# Patient Record
Sex: MALE | Race: WHITE | NOT HISPANIC OR LATINO | Employment: OTHER | URBAN - METROPOLITAN AREA
[De-identification: names, ages, dates, MRNs, and addresses within clinical notes are randomized per-mention and may not be internally consistent; named-entity substitution may affect disease eponyms.]

---

## 2017-11-27 ENCOUNTER — ALLSCRIPTS OFFICE VISIT (OUTPATIENT)
Dept: OTHER | Facility: OTHER | Age: 62
End: 2017-11-27

## 2017-11-29 NOTE — PROGRESS NOTES
Assessment    1  Encounter to establish care (V65 8) (Z76 89)   2  Encounter for preventive health examination (V70 0) (Z00 00)   3  BMI 45 0-49 9, adult (V85 42) (Z68 42)   4  Right groin pain (789 09) (R10 31)   5  Screening for colorectal cancer (V76 51) (Z12 11,Z12 12)   6  Fatigue (780 79) (R53 83)   7  Screening for prostate cancer (V76 44) (Z12 5)   8  Screening for lipid disorders (V77 91) (Z13 220)   9  Screening for other and unspecified cardiovascular conditions (V81 2) (Z13 6)   10  Borderline high blood pressure (796 2) (R03 0)    Plan  BMI 45 0-49 9, adult    · 1 Samantha Ratliff MD, Jose Alfredo Morales (Internal Medicine) Co-Management  *  Status: Active  Requestedfor: 61RBL6181  Care Summary provided  : Yes  BMI 45 0-49 9, adult, Fatigue    · (1) CBC/PLT/DIFF; Status:Active; Requested for:27Nov2017;    · (1) COMPREHENSIVE METABOLIC PANEL; Status:Active; Requested for:27Nov2017;    · (1) TSH WITH FT4 REFLEX; Status:Active; Requested for:27Nov2017; Health Maintenance    · Begin a limited exercise program ; Status:Complete;   Done: 15HNE8241   · Decreasing the stress in your life may help your condition improve ; Status:Complete;  Done: 04JSK7320   · Drink plenty of fluids ; Status:Complete;   Done: 68PBL2782   · Regular aerobic exercise can help reduce stress ; Status:Complete;   Done: 54YLU5562   · There are many exercise options for seniors ; Status:Complete;   Done: 88ZIM1415   · We recommend a colonoscopy ; Status:Complete;   Done: 86ZHQ0204   · We recommend that you follow the Mediterranean diet ; Status:Complete;   Done:27Nov2017  Right groin pain    · * XR HIPS BILATERAL 2 VW W PELVIS IF PERFORMED; Status:Active; Requestedfor:27Nov2017;    · * XR SPINE LUMBAR MINIMUM 4 VIEWS NON INJURY; Status:Active;  Requestedfor:27Nov2017;    · *1 - [facility] Co-[facility]  *  Status: Active  Requested for:27Nov2017  Care Summary provided  : Yes  Screening for colorectal cancer    · 1 Lisa Nichols MD, Lolly West (Gastroenterology) Co-Management  *  Status: Active Requested for: 14XAO6401  Care Summary provided  : Yes   · 1 - Srinivasan Crawley MD, Frannie Gaviria  (Gastroenterology) Co-Management  *  Status: Active  Requested for:27Nov2017  Care Summary provided  : Yes  Screening for lipid disorders, Screening for other and unspecified cardiovascularconditions    · (1) LIPID PANEL FASTING W DIRECT LDL REFLEX; Status:Active; Requestedfor:27Nov2017;   Screening for prostate cancer    · (1) PSA (SCREEN) (Dx V76 44 Screen for Prostate Cancer); Status:Active; Requestedfor:27Nov2017;     Discussion/Summary  Discussion Summary:   49-year-old male here to establish care  Have strongly urged, advised on the importance of lifestyle modifications  Will refer to Dr Karin Blankenship for further management as well   groin pain: Unclear etiology  Will obtain x-rays of low back as well as hips  Will refer to Orthopedics for further evaluation  Exam WNL today  BP: Patient is asymptomatic  Will monitor closely  Have discussed the importance of lifestyle modification, working on weight loss, continue routine exercise regimen, limiting sodium intake  Patient admits to not eating as healthy  I would like patient to return in 2 weeks for BP check  No prior colonoscopy  Referral to GI provided  Will check routine blood work  flu vaccine  maintenance care guided provided  in 2 weeks for BP check or sooner if needed  Counseling Documentation With Imm: The patient was counseled regarding diagnostic results,-- instructions for management,-- risk factor reductions,-- prognosis,-- patient and family education,-- impressions,-- risks and benefits of treatment options,-- importance of compliance with treatment  Medication SE Review and Pt Understands Tx: Possible side effects of new medications were reviewed with the patient/guardian today  The treatment plan was reviewed with the patient/guardian   The patient/guardian understands and agrees with the treatment plan      Chief Complaint  Chief Complaint Free Text Note Form: Pt is here to re-establish care  Pt states he has discomfort right side of groin area times 3 months  All meds/allergies reviewed w/ pt  History of Present Illness  HPI: 67-year-old male here to establish care  states he has a h/o elevated bp, treated for it in his 18sa h/o gout in the pastdailystates he has had a 3 month h/o intermittent right groin painwith prolonged sittingright hip painlow back painany injuries  any pain at present  Review of Systems  Complete-Male:  Constitutional: recent weight gain  Eyes: No complaints of eye pain, no red eyes, no discharge from eyes, no itchy eyes  ENT: no complaints of earache, no hearing loss, no nosebleeds, no nasal discharge, no sore throat, no hoarseness  Cardiovascular: No complaints of slow heart rate, no fast heart rate, no chest pain, no palpitations, no leg claudication, no lower extremity,-- no chest pain,-- no palpitations-- and-- no extremity edema  Respiratory: No complaints of shortness of breath, no wheezing, no cough, no SOB on exertion, no orthopnea or PND-- and-- no shortness of breath  Gastrointestinal: No complaints of abdominal pain, no constipation, no nausea or vomiting, no diarrhea or bloody stools-- and-- no abdominal pain  Genitourinary: no dysuria-- and-- no incontinence  Musculoskeletal: myalgias-- and-- groin pain, but-- as noted in HPI  Neurological: no headache,-- no numbness,-- no tingling-- and-- no limb weakness  Psychiatric: no depression  Active Problems  1  Abnormal EKG (794 31) (R94 31)   2  Chest pain (786 50) (R07 9)   3  Dysuria (788 1) (R30 0)   4  Obesity (278 00) (E66 9)    Past Medical History  1  History of sciatica (V12 49) (Z86 69)  Active Problems And Past Medical History Reviewed: The active problems and past medical history were reviewed and updated today  Surgical History  1  History of Hand Surgery   2   History of Oral Surgery Tooth Extraction 3  History of Tonsillectomy  Surgical History Reviewed: The surgical history was reviewed and updated today  Family History  Mother    1  Family history of Breast Cancer (V16 3)  Father    2  Family history of Heart Disease (V17 49)  Family History Reviewed: The family history was reviewed and updated today  Social History   · Alcohol Use (History)   · Coffee   · Never a smoker   · Occupation: Retired   · Denied: History of Smoker, Current Status Unknown  Social History Reviewed: The social history was reviewed and updated today  Current Meds   1  No Reported Medications Recorded    Allergies  1  [address]  Vital Signs    Recorded: 23SYX6024 12:01PM Recorded: 58ZEP2921 11:16AM   Temperature  98 7 F   Heart Rate  84   Respiration  16   Systolic 937 115   Diastolic 78 80   Height  5 ft 9 in   Weight  337 lb 2 oz   BMI Calculated  49 79   BSA Calculated  2 58       Physical Exam   Constitutional  General appearance: No acute distress, well appearing and well nourished  Eyes  Conjunctiva and lids: No swelling, erythema, or discharge  Pupils and irises: Equal, round and reactive to light  Ears, Nose, Mouth, and Throat  External inspection of ears and nose: Normal    Otoscopic examination: Tympanic membrance translucent with normal light reflex  Canals patent without erythema  Oropharynx: Normal with no erythema, edema, exudate or lesions  Pulmonary  Respiratory effort: No increased work of breathing or signs of respiratory distress  Auscultation of lungs: Clear to auscultation, equal breath sounds bilaterally, no wheezes, no rales, no rhonci  Cardiovascular  Auscultation of heart: Normal rate and rhythm, normal S1 and S2, without murmurs  Examination of extremities for edema and/or varicosities: Normal    Carotid pulses: Normal    Abdomen  Abdomen: Non-tender, no masses  -- obese  Liver and spleen: No hepatomegaly or splenomegaly     Lymphatic  Palpation of lymph nodes in neck: No lymphadenopathy  Musculoskeletal  Inspection/palpation of joints, bones, and muscles: Normal  -- full rom of right hip  Neurologic  Cranial nerves: Cranial nerves 2-12 intact     Psychiatric  Mood and affect: Normal          Future Appointments    Date/Time Provider Specialty Site   12/11/2017 11:00 AM Catrina Hartman MD Family Medicine [address] [place] [address]       Signatures   Electronically signed by : Chris Gee MD; Nov 28 2017  8:07PM EST                       (Author)

## 2017-11-30 ENCOUNTER — APPOINTMENT (OUTPATIENT)
Dept: LAB | Facility: CLINIC | Age: 62
End: 2017-11-30
Payer: COMMERCIAL

## 2017-11-30 ENCOUNTER — TRANSCRIBE ORDERS (OUTPATIENT)
Dept: LAB | Facility: CLINIC | Age: 62
End: 2017-11-30

## 2017-11-30 DIAGNOSIS — Z13.6 SCREENING FOR ISCHEMIC HEART DISEASE: ICD-10-CM

## 2017-11-30 DIAGNOSIS — R53.83 FATIGUE, UNSPECIFIED TYPE: ICD-10-CM

## 2017-11-30 DIAGNOSIS — Z12.5 SPECIAL SCREENING FOR MALIGNANT NEOPLASM OF PROSTATE: ICD-10-CM

## 2017-11-30 DIAGNOSIS — Z13.220 SCREENING FOR LIPOID DISORDERS: ICD-10-CM

## 2017-11-30 LAB
ALBUMIN SERPL BCP-MCNC: 3.6 G/DL (ref 3.5–5)
ALP SERPL-CCNC: 68 U/L (ref 46–116)
ALT SERPL W P-5'-P-CCNC: 43 U/L (ref 12–78)
ANION GAP SERPL CALCULATED.3IONS-SCNC: 6 MMOL/L (ref 4–13)
AST SERPL W P-5'-P-CCNC: 29 U/L (ref 5–45)
BASOPHILS # BLD AUTO: 0.03 THOUSANDS/ΜL (ref 0–0.1)
BASOPHILS NFR BLD AUTO: 1 % (ref 0–1)
BILIRUB SERPL-MCNC: 1.15 MG/DL (ref 0.2–1)
BUN SERPL-MCNC: 11 MG/DL (ref 5–25)
CALCIUM SERPL-MCNC: 8.6 MG/DL (ref 8.3–10.1)
CHLORIDE SERPL-SCNC: 107 MMOL/L (ref 100–108)
CHOLEST SERPL-MCNC: 147 MG/DL (ref 50–200)
CO2 SERPL-SCNC: 29 MMOL/L (ref 21–32)
CREAT SERPL-MCNC: 0.75 MG/DL (ref 0.6–1.3)
EOSINOPHIL # BLD AUTO: 0.31 THOUSAND/ΜL (ref 0–0.61)
EOSINOPHIL NFR BLD AUTO: 5 % (ref 0–6)
ERYTHROCYTE [DISTWIDTH] IN BLOOD BY AUTOMATED COUNT: 14 % (ref 11.6–15.1)
GFR SERPL CREATININE-BSD FRML MDRD: 98 ML/MIN/1.73SQ M
GLUCOSE P FAST SERPL-MCNC: 106 MG/DL (ref 65–99)
HCT VFR BLD AUTO: 45.8 % (ref 36.5–49.3)
HDLC SERPL-MCNC: 38 MG/DL (ref 40–60)
HGB BLD-MCNC: 16.1 G/DL (ref 12–17)
LDLC SERPL CALC-MCNC: 88 MG/DL (ref 0–100)
LYMPHOCYTES # BLD AUTO: 1.7 THOUSANDS/ΜL (ref 0.6–4.47)
LYMPHOCYTES NFR BLD AUTO: 27 % (ref 14–44)
MCH RBC QN AUTO: 31 PG (ref 26.8–34.3)
MCHC RBC AUTO-ENTMCNC: 35.2 G/DL (ref 31.4–37.4)
MCV RBC AUTO: 88 FL (ref 82–98)
MONOCYTES # BLD AUTO: 0.66 THOUSAND/ΜL (ref 0.17–1.22)
MONOCYTES NFR BLD AUTO: 11 % (ref 4–12)
NEUTROPHILS # BLD AUTO: 3.58 THOUSANDS/ΜL (ref 1.85–7.62)
NEUTS SEG NFR BLD AUTO: 56 % (ref 43–75)
NRBC BLD AUTO-RTO: 0 /100 WBCS
PLATELET # BLD AUTO: 226 THOUSANDS/UL (ref 149–390)
PMV BLD AUTO: 9.8 FL (ref 8.9–12.7)
POTASSIUM SERPL-SCNC: 3.7 MMOL/L (ref 3.5–5.3)
PROT SERPL-MCNC: 7 G/DL (ref 6.4–8.2)
PSA SERPL-MCNC: 3.4 NG/ML (ref 0–4)
RBC # BLD AUTO: 5.19 MILLION/UL (ref 3.88–5.62)
SODIUM SERPL-SCNC: 142 MMOL/L (ref 136–145)
TRIGL SERPL-MCNC: 106 MG/DL
TSH SERPL DL<=0.05 MIU/L-ACNC: 2.32 UIU/ML (ref 0.36–3.74)
WBC # BLD AUTO: 6.29 THOUSAND/UL (ref 4.31–10.16)

## 2017-11-30 PROCEDURE — 80061 LIPID PANEL: CPT

## 2017-11-30 PROCEDURE — 36415 COLL VENOUS BLD VENIPUNCTURE: CPT

## 2017-11-30 PROCEDURE — 80053 COMPREHEN METABOLIC PANEL: CPT

## 2017-11-30 PROCEDURE — 84443 ASSAY THYROID STIM HORMONE: CPT

## 2017-11-30 PROCEDURE — G0103 PSA SCREENING: HCPCS

## 2017-11-30 PROCEDURE — 85025 COMPLETE CBC W/AUTO DIFF WBC: CPT

## 2017-12-01 ENCOUNTER — HOSPITAL ENCOUNTER (OUTPATIENT)
Dept: RADIOLOGY | Facility: HOSPITAL | Age: 62
Discharge: HOME | End: 2017-12-01
Payer: COMMERCIAL

## 2017-12-01 ENCOUNTER — TRANSCRIBE ORDERS (OUTPATIENT)
Dept: LAB | Facility: CLINIC | Age: 62
End: 2017-12-01

## 2017-12-01 ENCOUNTER — TRANSCRIBE ORDERS (OUTPATIENT)
Dept: ADMINISTRATIVE | Facility: HOSPITAL | Age: 62
End: 2017-12-01

## 2017-12-01 ENCOUNTER — GENERIC CONVERSION - ENCOUNTER (OUTPATIENT)
Dept: OTHER | Facility: OTHER | Age: 62
End: 2017-12-01

## 2017-12-01 ENCOUNTER — APPOINTMENT (OUTPATIENT)
Dept: LAB | Facility: CLINIC | Age: 62
End: 2017-12-01
Payer: COMMERCIAL

## 2017-12-01 ENCOUNTER — GENERIC CONVERSION - ENCOUNTER (OUTPATIENT)
Dept: FAMILY MEDICINE CLINIC | Facility: CLINIC | Age: 62
End: 2017-12-01

## 2017-12-01 DIAGNOSIS — R10.31 ABDOMINAL PAIN, RLQ: ICD-10-CM

## 2017-12-01 DIAGNOSIS — R73.01 IMPAIRED FASTING GLUCOSE: Primary | ICD-10-CM

## 2017-12-01 DIAGNOSIS — R73.01 IMPAIRED FASTING GLUCOSE: ICD-10-CM

## 2017-12-01 DIAGNOSIS — R10.31 ABDOMINAL PAIN, RLQ: Primary | ICD-10-CM

## 2017-12-01 LAB
EST. AVERAGE GLUCOSE BLD GHB EST-MCNC: 120 MG/DL
HBA1C MFR BLD: 5.8 % (ref 4.2–6.3)

## 2017-12-01 PROCEDURE — 73521 X-RAY EXAM HIPS BI 2 VIEWS: CPT

## 2017-12-01 PROCEDURE — 36415 COLL VENOUS BLD VENIPUNCTURE: CPT

## 2017-12-01 PROCEDURE — 72110 X-RAY EXAM L-2 SPINE 4/>VWS: CPT

## 2017-12-01 PROCEDURE — 83036 HEMOGLOBIN GLYCOSYLATED A1C: CPT

## 2017-12-03 ENCOUNTER — GENERIC CONVERSION - ENCOUNTER (OUTPATIENT)
Dept: OTHER | Facility: OTHER | Age: 62
End: 2017-12-03

## 2017-12-11 ENCOUNTER — GENERIC CONVERSION - ENCOUNTER (OUTPATIENT)
Dept: OTHER | Facility: OTHER | Age: 62
End: 2017-12-11

## 2017-12-12 ENCOUNTER — ALLSCRIPTS OFFICE VISIT (OUTPATIENT)
Dept: OTHER | Facility: OTHER | Age: 62
End: 2017-12-12

## 2017-12-12 ENCOUNTER — GENERIC CONVERSION - ENCOUNTER (OUTPATIENT)
Dept: OTHER | Facility: OTHER | Age: 62
End: 2017-12-12

## 2017-12-12 LAB
BILIRUB UR QL STRIP: NORMAL
CLARITY UR: NORMAL
COLOR UR: YELLOW
GLUCOSE (HISTORICAL): NORMAL
HGB UR QL STRIP.AUTO: NORMAL
KETONES UR STRIP-MCNC: NORMAL MG/DL
LEUKOCYTE ESTERASE UR QL STRIP: NORMAL
NITRITE UR QL STRIP: NORMAL
PH UR STRIP.AUTO: 8 [PH]
PROT UR STRIP-MCNC: NORMAL MG/DL
SP GR UR STRIP.AUTO: 1.01
UROBILINOGEN UR QL STRIP.AUTO: 0.2

## 2018-01-14 VITALS
WEIGHT: 315 LBS | BODY MASS INDEX: 46.65 KG/M2 | HEIGHT: 69 IN | RESPIRATION RATE: 16 BRPM | DIASTOLIC BLOOD PRESSURE: 78 MMHG | TEMPERATURE: 98.7 F | SYSTOLIC BLOOD PRESSURE: 140 MMHG | HEART RATE: 84 BPM

## 2018-01-23 NOTE — PROGRESS NOTES
Chief Complaint  Pt is here for nurse visit for urine check      Active Problems   1  Abnormal EKG (794 31) (R94 31)  2  BMI 45 0-49 9, adult (V85 42) (Z68 42)  3  Borderline high blood pressure (796 2) (R03 0)  4  Chest pain (786 50) (R07 9)  5  Dysuria (788 1) (R30 0)  6  Encounter to establish care (V65 8) (Z76 89)  7  Fatigue (780 79) (R53 83)  8  Impaired fasting glucose (790 21) (R73 01)  9  Obesity (278 00) (E66 9)  10  Right groin pain (789 09) (R10 31)  11  Screening for colorectal cancer (V76 51) (Z12 11,Z12 12)  12  Screening for lipid disorders (V77 91) (Z13 220)  13  Screening for other and unspecified cardiovascular conditions (V81 2) (Z13 6)  14  Screening for prostate cancer (V76 44) (Z12 5)  15  Testicular swelling, right (608 86) (N50 89)    Current Meds  1  No Reported Medications Recorded    Allergies   1   [facility] Appointments    Date/Time Provider Specialty Site   06/12/2018 11:30 AM Praveen Le MD Family Medicine [facility] [address]   Electronically signed by : Iliana Diane MD; Dec 12 2017  5:27PM EST                       (Author)

## 2018-01-23 NOTE — RESULT NOTES
Verified Results  * XR HIPS BILATERAL 2 VW W PELVIS IF PERFORMED [id number] 11:21AM Dariela Garrido     Test Name Result Flag Reference   XR HIPS BILATERAL WITH AP PELVIS (Report)     BILATERAL HIPS AND PELVIS     INDICATION: Right-sided groin pain for 2 to 3 months  COMPARISON: None     VIEWS: AP pelvis and coned down views of each hip     IMAGES: 6      FINDINGS:   No acute pelvic fracture or pathologic bone lesions  Bilateral pelvic phleboliths  LEFT HIP:   Mild degenerative changes without joint space narrowing  Bony alignment is maintained  Soft tissues are unremarkable  RIGHT HIP:   Mild degenerative changes without joint space narrowing  Bony alignment is maintained  Soft tissues are unremarkable  IMPRESSION:     No acute osseous abnormality  Mild osteoarthritis of the hips bilaterally  Workstation performed: [id number]     Signed by:   Renu Maier DO   12/6/17     * XR SPINE LUMBAR MINIMUM 4 VIEWS NON INJURY [id number] 11:21AM Dariela Garrido     Test Name Result Flag Reference   XR SPINE LUMBAR MINIMUM 4 VIEWS (Report)     LUMBAR SPINE     INDICATION: Lower back stiffness for years  COMPARISON: None     VIEWS: AP, lateral, bilateral oblique and coned down projections     IMAGES: 5     FINDINGS:     No spondylolisthesis  There is slight exaggeration of the lumbar lordosis  There is suggestion of faint sclerosis along the adjacent margins of the L3-4 spinous processes which 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  There is no radiographic evidence of acute fracture or destructive osseous lesion  Bilateral facet arthropathy L5-S1  The intervertebral disc spaces are preserved  Small marginal endplate spurs are seen in the upper lumbar spine with bridging lower thoracic paravertebral ossifications  These findings could be secondary to diffuse idiopathic skeletal hyperostosis (DISH)  The pedicles appear intact  No pars defects       Visualized soft tissues appear unremarkable  IMPRESSION:     No acute findings  Degenerative changes as above with suggestive evidence of DISH         Workstation performed: [id number]     Signed by:   Jing Goodman DO   12/6/17

## 2018-01-23 NOTE — RESULT NOTES
Verified Results  Urine Dip Non-Automated- POC [id number] 02:35PM Dariela Garrido     Test Name Result Flag Reference   Color Yellow     Clarity Cloudy     Leukocytes -     Nitrite -     Blood -     Bilirubin -     Urobilinogen 0 2     Protein -     Ph 8 0     Specific Gravity 1 010     Ketone -     Glucose -

## 2018-01-23 NOTE — RESULT NOTES
Verified Results  (1) HEMOGLOBIN A1C [id number] 10:36AM Dariela Garrido     Test Name Result Flag Reference   HEMOGLOBIN A1C 5 8 %  4 2-6 3   EST  AVG   GLUCOSE 120 mg/dl

## 2018-01-23 NOTE — RESULT NOTES
Verified Results  (1) CBC/PLT/DIFF [id number] 08:48AM Dariela Garrido     Test Name Result Flag Reference   WBC COUNT 6 29 Thousand/uL  4 31-10 16   RBC COUNT 5 19 Million/uL  3 88-5 62   HEMOGLOBIN 16 1 g/dL  12 0-17 0   HEMATOCRIT 45 8 %  36 5-49 3   MCV 88 fL  82-98   MCH 31 0 pg  26 8-34 3   MCHC 35 2 g/dL  31 4-37 4   RDW 14 0 %  11 6-15 1   MPV 9 8 fL  8 9-12 7   PLATELET COUNT 453 Thousands/uL  149-390   nRBC AUTOMATED 0 /100 WBCs     NEUTROPHILS RELATIVE PERCENT 56 %  43-75   LYMPHOCYTES RELATIVE PERCENT 27 %  14-44   MONOCYTES RELATIVE PERCENT 11 %  4-12   EOSINOPHILS RELATIVE PERCENT 5 %  0-6   BASOPHILS RELATIVE PERCENT 1 %  0-1   NEUTROPHILS ABSOLUTE COUNT 3 58 Thousands/? ??L  1 85-7 62   LYMPHOCYTES ABSOLUTE COUNT 1 70 Thousands/? ??L  0 60-4 47   MONOCYTES ABSOLUTE COUNT 0 66 Thousand/? ??L  0 17-1 22   EOSINOPHILS ABSOLUTE COUNT 0 31 Thousand/? ??L  0 00-0 61   BASOPHILS ABSOLUTE COUNT 0 03 Thousands/? ??L  0 00-0 10   This is a patient instruction: This test is non-fasting  Please drink two glasses of water morning of bloodwork  (1) COMPREHENSIVE METABOLIC PANEL [id number] 20:79XE Dariela Garrido     Test Name Result Flag Reference   SODIUM 142 mmol/L  136-145   POTASSIUM 3 7 mmol/L  3 5-5 3   CHLORIDE 107 mmol/L  100-108   CARBON DIOXIDE 29 mmol/L  21-32   ANION GAP (CALC) 6 mmol/L  4-13   BLOOD UREA NITROGEN 11 mg/dL  5-25   CREATININE 0 75 mg/dL  0 60-1 30   Standardized to IDMS reference method   CALCIUM 8 6 mg/dL  8 3-10 1   BILI, TOTAL 1 15 mg/dL H 0 20-1 00   ALK PHOSPHATAS 68 U/L     ALT (SGPT) 43 U/L  12-78   Specimen collection should occur prior to Sulfasalazine and/or Sulfapyridine administration due to the potential for falsely depressed results  AST(SGOT) 29 U/L  5-45   Specimen collection should occur prior to Sulfasalazine administration due to the potential for falsely depressed results     ALBUMIN 3 6 g/dL  3 5-5 0   TOTAL PROTEIN 7 0 g/dL  6 4-8 2   eGFR 98 ml/min/1 73sq m This is a patient instruction: Patient fasting for 8 hours or longer recommended  National Kidney Disease Education Program recommendations are as follows:  GFR calculation is accurate only with a steady state creatinine  Chronic Kidney disease less than 60 ml/min/1 73 sq  meters  Kidney failure less than 15 ml/min/1 73 sq  meters  GLUCOSE FASTING 106 mg/dL H 65-99   Specimen collection should occur prior to Sulfasalazine administration due to the potential for falsely depressed results  Specimen collection should occur prior to Sulfapyridine administration due to the potential for falsely elevated results  (1) TSH WITH FT4 REFLEX [id number] 08:48AM Dariela Garrido     Test Name Result Flag Reference   TSH 2 320 uIU/mL  0 358-3 740   Patients undergoing fluorescein dye angiography may retain small amounts of fluorescein in the body for 48-72 hours post procedure  Samples containing fluorescein can produce falsely depressed TSH values  If the patient had this procedure,a specimen should be resubmitted post fluorescein clearance  (1) LIPID PANEL FASTING W DIRECT LDL REFLEX [id number] 08:48AM Dariela Garrido     Test Name Result Flag Reference   CHOLESTEROL 147 mg/dL     LDL CHOLESTEROL CALCULATED 88 mg/dL  0-100   This is a patient instruction: This test requires patient fasting for 10-12 hours or longer  Drinking of black coffee or black tea is acceptable  Triglyceride:        Normal <150 mg/dl   Borderline High 150-199 mg/dl   High 200-499 mg/dl   Very High >499 mg/dl      Cholesterol:       Desirable <200 mg/dl    Borderline High 200-239 mg/dl    High >239 mg/dl      HDL Cholesterol:       High>59 mg/dL    Low <41 mg/dL      HDL Cholesterol:       High>59 mg/dL    Low <41 mg/dL      This screening LDL is a calculated result  It does not have the accuracy of the Direct Measured LDL in the monitoring of patients with hyperlipidemia and/or statin therapy     Direct Measure LDL (DVS577) must be ordered separately in these patients  TRIGLYCERIDES 106 mg/dL  <=150   Specimen collection should occur prior to N-Acetylcysteine or Metamizole administration due to the potential for falsely depressed results  HDL,DIRECT 38 mg/dL L 40-60   Specimen collection should occur prior to Metamizole administration due to the potential for falsley depressed results  (1) PSA (SCREEN) (Dx V76 44 Screen for Prostate Cancer) [id number] 08:48AM Dariela Garrido     Test Name Result Flag Reference   PROSTATE SPECIFIC ANTIGEN 3 4 ng/mL  0 0-4 0   American Urological Association Guidelines define biochemical recurrence of prostate cancer as a detectable or rising PSA value post-radical prostatectomy that is greater than or equal to 0 2 ng/mL with a second confirmatory level of greater than or equal to 0 2 ng/mL  This is a patient instruction: This test is non-fasting  Please drink two glasses of water morning of bloodwork

## 2018-01-24 VITALS
RESPIRATION RATE: 16 BRPM | WEIGHT: 315 LBS | TEMPERATURE: 97.5 F | HEART RATE: 76 BPM | DIASTOLIC BLOOD PRESSURE: 72 MMHG | SYSTOLIC BLOOD PRESSURE: 126 MMHG | BODY MASS INDEX: 46.65 KG/M2 | HEIGHT: 69 IN

## 2018-03-13 ENCOUNTER — OFFICE VISIT (OUTPATIENT)
Dept: GASTROENTEROLOGY | Facility: AMBULARY SURGERY CENTER | Age: 63
End: 2018-03-13
Payer: COMMERCIAL

## 2018-03-13 ENCOUNTER — APPOINTMENT (OUTPATIENT)
Dept: LAB | Facility: AMBULARY SURGERY CENTER | Age: 63
End: 2018-03-13
Attending: INTERNAL MEDICINE
Payer: COMMERCIAL

## 2018-03-13 VITALS
SYSTOLIC BLOOD PRESSURE: 150 MMHG | HEIGHT: 69 IN | WEIGHT: 300 LBS | HEART RATE: 85 BPM | DIASTOLIC BLOOD PRESSURE: 80 MMHG | BODY MASS INDEX: 44.43 KG/M2 | TEMPERATURE: 98.2 F | RESPIRATION RATE: 14 BRPM

## 2018-03-13 DIAGNOSIS — R19.7 DIARRHEA, UNSPECIFIED TYPE: ICD-10-CM

## 2018-03-13 DIAGNOSIS — Z12.11 COLON CANCER SCREENING: Primary | ICD-10-CM

## 2018-03-13 PROCEDURE — 87177 OVA AND PARASITES SMEARS: CPT

## 2018-03-13 PROCEDURE — 99244 OFF/OP CNSLTJ NEW/EST MOD 40: CPT | Performed by: INTERNAL MEDICINE

## 2018-03-13 PROCEDURE — 87493 C DIFF AMPLIFIED PROBE: CPT

## 2018-03-13 PROCEDURE — 87209 SMEAR COMPLEX STAIN: CPT

## 2018-03-13 PROCEDURE — 87505 NFCT AGENT DETECTION GI: CPT

## 2018-03-13 RX ORDER — DICYCLOMINE HYDROCHLORIDE 10 MG/1
10 CAPSULE ORAL 3 TIMES DAILY PRN
Qty: 90 CAPSULE | Refills: 0 | Status: SHIPPED | OUTPATIENT
Start: 2018-03-13 | End: 2018-06-12

## 2018-03-13 NOTE — LETTER
March 13, 2018     Mauro Rocha, [address]    Patient: Ron Begum   YOB: 1955   Date of Visit: 3/13/2018       Dear Dr Sam Red:    Thank you for referring Rosemary Michelle to me for evaluation  Below are my notes for this consultation  If you have questions, please do not hesitate to call me  I look forward to following your patient along with you           Sincerely,        Radha Espinoza MD        CC: No Recipients

## 2018-03-13 NOTE — PROGRESS NOTES
Consultation - [facility]  Oswaldo Banda 57 y o  male MRN: [id number]  Unit/Bed#:  Encounter: [id number]        Consults    ASSESSMENT/PLAN:   1  Change in bowel habits with diarrhea over the past 3 months-states that he has been having 3-4 loose bowel movements on a daily basis for the past 3 months  Has been attempting to lose weight and has therefore made dietary changes and has lost 37 pounds  No new medications  Other possibility includes infectious causes versus microscopic colitis versus overflow incontinence due to colonic lesion-no prior colonoscopy  TSH, CBC and CMP were normal in November 2017   -We'll check stool studies including ova and parasites, cultures and C  Difficile   -Obtain colonoscopy to assess for colonic lesions/polyps and to assess for microscopic colitis   -Discussed avoiding dairy products  Adding probiotic and a diet and as needed antispasmodics meanwhile           ______________________________________________________________________    Reason for Consult / Principal Problem: [unfilled]    HPI: Oswaldo Banda is a 57y o  year old male medical history presents for evaluation of change in bowel habits and colon cancer screening  Patient states that he has never had a colonoscopy, denies family history of GI malignancy  He denies hematochezia but does endorse change in bowel habits over the past 3 months  Patient states that his bowel movements have become more loose, is having 2-4 loose bowel movements on a daily basis  He states that he has lost 37 pounds however attributes this to dietary changes and exercise  He denies change in medications, in fact does not take any medications at this time  He denies recent travel or antibiotic use  No sick contacts  Lab work is reviewed, CBC shows normal hemoglobin, TSH is normal and liver function tests is also normal     Review of Systems:   The remainder of the review of systems was negative except for the pertinent positives noted in HPI  Historical Information   History reviewed  No pertinent past medical history  Past Surgical History:   Procedure Laterality Date    TONSILECTOMY AND ADNOIDECTOMY       Social History   History   Alcohol Use    Yes     History   Drug Use No     History   Smoking Status    Never Smoker   Smokeless Tobacco    Never Used     History reviewed  No pertinent family history  Meds/Allergies       (Not in a hospital admission)  No current facility-administered medications for this visit  Allergies   Allergen Reactions    Cefadroxil      Action Taken: fever;        Objective     Blood pressure 150/80, pulse 85, temperature 98 2 °F (36 8 °C), temperature source Tympanic, resp  rate 14, height 5' 9" (1 753 m), weight 136 kg (300 lb)  [unfilled]    PHYSICAL EXAM     GEN: well nourished, well developed, no acute distress  HEENT: anicteric, MMM, no cervical or supraclavicular lymphadenopathy  CV: RRR, no m/r/g  CHEST: CTA b/l, no WRR  ABD: +BS, soft, NT/ND, no hepatosplenomegaly  EXT: no c/c/e  SKIN: no rashes,  NEURO: aaox3    Lab Results:   No visits with results within 1 Day(s) from this visit     Latest known visit with results is:   [employer] Office Visit on 12/12/2017   Component Date Value    Color, UA 12/12/2017 Yellow     Clarity, UA 12/12/2017 Cloudy     Leukocytes, UA 12/12/2017 -     Nitrite, UA 12/12/2017 -     Blood, UA 12/12/2017 -     Bilirubin, UA 12/12/2017 -     Urobilinogen, UA 12/12/2017 0 2     Protein, UA 12/12/2017 -     pH, UA 12/12/2017 8 0     Specific Arp, UA 12/12/2017 1 010     Ketones, UA 12/12/2017 -     Glucose 12/12/2017 -      Imaging Studies: I have personally reviewed pertinent films in PACS

## 2018-03-14 LAB
C DIFF TOX GENS STL QL NAA+PROBE: NORMAL
CAMPYLOBACTER DNA SPEC NAA+PROBE: NORMAL
SALMONELLA DNA SPEC QL NAA+PROBE: NORMAL
SHIGA TOXIN STX GENE SPEC NAA+PROBE: NORMAL
SHIGELLA DNA SPEC QL NAA+PROBE: NORMAL

## 2018-03-16 LAB — O+P STL CONC: NORMAL

## 2018-03-21 ENCOUNTER — ANESTHESIA EVENT (OUTPATIENT)
Dept: GASTROENTEROLOGY | Facility: HOSPITAL | Age: 63
End: 2018-03-21
Payer: COMMERCIAL

## 2018-03-22 ENCOUNTER — ANESTHESIA (OUTPATIENT)
Dept: GASTROENTEROLOGY | Facility: HOSPITAL | Age: 63
End: 2018-03-22
Payer: COMMERCIAL

## 2018-03-22 ENCOUNTER — HOSPITAL ENCOUNTER (OUTPATIENT)
Facility: HOSPITAL | Age: 63
Setting detail: OUTPATIENT SURGERY
Discharge: HOME | End: 2018-03-22
Attending: INTERNAL MEDICINE | Admitting: INTERNAL MEDICINE
Payer: COMMERCIAL

## 2018-03-22 VITALS
HEIGHT: 69 IN | SYSTOLIC BLOOD PRESSURE: 145 MMHG | TEMPERATURE: 97.5 F | OXYGEN SATURATION: 98 % | DIASTOLIC BLOOD PRESSURE: 77 MMHG | BODY MASS INDEX: 43.69 KG/M2 | WEIGHT: 295 LBS | HEART RATE: 85 BPM | RESPIRATION RATE: 18 BRPM

## 2018-03-22 DIAGNOSIS — R19.7 DIARRHEA, UNSPECIFIED TYPE: ICD-10-CM

## 2018-03-22 DIAGNOSIS — Z12.11 COLON CANCER SCREENING: ICD-10-CM

## 2018-03-22 PROCEDURE — 88305 TISSUE EXAM BY PATHOLOGIST: CPT | Performed by: PATHOLOGY

## 2018-03-22 PROCEDURE — 45380 COLONOSCOPY AND BIOPSY: CPT | Performed by: INTERNAL MEDICINE

## 2018-03-22 PROCEDURE — 45385 COLONOSCOPY W/LESION REMOVAL: CPT | Performed by: INTERNAL MEDICINE

## 2018-03-22 RX ORDER — LIDOCAINE HYDROCHLORIDE 10 MG/ML
INJECTION, SOLUTION INFILTRATION; PERINEURAL AS NEEDED
Status: DISCONTINUED | OUTPATIENT
Start: 2018-03-22 | End: 2018-03-22 | Stop reason: SURG

## 2018-03-22 RX ORDER — PROPOFOL 10 MG/ML
INJECTION, EMULSION INTRAVENOUS AS NEEDED
Status: DISCONTINUED | OUTPATIENT
Start: 2018-03-22 | End: 2018-03-22 | Stop reason: SURG

## 2018-03-22 RX ORDER — SODIUM CHLORIDE, SODIUM LACTATE, POTASSIUM CHLORIDE, CALCIUM CHLORIDE 600; 310; 30; 20 MG/100ML; MG/100ML; MG/100ML; MG/100ML
125 INJECTION, SOLUTION INTRAVENOUS CONTINUOUS
Status: DISCONTINUED | OUTPATIENT
Start: 2018-03-22 | End: 2018-03-22 | Stop reason: HOSPADM

## 2018-03-22 RX ORDER — SODIUM CHLORIDE, SODIUM LACTATE, POTASSIUM CHLORIDE, CALCIUM CHLORIDE 600; 310; 30; 20 MG/100ML; MG/100ML; MG/100ML; MG/100ML
INJECTION, SOLUTION INTRAVENOUS CONTINUOUS PRN
Status: DISCONTINUED | OUTPATIENT
Start: 2018-03-22 | End: 2018-03-22 | Stop reason: SURG

## 2018-03-22 RX ADMIN — PROPOFOL 20 MG: 10 INJECTION, EMULSION INTRAVENOUS at 08:33

## 2018-03-22 RX ADMIN — PROPOFOL 20 MG: 10 INJECTION, EMULSION INTRAVENOUS at 08:52

## 2018-03-22 RX ADMIN — PROPOFOL 20 MG: 10 INJECTION, EMULSION INTRAVENOUS at 08:44

## 2018-03-22 RX ADMIN — PROPOFOL 30 MG: 10 INJECTION, EMULSION INTRAVENOUS at 08:16

## 2018-03-22 RX ADMIN — PROPOFOL 20 MG: 10 INJECTION, EMULSION INTRAVENOUS at 08:28

## 2018-03-22 RX ADMIN — SODIUM CHLORIDE, SODIUM LACTATE, POTASSIUM CHLORIDE, AND CALCIUM CHLORIDE: .6; .31; .03; .02 INJECTION, SOLUTION INTRAVENOUS at 08:10

## 2018-03-22 RX ADMIN — PROPOFOL 20 MG: 10 INJECTION, EMULSION INTRAVENOUS at 08:50

## 2018-03-22 RX ADMIN — PROPOFOL 20 MG: 10 INJECTION, EMULSION INTRAVENOUS at 08:31

## 2018-03-22 RX ADMIN — PROPOFOL 20 MG: 10 INJECTION, EMULSION INTRAVENOUS at 08:25

## 2018-03-22 RX ADMIN — PROPOFOL 20 MG: 10 INJECTION, EMULSION INTRAVENOUS at 08:39

## 2018-03-22 RX ADMIN — PROPOFOL 20 MG: 10 INJECTION, EMULSION INTRAVENOUS at 08:42

## 2018-03-22 RX ADMIN — PROPOFOL 20 MG: 10 INJECTION, EMULSION INTRAVENOUS at 08:46

## 2018-03-22 RX ADMIN — PROPOFOL 20 MG: 10 INJECTION, EMULSION INTRAVENOUS at 08:18

## 2018-03-22 RX ADMIN — PROPOFOL 20 MG: 10 INJECTION, EMULSION INTRAVENOUS at 08:37

## 2018-03-22 RX ADMIN — PROPOFOL 120 MG: 10 INJECTION, EMULSION INTRAVENOUS at 08:15

## 2018-03-22 RX ADMIN — PROPOFOL 20 MG: 10 INJECTION, EMULSION INTRAVENOUS at 08:20

## 2018-03-22 RX ADMIN — PROPOFOL 30 MG: 10 INJECTION, EMULSION INTRAVENOUS at 08:35

## 2018-03-22 RX ADMIN — PROPOFOL 20 MG: 10 INJECTION, EMULSION INTRAVENOUS at 08:40

## 2018-03-22 RX ADMIN — LIDOCAINE HYDROCHLORIDE 50 MG: 10 INJECTION, SOLUTION INFILTRATION; PERINEURAL at 08:14

## 2018-03-22 RX ADMIN — PROPOFOL 20 MG: 10 INJECTION, EMULSION INTRAVENOUS at 08:29

## 2018-03-22 RX ADMIN — PROPOFOL 20 MG: 10 INJECTION, EMULSION INTRAVENOUS at 08:23

## 2018-03-22 RX ADMIN — PROPOFOL 20 MG: 10 INJECTION, EMULSION INTRAVENOUS at 08:22

## 2018-03-22 RX ADMIN — PROPOFOL 20 MG: 10 INJECTION, EMULSION INTRAVENOUS at 08:48

## 2018-03-22 RX ADMIN — PROPOFOL 20 MG: 10 INJECTION, EMULSION INTRAVENOUS at 08:27

## 2018-03-22 NOTE — ANESTHESIA PREPROCEDURE EVALUATION
Review of Systems/Medical History      No history of anesthetic complications     Cardiovascular  Negative cardio ROS    Pulmonary  Negative pulmonary ROS        GI/Hepatic  Negative GI/hepatic ROS          Negative  ROS        Endo/Other  Negative endo/other ROS      GYN  Negative gynecology ROS          Hematology   Musculoskeletal    Arthritis     Neurology   Psychology           Physical Exam    Airway       Dental       Cardiovascular  Comment: Negative ROS,     Pulmonary      Other Findings        Anesthesia Plan  ASA Score- 3     Anesthesia Type- IV sedation with anesthesia with ASA Monitors  Additional Monitors:   Airway Plan:     Comment: IV sedation,  standard ASA monitors  Risks and benefits discussed with patient; patient consented and agrees to proceed  I saw and evaluated the patient  If seen with CRNA, we have discussed the anesthetic plan and I am in agreement that the plan is appropriate for the patient        Plan Factors-    Induction- intravenous  Postoperative Plan-     Informed Consent- Anesthetic plan and risks discussed with patient

## 2018-03-22 NOTE — DISCHARGE INSTRUCTIONS
Diverticulosis   WHAT YOU NEED TO KNOW:   Diverticulosis is a condition that causes small pockets called diverticula to form in your intestine  These pockets make it difficult for bowel movements to pass through your digestive system  DISCHARGE INSTRUCTIONS:   Seek care immediately if:   · You have severe pain on the left side of your lower abdomen  · Your bowel movements are bright or dark red  Contact your healthcare provider if:   · You have a fever and chills  · You feel dizzy or lightheaded  · You have nausea, or you are vomiting  · You have a change in your bowel movements  · You have questions or concerns about your condition or care  Medicines:   · Medicines  to soften your bowel movements may be given  You may also need medicines to treat symptoms such as bloating and pain  · Take your medicine as directed  Contact your healthcare provider if you think your medicine is not helping or if you have side effects  Tell him or her if you are allergic to any medicine  Keep a list of the medicines, vitamins, and herbs you take  Include the amounts, and when and why you take them  Bring the list or the pill bottles to follow-up visits  Carry your medicine list with you in case of an emergency  Self-care: The goal of treatment is to manage any symptoms you have and prevent other problems such as diverticulitis  Diverticulitis is swelling or infection of the diverticula  Your healthcare provider may recommend any of the following:  · Eat a variety of high-fiber foods  High-fiber foods help you have regular bowel movements  High-fiber foods include cooked beans, fruits, vegetables, and some cereals  Most adults need 25 to 35 grams of fiber each day  Your healthcare provider may recommend that you have more  Ask your healthcare provider how much fiber you need  Increase fiber slowly  You may have abdominal discomfort, bloating, and gas if you add fiber to your diet too quickly   You may need to take a fiber supplement if you are not getting enough fiber from food  · Drink liquids as directed  You may need to drink 2 to 3 liters (8 to 12 cups) of liquids every day  Ask your healthcare provider how much liquid to drink each day and which liquids are best for you  · Apply heat  on your abdomen for 20 to 30 minutes every 2 hours for as many days as directed  Heat helps decrease pain and muscle spasms  Help prevent diverticulitis or other symptoms: The following may help decrease your risk for diverticulitis or symptoms, such as bleeding  Talk to your provider about these or other things you can do to prevent problems that may occur with diverticulosis  · Exercise regularly  Ask your healthcare provider about the best exercise plan for you  Exercise can help you have regular bowel movements  Get 30 minutes of exercise on most days of the week  · Maintain a healthy weight  Ask your healthcare provider how much you should weigh  Ask him or her to help you create a weight loss plan if you are overweight  · Do not smoke  Nicotine and other chemicals in cigarettes increase your risk for diverticulitis  Ask your healthcare provider for information if you currently smoke and need help to quit  E-cigarettes or smokeless tobacco still contain nicotine  Talk to your healthcare provider before you use these products  · Ask your healthcare provider if it is safe to take NSAIDs  NSAIDs may increase your risk of diverticulitis  Follow up with your healthcare provider as directed:  Write down your questions so you remember to ask them during your visits  © 2017 [employer] Information is for End User's use only and may not be sold, redistributed or otherwise used for commercial purposes  All illustrations and images included in CareNotes® are the copyrighted property of [employer] , [employer]  or Jason Roldan  The above information is an  only   It is not intended as medical advice for individual conditions or treatments  Talk to your doctor, nurse or pharmacist before following any medical regimen to see if it is safe and effective for you

## 2018-03-22 NOTE — ANESTHESIA POSTPROCEDURE EVALUATION
Post-Op Assessment Note      CV Status:  Stable    Mental Status:  Alert and awake    Hydration Status:  Euvolemic    PONV Controlled:  Controlled    Airway Patency:  Patent    Post Op Vitals Reviewed: Yes          Comments: vss, report to rn          BP      Temp      Pulse     Resp      SpO2

## 2018-03-22 NOTE — H&P
History and Physical -  [facility]  Madyson Padron 57 y o  male MRN: [id number]    HPI: Madyson Padron is a 57y o  year old male who presents with change in bowel habits  Review of Systems    Historical Information   Past Medical History:   Diagnosis Date    Arthritis      Past Surgical History:   Procedure Laterality Date    TONSILECTOMY AND ADNOIDECTOMY       Social History   History   Alcohol Use    Yes     Comment: occasional     History   Drug Use No     History   Smoking Status    Never Smoker   Smokeless Tobacco    Never Used     History reviewed  No pertinent family history  Meds/Allergies     Prescriptions Prior to Admission   Medication    dicyclomine (BENTYL) 10 mg capsule    Na Sulfate-K Sulfate-Mg Sulf 17 5-3 13-1 6 GM/180ML SOLN       Allergies   Allergen Reactions    Cefadroxil      Action Taken: fever;        Objective     Pulse 96, temperature 97 5 °F (36 4 °C), temperature source Temporal, resp  rate 20, height 5' 9" (1 753 m), weight 134 kg (295 lb), SpO2 95 %  PHYSICAL EXAM    Gen: NAD  CV: RRR  CHEST: Clear  ABD: soft, NT/ND  EXT: no edema  Neuro: AAO      ASSESSMENT/PLAN:  This is a 57y o  year old male here for change in bowel habits    PLAN:   Procedure: colonoscopy

## 2018-03-22 NOTE — OP NOTE
Colonoscopy Procedure Note    Procedure: Colonoscopy    Sedation: Monitored anesthesia care, check anesthesia records      ASA Class: 3    INDICATIONS: Diarrhea, change in bowel habits  POST-OP DIAGNOSIS: See the impression below    Procedure Details     Prior colonoscopy: No prior colonoscopy  Informed consent was obtained for the procedure, including sedation  Risks of perforation, hemorrhage, adverse drug reaction and aspiration were discussed  The patient was placed in the left lateral decubitus position  Based on the pre-procedure assessment, including review of the patient's medical history, medications, allergies, and review of systems, he had been deemed to be an appropriate candidate for conscious sedation; he was therefore sedated with the medications listed below  The patient was monitored continuously with telemetry, pulse oximetry, blood pressure monitoring, and direct observations  A rectal examination was performed  The variable-stiffness pediatric colonoscope was inserted into the rectum and advanced under direct vision to the terminal ileum  The quality of the colonic preparation was good  A careful inspection was made as the colonoscope was withdrawn, including a retroflexed view of the rectum; findings and interventions are described below  Findings:  1   1 5 cm sessile polyp noted in the cecum, removed using hot snare polypectomy in its entirety, 2 hemoclips were placed to prevent post polypectomy bleeding  2   1 cm pedunculated polyp noted in the sigmoid removed using hot snare polypectomy  A 2nd polyp measuring 3-4 mm, removed using cold snare polypectomy  3   Moderate sigmoid/descending colon diverticulosis  4   Retroflexion was performed and revealed small internal hemorrhoids  Complications:  None; patient tolerated the procedure well  Impression:    1  Three polyps  2   Moderate left-sided diverticulosis  3   Small internal hemorrhoids    4  Otherwise normal appearing colonic mucosa and terminal ileum also appeared normal, random biopsies were taken to assess for microscopic colitis  Recommendations:    1  Repeat colonoscopy in 3 years if the polyps removed were adenomatous  2   Low FODMAP diet  3   Follow-up biopsy results in 2-3 weeks

## 2018-05-16 ENCOUNTER — OFFICE VISIT (OUTPATIENT)
Dept: GASTROENTEROLOGY | Facility: AMBULARY SURGERY CENTER | Age: 63
End: 2018-05-16
Payer: COMMERCIAL

## 2018-05-16 VITALS
HEIGHT: 69 IN | DIASTOLIC BLOOD PRESSURE: 76 MMHG | SYSTOLIC BLOOD PRESSURE: 134 MMHG | HEART RATE: 82 BPM | TEMPERATURE: 98.6 F | WEIGHT: 267 LBS | BODY MASS INDEX: 39.55 KG/M2

## 2018-05-16 DIAGNOSIS — Z86.010 HISTORY OF COLONIC POLYPS: ICD-10-CM

## 2018-05-16 DIAGNOSIS — R19.7 DIARRHEA, UNSPECIFIED TYPE: Primary | ICD-10-CM

## 2018-05-16 PROBLEM — Z86.0100 HISTORY OF COLONIC POLYPS: Status: ACTIVE | Noted: 2018-05-16

## 2018-05-16 PROCEDURE — 99213 OFFICE O/P EST LOW 20 MIN: CPT | Performed by: PHYSICIAN ASSISTANT

## 2018-05-16 NOTE — PROGRESS NOTES
[facility] - Outpatient Follow-up Note  Moises Castillo 57 y o  male MRN: [id number]  Encounter: [id number]          ASSESSMENT AND PLAN:      Change in bowel habits  -stools have improved  -Colonoscopy unremarkable for causes of change in stools  -Recommend high fiber diet and to avoid dairy  -Recall colonoscopy in 2 years  ______________________________________________________________________    SUBJECTIVE:  Mr Sandra Douglass presents for follow-up  He had seen Dr Rebel Bryant with complaints of increase of stool frequency and looseness of stool  He had a colonoscopy 3/22/18 which had 3 polyps, 2 tubular adenomatous and internal hemorrhoids  He states that he has improved since his prior visit and is now back at his baseline in regards to bms, formed and about twice daily  He denies additional GI complaints  He continues to lose weight as he is dieting  REVIEW OF SYSTEMS IS OTHERWISE NEGATIVE  Historical Information   Past Medical History:   Diagnosis Date    Arthritis      Past Surgical History:   Procedure Laterality Date    HAND SURGERY      ME COLONOSCOPY FLX DX W/COLLJ SPEC WHEN PFRMD N/A 3/22/2018    Procedure: COLONOSCOPY;  Surgeon: Edson Mclain MD;  [facility];   Service: Gastroenterology    TONSILECTOMY AND ADNOIDECTOMY      TOOTH EXTRACTION       Social History   History   Alcohol Use    Yes     Comment: occasional     History   Drug Use No     History   Smoking Status    Never Smoker   Smokeless Tobacco    Never Used     Family History   Problem Relation Age of Onset   [facility] Breast cancer Mother     Heart disease Father      MI in his 52's       Meds/Allergies       Current Outpatient Prescriptions:     dicyclomine (BENTYL) 10 mg capsule    Na Sulfate-K Sulfate-Mg Sulf 17 5-3 13-1 6 GM/180ML SOLN    Allergies   Allergen Reactions    Cefadroxil      Action Taken: fever;            Objective     Blood pressure 134/76, pulse 82, temperature 98 6 °F (37 °C), height 5' 9" (1 753 m), weight 121 kg (267 lb)  Body mass index is 39 43 kg/m²  PHYSICAL EXAM:      General Appearance:   Alert, cooperative, no distress   HEENT:   Normocephalic, atraumatic, anicteric      Neck:  Supple, symmetrical, trachea midline   Lungs:   Clear to auscultation bilaterally; no rales, rhonchi or wheezing; respirations unlabored    Heart[de-identified]   Regular rate and rhythm; no murmur, rub, or gallop  Abdomen:   Soft, non-tender, non-distended; normal bowel sounds; no masses, no organomegaly    Genitalia:   Deferred    Rectal:   Deferred    Extremities:  No cyanosis, clubbing or edema    Pulses:  2+ and symmetric    Skin:  No jaundice, rashes, or lesions    Lymph nodes:  No palpable cervical lymphadenopathy        Lab Results:   No visits with results within 1 Day(s) from this visit  Latest known visit with results is:   Admission on 03/22/2018, Discharged on 03/22/2018   Component Date Value    Case Report 03/22/2018                      Value:Surgical Pathology Report                         Case: [id number]                                   Authorizing Provider:  Aaliyah Martinez MD       Collected:           03/22/2018 0828              Ordering Location:     [facility]        Received:            03/22/2018 [address] [place] [address] Endoscopy                                                           Pathologist:           Rosalba Kuhn MD                                                              Specimens:   A) - Large Intestine, Cecum, Hot Snare cecal polyp                                                  B) - Colon, Random cold Bx colon                                                                    C) - Large Intestine, Sigmoid Colon, Hot Snare sigmoid polyp x2                            Final Diagnosis 03/22/2018                      Value: This result contains rich text formatting which cannot be displayed here      Note 03/22/2018 Value:This result contains rich text formatting which cannot be displayed here   Additional Information 03/22/2018                      Value: This result contains rich text formatting which cannot be displayed here  Christy Driver 03/22/2018                      Value: This result contains rich text formatting which cannot be displayed here  Radiology Results:   No results found

## 2018-05-16 NOTE — LETTER
May 16, 2018     Braxton Cartagena, [address]    Patient: Mckenzie Freeman   YOB: 1955   Date of Visit: 5/16/2018       Dear Dr Natalie Lerma:    Thank you for referring [facility] to me for evaluation  Below are my notes for this consultation  If you have questions, please do not hesitate to call me  I look forward to following your patient along with you           Sincerely,        Darrius Willis PA-C        CC: No Recipients

## 2018-06-12 ENCOUNTER — OFFICE VISIT (OUTPATIENT)
Dept: FAMILY MEDICINE CLINIC | Facility: CLINIC | Age: 63
End: 2018-06-12
Payer: COMMERCIAL

## 2018-06-12 VITALS
BODY MASS INDEX: 37.98 KG/M2 | SYSTOLIC BLOOD PRESSURE: 126 MMHG | TEMPERATURE: 96.8 F | DIASTOLIC BLOOD PRESSURE: 84 MMHG | RESPIRATION RATE: 18 BRPM | WEIGHT: 256.4 LBS | HEART RATE: 64 BPM | HEIGHT: 69 IN

## 2018-06-12 DIAGNOSIS — Z12.5 SCREENING FOR PROSTATE CANCER: ICD-10-CM

## 2018-06-12 DIAGNOSIS — Z00.00 ROUTINE HEALTH MAINTENANCE: ICD-10-CM

## 2018-06-12 DIAGNOSIS — Z13.220 SCREENING FOR LIPID DISORDERS: ICD-10-CM

## 2018-06-12 DIAGNOSIS — R73.01 IMPAIRED FASTING GLUCOSE: Primary | ICD-10-CM

## 2018-06-12 DIAGNOSIS — R53.83 FATIGUE, UNSPECIFIED TYPE: ICD-10-CM

## 2018-06-12 DIAGNOSIS — L98.9 SKIN LESION: ICD-10-CM

## 2018-06-12 PROCEDURE — 1036F TOBACCO NON-USER: CPT | Performed by: FAMILY MEDICINE

## 2018-06-12 PROCEDURE — 99214 OFFICE O/P EST MOD 30 MIN: CPT | Performed by: FAMILY MEDICINE

## 2018-06-12 NOTE — PROGRESS NOTES
FAMILY PRACTICE OFFICE VISIT       NAME: Francisca Couch  AGE: 57 y o  SEX: male       : 1955        MRN: [id number]    DATE: 2018  TIME: 4:20 PM    Assessment and Plan     Problem List Items Addressed This Visit     Impaired fasting glucose - Primary      Will monitor A1c  Continue with lifestyle modifications including limiting carbohydrates in the diet  Have also encouraged to start routine exercise regimen as well  Relevant Orders    Hemoglobin A1C    BMI 37 0-37 9, adult       Patient has lost over 40 lb since his last office visit  He continues to monitor what he eats  Have encouraged to continue lifestyle modifications as well as routine exercise regimen  Will continue to monitor  Routine health maintenance       Patient is up-to-date with colonoscopy done by Dr Gaby Dowd,  Was noted to have polyps and recommended to repeat colonoscopy in 2 years  Continue to increase fiber as well as water in the diet  Other Visit Diagnoses     Fatigue, unspecified type        Relevant Orders    CBC and differential    Comprehensive metabolic panel    TSH, 3rd generation with T4 reflex    Skin lesion        Relevant Orders    Ambulatory referral to Dermatology    Screening for lipid disorders        Relevant Orders    Lipid Panel with Direct LDL reflex    Screening for prostate cancer        Relevant Orders    PSA, total and free        I have spent 25 minutes with Patient  today in which greater than 50% of this time was spent in counseling/coordination of care regarding Diagnostic results, Prognosis, Risks and benefits of tx options, Intructions for management, Patient and family education, Importance of tx compliance, Risk factor reductions and Impressions  There are no Patient Instructions on file for this visit  Chief Complaint     Chief Complaint   Patient presents with    Follow-up     Patient is here for a followup      Dental Pain     Patient c/o tooth pain x's 3+ days  History of Present Illness     HPI    75-year-old male here for routine follow-up  Patient states he is doing well overall  He did have his colonoscopy and was recommended to have a repeat colonoscopy in 2 years as he was noted to have polyps  In addition, patient has been working on watching what he eats and has lost over 40 lb  Patient states he is also more active  Review of Systems   Review of Systems   Constitutional: Negative for unexpected weight change  HENT: Negative  Eyes: Negative for visual disturbance  Respiratory: Negative for shortness of breath  Cardiovascular: Negative  Gastrointestinal: Negative for abdominal pain and constipation  Genitourinary: Negative for dysuria  Psychiatric/Behavioral: Negative for dysphoric mood and sleep disturbance  Active Problem List     Patient Active Problem List   Diagnosis    Colon cancer screening    History of colonic polyps    Impaired fasting glucose    BMI 37 0-37 9, adult    Routine health maintenance       Past Medical History:  Past Medical History:   Diagnosis Date    Arthritis        Past Surgical History:  Past Surgical History:   Procedure Laterality Date    HAND SURGERY      SC COLONOSCOPY FLX DX W/COLLJ SPEC WHEN PFRMD N/A 3/22/2018    Procedure: COLONOSCOPY;  Surgeon: Lawson Betancourt MD;  [facility];   Service: Gastroenterology    TONSILECTOMY AND ADNOIDECTOMY      TOOTH EXTRACTION         Family History:  Family History   Problem Relation Age of Onset   [employer] Breast cancer Mother     Heart disease Father      MI in his 52's       Social History:  Social History     Social History    Marital status: Single     Spouse name: N/A    Number of children: N/A    Years of education: N/A     Occupational History    retired      Social History Main Topics    Smoking status: Never Smoker    Smokeless tobacco: Never Used    Alcohol use Yes      Comment: occasional    Drug use: No    Sexual activity: Not Currently     Other Topics Concern    Not on file     Social History Narrative    Coffee: 12 oz         I have reviewed the patient's medical history in detail; there are no changes to the history as noted in the electronic medical record  Objective     Vitals:    06/12/18 1140   BP: 126/84   Pulse: 64   Resp: 18   Temp: (!) 96 8 °F (36 °C)     Wt Readings from Last 3 Encounters:   06/12/18 116 kg (256 lb 6 4 oz)   05/16/18 121 kg (267 lb)   03/22/18 134 kg (295 lb)       Physical Exam   Constitutional: He is oriented to person, place, and time  He appears well-developed and well-nourished  HENT:   Head: Normocephalic and atraumatic  Mouth/Throat: Oropharynx is clear and moist     TMs intact and clear   Eyes: Conjunctivae and EOM are normal  Pupils are equal, round, and reactive to light  Neck: Normal range of motion  Neck supple  No thyromegaly present  Cardiovascular: Normal rate, regular rhythm and normal heart sounds  No carotid bruits auscultated   Pulmonary/Chest: Effort normal and breath sounds normal    Abdominal: Soft  Bowel sounds are normal  He exhibits no distension  There is no tenderness  Musculoskeletal: Normal range of motion  He exhibits no edema  Lymphadenopathy:     He has no cervical adenopathy  Neurological: He is alert and oriented to person, place, and time  Skin: No rash noted  Psychiatric: He has a normal mood and affect  Nursing note and vitals reviewed        Pertinent Laboratory/Diagnostic Studies:  Lab Results   Component Value Date    GLUCOSE 104 10/29/2014    BUN 11 11/30/2017    CREATININE 0 75 11/30/2017    CALCIUM 8 6 11/30/2017     11/30/2017    K 3 7 11/30/2017    CO2 29 11/30/2017     11/30/2017     Lab Results   Component Value Date    ALT 43 11/30/2017    AST 29 11/30/2017    ALKPHOS 68 11/30/2017    BILITOT 1 15 (H) 11/30/2017       Lab Results   Component Value Date    WBC 6 29 11/30/2017    HGB 16 1 11/30/2017 HCT 45 8 11/30/2017    MCV 88 11/30/2017     11/30/2017       No results found for: TSH    Lab Results   Component Value Date    CHOL 147 11/30/2017     Lab Results   Component Value Date    TRIG 106 11/30/2017     Lab Results   Component Value Date    HDL 38 (L) 11/30/2017     Lab Results   Component Value Date    LDLCALC 88 11/30/2017     Lab Results   Component Value Date    HGBA1C 5 8 12/01/2017       Results for orders placed or performed during the hospital encounter of 03/22/18   Tissue Exam   Result Value Ref Range    Case Report       Surgical Pathology Report                         Case: [id number]                                   Authorizing Provider:  Laura Arana MD       Collected:           03/22/2018 0828              Ordering Location:     [facility]        Received:            03/22/2018 [address] [place] [address] Endoscopy                                                           Pathologist:           Lily Fish MD                                                              Specimens:   A) - Large Intestine, Cecum, Hot Snare cecal polyp                                                  B) - Colon, Random cold Bx colon                                                                    C) - Large Intestine, Sigmoid Colon, Hot Snare sigmoid polyp x2                            Final Diagnosis       A  Large Intestine, Cecum, Hot Snare cecal polyp:  -Tubulovillous adenoma with focal mucin extrusion      -No evidence of high grade dysplasia or malignancy     B  Colon, Random cold Bx colon:  Benign colonic mucosa with mild edema   -No evidence of lymphocytic or collagenous colitis   -No evidence of active colitis   -No evidence of dysplasia or malignancy seen  C  Large Intestine, Sigmoid Colon, Hot Snare sigmoid polyp x2:  -Two hyperplastic polyps  -No evidence of adenomatous change or malignancy          Note       Interpretation performed at [facility], [address]      Additional Information       All controls performed with the immunohistochemical stains reported above reacted appropriately  These tests were developed and their performance characteristics determined by [facility] or [facility]  They may not be cleared or approved by the [place]  Food and Drug Administration  The FDA has determined that such clearance or approval is not necessary  These tests are used for clinical purposes  They should not be regarded as investigational or for research  This laboratory has been approved by [facility], designated as a high-complexity laboratory and is qualified to perform these tests  Gross Description       A  The specimen is received in formalin, labeled with the patient's name and hospital number, and is designated " Cecal polyp  The specimen consists of 1 tan red polypoid tissue fragment measuring 1 0 cm in greatest dimension  The apparent margin of resection is inked blue  Trisected and entirely submitted in 1 cassette  B  The specimen is received in formalin, labeled with the patient's name and hospital number, and is designated "random colon biopsy  The specimen consists of 3 tan soft tissue fragments measuring 0 2, 0 3, and 0 4 cm in greatest dimension  Entirely submitted  One cassette  C  The specimen is received in formalin, labeled with the patient's name and hospital number, and is designated "sigmoid polyp x2  The specimen consists of 3 tan soft tissue fragments measuring 0 1, 0 4, and 0 5 cm in greatest dimension  Entirely submitted  One cassette  Note: The estimated total formalin fixation time based upon information provided by the submitting clinician and the standard processing schedule is under 72 hours      AKemmerer         Orders Placed This Encounter   Procedures    CBC and differential    Comprehensive metabolic panel    Lipid Panel with Direct LDL reflex    TSH, 3rd generation with T4 reflex    Hemoglobin A1C    PSA, total and free    Ambulatory referral to Dermatology       ALLERGIES:  Allergies   Allergen Reactions    Cefadroxil      Action Taken: fever;        Current Medications     No current outpatient prescriptions on file  No current facility-administered medications for this visit  Health Maintenance     Health Maintenance   Topic Date Due    HIV SCREENING  1955    Hepatitis C Screening  1955    DTaP,Tdap,and Td Vaccines (1 - Tdap) 12/23/1974    INFLUENZA VACCINE  09/01/2018    Depression Screening PHQ-9  06/12/2019    CRC Screening: Colonoscopy  03/22/2020       There is no immunization history on file for this patient      Crissy Ruth MD

## 2018-06-13 PROBLEM — R73.01 IMPAIRED FASTING GLUCOSE: Status: ACTIVE | Noted: 2018-06-13

## 2018-06-13 PROBLEM — Z00.00 ROUTINE HEALTH MAINTENANCE: Status: ACTIVE | Noted: 2018-06-13

## 2018-06-13 NOTE — ASSESSMENT & PLAN NOTE
Patient is up-to-date with colonoscopy done by Dr Em Yo,  Was noted to have polyps and recommended to repeat colonoscopy in 2 years  Continue to increase fiber as well as water in the diet

## 2018-06-13 NOTE — ASSESSMENT & PLAN NOTE
Patient has lost over 40 lb since his last office visit  He continues to monitor what he eats  Have encouraged to continue lifestyle modifications as well as routine exercise regimen  Will continue to monitor

## 2018-06-13 NOTE — ASSESSMENT & PLAN NOTE
Will monitor A1c  Continue with lifestyle modifications including limiting carbohydrates in the diet  Have also encouraged to start routine exercise regimen as well

## 2018-12-03 ENCOUNTER — TRANSCRIBE ORDERS (OUTPATIENT)
Dept: LAB | Facility: CLINIC | Age: 63
End: 2018-12-03

## 2018-12-03 ENCOUNTER — APPOINTMENT (OUTPATIENT)
Dept: LAB | Facility: CLINIC | Age: 63
End: 2018-12-03
Payer: COMMERCIAL

## 2018-12-03 DIAGNOSIS — Z13.220 SCREENING FOR LIPID DISORDERS: ICD-10-CM

## 2018-12-03 DIAGNOSIS — R53.83 FATIGUE, UNSPECIFIED TYPE: ICD-10-CM

## 2018-12-03 DIAGNOSIS — Z12.5 SCREENING FOR PROSTATE CANCER: ICD-10-CM

## 2018-12-03 DIAGNOSIS — R73.01 IMPAIRED FASTING GLUCOSE: ICD-10-CM

## 2018-12-03 LAB
ALBUMIN SERPL BCP-MCNC: 3.7 G/DL (ref 3.5–5)
ALP SERPL-CCNC: 71 U/L (ref 46–116)
ALT SERPL W P-5'-P-CCNC: 18 U/L (ref 12–78)
ANION GAP SERPL CALCULATED.3IONS-SCNC: 5 MMOL/L (ref 4–13)
AST SERPL W P-5'-P-CCNC: 15 U/L (ref 5–45)
BASOPHILS # BLD AUTO: 0.04 THOUSANDS/ΜL (ref 0–0.1)
BASOPHILS NFR BLD AUTO: 1 % (ref 0–1)
BILIRUB SERPL-MCNC: 2.06 MG/DL (ref 0.2–1)
BUN SERPL-MCNC: 12 MG/DL (ref 5–25)
CALCIUM SERPL-MCNC: 8.5 MG/DL (ref 8.3–10.1)
CHLORIDE SERPL-SCNC: 107 MMOL/L (ref 100–108)
CHOLEST SERPL-MCNC: 173 MG/DL (ref 50–200)
CO2 SERPL-SCNC: 29 MMOL/L (ref 21–32)
CREAT SERPL-MCNC: 0.65 MG/DL (ref 0.6–1.3)
EOSINOPHIL # BLD AUTO: 0.21 THOUSAND/ΜL (ref 0–0.61)
EOSINOPHIL NFR BLD AUTO: 4 % (ref 0–6)
ERYTHROCYTE [DISTWIDTH] IN BLOOD BY AUTOMATED COUNT: 13.3 % (ref 11.6–15.1)
EST. AVERAGE GLUCOSE BLD GHB EST-MCNC: 91 MG/DL
GFR SERPL CREATININE-BSD FRML MDRD: 103 ML/MIN/1.73SQ M
GLUCOSE P FAST SERPL-MCNC: 77 MG/DL (ref 65–99)
HBA1C MFR BLD: 4.8 % (ref 4.2–6.3)
HCT VFR BLD AUTO: 46.1 % (ref 36.5–49.3)
HDLC SERPL-MCNC: 44 MG/DL (ref 40–60)
HGB BLD-MCNC: 15.4 G/DL (ref 12–17)
IMM GRANULOCYTES # BLD AUTO: 0.01 THOUSAND/UL (ref 0–0.2)
IMM GRANULOCYTES NFR BLD AUTO: 0 % (ref 0–2)
LDLC SERPL CALC-MCNC: 114 MG/DL (ref 0–100)
LYMPHOCYTES # BLD AUTO: 1.36 THOUSANDS/ΜL (ref 0.6–4.47)
LYMPHOCYTES NFR BLD AUTO: 27 % (ref 14–44)
MCH RBC QN AUTO: 31.4 PG (ref 26.8–34.3)
MCHC RBC AUTO-ENTMCNC: 33.4 G/DL (ref 31.4–37.4)
MCV RBC AUTO: 94 FL (ref 82–98)
MONOCYTES # BLD AUTO: 0.48 THOUSAND/ΜL (ref 0.17–1.22)
MONOCYTES NFR BLD AUTO: 9 % (ref 4–12)
NEUTROPHILS # BLD AUTO: 3.02 THOUSANDS/ΜL (ref 1.85–7.62)
NEUTS SEG NFR BLD AUTO: 59 % (ref 43–75)
NRBC BLD AUTO-RTO: 0 /100 WBCS
PLATELET # BLD AUTO: 223 THOUSANDS/UL (ref 149–390)
PMV BLD AUTO: 9.2 FL (ref 8.9–12.7)
POTASSIUM SERPL-SCNC: 4.1 MMOL/L (ref 3.5–5.3)
PROT SERPL-MCNC: 7 G/DL (ref 6.4–8.2)
RBC # BLD AUTO: 4.91 MILLION/UL (ref 3.88–5.62)
SODIUM SERPL-SCNC: 141 MMOL/L (ref 136–145)
TRIGL SERPL-MCNC: 74 MG/DL
TSH SERPL DL<=0.05 MIU/L-ACNC: 1.18 UIU/ML (ref 0.36–3.74)
WBC # BLD AUTO: 5.12 THOUSAND/UL (ref 4.31–10.16)

## 2018-12-03 PROCEDURE — 85025 COMPLETE CBC W/AUTO DIFF WBC: CPT

## 2018-12-03 PROCEDURE — 36415 COLL VENOUS BLD VENIPUNCTURE: CPT

## 2018-12-03 PROCEDURE — 84153 ASSAY OF PSA TOTAL: CPT

## 2018-12-03 PROCEDURE — 84443 ASSAY THYROID STIM HORMONE: CPT

## 2018-12-03 PROCEDURE — 84154 ASSAY OF PSA FREE: CPT

## 2018-12-03 PROCEDURE — 80053 COMPREHEN METABOLIC PANEL: CPT

## 2018-12-03 PROCEDURE — 83036 HEMOGLOBIN GLYCOSYLATED A1C: CPT

## 2018-12-03 PROCEDURE — 80061 LIPID PANEL: CPT

## 2018-12-04 LAB
PSA FREE MFR SERPL: 35 %
PSA FREE SERPL-MCNC: 0.28 NG/ML
PSA SERPL-MCNC: 0.8 NG/ML (ref 0–4)

## 2018-12-10 ENCOUNTER — OFFICE VISIT (OUTPATIENT)
Dept: FAMILY MEDICINE CLINIC | Facility: CLINIC | Age: 63
End: 2018-12-10
Payer: COMMERCIAL

## 2018-12-10 ENCOUNTER — OFFICE VISIT (OUTPATIENT)
Dept: LAB | Facility: CLINIC | Age: 63
End: 2018-12-10
Payer: COMMERCIAL

## 2018-12-10 VITALS
TEMPERATURE: 96.4 F | WEIGHT: 213 LBS | HEIGHT: 69 IN | SYSTOLIC BLOOD PRESSURE: 120 MMHG | DIASTOLIC BLOOD PRESSURE: 60 MMHG | RESPIRATION RATE: 16 BRPM | BODY MASS INDEX: 31.55 KG/M2 | HEART RATE: 60 BPM

## 2018-12-10 DIAGNOSIS — I45.9 SKIPPED BEATS: ICD-10-CM

## 2018-12-10 DIAGNOSIS — R17 TOTAL BILIRUBIN, ELEVATED: Primary | ICD-10-CM

## 2018-12-10 DIAGNOSIS — Z00.00 ROUTINE HEALTH MAINTENANCE: ICD-10-CM

## 2018-12-10 DIAGNOSIS — Z86.010 HISTORY OF COLONIC POLYPS: ICD-10-CM

## 2018-12-10 PROCEDURE — 99214 OFFICE O/P EST MOD 30 MIN: CPT | Performed by: FAMILY MEDICINE

## 2018-12-10 PROCEDURE — 93005 ELECTROCARDIOGRAM TRACING: CPT

## 2018-12-10 PROCEDURE — 3008F BODY MASS INDEX DOCD: CPT | Performed by: FAMILY MEDICINE

## 2018-12-10 PROCEDURE — 1036F TOBACCO NON-USER: CPT | Performed by: FAMILY MEDICINE

## 2018-12-10 NOTE — PROGRESS NOTES
FAMILY PRACTICE OFFICE VISIT       NAME: Jignesh Escalante  AGE: 57 y o  SEX: male       : 1955        MRN: [id number]    DATE: 2018  TIME: 10:06 PM    Assessment and Plan     Problem List Items Addressed This Visit     History of colonic polyps     Up-to-date with colonoscopy  Patient will have repeat colonoscopy in 2020 as he was found to 2 polyps  Recommend increasing fiber and water in the diet  BMI 31 0-31 9,adult     Patient has lost significant amount of weight  He has been working on lifestyle modifications and doing very well  Patient was previously BMI of 49 approximately 1 year ago  Continue with lifestyle modifications  Continue with well-balanced diet  Will continue to monitor  Routine health maintenance    Total bilirubin, elevated - Primary     Patient was noted to have elevated total bilirubin on recent blood work  I will go ahead and recheck this  LFTs are normal   If persistently elevated, will refer to GI and obtain ultrasound  Patient is agreeable with this  Relevant Orders    Bilirubin, total    Skipped beats     Unable to obtain EKG in the office today due to motion artifact  Will have patient obtain EKG from hospital   Script provided  Will also order Holter monitor  Exam today WNL  Denies chest pain, shortness of breath, palpitations  If symptoms do worsen or occur more in frequency, I would like patient to let me know  Relevant Orders    ECG 12 lead    Holter monitor - 24 hour            There are no Patient Instructions on file for this visit  Chief Complaint     Chief Complaint   Patient presents with    Follow-up     Patient is here for a follow up visit  History of Present Illness     HPI   78-year-old male here for routine follow-up and discuss recent blood work results  Patient states he is doing well overall  He has lost further weight    Has been cutting down on his portion, walks 3 miles in the morning and 1-1/2 to 2 miles in the afternoon  Had lifeline screening which was normal  Feels an extra heart beat once in while  This has been occurring for 20 yrs   had holter, stress test 5 yrs ago    denies cp, sob, palpitations      Review of Systems   Review of Systems   Constitutional: Negative for unexpected weight change  HENT: Negative  Eyes: Negative for visual disturbance  Respiratory: Negative for shortness of breath  Cardiovascular: Negative  Gastrointestinal: Negative for abdominal pain and constipation  Genitourinary: Negative for dysuria  Psychiatric/Behavioral: Negative for dysphoric mood and sleep disturbance  Active Problem List     Patient Active Problem List   Diagnosis    Colon cancer screening    History of colonic polyps    Impaired fasting glucose    BMI 31 0-31 9,adult    Routine health maintenance    Total bilirubin, elevated    Skipped beats       Past Medical History:  Past Medical History:   Diagnosis Date    Arthritis        Past Surgical History:  Past Surgical History:   Procedure Laterality Date    HAND SURGERY      MS COLONOSCOPY FLX DX W/COLLJ SPEC WHEN PFRMD N/A 3/22/2018    Procedure: COLONOSCOPY;  Surgeon: Bijal Oliveira MD;  [facility];   Service: Gastroenterology    TONSILECTOMY AND ADNOIDECTOMY      TOOTH EXTRACTION         Family History:  Family History   Problem Relation Age of Onset   [place] Breast cancer Mother     Heart disease Father         MI in his 52's       Social History:  Social History     Social History    Marital status: Single     Spouse name: N/A    Number of children: N/A    Years of education: N/A     Occupational History    retired      Social History Main Topics    Smoking status: Never Smoker    Smokeless tobacco: Never Used    Alcohol use Yes      Comment: occasional    Drug use: No    Sexual activity: Not Currently     Other Topics Concern    Not on file     Social History Narrative    Coffee: 12 oz         I have reviewed the patient's medical history in detail; there are no changes to the history as noted in the electronic medical record  Objective     Vitals:    12/10/18 1126   BP: 120/60   Pulse: 60   Resp: 16   Temp: (!) 96 4 °F (35 8 °C)     Wt Readings from Last 3 Encounters:   12/10/18 96 6 kg (213 lb)   06/12/18 116 kg (256 lb 6 4 oz)   05/16/18 121 kg (267 lb)             Physical Exam   Constitutional: He is oriented to person, place, and time  He appears well-developed and well-nourished  HENT:   Head: Normocephalic and atraumatic  Mouth/Throat: Oropharynx is clear and moist    TMs intact and clear   Eyes: Pupils are equal, round, and reactive to light  Conjunctivae and EOM are normal    Neck: Normal range of motion  Neck supple  No thyromegaly present  Cardiovascular: Normal rate, regular rhythm and normal heart sounds  Pulmonary/Chest: Effort normal and breath sounds normal    Abdominal: Soft  Bowel sounds are normal  He exhibits no distension  There is no tenderness  Musculoskeletal: Normal range of motion  He exhibits no edema  Lymphadenopathy:     He has no cervical adenopathy  Neurological: He is alert and oriented to person, place, and time  Psychiatric: He has a normal mood and affect  Nursing note and vitals reviewed        Pertinent Laboratory/Diagnostic Studies:  Lab Results   Component Value Date    GLUCOSE 104 10/29/2014    BUN 12 12/03/2018    CREATININE 0 65 12/03/2018    CALCIUM 8 5 12/03/2018     10/29/2014    K 4 1 12/03/2018    CO2 29 12/03/2018     12/03/2018     Lab Results   Component Value Date    ALT 18 12/03/2018    AST 15 12/03/2018    ALKPHOS 71 12/03/2018    BILITOT 1 52 (H) 10/29/2014       Lab Results   Component Value Date    WBC 5 12 12/03/2018    HGB 15 4 12/03/2018    HCT 46 1 12/03/2018    MCV 94 12/03/2018     12/03/2018       No results found for: TSH    No results found for: CHOL  Lab Results   Component Value Date    TRIG 74 12/03/2018     Lab Results   Component Value Date    HDL 44 12/03/2018     Lab Results   Component Value Date    LDLCALC 114 (H) 12/03/2018     Lab Results   Component Value Date    HGBA1C 4 8 12/03/2018       Results for orders placed or performed in visit on 12/03/18   CBC and differential   Result Value Ref Range    WBC 5 12 4 31 - 10 16 Thousand/uL    RBC 4 91 3 88 - 5 62 Million/uL    Hemoglobin 15 4 12 0 - 17 0 g/dL    Hematocrit 46 1 36 5 - 49 3 %    MCV 94 82 - 98 fL    MCH 31 4 26 8 - 34 3 pg    MCHC 33 4 31 4 - 37 4 g/dL    RDW 13 3 11 6 - 15 1 %    MPV 9 2 8 9 - 12 7 fL    Platelets 724 692 - 136 Thousands/uL    nRBC 0 /100 WBCs    Neutrophils Relative 59 43 - 75 %    Immat GRANS % 0 0 - 2 %    Lymphocytes Relative 27 14 - 44 %    Monocytes Relative 9 4 - 12 %    Eosinophils Relative 4 0 - 6 %    Basophils Relative 1 0 - 1 %    Neutrophils Absolute 3 02 1 85 - 7 62 Thousands/µL    Immature Grans Absolute 0 01 0 00 - 0 20 Thousand/uL    Lymphocytes Absolute 1 36 0 60 - 4 47 Thousands/µL    Monocytes Absolute 0 48 0 17 - 1 22 Thousand/µL    Eosinophils Absolute 0 21 0 00 - 0 61 Thousand/µL    Basophils Absolute 0 04 0 00 - 0 10 Thousands/µL   Comprehensive metabolic panel   Result Value Ref Range    Sodium 141 136 - 145 mmol/L    Potassium 4 1 3 5 - 5 3 mmol/L    Chloride 107 100 - 108 mmol/L    CO2 29 21 - 32 mmol/L    ANION GAP 5 4 - 13 mmol/L    BUN 12 5 - 25 mg/dL    Creatinine 0 65 0 60 - 1 30 mg/dL    Glucose, Fasting 77 65 - 99 mg/dL    Calcium 8 5 8 3 - 10 1 mg/dL    AST 15 5 - 45 U/L    ALT 18 12 - 78 U/L    Alkaline Phosphatase 71 46 - 116 U/L    Total Protein 7 0 6 4 - 8 2 g/dL    Albumin 3 7 3 5 - 5 0 g/dL    Total Bilirubin 2 06 (H) 0 20 - 1 00 mg/dL    eGFR 103 ml/min/1 73sq m   Lipid Panel with Direct LDL reflex   Result Value Ref Range    Cholesterol 173 50 - 200 mg/dL    Triglycerides 74 <=150 mg/dL    HDL, Direct 44 40 - 60 mg/dL    LDL Calculated 114 (H) 0 - 100 mg/dL   TSH, 3rd generation with T4 reflex   Result Value Ref Range    TSH 3RD GENERATON 1 180 0 358 - 3 740 uIU/mL   Hemoglobin A1C   Result Value Ref Range    Hemoglobin A1C 4 8 4 2 - 6 3 %    EAG 91 mg/dl   PSA, total and free   Result Value Ref Range    Prostate Specific Antigen Total 0 8 0 0 - 4 0 ng/mL    PSA, Free 0 28 N/A ng/mL    PSA, Free Pct 35 0 %       Orders Placed This Encounter   Procedures    Bilirubin, total    Holter monitor - 24 hour    ECG 12 lead       ALLERGIES:  Allergies   Allergen Reactions    Cefadroxil      Action Taken: fever;        Current Medications     No current outpatient prescriptions on file  No current facility-administered medications for this visit  [facility]     Health Maintenance   Topic Date Due    Hepatitis C Screening  1955    DTaP,Tdap,and Td Vaccines (1 - Tdap) 12/23/1974    INFLUENZA VACCINE  07/01/2018    Depression Screening PHQ  06/12/2019    CRC Screening: Colonoscopy  03/22/2020       There is no immunization history on file for this patient      Alveta Hammans, MD

## 2018-12-11 PROBLEM — R17 TOTAL BILIRUBIN, ELEVATED: Status: ACTIVE | Noted: 2018-12-11

## 2018-12-11 PROBLEM — I45.9 SKIPPED BEATS: Status: ACTIVE | Noted: 2018-12-11

## 2018-12-11 LAB
ATRIAL RATE: 72 BPM
P AXIS: 4 DEGREES
PR INTERVAL: 166 MS
QRS AXIS: 72 DEGREES
QRSD INTERVAL: 90 MS
QT INTERVAL: 402 MS
QTC INTERVAL: 440 MS
T WAVE AXIS: 25 DEGREES
VENTRICULAR RATE: 72 BPM

## 2018-12-11 PROCEDURE — 93010 ELECTROCARDIOGRAM REPORT: CPT | Performed by: INTERNAL MEDICINE

## 2018-12-12 NOTE — ASSESSMENT & PLAN NOTE
Patient has lost significant amount of weight  He has been working on lifestyle modifications and doing very well  Patient was previously BMI of 49 approximately 1 year ago  Continue with lifestyle modifications  Continue with well-balanced diet  Will continue to monitor

## 2018-12-12 NOTE — ASSESSMENT & PLAN NOTE
Unable to obtain EKG in the office today due to motion artifact  Will have patient obtain EKG from hospital   Script provided  Will also order Holter monitor  Exam today WNL  Denies chest pain, shortness of breath, palpitations  If symptoms do worsen or occur more in frequency, I would like patient to let me know

## 2018-12-12 NOTE — ASSESSMENT & PLAN NOTE
Up-to-date with colonoscopy  Patient will have repeat colonoscopy in 2020 as he was found to 2 polyps  Recommend increasing fiber and water in the diet

## 2018-12-12 NOTE — ASSESSMENT & PLAN NOTE
Patient was noted to have elevated total bilirubin on recent blood work  I will go ahead and recheck this  LFTs are normal   If persistently elevated, will refer to GI and obtain ultrasound  Patient is agreeable with this

## 2018-12-18 ENCOUNTER — HOSPITAL ENCOUNTER (OUTPATIENT)
Dept: NON INVASIVE DIAGNOSTICS | Facility: CLINIC | Age: 63
Discharge: HOME | End: 2018-12-18
Payer: COMMERCIAL

## 2018-12-18 DIAGNOSIS — I45.9 SKIPPED BEATS: ICD-10-CM

## 2018-12-18 PROCEDURE — 93226 XTRNL ECG REC<48 HR SCAN A/R: CPT

## 2018-12-18 PROCEDURE — 93225 XTRNL ECG REC<48 HRS REC: CPT

## 2018-12-19 ENCOUNTER — TRANSCRIBE ORDERS (OUTPATIENT)
Dept: LAB | Facility: CLINIC | Age: 63
End: 2018-12-19

## 2018-12-19 ENCOUNTER — LAB (OUTPATIENT)
Dept: LAB | Facility: CLINIC | Age: 63
End: 2018-12-19
Payer: COMMERCIAL

## 2018-12-19 DIAGNOSIS — R17 TOTAL BILIRUBIN, ELEVATED: ICD-10-CM

## 2018-12-19 LAB — BILIRUB SERPL-MCNC: 1.2 MG/DL (ref 0.2–1)

## 2018-12-19 PROCEDURE — 82247 BILIRUBIN TOTAL: CPT

## 2018-12-19 PROCEDURE — 36415 COLL VENOUS BLD VENIPUNCTURE: CPT

## 2018-12-20 NOTE — PROGRESS NOTES
Can you please let patient know that his total bilirubin has improved and is now 1 2 decreased from 2 0  We can continue to monitor it    Thank you

## 2018-12-21 PROCEDURE — 93227 XTRNL ECG REC<48 HR R&I: CPT | Performed by: INTERNAL MEDICINE

## 2018-12-24 ENCOUNTER — TELEPHONE (OUTPATIENT)
Dept: FAMILY MEDICINE CLINIC | Facility: CLINIC | Age: 63
End: 2018-12-24

## 2018-12-24 NOTE — TELEPHONE ENCOUNTER
----- Message from Oziel Fontenot MD sent at 12/24/2018 12:30 PM EST -----  Can you please let patient know that his Holter monitor was overall normal   Because he has been experiencing skipped beats occasionally, I would like him to be further evaluated by Cardiology  Can you please provide patient with [facility] Cardiology number    Thank you

## 2018-12-24 NOTE — PROGRESS NOTES
Can you please let patient know that his Holter monitor was overall normal   Because he has been experiencing skipped beats occasionally, I would like him to be further evaluated by Cardiology  Can you please provide patient with [facility] Cardiology number    Thank you

## 2018-12-24 NOTE — TELEPHONE ENCOUNTER
----- Message from Venus Perales MD sent at 12/24/2018 12:30 PM EST -----  Can you please let patient know that his Holter monitor was overall normal   Because he has been experiencing skipped beats occasionally, I would like him to be further evaluated by Cardiology  Can you please provide patient with [facility] Cardiology number    Thank you

## 2019-01-17 ENCOUNTER — OFFICE VISIT (OUTPATIENT)
Dept: CARDIOLOGY CLINIC | Facility: CLINIC | Age: 64
End: 2019-01-17
Payer: COMMERCIAL

## 2019-01-17 VITALS
BODY MASS INDEX: 31.83 KG/M2 | SYSTOLIC BLOOD PRESSURE: 126 MMHG | HEART RATE: 68 BPM | DIASTOLIC BLOOD PRESSURE: 68 MMHG | HEIGHT: 69 IN | WEIGHT: 214.9 LBS

## 2019-01-17 DIAGNOSIS — I47.2 NSVT (NONSUSTAINED VENTRICULAR TACHYCARDIA) (HCC): ICD-10-CM

## 2019-01-17 DIAGNOSIS — R00.8 OTHER ABNORMALITIES OF HEART BEAT: Primary | ICD-10-CM

## 2019-01-17 PROBLEM — I47.29 NSVT (NONSUSTAINED VENTRICULAR TACHYCARDIA): Status: ACTIVE | Noted: 2019-01-17

## 2019-01-17 PROCEDURE — 99204 OFFICE O/P NEW MOD 45 MIN: CPT | Performed by: INTERNAL MEDICINE

## 2019-01-17 PROCEDURE — 93000 ELECTROCARDIOGRAM COMPLETE: CPT | Performed by: INTERNAL MEDICINE

## 2019-01-17 NOTE — LETTER
January 17, 2019     [place], [address]    Patient: Ford Reaves   YOB: 1955   Date of Visit: 1/17/2019       Dear Dr René Gamble:    Thank you for referring Williamstiarra Balbuena to me for evaluation  Below are my notes for this consultation  If you have questions, please do not hesitate to call me  I look forward to following your patient along with you           Sincerely,        Rebeca Garnett MD        CC: No Recipients

## 2019-01-17 NOTE — PATIENT INSTRUCTIONS
Premature Ventricular Contractions   WHAT YOU NEED TO KNOW:   What are premature ventricular contractions? Premature ventricular contractions (PVCs) are an interruption in your heart rhythm  They are caused by an early signal for your heart to pump  Your risk of PVCs increases when you drink alcohol or caffeine, or if you are fatigued or stressed  It is very important for you to follow up with your healthcare provider so the cause of your PVC can be diagnosed and treated  What are symptoms of PVCs? · A skipped heartbeat     · A fast heartbeat    · Chest pain     · Lightheadedness, dizziness, or faintness    · Tiredness with exercise or activity  How are PVCs diagnosed? Your healthcare provider will ask if you have a family history of heart problems  You may also need one or more of the following:  · An EKG  records your heart rhythm and how fast your heart beats  It is used to check for PVCs  · A Holter monitor  is a device that you wear for a period of time  It records how fast your heart beats, and if it beats in a regular pattern  You may need to wear it up to 72 hours  This will show how frequent your PVCs are during your normal daily activities  · An echocardiogram  (echo) is a type of ultrasound that shows the movement and blood vessels of your heart on a monitor  How are PVCs treated? Your treatment will depend on the cause of your PVC  You may need any of the following:  · Heart medicine  may be given to make your heart beat at a regular rate and rhythm  · Cardiac ablation  is a procedure that is used to treat an abnormal heart rhythm  Ask your healthcare provider for more information  When should I contact my healthcare provider? · You still have symptoms after treatment, or your symptoms worsen  · You have questions or concerns about your condition or care  When should I seek immediate care or call 911?    · You have any of the following signs of a heart attack:      ¨ Squeezing, pressure, or pain in your chest that lasts longer than 5 minutes or returns    ¨ Discomfort or pain in your back, neck, jaw, stomach, or arm     ¨ Trouble breathing    ¨ Nausea or vomiting    ¨ Lightheadedness or a sudden cold sweat, especially with chest pain or trouble breathing    CARE AGREEMENT:   You have the right to help plan your care  Learn about your health condition and how it may be treated  Discuss treatment options with your caregivers to decide what care you want to receive  You always have the right to refuse treatment  The above information is an  only  It is not intended as medical advice for individual conditions or treatments  Talk to your doctor, nurse or pharmacist before following any medical regimen to see if it is safe and effective for you  © 2017 [employer] Information is for End User's use only and may not be sold, redistributed or otherwise used for commercial purposes  All illustrations and images included in CareNotes® are the copyrighted property of [employer]  or Jason Roldan

## 2019-01-17 NOTE — PROGRESS NOTES
Cardiology Consultation     Juan Miguel Plaza  [id number]  1955  HEART & VASCULAR [facility] [address]    1  Other abnormalities of heart beat  POCT ECG   2  NSVT (nonsustained ventricular tachycardia) [facility])       Chief Complaint   Patient presents with    PT Initial Evaluation     patient at the office refer by PCP due to patient report to have an extra heart beat  patient deny any cardiac complaint  HPI: Patient is here for evaluation and management of extra beats seen on EKG by PCP's office  Patient feels well, without complaints  No reported chest pain, shortness of breath, palpitations, lightheadedness, syncope, LE edema, orthopnea, PND, or significant weight changes  Patient remains active without any increased fatigue out of the ordinary        Patient Active Problem List   Diagnosis    Colon cancer screening    History of colonic polyps    Impaired fasting glucose    BMI 31 0-31 9,adult    Routine health maintenance    Total bilirubin, elevated    Skipped beats    NSVT (nonsustained ventricular tachycardia) (HCC)     Past Medical History:   Diagnosis Date    Arthritis      Social History     Social History    Marital status: Single     Spouse name: N/A    Number of children: N/A    Years of education: N/A     Occupational History    retired      Social History Main Topics    Smoking status: Never Smoker    Smokeless tobacco: Never Used    Alcohol use Yes      Comment: occasional    Drug use: No    Sexual activity: Not Currently     Other Topics Concern    Not on file     Social History Narrative    Coffee: 12 oz          Family History   Problem Relation Age of Onset    Breast cancer Mother     Heart disease Father         MI in his 52's     Past Surgical History:   Procedure Laterality Date    HAND SURGERY      SD COLONOSCOPY FLX DX W/COLLJ SPEC WHEN PFRMD N/A 3/22/2018 Procedure: COLONOSCOPY;  Surgeon: Cassidy Douglass MD;  [facility]; Service: Gastroenterology    TONSILECTOMY AND ADNOIDECTOMY      TOOTH EXTRACTION       No current outpatient prescriptions on file    Allergies   Allergen Reactions    Cefadroxil      Action Taken: fever;      Vitals:    01/17/19 1103   BP: 126/68   BP Location: Left arm   Patient Position: Sitting   Cuff Size: Standard   Pulse: 68   Weight: 97 5 kg (214 lb 14 4 oz)   Height: 5' 9" (1 753 m)       Labs:  Lab on 12/19/2018   Component Date Value    Total Bilirubin 12/19/2018 1 20*   Office Visit on 12/10/2018   Component Date Value    Ventricular Rate 12/10/2018 72     Atrial Rate 12/10/2018 72     IN Interval 12/10/2018 166     QRSD Interval 12/10/2018 90     QT Interval 12/10/2018 402     QTC Interval 12/10/2018 440     P Axis 12/10/2018 4     QRS Axis 12/10/2018 72     T Wave [place] 12/10/2018 25    Appointment on 12/03/2018   Component Date Value    WBC 12/03/2018 5 12     RBC 12/03/2018 4 91     Hemoglobin 12/03/2018 15 4     Hematocrit 12/03/2018 46 1     MCV 12/03/2018 94     MCH 12/03/2018 31 4     MCHC 12/03/2018 33 4     RDW 12/03/2018 13 3     MPV 12/03/2018 9 2     Platelets 57/05/0383 223     nRBC 12/03/2018 0     Neutrophils Relative 12/03/2018 59     Immat GRANS % 12/03/2018 0     Lymphocytes Relative 12/03/2018 27     Monocytes Relative 12/03/2018 9     Eosinophils Relative 12/03/2018 4     Basophils Relative 12/03/2018 1     Neutrophils Absolute 12/03/2018 3 02     Immature Grans Absolute 12/03/2018 0 01     Lymphocytes Absolute 12/03/2018 1 36     Monocytes Absolute 12/03/2018 0 48     Eosinophils Absolute 12/03/2018 0 21     Basophils Absolute 12/03/2018 0 04     Sodium 12/03/2018 141     Potassium 12/03/2018 4 1     Chloride 12/03/2018 107     CO2 12/03/2018 29     ANION GAP 12/03/2018 5     BUN 12/03/2018 12     Creatinine 12/03/2018 0 65     Glucose, Fasting 12/03/2018 77  Calcium 12/03/2018 8 5     AST 12/03/2018 15     ALT 12/03/2018 18     Alkaline Phosphatase 12/03/2018 71     Total Protein 12/03/2018 7 0     Albumin 12/03/2018 3 7     Total Bilirubin 12/03/2018 2 06*    eGFR 12/03/2018 103     Cholesterol 12/03/2018 173     Triglycerides 12/03/2018 74     HDL, Direct 12/03/2018 44     LDL Calculated 12/03/2018 114*    TSH 3RD GENERATON 12/03/2018 1 180     Hemoglobin A1C 12/03/2018 4 8     EAG 12/03/2018 91     Prostate Specific Antige* 12/03/2018 0 8     PSA, Free 12/03/2018 0 28     PSA, Free Pct 12/03/2018 35 0      Lab Results   Component Value Date    TRIG 74 12/03/2018    HDL 44 12/03/2018     Imaging: No results found  Review of Systems:  Review of Systems   Constitutional: Negative for activity change, appetite change, fatigue and fever  HENT: Negative for nosebleeds and sore throat  Eyes: Negative for photophobia and visual disturbance  Respiratory: Negative for cough, chest tightness, shortness of breath and wheezing  Cardiovascular: Negative for chest pain, palpitations and leg swelling  Gastrointestinal: Negative for abdominal pain, diarrhea, nausea and vomiting  Endocrine: Negative for polyuria  Genitourinary: Negative for dysuria, frequency and hematuria  Musculoskeletal: Positive for arthralgias  Negative for back pain and gait problem  Skin: Negative for pallor and rash  Neurological: Negative for dizziness, syncope, speech difficulty and light-headedness  Hematological: Does not bruise/bleed easily  Psychiatric/Behavioral: Negative for agitation, behavioral problems and confusion  Physical Exam:  Physical Exam   Constitutional: He is oriented to person, place, and time  He appears well-developed and well-nourished  HENT:   Head: Normocephalic and atraumatic  Nose: Nose normal    Eyes: Pupils are equal, round, and reactive to light  EOM are normal  No scleral icterus  Neck: Normal range of motion   Neck supple  No JVD present  Cardiovascular: Normal rate, regular rhythm and normal heart sounds  Exam reveals no gallop and no friction rub  No murmur heard  Pulmonary/Chest: Effort normal and breath sounds normal  No respiratory distress  He has no wheezes  He has no rales  Abdominal: Soft  Bowel sounds are normal  He exhibits no distension  There is no tenderness  Musculoskeletal: Normal range of motion  He exhibits no edema or deformity  Neurological: He is alert and oriented to person, place, and time  No cranial nerve deficit  Skin: Skin is warm and dry  No rash noted  He is not diaphoretic  Psychiatric: He has a normal mood and affect  His behavior is normal    Vitals reviewed  Blood pressure 126/68, pulse 68, height 5' 9" (1 753 m), weight 97 5 kg (214 lb 14 4 oz)  EKG:  Normal sinus rhythm  Normal ECG    Discussion/Summary:  Extrasystoles/NSVT: Holter monitor revealed a 4 beat run of NSVT without any other significant abnormalities  This was asymptomatic, and he remains asymptomatic  Will check an echo to evaluate for any structural heart disease as well as a stress test to rule out any ischemic disease or increase in NSVT with exercise  His father  of a heart attack

## 2019-02-14 ENCOUNTER — HOSPITAL ENCOUNTER (OUTPATIENT)
Dept: NON INVASIVE DIAGNOSTICS | Facility: CLINIC | Age: 64
Discharge: HOME | End: 2019-02-14
Payer: COMMERCIAL

## 2019-02-14 DIAGNOSIS — I47.2 NSVT (NONSUSTAINED VENTRICULAR TACHYCARDIA) (HCC): ICD-10-CM

## 2019-02-14 LAB
CHEST PAIN STATEMENT: NORMAL
MAX DIASTOLIC BP: 70 MMHG
MAX HEART RATE: 162 BPM
MAX PREDICTED HEART RATE: 155 BPM
MAX. SYSTOLIC BP: 166 MMHG
PROTOCOL NAME: NORMAL
REASON FOR TERMINATION: NORMAL
TARGET HR FORMULA: NORMAL
TEST INDICATION: NORMAL
TIME IN EXERCISE PHASE: NORMAL

## 2019-02-14 PROCEDURE — 93306 TTE W/DOPPLER COMPLETE: CPT | Performed by: INTERNAL MEDICINE

## 2019-02-14 PROCEDURE — 93016 CV STRESS TEST SUPVJ ONLY: CPT | Performed by: INTERNAL MEDICINE

## 2019-02-14 PROCEDURE — 93017 CV STRESS TEST TRACING ONLY: CPT

## 2019-02-14 PROCEDURE — 93018 CV STRESS TEST I&R ONLY: CPT | Performed by: INTERNAL MEDICINE

## 2019-02-14 PROCEDURE — 93306 TTE W/DOPPLER COMPLETE: CPT

## 2019-02-21 ENCOUNTER — OFFICE VISIT (OUTPATIENT)
Dept: CARDIOLOGY CLINIC | Facility: CLINIC | Age: 64
End: 2019-02-21
Payer: COMMERCIAL

## 2019-02-21 VITALS
HEIGHT: 69 IN | WEIGHT: 214 LBS | BODY MASS INDEX: 31.7 KG/M2 | DIASTOLIC BLOOD PRESSURE: 68 MMHG | SYSTOLIC BLOOD PRESSURE: 128 MMHG | HEART RATE: 73 BPM

## 2019-02-21 DIAGNOSIS — I45.9 SKIPPED BEATS: ICD-10-CM

## 2019-02-21 DIAGNOSIS — I47.2 NSVT (NONSUSTAINED VENTRICULAR TACHYCARDIA) (HCC): Primary | ICD-10-CM

## 2019-02-21 PROCEDURE — 99214 OFFICE O/P EST MOD 30 MIN: CPT | Performed by: INTERNAL MEDICINE

## 2019-02-21 NOTE — PROGRESS NOTES
Cardiology Consultation     Maddi Rick  [id number]  1955  [facility]ASCULAR [facility] [address]    1  NSVT (nonsustained ventricular tachycardia) ([address] )     2  Skipped beats       Chief Complaint   Patient presents with    Follow-up      week follow up     HPI: Patient is here for evaluation and management of extra beats seen on EKG by PCP's office  Patient feels well, without complaints  No reported chest pain, shortness of breath, palpitations, lightheadedness, syncope, LE edema, orthopnea, PND, or significant weight changes  Patient remains active without any increased fatigue out of the ordinary  No significant changes since last visit       Patient Active Problem List   Diagnosis    Colon cancer screening    History of colonic polyps    Impaired fasting glucose    BMI 31 0-31 9,adult    Routine health maintenance    Total bilirubin, elevated    Skipped beats    NSVT (nonsustained ventricular tachycardia) (HCC)     Past Medical History:   Diagnosis Date    Arthritis      Social History     Socioeconomic History    Marital status: Single     Spouse name: Not on file    Number of children: Not on file    Years of education: Not on file    Highest education level: Not on file   Occupational History    Occupation: retired   Social Needs    Financial resource strain: Not on file    Food insecurity:     Worry: Not on file     Inability: Not on file   [employer] needs:     Medical: Not on file     Non-medical: Not on file   Tobacco Use    Smoking status: Never Smoker    Smokeless tobacco: Never Used   Substance and Sexual Activity    Alcohol use: Yes     Comment: occasional    Drug use: No    Sexual activity: Not Currently   Lifestyle    Physical activity:     Days per week: Not on file     Minutes per session: Not on file    Stress: Not on file   Relationships    Social connections:     Talks on phone: Not on file     Gets together: Not on file     Attends Catholic service: Not on file     Active member of club or organization: Not on file     Attends meetings of clubs or organizations: Not on file     Relationship status: Not on file    Intimate partner violence:     Fear of current or ex partner: Not on file     Emotionally abused: Not on file     Physically abused: Not on file     Forced sexual activity: Not on file   Other Topics Concern    Not on file   Social History Narrative    Coffee: 12 oz      Family History   Problem Relation Age of Onset    Breast cancer Mother     Heart disease Father         MI in his 52's     Past Surgical History:   Procedure Laterality Date    HAND SURGERY      MT COLONOSCOPY FLX DX W/COLLJ SPEC WHEN PFRMD N/A 3/22/2018    Procedure: COLONOSCOPY;  Surgeon: Juan Francisco Yusuf MD;  [facility]; Service: Gastroenterology    TONSILECTOMY AND ADNOIDECTOMY      TOOTH EXTRACTION       No current outpatient medications on file  Allergies   Allergen Reactions    Cefadroxil      Action Taken: fever;      Vitals:    02/21/19 1312   BP: 128/68   BP Location: Right arm   Patient Position: Sitting   Cuff Size: Large   Pulse: 73   Weight: 97 1 kg (214 lb)   Height: 5' 9" (1 753 m)       Labs:  Hospital Outpatient Visit on 02/14/2019   Component Date Value    Protocol Name 02/14/2019 BOYD     Time In Exercise Phase 02/14/2019 00:09:13     MAX   SYSTOLIC BP 39/55/0292 922     Max Diastolic Bp 28/89/1403 70     Max Heart Rate 02/14/2019 162     Max Predicted Heart Rate 02/14/2019 155     Reason for Termination 02/14/2019 leg fatigue, fatigue, mild dyspnea     Test Indication 02/14/2019 Abnormal ECG/ holter     Target Hr Formular 02/14/2019 (220 - Age)*100%     Chest Pain Statement 02/14/2019 none    Lab on 12/19/2018   Component Date Value    Total Bilirubin 12/19/2018 1 20*   Office Visit on 12/10/2018   Component Date Value    Ventricular Rate 12/10/2018 72     Atrial Rate 12/10/2018 72     MA Interval 12/10/2018 166     QRSD Interval 12/10/2018 90     QT Interval 12/10/2018 402     QTC Interval 12/10/2018 440     P Axis 12/10/2018 4     QRS Axis 12/10/2018 72     T Wave [place] 12/10/2018 25    Appointment on 12/03/2018   Component Date Value    WBC 12/03/2018 5 12     RBC 12/03/2018 4 91     Hemoglobin 12/03/2018 15 4     Hematocrit 12/03/2018 46 1     MCV 12/03/2018 94     MCH 12/03/2018 31 4     MCHC 12/03/2018 33 4     RDW 12/03/2018 13 3     MPV 12/03/2018 9 2     Platelets 77/41/0364 223     nRBC 12/03/2018 0     Neutrophils Relative 12/03/2018 59     Immat GRANS % 12/03/2018 0     Lymphocytes Relative 12/03/2018 27     Monocytes Relative 12/03/2018 9     Eosinophils Relative 12/03/2018 4     Basophils Relative 12/03/2018 1     Neutrophils Absolute 12/03/2018 3 02     Immature Grans Absolute 12/03/2018 0 01     Lymphocytes Absolute 12/03/2018 1 36     Monocytes Absolute 12/03/2018 0 48     Eosinophils Absolute 12/03/2018 0 21     Basophils Absolute 12/03/2018 0 04     Sodium 12/03/2018 141     Potassium 12/03/2018 4 1     Chloride 12/03/2018 107     CO2 12/03/2018 29     ANION GAP 12/03/2018 5     BUN 12/03/2018 12     Creatinine 12/03/2018 0 65     Glucose, Fasting 12/03/2018 77     Calcium 12/03/2018 8 5     AST 12/03/2018 15     ALT 12/03/2018 18     Alkaline Phosphatase 12/03/2018 71     Total Protein 12/03/2018 7 0     Albumin 12/03/2018 3 7     Total Bilirubin 12/03/2018 2 06*    eGFR 12/03/2018 103     Cholesterol 12/03/2018 173     Triglycerides 12/03/2018 74     HDL, Direct 12/03/2018 44     LDL Calculated 12/03/2018 114*    TSH 3RD GENERATON 12/03/2018 1 180     Hemoglobin A1C 12/03/2018 4 8     EAG 12/03/2018 91     Prostate Specific Antige* 12/03/2018 0 8     PSA, Free 12/03/2018 0 28     PSA, Free Pct 12/03/2018 35 0      Lab Results   Component Value Date    TRIG 74 12/03/2018    HDL 44 12/03/2018     Imaging: No results found  Review of Systems:  Review of Systems   Constitutional: Negative for activity change, appetite change, fatigue and fever  HENT: Negative for nosebleeds and sore throat  Eyes: Negative for photophobia and visual disturbance  Respiratory: Negative for cough, chest tightness, shortness of breath and wheezing  Cardiovascular: Negative for chest pain, palpitations and leg swelling  Gastrointestinal: Negative for abdominal pain, diarrhea, nausea and vomiting  Endocrine: Negative for polyuria  Genitourinary: Negative for dysuria, frequency and hematuria  Musculoskeletal: Positive for arthralgias  Negative for back pain and gait problem  Skin: Negative for pallor and rash  Neurological: Negative for dizziness, syncope, speech difficulty and light-headedness  Hematological: Does not bruise/bleed easily  Psychiatric/Behavioral: Negative for agitation, behavioral problems and confusion  Physical Exam:  Physical Exam   Constitutional: He is oriented to person, place, and time  He appears well-developed and well-nourished  HENT:   Head: Normocephalic and atraumatic  Nose: Nose normal    Eyes: Pupils are equal, round, and reactive to light  EOM are normal  No scleral icterus  Neck: Normal range of motion  Neck supple  No JVD present  Cardiovascular: Normal rate, regular rhythm and normal heart sounds  Exam reveals no gallop and no friction rub  No murmur heard  Pulmonary/Chest: Effort normal and breath sounds normal  No respiratory distress  He has no wheezes  He has no rales  Abdominal: Soft  Bowel sounds are normal  He exhibits no distension  There is no tenderness  Musculoskeletal: Normal range of motion  He exhibits no edema or deformity  Neurological: He is alert and oriented to person, place, and time  No cranial nerve deficit  Skin: Skin is warm and dry  No rash noted  He is not diaphoretic  Psychiatric: He has a normal mood and affect  His behavior is normal    Vitals reviewed  Blood pressure 128/68, pulse 73, height 5' 9" (1 753 m), weight 97 1 kg (214 lb)  EKG:  Normal sinus rhythm  Normal ECG    Discussion/Summary:  Extrasystoles/NSVT: Holter monitor revealed a 4 beat run of NSVT without any other significant abnormalities  This was asymptomatic, and he remains asymptomatic  His father  of a heart attack  We checked an echo to evaluate for any structural heart disease, which was normal - as well as a stress test that ruled out any ischemic disease or increase in NSVT with exercise

## 2019-04-29 ENCOUNTER — OFFICE VISIT (OUTPATIENT)
Dept: FAMILY MEDICINE CLINIC | Facility: CLINIC | Age: 64
End: 2019-04-29
Payer: COMMERCIAL

## 2019-04-29 VITALS
TEMPERATURE: 98 F | WEIGHT: 214.4 LBS | BODY MASS INDEX: 31.76 KG/M2 | SYSTOLIC BLOOD PRESSURE: 128 MMHG | RESPIRATION RATE: 16 BRPM | HEIGHT: 69 IN | DIASTOLIC BLOOD PRESSURE: 78 MMHG | HEART RATE: 64 BPM

## 2019-04-29 DIAGNOSIS — Z00.00 ROUTINE HEALTH MAINTENANCE: ICD-10-CM

## 2019-04-29 DIAGNOSIS — Z13.220 ENCOUNTER FOR LIPID SCREENING FOR CARDIOVASCULAR DISEASE: ICD-10-CM

## 2019-04-29 DIAGNOSIS — R53.83 FATIGUE, UNSPECIFIED TYPE: ICD-10-CM

## 2019-04-29 DIAGNOSIS — Z13.6 ENCOUNTER FOR LIPID SCREENING FOR CARDIOVASCULAR DISEASE: ICD-10-CM

## 2019-04-29 DIAGNOSIS — Z12.5 SCREENING FOR PROSTATE CANCER: ICD-10-CM

## 2019-04-29 DIAGNOSIS — Z28.21 REFUSED INFLUENZA VACCINE: ICD-10-CM

## 2019-04-29 DIAGNOSIS — Z11.59 NEED FOR HEPATITIS C SCREENING TEST: ICD-10-CM

## 2019-04-29 PROCEDURE — 3008F BODY MASS INDEX DOCD: CPT | Performed by: FAMILY MEDICINE

## 2019-04-29 PROCEDURE — G0438 PPPS, INITIAL VISIT: HCPCS | Performed by: FAMILY MEDICINE

## 2019-04-29 PROCEDURE — 99213 OFFICE O/P EST LOW 20 MIN: CPT | Performed by: FAMILY MEDICINE

## 2019-04-29 PROCEDURE — 1125F AMNT PAIN NOTED PAIN PRSNT: CPT | Performed by: FAMILY MEDICINE

## 2019-04-29 PROCEDURE — 1170F FXNL STATUS ASSESSED: CPT | Performed by: FAMILY MEDICINE

## 2019-04-29 PROCEDURE — 1160F RVW MEDS BY RX/DR IN RCRD: CPT | Performed by: FAMILY MEDICINE

## 2019-11-26 ENCOUNTER — LAB (OUTPATIENT)
Dept: LAB | Facility: CLINIC | Age: 64
End: 2019-11-26
Payer: COMMERCIAL

## 2019-11-26 DIAGNOSIS — R53.83 FATIGUE, UNSPECIFIED TYPE: ICD-10-CM

## 2019-11-26 DIAGNOSIS — Z11.59 NEED FOR HEPATITIS C SCREENING TEST: ICD-10-CM

## 2019-11-26 DIAGNOSIS — Z13.6 ENCOUNTER FOR LIPID SCREENING FOR CARDIOVASCULAR DISEASE: ICD-10-CM

## 2019-11-26 DIAGNOSIS — Z13.220 ENCOUNTER FOR LIPID SCREENING FOR CARDIOVASCULAR DISEASE: ICD-10-CM

## 2019-11-26 DIAGNOSIS — Z12.5 SCREENING FOR PROSTATE CANCER: ICD-10-CM

## 2019-11-26 LAB
ALBUMIN SERPL BCP-MCNC: 3.9 G/DL (ref 3.5–5)
ALP SERPL-CCNC: 53 U/L (ref 46–116)
ALT SERPL W P-5'-P-CCNC: 20 U/L (ref 12–78)
ANION GAP SERPL CALCULATED.3IONS-SCNC: 3 MMOL/L (ref 4–13)
AST SERPL W P-5'-P-CCNC: 16 U/L (ref 5–45)
BASOPHILS # BLD AUTO: 0.04 THOUSANDS/ΜL (ref 0–0.1)
BASOPHILS NFR BLD AUTO: 1 % (ref 0–1)
BILIRUB SERPL-MCNC: 3.14 MG/DL (ref 0.2–1)
BUN SERPL-MCNC: 13 MG/DL (ref 5–25)
CALCIUM SERPL-MCNC: 9 MG/DL (ref 8.3–10.1)
CHLORIDE SERPL-SCNC: 108 MMOL/L (ref 100–108)
CHOLEST SERPL-MCNC: 170 MG/DL (ref 50–200)
CO2 SERPL-SCNC: 30 MMOL/L (ref 21–32)
CREAT SERPL-MCNC: 0.74 MG/DL (ref 0.6–1.3)
EOSINOPHIL # BLD AUTO: 0.22 THOUSAND/ΜL (ref 0–0.61)
EOSINOPHIL NFR BLD AUTO: 5 % (ref 0–6)
ERYTHROCYTE [DISTWIDTH] IN BLOOD BY AUTOMATED COUNT: 13.1 % (ref 11.6–15.1)
GFR SERPL CREATININE-BSD FRML MDRD: 97 ML/MIN/1.73SQ M
GLUCOSE P FAST SERPL-MCNC: 89 MG/DL (ref 65–99)
HCT VFR BLD AUTO: 44.6 % (ref 36.5–49.3)
HCV AB SER QL: NORMAL
HDLC SERPL-MCNC: 51 MG/DL
HGB BLD-MCNC: 14.8 G/DL (ref 12–17)
IMM GRANULOCYTES # BLD AUTO: 0.01 THOUSAND/UL (ref 0–0.2)
IMM GRANULOCYTES NFR BLD AUTO: 0 % (ref 0–2)
LDLC SERPL CALC-MCNC: 101 MG/DL (ref 0–100)
LYMPHOCYTES # BLD AUTO: 1.25 THOUSANDS/ΜL (ref 0.6–4.47)
LYMPHOCYTES NFR BLD AUTO: 28 % (ref 14–44)
MCH RBC QN AUTO: 31 PG (ref 26.8–34.3)
MCHC RBC AUTO-ENTMCNC: 33.2 G/DL (ref 31.4–37.4)
MCV RBC AUTO: 93 FL (ref 82–98)
MONOCYTES # BLD AUTO: 0.41 THOUSAND/ΜL (ref 0.17–1.22)
MONOCYTES NFR BLD AUTO: 9 % (ref 4–12)
NEUTROPHILS # BLD AUTO: 2.49 THOUSANDS/ΜL (ref 1.85–7.62)
NEUTS SEG NFR BLD AUTO: 57 % (ref 43–75)
NRBC BLD AUTO-RTO: 0 /100 WBCS
PLATELET # BLD AUTO: 166 THOUSANDS/UL (ref 149–390)
PMV BLD AUTO: 9.6 FL (ref 8.9–12.7)
POTASSIUM SERPL-SCNC: 3.7 MMOL/L (ref 3.5–5.3)
PROT SERPL-MCNC: 7.1 G/DL (ref 6.4–8.2)
RBC # BLD AUTO: 4.78 MILLION/UL (ref 3.88–5.62)
SODIUM SERPL-SCNC: 141 MMOL/L (ref 136–145)
TRIGL SERPL-MCNC: 89 MG/DL
TSH SERPL DL<=0.05 MIU/L-ACNC: 1.89 UIU/ML (ref 0.36–3.74)
WBC # BLD AUTO: 4.42 THOUSAND/UL (ref 4.31–10.16)

## 2019-11-26 PROCEDURE — 84153 ASSAY OF PSA TOTAL: CPT

## 2019-11-26 PROCEDURE — 84154 ASSAY OF PSA FREE: CPT

## 2019-11-26 PROCEDURE — 84443 ASSAY THYROID STIM HORMONE: CPT

## 2019-11-26 PROCEDURE — 80061 LIPID PANEL: CPT

## 2019-11-26 PROCEDURE — 85025 COMPLETE CBC W/AUTO DIFF WBC: CPT

## 2019-11-26 PROCEDURE — 36415 COLL VENOUS BLD VENIPUNCTURE: CPT

## 2019-11-26 PROCEDURE — 86803 HEPATITIS C AB TEST: CPT

## 2019-11-26 PROCEDURE — 80053 COMPREHEN METABOLIC PANEL: CPT

## 2019-11-27 ENCOUNTER — TELEPHONE (OUTPATIENT)
Dept: FAMILY MEDICINE CLINIC | Facility: CLINIC | Age: 64
End: 2019-11-27

## 2019-11-27 DIAGNOSIS — R17 TOTAL BILIRUBIN, ELEVATED: Primary | ICD-10-CM

## 2019-11-27 LAB
PSA FREE MFR SERPL: 48.3 %
PSA FREE SERPL-MCNC: 0.29 NG/ML
PSA SERPL-MCNC: 0.6 NG/ML (ref 0–4)

## 2019-11-27 NOTE — TELEPHONE ENCOUNTER
----- Message from Madisyn Fish MD sent at 11/27/2019  9:16 AM EST -----  Can you please let patient know that all of his blood work is stable except that his total bilirubin, breakdown of his hemoglobin by his liver has increased and is doubled  His liver enzymes however are normal   Because of this, I would like to repeat this again and have him to an ultrasound of his liver  I will order this  If he should have any abdominal pain, nausea or anything else that is concerning prior to his upcoming appointment, I would like him to let us know or go to the ER    Thank you

## 2019-11-27 NOTE — RESULT ENCOUNTER NOTE
Can you please let patient know that all of his blood work is stable except that his total bilirubin, breakdown of his hemoglobin by his liver has increased and is doubled  His liver enzymes however are normal   Because of this, I would like to repeat this again and have him to an ultrasound of his liver  I will order this  If he should have any abdominal pain, nausea or anything else that is concerning prior to his upcoming appointment, I would like him to let us know or go to the ER    Thank you

## 2019-12-02 ENCOUNTER — TELEPHONE (OUTPATIENT)
Dept: FAMILY MEDICINE CLINIC | Facility: CLINIC | Age: 64
End: 2019-12-02

## 2019-12-02 ENCOUNTER — OFFICE VISIT (OUTPATIENT)
Dept: FAMILY MEDICINE CLINIC | Facility: CLINIC | Age: 64
End: 2019-12-02
Payer: COMMERCIAL

## 2019-12-02 ENCOUNTER — LAB (OUTPATIENT)
Dept: LAB | Facility: CLINIC | Age: 64
End: 2019-12-02
Payer: COMMERCIAL

## 2019-12-02 DIAGNOSIS — R17 TOTAL BILIRUBIN, ELEVATED: ICD-10-CM

## 2019-12-02 DIAGNOSIS — Z00.00 ROUTINE HEALTH MAINTENANCE: ICD-10-CM

## 2019-12-02 LAB — BILIRUB SERPL-MCNC: 1.72 MG/DL (ref 0.2–1)

## 2019-12-02 PROCEDURE — 36415 COLL VENOUS BLD VENIPUNCTURE: CPT

## 2019-12-02 PROCEDURE — 82247 BILIRUBIN TOTAL: CPT

## 2019-12-02 PROCEDURE — 99214 OFFICE O/P EST MOD 30 MIN: CPT | Performed by: FAMILY MEDICINE

## 2019-12-02 NOTE — TELEPHONE ENCOUNTER
----- Message from Liana Liao MD sent at 12/2/2019  2:08 PM EST -----  Please let patient know that his total bilirubin is now 1 72, decreased from 3 14  It has improved tremendously from last week however it is still little higher  Because of this, I would like him to see the gastroenterologist   I also would like him to keep his appointment for abdominal ultrasound    Thank you

## 2019-12-02 NOTE — PROGRESS NOTES
FAMILY PRACTICE OFFICE VISIT       NAME: Sera Faith  AGE: 70 y o  SEX: male       : 1955        MRN: [id number]    DATE: 12/3/2019  TIME: 10:11 PM    Assessment and Plan     Problem List Items Addressed This Visit        Other    BMI 34 0-34 9,adult - Primary    Routine health maintenance    Elevated bilirubin        BMI 34:  Patient continues to watch what he eats, remains active  Continue with lifestyle modifications to work on weight loss  Will continue to monitor  Elevated total bilirubin:  Unclear etiology  This has been elevated in the past however this time increased to over 3  I will go ahead and recheck this again today  Patient is scheduled for abdominal ultrasound for Wednesday  I feel he would benefit from seeing Gastroenterology as well  Liver enzymes are within normal range  Exam WNL today  Routine health maintenance:  Up-to-date colonoscopy, due in 2020  Declines vaccines today  Up-to-date with PSA  There are no Patient Instructions on file for this visit  I have spent 25 minutes with Patient  today in which greater than 50% of this time was spent in counseling/coordination of care regarding Diagnostic results, Prognosis, Risks and benefits of tx options, Intructions for management, Patient and family education, Importance of tx compliance, Risk factor reductions and Impressions  Chief Complaint     Chief Complaint   Patient presents with    Follow-up       History of Present Illness     HPI   49-year-old male here for routine follow-up and discuss recent blood work results  Patient states he is concerned about his elevated bilirubin level  He is scheduled for upcoming ultrasound on Wednesday  He is agreeable on getting his bilirubin checked today  He denies any complaints  He continues to remain active   Walks 3 miles in the morning daily then walks again for an 1 hr  Does football and baseball coaching  Review of Systems   Review of Systems Constitutional: Negative for activity change, appetite change and unexpected weight change  Respiratory: Negative for shortness of breath  Cardiovascular: Negative  Gastrointestinal: Negative for abdominal pain, constipation, diarrhea, nausea and vomiting  Genitourinary: Negative for dysuria  Neurological: Negative for dizziness and light-headedness  Psychiatric/Behavioral: Negative for dysphoric mood  Active Problem List     Patient Active Problem List   Diagnosis    Colon cancer screening    History of adenomatous polyp of colon    Impaired fasting glucose    BMI 34 0-34 9,adult    Routine health maintenance    Elevated bilirubin    Skipped beats    NSVT (nonsustained ventricular tachycardia) (HCC)       Past Medical History:  Past Medical History:   Diagnosis Date    Arthritis        Past Surgical History:  Past Surgical History:   Procedure Laterality Date    HAND SURGERY      [place] COLONOSCOPY FLX DX W/COLLJ SPEC WHEN PFRMD N/A 3/22/2018    Procedure: COLONOSCOPY;  Surgeon: Hina Romano MD;  [facility];   Service: Gastroenterology    TONSILECTOMY AND ADNOIDECTOMY      TOOTH EXTRACTION         Family History:  Family History   Problem Relation Age of Onset   [facility] Breast cancer Mother     Heart disease Father         MI in his 52's       Social History:  Social History     Socioeconomic History    Marital status: Single     Spouse name: Not on file    Number of children: Not on file    Years of education: Not on file    Highest education level: Not on file   Occupational History    Occupation: retired   Social Needs    Financial resource strain: Not on file    Food insecurity:     Worry: Not on file     Inability: Not on file   [employer] needs:     Medical: Not on file     Non-medical: Not on file   Tobacco Use    Smoking status: Never Smoker    Smokeless tobacco: Never Used   Substance and Sexual Activity    Alcohol use: Yes     Comment: occasional    Drug use: No    Sexual activity: Not Currently   Lifestyle    Physical activity:     Days per week: Not on file     Minutes per session: Not on file    Stress: Not on file   Relationships    Social connections:     Talks on phone: Not on file     Gets together: Not on file     Attends Hindu service: Not on file     Active member of club or organization: Not on file     Attends meetings of clubs or organizations: Not on file     Relationship status: Not on file    Intimate partner violence:     Fear of current or ex partner: Not on file     Emotionally abused: Not on file     Physically abused: Not on file     Forced sexual activity: Not on file   Other Topics Concern    Not on file   Social History Narrative    Coffee: 12 oz     I have reviewed the patient's medical history in detail; there are no changes to the history as noted in the electronic medical record  Objective     Vitals:    12/02/19 1153   BP: 130/74   Pulse:    Resp:    Temp:    SpO2:      Wt Readings from Last 3 Encounters:   12/03/19 106 kg (234 lb)   12/02/19 105 kg (232 lb)   04/29/19 97 3 kg (214 lb 6 4 oz)     Vitals:    12/02/19 1129 12/02/19 1153   BP: 130/86 130/74   BP Location: Left arm    Patient Position: Sitting    Cuff Size: Adult    Pulse: 62    Resp: 18    Temp: 98 6 °F (37 °C)    TempSrc: Tympanic    SpO2: 98%    Weight: 105 kg (232 lb)    Height: 5' 9" (1 753 m)      Vitals:    12/02/19 1129 12/02/19 1153 12/04/19 1901   BP: 130/86 130/74    BP Location: Left arm     Patient Position: Sitting     Cuff Size: Adult     Pulse: 62     Resp: 18  16   Temp: 98 6 °F (37 °C)     TempSrc: Tympanic     SpO2: 98%     Weight: 105 kg (232 lb)     Height: 5' 9" (1 753 m)           Physical Exam   Constitutional: He appears well-developed and well-nourished  HENT:   Head: Normocephalic and atraumatic  Mouth/Throat: Oropharynx is clear and moist    TMs intact and clear   Eyes: Pupils are equal, round, and reactive to light  Conjunctivae and EOM are normal    Neck: Normal range of motion  Neck supple  No thyromegaly present  Cardiovascular: Normal rate, regular rhythm and normal heart sounds  Pulmonary/Chest: Effort normal and breath sounds normal    Abdominal: Soft  Bowel sounds are normal  He exhibits no distension  There is no tenderness  Musculoskeletal: Normal range of motion  He exhibits no edema  Lymphadenopathy:     He has no cervical adenopathy  Neurological: He is alert  Psychiatric: He has a normal mood and affect  Nursing note and vitals reviewed        Pertinent Laboratory/Diagnostic Studies:  Lab Results   Component Value Date    GLUCOSE 104 10/29/2014    BUN 13 11/26/2019    CREATININE 0 74 11/26/2019    CALCIUM 9 0 11/26/2019     10/29/2014    K 3 7 11/26/2019    CO2 30 11/26/2019     11/26/2019     Lab Results   Component Value Date    ALT 20 11/26/2019    AST 16 11/26/2019    ALKPHOS 53 11/26/2019    BILITOT 1 52 (H) 10/29/2014       Lab Results   Component Value Date    WBC 4 42 11/26/2019    HGB 14 8 11/26/2019    HCT 44 6 11/26/2019    MCV 93 11/26/2019     11/26/2019       No results found for: TSH    No results found for: CHOL  Lab Results   Component Value Date    TRIG 89 11/26/2019     Lab Results   Component Value Date    HDL 51 11/26/2019     Lab Results   Component Value Date    LDLCALC 101 (H) 11/26/2019     Lab Results   Component Value Date    HGBA1C 4 8 12/03/2018       Results for orders placed or performed in visit on 11/26/19   CBC and differential   Result Value Ref Range    WBC 4 42 4 31 - 10 16 Thousand/uL    RBC 4 78 3 88 - 5 62 Million/uL    Hemoglobin 14 8 12 0 - 17 0 g/dL    Hematocrit 44 6 36 5 - 49 3 %    MCV 93 82 - 98 fL    MCH 31 0 26 8 - 34 3 pg    MCHC 33 2 31 4 - 37 4 g/dL    RDW 13 1 11 6 - 15 1 %    MPV 9 6 8 9 - 12 7 fL    Platelets 584 502 - 716 Thousands/uL    nRBC 0 /100 WBCs    Neutrophils Relative 57 43 - 75 %    Immat GRANS % 0 0 - 2 % Lymphocytes Relative 28 14 - 44 %    Monocytes Relative 9 4 - 12 %    Eosinophils Relative 5 0 - 6 %    Basophils Relative 1 0 - 1 %    Neutrophils Absolute 2 49 1 85 - 7 62 Thousands/µL    Immature Grans Absolute 0 01 0 00 - 0 20 Thousand/uL    Lymphocytes Absolute 1 25 0 60 - 4 47 Thousands/µL    Monocytes Absolute 0 41 0 17 - 1 22 Thousand/µL    Eosinophils Absolute 0 22 0 00 - 0 61 Thousand/µL    Basophils Absolute 0 04 0 00 - 0 10 Thousands/µL   Comprehensive metabolic panel   Result Value Ref Range    Sodium 141 136 - 145 mmol/L    Potassium 3 7 3 5 - 5 3 mmol/L    Chloride 108 100 - 108 mmol/L    CO2 30 21 - 32 mmol/L    ANION GAP 3 (L) 4 - 13 mmol/L    BUN 13 5 - 25 mg/dL    Creatinine 0 74 0 60 - 1 30 mg/dL    Glucose, Fasting 89 65 - 99 mg/dL    Calcium 9 0 8 3 - 10 1 mg/dL    AST 16 5 - 45 U/L    ALT 20 12 - 78 U/L    Alkaline Phosphatase 53 46 - 116 U/L    Total Protein 7 1 6 4 - 8 2 g/dL    Albumin 3 9 3 5 - 5 0 g/dL    Total Bilirubin 3 14 (H) 0 20 - 1 00 mg/dL    eGFR 97 ml/min/1 73sq m   Lipid Panel with Direct LDL reflex   Result Value Ref Range    Cholesterol 170 50 - 200 mg/dL    Triglycerides 89 <=150 mg/dL    HDL, Direct 51 >=40 mg/dL    LDL Calculated 101 (H) 0 - 100 mg/dL   TSH, 3rd generation with Free T4 reflex   Result Value Ref Range    TSH 3RD GENERATON 1 890 0 358 - 3 740 uIU/mL   PSA, total and free   Result Value Ref Range    Prostate Specific Antigen Total 0 6 0 0 - 4 0 ng/mL    PSA, Free 0 29 N/A ng/mL    PSA, Free Pct 48 3 %   Hepatitis C antibody   Result Value Ref Range    Hepatitis C Ab Non-reactive Non-reactive       No orders of the defined types were placed in this encounter  ALLERGIES:  Allergies   Allergen Reactions    Cefadroxil      Action Taken: fever;        Current Medications     No current outpatient medications on file  No current facility-administered medications for this visit            Health Maintenance     Health Maintenance   Topic Date Due    HIV Screening  12/23/1968    BMI: Followup Plan  12/23/1971    Pneumococcal Vaccine: 65+ Years (1 of 2 - PCV13) 12/23/2018    DTaP,Tdap,and Td Vaccines (1 - Tdap) 04/25/2020 (Originally 12/23/1964)    Influenza Vaccine  04/29/2020 (Originally 7/1/2019)    CRC Screening: Colonoscopy  03/22/2020    Fall Risk  04/29/2020    Depression Screening PHQ  04/29/2020    Medicare Annual Wellness Visit (AWV)  04/29/2020    BMI: Adult  12/03/2020    Hepatitis C Screening  Completed    Pneumococcal Vaccine: Pediatrics (0 to 5 Years) and At-Risk Patients (6 to 59 Years)  Aged Out    HIB Vaccine  Aged Out    Hepatitis B Vaccine  Aged Out    IPV Vaccine  Aged Out    Hepatitis A Vaccine  Aged Out    Meningococcal ACWY Vaccine  Aged Out    HPV Vaccine  Aged Out     There is no immunization history for the selected administration types on file for this patient      Luis Angel Cervantes MD

## 2019-12-02 NOTE — RESULT ENCOUNTER NOTE
Please let patient know that his total bilirubin is now 1 72, decreased from 3 14  It has improved tremendously from last week however it is still little higher  Because of this, I would like him to see the gastroenterologist   I also would like him to keep his appointment for abdominal ultrasound    Thank you

## 2019-12-03 ENCOUNTER — OFFICE VISIT (OUTPATIENT)
Dept: GASTROENTEROLOGY | Facility: AMBULARY SURGERY CENTER | Age: 64
End: 2019-12-03
Payer: COMMERCIAL

## 2019-12-03 VITALS
SYSTOLIC BLOOD PRESSURE: 130 MMHG | HEIGHT: 69 IN | HEART RATE: 64 BPM | BODY MASS INDEX: 34.66 KG/M2 | DIASTOLIC BLOOD PRESSURE: 88 MMHG | RESPIRATION RATE: 18 BRPM | WEIGHT: 234 LBS | TEMPERATURE: 98 F

## 2019-12-03 DIAGNOSIS — Z86.010 HISTORY OF ADENOMATOUS POLYP OF COLON: ICD-10-CM

## 2019-12-03 DIAGNOSIS — R17 ELEVATED BILIRUBIN: Primary | ICD-10-CM

## 2019-12-03 PROBLEM — Z86.0101 HISTORY OF ADENOMATOUS POLYP OF COLON: Status: ACTIVE | Noted: 2018-05-16

## 2019-12-03 PROCEDURE — 99214 OFFICE O/P EST MOD 30 MIN: CPT | Performed by: PHYSICIAN ASSISTANT

## 2019-12-03 NOTE — PROGRESS NOTES
Follow-up Note -  [facility]  Rula Isaac 1955 70 y o  male         Reason:  Follow up; elevated bilirubin    HPI:  59-year-old male with history of arthritis who presents for follow-up; he had had colonoscopy with our service in March 2018, showing 3 colon polyps, 2 of which showed tubular adenoma and one of which showed tubulovillous adenoma, and he was recommended for follow-up colonoscopy in 2 years after that point  At this time, he says he was referred from his PCP because his bilirubin was elevated  This had apparently fluctuated some in the last couple of years, about a year ago he had been noted with bilirubin of 2, but in November 26 was found be 3 14, and was checked again yesterday and found to be 1 72  I do not see that his direct bilirubin was ever differentiated from his indirect bilirubin  His other liver enzymes also appeared to have been normal   He denies any known history of liver disease, denies any history of regular alcohol use, I see that he had hepatitis C antibody recently checked which was negative  He has ultrasound of the abdomen currently ordered and pending  REVIEW OF SYSTEMS:      CONSTITUTIONAL: Denies any fever, chills, or rigors  Good appetite, and no recent weight loss  HEENT: No earache or tinnitus  Denies hearing loss or visual disturbances  CARDIOVASCULAR: No chest pain or palpitations  RESPIRATORY: Denies any cough, hemoptysis, shortness of breath or dyspnea on exertion  GASTROINTESTINAL: As noted in the History of Present Illness  GENITOURINARY: No problems with urination  Denies any hematuria or dysuria  NEUROLOGIC: No dizziness or vertigo, denies headaches  MUSCULOSKELETAL: Denies any muscle or joint pain  SKIN: Denies skin rashes or itching  ENDOCRINE: Denies excessive thirst  Denies intolerance to heat or cold  PSYCHOSOCIAL: Denies depression or anxiety  Denies any recent memory loss       Past Medical History:   Diagnosis Date    Arthritis       Past Surgical History:   Procedure Laterality Date    HAND SURGERY      [place] COLONOSCOPY FLX DX W/COLLJ SPEC WHEN PFRMD N/A 3/22/2018    Procedure: COLONOSCOPY;  Surgeon: Ashwini Krishnamurthy MD;  [facility]; Service: Gastroenterology    TONSILECTOMY AND ADNOIDECTOMY      TOOTH EXTRACTION       Social History     Socioeconomic History    Marital status: Single     Spouse name: Not on file    Number of children: Not on file    Years of education: Not on file    Highest education level: Not on file   Occupational History    Occupation: retired   Social Needs    Financial resource strain: Not on file    Food insecurity:     Worry: Not on file     Inability: Not on file   [employer] needs:     Medical: Not on file     Non-medical: Not on file   Tobacco Use    Smoking status: Never Smoker    Smokeless tobacco: Never Used   Substance and Sexual Activity    Alcohol use: Yes     Comment: occasional    Drug use: No    Sexual activity: Not Currently   Lifestyle    Physical activity:     Days per week: Not on file     Minutes per session: Not on file    Stress: Not on file   Relationships    Social connections:     Talks on phone: Not on file     Gets together: Not on file     Attends [employer] service: Not on file     Active member of club or organization: Not on file     Attends meetings of clubs or organizations: Not on file     Relationship status: Not on file    Intimate partner violence:     Fear of current or ex partner: Not on file     Emotionally abused: Not on file     Physically abused: Not on file     Forced sexual activity: Not on file   Other Topics Concern    Not on file   Social History Narrative    Coffee: 12 oz     Family History   Problem Relation Age of Onset    Breast cancer Mother     Heart disease Father         MI in his 52's     Cefadroxil  No current outpatient medications on file       No current facility-administered medications for this visit  Blood pressure 130/88, pulse 64, temperature 98 °F (36 7 °C), temperature source Tympanic, resp  rate 18, height 5' 9" (1 753 m), weight 106 kg (234 lb)  PHYSICAL EXAM:      General Appearance:   Alert, cooperative, no distress, appears stated age    HEENT:   Normocephalic, atraumatic, anicteric      Neck:  Supple, symmetrical, trachea midline, no adenopathy;    thyroid: no enlargement/tenderness/nodules; no carotid  bruit or JVD    Lungs:   Clear to auscultation bilaterally; no rales, rhonchi or wheezing; respirations unlabored    Heart[de-identified]   S1 and S2 normal; regular rate and rhythm; no murmur, rub, or gallop  Abdomen:   Soft, non-tender, non-distended; normal bowel sounds; no masses, no organomegaly    Extremities: No edema, erythema, wounds, rashes   Rectal:   Deferred                      Lab Results   Component Value Date    WBC 4 42 11/26/2019    HGB 14 8 11/26/2019    HCT 44 6 11/26/2019    MCV 93 11/26/2019     11/26/2019     Lab Results   Component Value Date    GLUCOSE 104 10/29/2014    CALCIUM 9 0 11/26/2019     10/29/2014    K 3 7 11/26/2019    CO2 30 11/26/2019     11/26/2019    BUN 13 11/26/2019    CREATININE 0 74 11/26/2019     Lab Results   Component Value Date    ALT 20 11/26/2019    AST 16 11/26/2019    ALKPHOS 53 11/26/2019    BILITOT 1 52 (H) 10/29/2014     Lab Results   Component Value Date    INR 1 08 10/29/2014    PROTIME 13 8 10/29/2014       No results found      ASSESSMENT & PLAN:    Elevated bilirubin  Suspect to be benign process such as Clarisse Means syndrome, his bilirubin has fluctuated without a true persistent increase over the last couple of years, and his other liver enzymes have remained normal   Could possibly indicate fatty liver disease or other process though, his hep C antibody was negative    - We will agree with checking right upper quadrant ultrasound, follow-up on this report    - In one month we'll check liver enzymes including both direct and indirect bilirubin, check PT/INR also    History of adenomatous polyp of colon  Patient had 3 adenomatous polyps including one tubulovillous adenoma during colonoscopy in March 2018, for which he was recommended for repeat colonoscopy in 2 years      - Plan for colonoscopy this coming spring, he says he is planned for April which I think is appropriate

## 2019-12-03 NOTE — ASSESSMENT & PLAN NOTE
Suspect to be benign process such as Latoya Boas syndrome, his bilirubin has fluctuated without a true persistent increase over the last couple of years, and his other liver enzymes have remained normal   Could possibly indicate fatty liver disease or other process though, his hep C antibody was negative    - We will agree with checking right upper quadrant ultrasound, follow-up on this report    - In one month we'll check liver enzymes including both direct and indirect bilirubin, check PT/INR also

## 2019-12-03 NOTE — ASSESSMENT & PLAN NOTE
Patient had 3 adenomatous polyps including one tubulovillous adenoma during colonoscopy in March 2018, for which he was recommended for repeat colonoscopy in 2 years      - Plan for colonoscopy this coming spring, he says he is planned for April which I think is appropriate

## 2019-12-04 ENCOUNTER — HOSPITAL ENCOUNTER (OUTPATIENT)
Dept: ULTRASOUND IMAGING | Facility: HOSPITAL | Age: 64
Discharge: HOME | End: 2019-12-04
Payer: COMMERCIAL

## 2019-12-04 VITALS
WEIGHT: 232 LBS | HEIGHT: 69 IN | RESPIRATION RATE: 16 BRPM | DIASTOLIC BLOOD PRESSURE: 74 MMHG | TEMPERATURE: 98.6 F | SYSTOLIC BLOOD PRESSURE: 130 MMHG | HEART RATE: 62 BPM | OXYGEN SATURATION: 98 % | BODY MASS INDEX: 34.36 KG/M2

## 2019-12-04 DIAGNOSIS — R17 TOTAL BILIRUBIN, ELEVATED: ICD-10-CM

## 2019-12-04 PROCEDURE — 76705 ECHO EXAM OF ABDOMEN: CPT

## 2019-12-06 ENCOUNTER — TELEPHONE (OUTPATIENT)
Dept: FAMILY MEDICINE CLINIC | Facility: CLINIC | Age: 64
End: 2019-12-06

## 2019-12-06 NOTE — RESULT ENCOUNTER NOTE
Can you please let patient know that his ultrasound of his abdomen shows a gallstone as well as possible fatty liver  I would like patient to review the ultrasound with the gastroenterologist as well  I would like him to continue to work on weight loss, avoiding saturated fats, incorporating heart healthy fats in the diet

## 2019-12-06 NOTE — TELEPHONE ENCOUNTER
----- Message from Mirela Costello MD sent at 12/6/2019  3:02 PM EST -----  Can you please let patient know that his ultrasound of his abdomen shows a gallstone as well as possible fatty liver  I would like patient to review the ultrasound with the gastroenterologist as well  I would like him to continue to work on weight loss, avoiding saturated fats, incorporating heart healthy fats in the diet

## 2020-01-03 ENCOUNTER — APPOINTMENT (OUTPATIENT)
Dept: LAB | Facility: CLINIC | Age: 65
End: 2020-01-03
Payer: COMMERCIAL

## 2020-01-03 ENCOUNTER — TELEPHONE (OUTPATIENT)
Dept: GASTROENTEROLOGY | Facility: CLINIC | Age: 65
End: 2020-01-03

## 2020-01-03 DIAGNOSIS — R17 ELEVATED BILIRUBIN: ICD-10-CM

## 2020-01-03 LAB
ALBUMIN SERPL BCP-MCNC: 3.9 G/DL (ref 3.5–5)
ALP SERPL-CCNC: 56 U/L (ref 46–116)
ALT SERPL W P-5'-P-CCNC: 24 U/L (ref 12–78)
AST SERPL W P-5'-P-CCNC: 21 U/L (ref 5–45)
BILIRUB DIRECT SERPL-MCNC: 0.48 MG/DL (ref 0–0.2)
BILIRUB SERPL-MCNC: 3.01 MG/DL (ref 0.2–1)
INR PPP: 1.12 (ref 0.84–1.19)
PROT SERPL-MCNC: 7.5 G/DL (ref 6.4–8.2)
PROTHROMBIN TIME: 14 SECONDS (ref 11.6–14.5)

## 2020-01-03 PROCEDURE — 36415 COLL VENOUS BLD VENIPUNCTURE: CPT

## 2020-01-03 PROCEDURE — 80076 HEPATIC FUNCTION PANEL: CPT

## 2020-01-03 PROCEDURE — 85610 PROTHROMBIN TIME: CPT

## 2020-01-03 NOTE — TELEPHONE ENCOUNTER
Patient was last seen 12/03/19 he's coming in for a US follow up, his apt is set for 01/06/20 do you want patient to be seen or follow up in a few months

## 2020-01-03 NOTE — TELEPHONE ENCOUNTER
If he is asymptomatic it is reasonable for him to follow up in the next few months unless he wants to follow up Monday to discuss the results  His U/S shows fatty liver  He should work on losing weight through diet and exercise  It also appears as Chica Marroquin had talked about in the past office visit that he has gilberts which means his bilirubin will likely be chronically mildly elevated  No further workup is indicated for that   Thanks

## 2020-01-06 ENCOUNTER — TELEPHONE (OUTPATIENT)
Dept: GASTROENTEROLOGY | Facility: CLINIC | Age: 65
End: 2020-01-06

## 2020-01-06 ENCOUNTER — OFFICE VISIT (OUTPATIENT)
Dept: GASTROENTEROLOGY | Facility: AMBULARY SURGERY CENTER | Age: 65
End: 2020-01-06
Payer: COMMERCIAL

## 2020-01-06 VITALS
RESPIRATION RATE: 18 BRPM | DIASTOLIC BLOOD PRESSURE: 65 MMHG | WEIGHT: 233 LBS | BODY MASS INDEX: 34.51 KG/M2 | SYSTOLIC BLOOD PRESSURE: 121 MMHG | HEIGHT: 69 IN | HEART RATE: 65 BPM | TEMPERATURE: 98 F

## 2020-01-06 DIAGNOSIS — R17 ELEVATED BILIRUBIN: Primary | ICD-10-CM

## 2020-01-06 DIAGNOSIS — Z86.010 HISTORY OF COLON POLYPS: Primary | ICD-10-CM

## 2020-01-06 DIAGNOSIS — K76.0 FATTY LIVER: ICD-10-CM

## 2020-01-06 DIAGNOSIS — E80.4 GILBERT'S SYNDROME: ICD-10-CM

## 2020-01-06 DIAGNOSIS — Z86.010 HISTORY OF ADENOMATOUS POLYP OF COLON: ICD-10-CM

## 2020-01-06 PROCEDURE — 99213 OFFICE O/P EST LOW 20 MIN: CPT | Performed by: PHYSICIAN ASSISTANT

## 2020-01-06 PROCEDURE — 1160F RVW MEDS BY RX/DR IN RCRD: CPT | Performed by: PHYSICIAN ASSISTANT

## 2020-01-06 NOTE — TELEPHONE ENCOUNTER
lmom asking patient to contact back that he doesn't need to come into the office if he's feeling okay

## 2020-01-06 NOTE — PROGRESS NOTES
[facility] - Outpatient Follow-up Note  Sonal Tamez 75 y o  male MRN: [id number]  Encounter: [id number]    ASSESSMENT AND PLAN:      Elevated bilirubin  -predominately indirect, which is consistent with conjugation disorder such a gilberts  -discussed that he chronically will have mild bilirubin elevation    Fatty liver  -patient has been focusing on diet and exercise and has lost over 100lbs  -he will continue this    History of colonic polyps  -will schedule repeat colonoscopy for April of this year  ______________________________________________________________________    SUBJECTIVE:  Pleasant 74 yo male presents for follow up of elevated bilirubin, labs reveal a predominance of indirect bilirubin  His ultrasound showed gallstones and fatty liver  He has no symptoms presently  He denies irregular bowel movements  He reports he has focused on losing weight over the last couple of years  He lost nearly 100 pounds and walks several miles a few times week  He had a colonoscopy with adenomatous polyps in 2018 and was instructed to have a repeat colonoscopy in 2 years    REVIEW OF SYSTEMS IS OTHERWISE NEGATIVE  Historical Information   Past Medical History:   Diagnosis Date    Arthritis      Past Surgical History:   Procedure Laterality Date    HAND SURGERY      [place] COLONOSCOPY FLX DX W/COLLJ SPEC WHEN PFRMD N/A 3/22/2018    Procedure: COLONOSCOPY;  Surgeon: Stefani Ardon MD;  [facility];   Service: Gastroenterology    TONSILECTOMY AND ADNOIDECTOMY      TOOTH EXTRACTION       Social History   Social History     Substance and Sexual Activity   Alcohol Use Yes    Comment: occasional     Social History     Substance and Sexual Activity   Drug Use No     Social History     Tobacco Use   Smoking Status Never Smoker   Smokeless Tobacco Never Used     Family History   Problem Relation Age of Onset    Breast cancer Mother     Heart disease Father         MI in his 52's Meds/Allergies       Current Outpatient Medications:     Na Sulfate-K Sulfate-Mg Sulf 17 5-3 13-1 6 GM/177ML SOLN    Allergies   Allergen Reactions    Cefadroxil      Action Taken: fever;            Objective     Blood pressure 121/65, pulse 65, temperature 98 °F (36 7 °C), temperature source Tympanic, resp  rate 18, height 5' 9" (1 753 m), weight 106 kg (233 lb)  Body mass index is 34 41 kg/m²  PHYSICAL EXAM:      General Appearance:   Alert, cooperative, no distress   HEENT:   Normocephalic, atraumatic, anicteric      Neck:  Supple, symmetrical, trachea midline   Lungs:   Clear to auscultation bilaterally; no rales, rhonchi or wheezing; respirations unlabored    Heart[de-identified]   Regular rate and rhythm; no murmur, rub, or gallop  Abdomen:   Soft, non-tender, non-distended; normal bowel sounds; no masses, no organomegaly                              Lab Results:   No visits with results within 1 Day(s) from this visit  Latest known visit with results is:   Appointment on 01/03/2020   Component Date Value    Total Bilirubin 01/03/2020 3 01*    Bilirubin, Direct 01/03/2020 0 48*    Alkaline Phosphatase 01/03/2020 56     AST 01/03/2020 21     ALT 01/03/2020 24     Total Protein 01/03/2020 7 5     Albumin 01/03/2020 3 9     Protime 01/03/2020 14 0     INR 01/03/2020 1 12      Radiology Results:   No results found

## 2020-01-08 ENCOUNTER — TELEPHONE (OUTPATIENT)
Dept: GASTROENTEROLOGY | Facility: CLINIC | Age: 65
End: 2020-01-08

## 2020-01-08 NOTE — LETTER
January 8, 2020     [facility] [address] [address]      Dear Mr Raven Pierre: Our office has attempted to call you in regards to your results, however, we have been unable to get a hold of you  Please give our office a call so we can review your results and answer any questions you may have in regards to your recent Lab Work                Sincerely,        [facility]

## 2020-01-08 NOTE — TELEPHONE ENCOUNTER
----- Message from Laurent Dai PA-C sent at 1/6/2020  1:11 PM EST -----  Looks like this patient has an office visit scheduled with you today  Bilirubin elevation appears to be mostly indirect, although the direct bilirubin is slightly elevated  Most likely related to fatty liver disease; can consider checking LFTs again in a few months and pursuing further workup on an as needed basis, recommending exercise/weight loss/vitamin-E in the meantime

## 2020-03-24 ENCOUNTER — TELEPHONE (OUTPATIENT)
Dept: GASTROENTEROLOGY | Facility: AMBULARY SURGERY CENTER | Age: 65
End: 2020-03-24

## 2020-03-24 NOTE — TELEPHONE ENCOUNTER
----- Message from Stephanie Jiang MD sent at 3/23/2020 12:36 PM EDT -----  History of adenomatous polyps-last colonoscopy 2018, will reschedule in 3 months

## 2020-06-11 DIAGNOSIS — Z86.010 HISTORY OF COLON POLYPS: Primary | ICD-10-CM

## 2020-06-18 ENCOUNTER — TELEPHONE (OUTPATIENT)
Dept: GASTROENTEROLOGY | Facility: AMBULARY SURGERY CENTER | Age: 65
End: 2020-06-18

## 2020-06-18 DIAGNOSIS — Z86.010 HISTORY OF COLON POLYPS: Primary | ICD-10-CM

## 2020-06-24 DIAGNOSIS — Z11.59 SCREENING FOR VIRAL DISEASE: ICD-10-CM

## 2020-06-24 PROCEDURE — U0003 INFECTIOUS AGENT DETECTION BY NUCLEIC ACID (DNA OR RNA); SEVERE ACUTE RESPIRATORY SYNDROME CORONAVIRUS 2 (SARS-COV-2) (CORONAVIRUS DISEASE [COVID-19]), AMPLIFIED PROBE TECHNIQUE, MAKING USE OF HIGH THROUGHPUT TECHNOLOGIES AS DESCRIBED BY CMS-2020-01-R: HCPCS

## 2020-06-25 LAB — SARS-COV-2 RNA SPEC QL NAA+PROBE: NOT DETECTED

## 2020-06-29 ENCOUNTER — ANESTHESIA (OUTPATIENT)
Dept: GASTROENTEROLOGY | Facility: AMBULARY SURGERY CENTER | Age: 65
End: 2020-06-29

## 2020-06-29 ENCOUNTER — HOSPITAL ENCOUNTER (OUTPATIENT)
Dept: GASTROENTEROLOGY | Facility: AMBULARY SURGERY CENTER | Age: 65
Setting detail: OUTPATIENT SURGERY
Discharge: HOME | End: 2020-06-29
Attending: INTERNAL MEDICINE | Admitting: INTERNAL MEDICINE
Payer: COMMERCIAL

## 2020-06-29 ENCOUNTER — ANESTHESIA EVENT (OUTPATIENT)
Dept: GASTROENTEROLOGY | Facility: AMBULARY SURGERY CENTER | Age: 65
End: 2020-06-29

## 2020-06-29 VITALS
HEART RATE: 61 BPM | RESPIRATION RATE: 21 BRPM | OXYGEN SATURATION: 95 % | HEIGHT: 69 IN | DIASTOLIC BLOOD PRESSURE: 73 MMHG | TEMPERATURE: 97.3 F | BODY MASS INDEX: 34.8 KG/M2 | WEIGHT: 235 LBS | SYSTOLIC BLOOD PRESSURE: 129 MMHG

## 2020-06-29 DIAGNOSIS — Z86.010 HISTORY OF COLON POLYPS: ICD-10-CM

## 2020-06-29 DIAGNOSIS — R19.7 DIARRHEA, UNSPECIFIED TYPE: ICD-10-CM

## 2020-06-29 DIAGNOSIS — R17 ELEVATED BILIRUBIN: ICD-10-CM

## 2020-06-29 DIAGNOSIS — Z86.010 HISTORY OF ADENOMATOUS POLYP OF COLON: ICD-10-CM

## 2020-06-29 PROCEDURE — 45385 COLONOSCOPY W/LESION REMOVAL: CPT | Performed by: INTERNAL MEDICINE

## 2020-06-29 PROCEDURE — 45380 COLONOSCOPY AND BIOPSY: CPT | Performed by: INTERNAL MEDICINE

## 2020-06-29 PROCEDURE — 88305 TISSUE EXAM BY PATHOLOGIST: CPT | Performed by: PATHOLOGY

## 2020-06-29 RX ORDER — SODIUM CHLORIDE, SODIUM LACTATE, POTASSIUM CHLORIDE, CALCIUM CHLORIDE 600; 310; 30; 20 MG/100ML; MG/100ML; MG/100ML; MG/100ML
100 INJECTION, SOLUTION INTRAVENOUS CONTINUOUS
Status: DISCONTINUED | OUTPATIENT
Start: 2020-06-29 | End: 2020-07-03 | Stop reason: HOSPADM

## 2020-06-29 RX ORDER — LIDOCAINE HYDROCHLORIDE 10 MG/ML
INJECTION, SOLUTION EPIDURAL; INFILTRATION; INTRACAUDAL; PERINEURAL AS NEEDED
Status: DISCONTINUED | OUTPATIENT
Start: 2020-06-29 | End: 2020-06-29 | Stop reason: SURG

## 2020-06-29 RX ORDER — PROPOFOL 10 MG/ML
INJECTION, EMULSION INTRAVENOUS AS NEEDED
Status: DISCONTINUED | OUTPATIENT
Start: 2020-06-29 | End: 2020-06-29 | Stop reason: SURG

## 2020-06-29 RX ADMIN — PROPOFOL 50 MG: 10 INJECTION, EMULSION INTRAVENOUS at 10:29

## 2020-06-29 RX ADMIN — PROPOFOL 50 MG: 10 INJECTION, EMULSION INTRAVENOUS at 10:45

## 2020-06-29 RX ADMIN — PROPOFOL 50 MG: 10 INJECTION, EMULSION INTRAVENOUS at 10:32

## 2020-06-29 RX ADMIN — LIDOCAINE HYDROCHLORIDE 50 MG: 10 INJECTION, SOLUTION EPIDURAL; INFILTRATION; INTRACAUDAL; PERINEURAL at 10:18

## 2020-06-29 RX ADMIN — PROPOFOL 50 MG: 10 INJECTION, EMULSION INTRAVENOUS at 10:26

## 2020-06-29 RX ADMIN — PROPOFOL 100 MG: 10 INJECTION, EMULSION INTRAVENOUS at 10:18

## 2020-06-29 RX ADMIN — PROPOFOL 50 MG: 10 INJECTION, EMULSION INTRAVENOUS at 10:34

## 2020-06-29 RX ADMIN — PROPOFOL 50 MG: 10 INJECTION, EMULSION INTRAVENOUS at 10:42

## 2020-06-29 RX ADMIN — PROPOFOL 50 MG: 10 INJECTION, EMULSION INTRAVENOUS at 10:50

## 2020-06-29 RX ADMIN — PROPOFOL 50 MG: 10 INJECTION, EMULSION INTRAVENOUS at 10:38

## 2020-06-29 RX ADMIN — PROPOFOL 50 MG: 10 INJECTION, EMULSION INTRAVENOUS at 10:22

## 2020-06-29 RX ADMIN — SODIUM CHLORIDE, SODIUM LACTATE, POTASSIUM CHLORIDE, AND CALCIUM CHLORIDE: .6; .31; .03; .02 INJECTION, SOLUTION INTRAVENOUS at 10:04

## 2020-06-29 NOTE — H&P
History and Physical -  [facility]  Ron Begum 75 y o  male MRN: [id number]    HPI: Ron Begum is a 75y o  year old male who presents for evaluation of colon polyps, has history of tubulovillous adenoma on a colonoscopy 3 years ago  Review of Systems    Historical Information   Past Medical History:   Diagnosis Date    Arthritis      Past Surgical History:   Procedure Laterality Date    HAND SURGERY      [place] COLONOSCOPY FLX DX W/COLLJ SPEC WHEN PFRMD N/A 3/22/2018    Procedure: COLONOSCOPY;  Surgeon: Radha Espinoza MD;  [facility]; Service: Gastroenterology    TONSILECTOMY AND ADNOIDECTOMY      TOOTH EXTRACTION       Social History   Social History     Substance and Sexual Activity   Alcohol Use Yes    Comment: occasional     Social History     Substance and Sexual Activity   Drug Use No     Social History     Tobacco Use   Smoking Status Never Smoker   Smokeless Tobacco Never Used     Family History   Problem Relation Age of Onset    Breast cancer Mother     Heart disease Father         MI in his 52's       Meds/Allergies       (Not in a hospital admission)    Allergies   Allergen Reactions    Cefadroxil      Action Taken: fever;        Objective     /60   Pulse 72   Temp 98 5 °F (36 9 °C) (Temporal)   Resp 16   Ht 5' 9" (1 753 m)   Wt 107 kg (235 lb)   SpO2 100%   BMI 34 70 kg/m²       PHYSICAL EXAM    Gen: NAD  CV: RRR  CHEST: Clear  ABD: soft, NT/ND  EXT: no edema  Neuro: AAO      ASSESSMENT/PLAN:  This is a 75y o  year old male here for evaluation of colon cancer screening, has history of colon polyps  Last colonoscopy was 3 years ago, was noted to have a tubulovillous adenoma  PLAN:   Procedure:  Colonoscopy

## 2020-07-07 ENCOUNTER — TELEPHONE (OUTPATIENT)
Dept: GASTROENTEROLOGY | Facility: MEDICAL CENTER | Age: 65
End: 2020-07-07

## 2020-07-07 NOTE — TELEPHONE ENCOUNTER
----- Message from Allan Gallegos MD sent at 7/2/2020 10:44 AM EDT -----  Please inform the patient that 2 of the polyps removed were precancerous, 1 was tubular adenoma, 2nd was sessile serrated adenoma, the remainder were benign  Would recommend repeat colonoscopy in 3 years with a 2 day prep  He will need to avoi fibrous materials 5 days prior to the procedure

## 2020-07-10 ENCOUNTER — APPOINTMENT (OUTPATIENT)
Dept: LAB | Facility: CLINIC | Age: 65
End: 2020-07-10
Payer: COMMERCIAL

## 2020-07-10 DIAGNOSIS — R17 ELEVATED BILIRUBIN: ICD-10-CM

## 2020-07-10 LAB
ALBUMIN SERPL BCP-MCNC: 3.7 G/DL (ref 3.5–5)
ALP SERPL-CCNC: 70 U/L (ref 46–116)
ALT SERPL W P-5'-P-CCNC: 24 U/L (ref 12–78)
AST SERPL W P-5'-P-CCNC: 16 U/L (ref 5–45)
BILIRUB DIRECT SERPL-MCNC: 0.33 MG/DL (ref 0–0.2)
BILIRUB SERPL-MCNC: 1.4 MG/DL (ref 0.2–1)
FERRITIN SERPL-MCNC: 263 NG/ML (ref 8–388)
HBV CORE AB SER QL: NORMAL
HBV CORE IGM SER QL: NORMAL
HBV SURFACE AG SER QL: NORMAL
HCV AB SER QL: NORMAL
IRON SATN MFR SERPL: 38 %
IRON SERPL-MCNC: 101 UG/DL (ref 65–175)
PROT SERPL-MCNC: 7 G/DL (ref 6.4–8.2)
TIBC SERPL-MCNC: 264 UG/DL (ref 250–450)

## 2020-07-10 PROCEDURE — 86705 HEP B CORE ANTIBODY IGM: CPT

## 2020-07-10 PROCEDURE — 86038 ANTINUCLEAR ANTIBODIES: CPT

## 2020-07-10 PROCEDURE — 86255 FLUORESCENT ANTIBODY SCREEN: CPT

## 2020-07-10 PROCEDURE — 83516 IMMUNOASSAY NONANTIBODY: CPT

## 2020-07-10 PROCEDURE — 87340 HEPATITIS B SURFACE AG IA: CPT

## 2020-07-10 PROCEDURE — 83550 IRON BINDING TEST: CPT

## 2020-07-10 PROCEDURE — 86235 NUCLEAR ANTIGEN ANTIBODY: CPT

## 2020-07-10 PROCEDURE — 82728 ASSAY OF FERRITIN: CPT

## 2020-07-10 PROCEDURE — 82784 ASSAY IGA/IGD/IGG/IGM EACH: CPT

## 2020-07-10 PROCEDURE — 86256 FLUORESCENT ANTIBODY TITER: CPT

## 2020-07-10 PROCEDURE — 36415 COLL VENOUS BLD VENIPUNCTURE: CPT

## 2020-07-10 PROCEDURE — 86376 MICROSOMAL ANTIBODY EACH: CPT

## 2020-07-10 PROCEDURE — 83540 ASSAY OF IRON: CPT

## 2020-07-10 PROCEDURE — 86704 HEP B CORE ANTIBODY TOTAL: CPT

## 2020-07-10 PROCEDURE — 82390 ASSAY OF CERULOPLASMIN: CPT

## 2020-07-10 PROCEDURE — 86803 HEPATITIS C AB TEST: CPT

## 2020-07-10 PROCEDURE — 82103 ALPHA-1-ANTITRYPSIN TOTAL: CPT

## 2020-07-10 PROCEDURE — 80076 HEPATIC FUNCTION PANEL: CPT

## 2020-07-11 LAB
A1AT SERPL-MCNC: 126 MG/DL (ref 101–187)
ACTIN IGG SERPL-ACNC: 6 UNITS (ref 0–19)
CERULOPLASMIN SERPL-MCNC: 15.4 MG/DL (ref 16–31)
ENDOMYSIUM IGA SER QL: NEGATIVE
GLIADIN PEPTIDE IGA SER-ACNC: 7 UNITS (ref 0–19)
GLIADIN PEPTIDE IGG SER-ACNC: 6 UNITS (ref 0–19)
IGA SERPL-MCNC: 145 MG/DL (ref 61–437)
LKM-1 AB SER-ACNC: <20.1 UNITS (ref 0–20)
MITOCHONDRIA M2 IGG SER-ACNC: <20 UNITS (ref 0–20)
TTG IGA SER-ACNC: <2 U/ML (ref 0–3)
TTG IGG SER-ACNC: 6 U/ML (ref 0–5)

## 2020-07-13 LAB — RYE IGE QN: NEGATIVE

## 2020-07-21 ENCOUNTER — TELEPHONE (OUTPATIENT)
Dept: GASTROENTEROLOGY | Facility: MEDICAL CENTER | Age: 65
End: 2020-07-21

## 2020-07-21 NOTE — TELEPHONE ENCOUNTER
----- Message from Julio César Vera MD sent at 7/13/2020  3:48 PM EDT -----  Please inform the patient that the bilirubin has now improved- most recent bilirubin was 1 4-remainder of the liver functions are completely normal, I do suspect that he has Gilbert's disease despite his ceruloplasmin being slightly low  We will continue to monitor his liver functions, will need to schedule follow-up with me in the next 4-6 weeks

## 2020-08-11 ENCOUNTER — TELEPHONE (OUTPATIENT)
Dept: GASTROENTEROLOGY | Facility: AMBULARY SURGERY CENTER | Age: 65
End: 2020-08-11

## 2020-08-13 ENCOUNTER — OFFICE VISIT (OUTPATIENT)
Dept: GASTROENTEROLOGY | Facility: AMBULARY SURGERY CENTER | Age: 65
End: 2020-08-13
Payer: COMMERCIAL

## 2020-08-13 VITALS
HEIGHT: 69 IN | WEIGHT: 248 LBS | BODY MASS INDEX: 36.73 KG/M2 | SYSTOLIC BLOOD PRESSURE: 118 MMHG | DIASTOLIC BLOOD PRESSURE: 68 MMHG | TEMPERATURE: 97.6 F

## 2020-08-13 DIAGNOSIS — Z86.010 HISTORY OF ADENOMATOUS POLYP OF COLON: ICD-10-CM

## 2020-08-13 DIAGNOSIS — K76.0 FATTY LIVER: Primary | ICD-10-CM

## 2020-08-13 PROCEDURE — 3008F BODY MASS INDEX DOCD: CPT | Performed by: PHYSICIAN ASSISTANT

## 2020-08-13 PROCEDURE — 1036F TOBACCO NON-USER: CPT | Performed by: PHYSICIAN ASSISTANT

## 2020-08-13 PROCEDURE — 99213 OFFICE O/P EST LOW 20 MIN: CPT | Performed by: PHYSICIAN ASSISTANT

## 2020-08-13 PROCEDURE — 1160F RVW MEDS BY RX/DR IN RCRD: CPT | Performed by: PHYSICIAN ASSISTANT

## 2020-08-13 NOTE — PROGRESS NOTES
Follow-up Note -  [facility]  Benjamin Mckeon 1955 75 y o  male         Reason:  Follow-up; elevated liver enzymes, history of colon polyps    HPI:  25-year-old male with history of obesity and arthritis who presents for follow-up; he was seen in our office with our physician assistant Johnny Mckinney in January, for follow-up of apparently elevated bilirubin level, fatty liver noted on imaging, he had also been noted with history of colon polyps which warranted surveillance  His bilirubin was found to be predominantly indirect, and only mildly elevated, as of 7/10 his total bilirubin was only 1 4  He was felt to have most likely Gilbert's syndrome, but he underwent extensive serologic workup including autoimmune serologies, chronic hepatitis panel, alpha-1 antitrypsin that was normal   His ceruloplasmin was very mildly decreased at 15 1  He denies any regular alcohol use  He underwent colonoscopy 6/29 which showed 4 polyps which were removed, 3 of which were adenomatous  Prep was also noted fair with some fibrous material noted throughout the colon, he was recommended for colonoscopy in 3 years with 2 day bowel prep  At this time the patient says he is feeling well, his bowel habits are regular and he denies any rectal bleeding or melena  He denies any unexpected weight changes, he has been losing weight intentionally  He denies any acid reflux or heartburn, or any difficulty with swallowing    REVIEW OF SYSTEMS:      CONSTITUTIONAL: Denies any fever, chills, or rigors  Good appetite, and no recent weight loss  HEENT: No earache or tinnitus  Denies hearing loss or visual disturbances  CARDIOVASCULAR: No chest pain or palpitations  RESPIRATORY: Denies any cough, hemoptysis, shortness of breath or dyspnea on exertion  GASTROINTESTINAL: As noted in the History of Present Illness  GENITOURINARY: No problems with urination  Denies any hematuria or dysuria    NEUROLOGIC: No dizziness or vertigo, denies headaches  MUSCULOSKELETAL: Denies any muscle or joint pain  SKIN: Denies skin rashes or itching  ENDOCRINE: Denies excessive thirst  Denies intolerance to heat or cold  PSYCHOSOCIAL: Denies depression or anxiety  Denies any recent memory loss  Past Medical History:   Diagnosis Date    Arthritis       Past Surgical History:   Procedure Laterality Date    HAND SURGERY      [place] COLONOSCOPY FLX DX W/COLLJ SPEC WHEN PFRMD N/A 3/22/2018    Procedure: COLONOSCOPY;  Surgeon: Lizabeth Davies MD;  [facility];   Service: Gastroenterology    TONSILECTOMY AND ADNOIDECTOMY      TOOTH EXTRACTION       Social History     Socioeconomic History    Marital status: Single     Spouse name: Not on file    Number of children: Not on file    Years of education: Not on file    Highest education level: Not on file   Occupational History    Occupation: retired   Social Needs    Financial resource strain: Not on file    Food insecurity     Worry: Not on file     Inability: Not on file   [employer] needs     Medical: Not on file     Non-medical: Not on file   Tobacco Use    Smoking status: Never Smoker    Smokeless tobacco: Never Used   Substance and Sexual Activity    Alcohol use: Yes     Comment: occasional    Drug use: No    Sexual activity: Not Currently   Lifestyle    Physical activity     Days per week: Not on file     Minutes per session: Not on file    Stress: Not on file   Relationships    Social connections     Talks on phone: Not on file     Gets together: Not on file     Attends [employer] service: Not on file     Active member of club or organization: Not on file     Attends meetings of clubs or organizations: Not on file     Relationship status: Not on file    Intimate partner violence     Fear of current or ex partner: Not on file     Emotionally abused: Not on file     Physically abused: Not on file     Forced sexual activity: Not on file   Other Topics Concern  Not on file   Social History Narrative    Coffee: 12 oz     Family History   Problem Relation Age of Onset    Breast cancer Mother     Heart disease Father         MI in his 52's     Cefadroxil  No current outpatient medications on file  No current facility-administered medications for this visit  Blood pressure 118/68, temperature 97 6 °F (36 4 °C), temperature source Tympanic, height 5' 9" (1 753 m), weight 112 kg (248 lb)  PHYSICAL EXAM:      General Appearance:   Alert, cooperative, no distress, appears stated age    HEENT:   Normocephalic, atraumatic, anicteric      Neck:  Supple, symmetrical, trachea midline, no adenopathy;    thyroid: no enlargement/tenderness/nodules; no carotid  bruit or JVD    Lungs:   Clear to auscultation bilaterally; no rales, rhonchi or wheezing; respirations unlabored    Heart[de-identified]   S1 and S2 normal; regular rate and rhythm; no murmur, rub, or gallop  Abdomen:   Soft, non-tender, non-distended; normal bowel sounds; no masses, no organomegaly    Extremities: No edema, erythema, wounds, rashes   Rectal:   Deferred                      Lab Results   Component Value Date    WBC 4 42 11/26/2019    HGB 14 8 11/26/2019    HCT 44 6 11/26/2019    MCV 93 11/26/2019     11/26/2019     Lab Results   Component Value Date    GLUCOSE 104 10/29/2014    CALCIUM 9 0 11/26/2019     10/29/2014    K 3 7 11/26/2019    CO2 30 11/26/2019     11/26/2019    BUN 13 11/26/2019    CREATININE 0 74 11/26/2019     Lab Results   Component Value Date    ALT 24 07/10/2020    AST 16 07/10/2020    ALKPHOS 70 07/10/2020    BILITOT 1 52 (H) 10/29/2014     Lab Results   Component Value Date    INR 1 12 01/03/2020    INR 1 08 10/29/2014    PROTIME 14 0 01/03/2020    PROTIME 13 8 10/29/2014       No results found      ASSESSMENT & PLAN:    Fatty liver  Appears to be most likely etiology of mildly elevated direct bilirubin, although his hyperbilirubinemia appears predominantly indirect suggesting Gilbert syndrome  AST and ALT appeared normal   He reports no history of diabetes    - suggested patient can take 1000 units vitamin-E once daily, as this can be helpful in preventing progression of fatty liver in nondiabetic patient's    -will check liver enzymes again in 3 months    -if liver enzymes are significantly elevated at that point, can consider further workup, doubt patient will require liver biopsy in this does not currently appear indicated at this time    -advised patient about alcohol avoidance, encouraged regular exercise and continued efforts at weight loss    History of adenomatous polyp of colon  Patient had 2 sessile serrated adenomas and 1 tubular adenoma removed during a colonoscopy 06/29/2020, which was noted with fair prep and fibrous material through the colon  He was recommended for colonoscopy in 3 years    No alarm symptoms at this time    -plan for repeat colonoscopy in 3 years with 2 day prep

## 2020-08-13 NOTE — ASSESSMENT & PLAN NOTE
Patient had 2 sessile serrated adenomas and 1 tubular adenoma removed during a colonoscopy 06/29/2020, which was noted with fair prep and fibrous material through the colon  He was recommended for colonoscopy in 3 years    No alarm symptoms at this time    -plan for repeat colonoscopy in 3 years with 2 day prep

## 2020-08-13 NOTE — ASSESSMENT & PLAN NOTE
Appears to be most likely etiology of mildly elevated direct bilirubin, although his hyperbilirubinemia appears predominantly indirect suggesting Gilbert syndrome    AST and ALT appeared normal   He reports no history of diabetes    - suggested patient can take 1000 units vitamin-E once daily, as this can be helpful in preventing progression of fatty liver in nondiabetic patient's    -will check liver enzymes again in 3 months    -if liver enzymes are significantly elevated at that point, can consider further workup, doubt patient will require liver biopsy in this does not currently appear indicated at this time    -advised patient about alcohol avoidance, encouraged regular exercise and continued efforts at weight loss

## 2020-11-13 ENCOUNTER — TELEPHONE (OUTPATIENT)
Dept: GASTROENTEROLOGY | Facility: CLINIC | Age: 65
End: 2020-11-13

## 2020-11-13 ENCOUNTER — LAB (OUTPATIENT)
Dept: LAB | Facility: CLINIC | Age: 65
End: 2020-11-13
Payer: COMMERCIAL

## 2020-11-13 DIAGNOSIS — K76.0 FATTY LIVER: ICD-10-CM

## 2020-11-13 LAB
ALBUMIN SERPL BCP-MCNC: 4 G/DL (ref 3.5–5)
ALP SERPL-CCNC: 58 U/L (ref 46–116)
ALT SERPL W P-5'-P-CCNC: 24 U/L (ref 12–78)
AST SERPL W P-5'-P-CCNC: 17 U/L (ref 5–45)
BILIRUB DIRECT SERPL-MCNC: 0.38 MG/DL (ref 0–0.2)
BILIRUB SERPL-MCNC: 2.28 MG/DL (ref 0.2–1)
INR PPP: 1.1 (ref 0.84–1.19)
PROT SERPL-MCNC: 7 G/DL (ref 6.4–8.2)
PROTHROMBIN TIME: 14.3 SECONDS (ref 11.6–14.5)

## 2020-11-13 PROCEDURE — 80076 HEPATIC FUNCTION PANEL: CPT

## 2020-11-13 PROCEDURE — 85610 PROTHROMBIN TIME: CPT

## 2020-11-13 PROCEDURE — 36415 COLL VENOUS BLD VENIPUNCTURE: CPT

## 2020-11-23 ENCOUNTER — OFFICE VISIT (OUTPATIENT)
Dept: URGENT CARE | Facility: CLINIC | Age: 65
End: 2020-11-23
Payer: COMMERCIAL

## 2020-11-23 VITALS
HEART RATE: 72 BPM | RESPIRATION RATE: 18 BRPM | BODY MASS INDEX: 35.55 KG/M2 | WEIGHT: 240 LBS | HEIGHT: 69 IN | OXYGEN SATURATION: 99 % | TEMPERATURE: 97.6 F

## 2020-11-23 DIAGNOSIS — Z11.59 SCREENING FOR VIRAL DISEASE: Primary | ICD-10-CM

## 2020-11-23 PROCEDURE — S9088 SERVICES PROVIDED IN URGENT: HCPCS | Performed by: PHYSICIAN ASSISTANT

## 2020-11-23 PROCEDURE — 99213 OFFICE O/P EST LOW 20 MIN: CPT | Performed by: PHYSICIAN ASSISTANT

## 2020-11-23 PROCEDURE — U0003 INFECTIOUS AGENT DETECTION BY NUCLEIC ACID (DNA OR RNA); SEVERE ACUTE RESPIRATORY SYNDROME CORONAVIRUS 2 (SARS-COV-2) (CORONAVIRUS DISEASE [COVID-19]), AMPLIFIED PROBE TECHNIQUE, MAKING USE OF HIGH THROUGHPUT TECHNOLOGIES AS DESCRIBED BY CMS-2020-01-R: HCPCS | Performed by: PHYSICIAN ASSISTANT

## 2020-11-24 ENCOUNTER — TELEPHONE (OUTPATIENT)
Dept: URGENT CARE | Facility: CLINIC | Age: 65
End: 2020-11-24

## 2020-11-24 LAB — SARS-COV-2 RNA SPEC QL NAA+PROBE: NOT DETECTED

## 2020-11-30 ENCOUNTER — TELEPHONE (OUTPATIENT)
Dept: URGENT CARE | Facility: CLINIC | Age: 65
End: 2020-11-30

## 2021-02-07 ENCOUNTER — HOSPITAL ENCOUNTER (EMERGENCY)
Facility: HOSPITAL | Age: 66
Discharge: HOME | End: 2021-02-07
Attending: EMERGENCY MEDICINE | Admitting: EMERGENCY MEDICINE
Payer: COMMERCIAL

## 2021-02-07 ENCOUNTER — APPOINTMENT (EMERGENCY)
Dept: RADIOLOGY | Facility: HOSPITAL | Age: 66
End: 2021-02-07
Payer: COMMERCIAL

## 2021-02-07 VITALS
HEART RATE: 88 BPM | DIASTOLIC BLOOD PRESSURE: 74 MMHG | OXYGEN SATURATION: 98 % | SYSTOLIC BLOOD PRESSURE: 174 MMHG | BODY MASS INDEX: 37.66 KG/M2 | TEMPERATURE: 97.9 F | RESPIRATION RATE: 16 BRPM | WEIGHT: 255 LBS

## 2021-02-07 DIAGNOSIS — S70.00XA CONTUSION OF HIP: ICD-10-CM
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
## 2021-02-07 PROCEDURE — 99283 EMERGENCY DEPT VISIT LOW MDM: CPT

## 2021-02-07 PROCEDURE — 73502 X-RAY EXAM HIP UNI 2-3 VIEWS: CPT

## 2021-02-07 PROCEDURE — 99284 EMERGENCY DEPT VISIT MOD MDM: CPT | Performed by: EMERGENCY MEDICINE

## 2021-02-07 RX ORDER — OXYCODONE HYDROCHLORIDE AND ACETAMINOPHEN 5; 325 MG/1; MG/1
1 TABLET ORAL EVERY 6 HOURS PRN
Qty: 12 TABLET | Refills: 0 | Status: SHIPPED | OUTPATIENT
Start: 2021-02-07 | End: 2021-02-17

## 2021-02-07 RX ORDER — LIDOCAINE 50 MG/G
1 PATCH TOPICAL ONCE
Status: DISCONTINUED | OUTPATIENT
Start: 2021-02-07 | End: 2021-02-07 | Stop reason: HOSPADM

## 2021-02-07 RX ADMIN — LIDOCAINE 5% 1 PATCH: 700 PATCH TOPICAL at 15:35

## 2021-02-07 NOTE — ED PROVIDER NOTES
History  Chief Complaint   Patient presents with    Fall     Pt presents to the ED after walking earlier and slipping on ice, landed on right side of hip  No head strike, no thinners  Took advil PTA     47-year-old gentleman comes in for hip pain  Patient states he was walking this morning when he slipped on some ice falling onto his right hip  Patient states he goes for a daily walk on the grounds of [facility]  He was trying to get his walk-in before this no came today  Patient denies any head strike or loss of consciousness  Denies any other injury  Patient denies any previous injury to the hip  History provided by:  Patient   used: No    Fall  Mechanism of injury: fall    Injury location:  Pelvis  Pelvic injury location:  R hip  Incident location:  [place]  Time since incident:  2 hours  Arrived directly from scene: no    Fall:     Fall occurred:  Walking    Impact surface:  [place]    Entrapped after fall: no    Protective equipment: none    Suspicion of alcohol use: no    Suspicion of drug use: no    Tetanus status:  Up to date  Prior to arrival data:     Bystander interventions:  None    Patient ambulatory at scene: yes      Blood loss:  None    Responsiveness at scene:  Alert    Orientation at scene:  Person, place, situation and time    Loss of consciousness: no      Amnesic to event: no    Associated symptoms: no abdominal pain, no chest pain, no headaches and no seizures        None       Past Medical History:   Diagnosis Date    Arthritis        Past Surgical History:   Procedure Laterality Date    HAND SURGERY      [place] COLONOSCOPY FLX DX W/COLLJ SPEC WHEN PFRMD N/A 3/22/2018    Procedure: COLONOSCOPY;  Surgeon: Dixie Watson MD;  [facility];   Service: Gastroenterology    TONSILECTOMY AND ADNOIDECTOMY      TOOTH EXTRACTION         Family History   Problem Relation Age of Onset    Breast cancer Mother     Heart disease Father         MI in his 52's I have reviewed and agree with the history as documented  E-Cigarette/Vaping     E-Cigarette/Vaping Substances     Social History     Tobacco Use    Smoking status: Never Smoker    Smokeless tobacco: Never Used   Substance Use Topics    Alcohol use: Yes     Comment: occasional    Drug use: No       Review of Systems   Constitutional: Negative for activity change and appetite change  HENT: Negative for congestion and facial swelling  Eyes: Negative for discharge and redness  Respiratory: Negative for cough and wheezing  Cardiovascular: Negative for chest pain and leg swelling  Gastrointestinal: Negative for abdominal distention, abdominal pain and blood in stool  Endocrine: Negative for cold intolerance and polydipsia  Genitourinary: Negative for difficulty urinating and hematuria  Musculoskeletal: Negative for arthralgias and gait problem  Skin: Negative for color change and rash  Allergic/Immunologic: Negative for food allergies and immunocompromised state  Neurological: Negative for dizziness, seizures and headaches  Hematological: Negative for adenopathy  Does not bruise/bleed easily  Psychiatric/Behavioral: Negative for agitation, confusion and decreased concentration  All other systems reviewed and are negative  Physical Exam  Physical Exam  Vitals signs and nursing note reviewed  Constitutional:       Appearance: He is well-developed  HENT:      Head: Normocephalic and atraumatic  Eyes:      Conjunctiva/sclera: Conjunctivae normal    Neck:      Musculoskeletal: Neck supple  Cardiovascular:      Rate and Rhythm: Normal rate and regular rhythm  Heart sounds: No murmur  Pulmonary:      Effort: Pulmonary effort is normal  No respiratory distress  Breath sounds: Normal breath sounds  Abdominal:      Palpations: Abdomen is soft  Tenderness: There is no abdominal tenderness     Musculoskeletal:      Right hip: He exhibits decreased range of motion and tenderness  Skin:     General: Skin is warm and dry  Neurological:      Mental Status: He is alert  Vital Signs  ED Triage Vitals   Temperature Pulse Respirations Blood Pressure SpO2   02/07/21 1459 02/07/21 1459 02/07/21 1459 02/07/21 1500 02/07/21 1459   97 9 °F (36 6 °C) 88 16 (!) 174/74 98 %      Temp Source Heart Rate Source Patient Position - Orthostatic VS BP Location FiO2 (%)   02/07/21 1459 02/07/21 1459 -- 02/07/21 1459 --   Temporal Monitor  Left arm       Pain Score       02/07/21 1459       7           Vitals:    02/07/21 1459 02/07/21 1500   BP:  (!) 174/74   Pulse: 88          Visual Acuity      ED Medications  Medications   lidocaine (LIDODERM) 5 % patch 1 patch (1 patch Topical Medication Applied 2/7/21 2135)       Diagnostic Studies  Results Reviewed     None                 XR hip/pelv 2-3 vws right    (Results Pending)              Procedures  Procedures         ED Course                                           MDM  Number of Diagnoses or Management Options  Contusion of hip: new and requires workup  Fall, initial encounter: new and requires workup     Amount and/or Complexity of Data Reviewed  Tests in the radiology section of CPT®: ordered and reviewed  Independent visualization of images, tracings, or specimens: yes (No fracture or dislocation appreciated)    Patient Progress  Patient progress: stable      Disposition  Final diagnoses:   Fall, initial encounter   Contusion of hip     Time reflects when diagnosis was documented in both MDM as applicable and the Disposition within this note     Time User Action Codes Description Comment    2/7/2021  3:54 PM Vera Ashby [[id number]] Fall, initial encounter     2/7/2021  3:54 PM Vera Ashby [[id number]] Contusion of hip       ED Disposition     ED Disposition Condition Date/Time Comment    Discharge Stable Sun Feb 7, 2021  3:53 PM Anibal Liu discharge to home/self care              Follow-up Information Follow up With Specialties Details Why Jayashree Nguyen MD Family Medicine Schedule an appointment as soon as possible for a visit  As needed [address]  [phone]            Patient's Medications   Discharge Prescriptions    OXYCODONE-ACETAMINOPHEN (PERCOCET) 5-325 MG PER TABLET    Take 1 tablet by mouth every 6 (six) hours as needed for severe pain for up to 10 daysMax Daily Amount: 4 tablets       Start Date: 2/7/2021  End Date: 2/17/2021       Order Dose: 1 tablet       Quantity: 12 tablet    Refills: 0     No discharge procedures on file      PDMP Review     None          ED Provider  Electronically Signed by           Juanita Stewart DO  02/07/21 5952

## 2021-02-13 DIAGNOSIS — Z23 ENCOUNTER FOR IMMUNIZATION: ICD-10-CM

## 2021-02-17 ENCOUNTER — IMMUNIZATIONS (OUTPATIENT)
Dept: FAMILY MEDICINE CLINIC | Facility: HOSPITAL | Age: 66
End: 2021-02-17

## 2021-02-17 DIAGNOSIS — Z23 ENCOUNTER FOR IMMUNIZATION: Primary | ICD-10-CM

## 2021-02-17 PROCEDURE — 91300 SARS-COV-2 / COVID-19 MRNA VACCINE (PFIZER-BIONTECH) 30 MCG: CPT

## 2021-02-17 PROCEDURE — 0001A SARS-COV-2 / COVID-19 MRNA VACCINE (PFIZER-BIONTECH) 30 MCG: CPT

## 2021-03-08 ENCOUNTER — IMMUNIZATIONS (OUTPATIENT)
Dept: FAMILY MEDICINE CLINIC | Facility: HOSPITAL | Age: 66
End: 2021-03-08

## 2021-03-08 DIAGNOSIS — Z23 ENCOUNTER FOR IMMUNIZATION: Primary | ICD-10-CM

## 2021-03-08 PROCEDURE — 91300 SARS-COV-2 / COVID-19 MRNA VACCINE (PFIZER-BIONTECH) 30 MCG: CPT

## 2021-03-08 PROCEDURE — 0002A SARS-COV-2 / COVID-19 MRNA VACCINE (PFIZER-BIONTECH) 30 MCG: CPT

## 2021-03-11 ENCOUNTER — TELEPHONE (OUTPATIENT)
Dept: GASTROENTEROLOGY | Facility: AMBULARY SURGERY CENTER | Age: 66
End: 2021-03-11

## 2021-03-11 DIAGNOSIS — K76.0 FATTY LIVER: Primary | ICD-10-CM

## 2021-03-18 ENCOUNTER — APPOINTMENT (OUTPATIENT)
Dept: LAB | Facility: CLINIC | Age: 66
End: 2021-03-18
Payer: COMMERCIAL

## 2021-03-18 DIAGNOSIS — K76.0 FATTY LIVER: ICD-10-CM

## 2021-03-18 LAB
ALBUMIN SERPL BCP-MCNC: 3.7 G/DL (ref 3.5–5)
ALP SERPL-CCNC: 68 U/L (ref 46–116)
ALT SERPL W P-5'-P-CCNC: 21 U/L (ref 12–78)
AST SERPL W P-5'-P-CCNC: 14 U/L (ref 5–45)
BILIRUB DIRECT SERPL-MCNC: 0.31 MG/DL (ref 0–0.2)
BILIRUB SERPL-MCNC: 1.44 MG/DL (ref 0.2–1)
PROT SERPL-MCNC: 6.9 G/DL (ref 6.4–8.2)

## 2021-03-18 PROCEDURE — 80076 HEPATIC FUNCTION PANEL: CPT

## 2021-03-18 PROCEDURE — 36415 COLL VENOUS BLD VENIPUNCTURE: CPT

## 2021-04-12 ENCOUNTER — TELEPHONE (OUTPATIENT)
Dept: FAMILY MEDICINE CLINIC | Facility: CLINIC | Age: 66
End: 2021-04-12

## 2021-04-12 NOTE — TELEPHONE ENCOUNTER
Called pt to schedule AWV/Check up  Last OV was on 12/02/2019  Spoke w/ pt, he states he will call back next week to schedule apt  If pt does not call by next Monday, we will call pt back to F/U

## 2021-04-13 NOTE — TELEPHONE ENCOUNTER
Spoke w/ pt, he states he has a lot going on right now and he will call back to schedule an apt w/ pcp, sometime next week

## 2021-04-20 ENCOUNTER — VBI (OUTPATIENT)
Dept: ADMINISTRATIVE | Facility: OTHER | Age: 66
End: 2021-04-20

## 2021-04-21 ENCOUNTER — TELEPHONE (OUTPATIENT)
Dept: PHYSICAL THERAPY | Facility: OTHER | Age: 66
End: 2021-04-21

## 2021-04-21 ENCOUNTER — HOSPITAL ENCOUNTER (EMERGENCY)
Facility: HOSPITAL | Age: 66
Discharge: HOME | End: 2021-04-21
Attending: EMERGENCY MEDICINE | Admitting: EMERGENCY MEDICINE
Payer: COMMERCIAL

## 2021-04-21 VITALS
HEART RATE: 80 BPM | RESPIRATION RATE: 16 BRPM | OXYGEN SATURATION: 97 % | DIASTOLIC BLOOD PRESSURE: 73 MMHG | SYSTOLIC BLOOD PRESSURE: 154 MMHG | TEMPERATURE: 97.9 F

## 2021-04-21 DIAGNOSIS — S39.012A STRAIN OF LUMBAR REGION, INITIAL ENCOUNTER: Primary | ICD-10-CM

## 2021-04-21 PROCEDURE — 99284 EMERGENCY DEPT VISIT MOD MDM: CPT | Performed by: PHYSICIAN ASSISTANT

## 2021-04-21 PROCEDURE — 99283 EMERGENCY DEPT VISIT LOW MDM: CPT

## 2021-04-21 PROCEDURE — 96372 THER/PROPH/DIAG INJ SC/IM: CPT

## 2021-04-21 RX ORDER — LIDOCAINE 50 MG/G
1 PATCH TOPICAL ONCE
Status: DISCONTINUED | OUTPATIENT
Start: 2021-04-21 | End: 2021-04-21 | Stop reason: HOSPADM

## 2021-04-21 RX ORDER — IBUPROFEN 600 MG/1
600 TABLET ORAL EVERY 8 HOURS PRN
Qty: 15 TABLET | Refills: 0 | OUTPATIENT
Start: 2021-04-21 | End: 2021-11-06

## 2021-04-21 RX ORDER — DIAZEPAM 5 MG/1
5 TABLET ORAL 2 TIMES DAILY
Qty: 10 TABLET | Refills: 0 | OUTPATIENT
Start: 2021-04-21 | End: 2021-11-06

## 2021-04-21 RX ORDER — KETOROLAC TROMETHAMINE 30 MG/ML
30 INJECTION, SOLUTION INTRAMUSCULAR; INTRAVENOUS ONCE
Status: COMPLETED | OUTPATIENT
Start: 2021-04-21 | End: 2021-04-21

## 2021-04-21 RX ORDER — CAPSAICIN 0.07 G/100G
CREAM TOPICAL 3 TIMES DAILY
Qty: 28.3 G | Refills: 0 | OUTPATIENT
Start: 2021-04-21 | End: 2021-11-06

## 2021-04-21 RX ADMIN — LIDOCAINE 5% 1 PATCH: 700 PATCH TOPICAL at 06:55

## 2021-04-21 RX ADMIN — KETOROLAC TROMETHAMINE 30 MG: 30 INJECTION, SOLUTION INTRAMUSCULAR at 06:55

## 2021-04-21 NOTE — TELEPHONE ENCOUNTER
Voice mail/message left requesting patient to return call to [employer] [facility] including our hours of business and phone number  Kindly asked to LM with Full Name,  and Reminded CB will come from a non- number as the nurses are working remotely/off-site      Referral deferred for f/u attempt per protocol

## 2021-04-21 NOTE — ED PROVIDER NOTES
EMERGENCY MEDICINE NOTE        PATIENT IDENTIFICATION PHYSICIAN/SERVICE INFORMATION   Name: Jignesh Escalante  MRN: [id number]  [place]: 1955  Age/Sex: 77 y o  male  Preferred Language: English  Code Status: No Order  Encounter Date: 4/21/2021  Attending Physician: Ed Briscoe MD  Admitting Physician: No admitting provider for patient encounter  Primary Care Physician: Neetu Arizmendi MD         Primary Care Phone: [phone] [address] [facility]     Chief Complaint   Patient presents with    Hip Pain     Patient states he was carrying 50lb bags on saturday and leaning over when he flet a "pop" in his R hip/lower back  Pain initally went away but has been grtting worse since monday  No trauma or falls  HISTORY OF PRESENT ILLNESS     Jignesh Escalante is a 77 y o  male who presents due to R lower back/Hip pain  Pt reports 4 days ago with lifting injury, carrying 50 lb bags, leaning over to  and feeling abrupt pain in lower back/hip which subsided after minutes, though gradually recurring over days with aching pain, which became less tolerable last night while attempting to sleep  Pt took one oxycodone with minimal relief  Denies bowel/bladder dysfunction, numbness/tingling, weakness, difficulty ambulating, f/c/s, rashes, abdominal pain, and no other complaints at this time  History provided by:  Patient   used: No          PAST MEDICAL AND SURGICAL HISTORY     Past Medical History:   Diagnosis Date    Arthritis        Past Surgical History:   Procedure Laterality Date    HAND SURGERY      [place] COLONOSCOPY FLX DX W/COLLJ SPEC WHEN PFRMD N/A 3/22/2018    Procedure: COLONOSCOPY;  Surgeon: Rosemary Mckee MD;  [facility];   Service: Gastroenterology    TONSILECTOMY AND ADNOIDECTOMY      TOOTH EXTRACTION         Family History   Problem Relation Age of Onset    Breast cancer Mother     Heart disease Father         MI in his 52's       E-Cigarette/Vaping E-Cigarette/Vaping Substances     Social History     Tobacco Use    Smoking status: Never Smoker    Smokeless tobacco: Never Used   Substance Use Topics    Alcohol use: Yes     Comment: occasional    Drug use: No         ALLERGIES     Allergies   Allergen Reactions    Cefadroxil      Action Taken: fever;          HOME MEDICATIONS     None         REVIEW OF SYSTEMS     Review of Systems   Constitutional: Negative for activity change, appetite change, chills, diaphoresis, fatigue and fever  Respiratory: Negative for cough and shortness of breath  Cardiovascular: Negative for chest pain  Gastrointestinal: Negative for abdominal pain  Genitourinary: Negative for decreased urine volume, difficulty urinating, dysuria, flank pain, frequency, genital sores, hematuria and urgency  Musculoskeletal: Positive for arthralgias (R hip), gait problem and myalgias  Negative for neck pain and neck stiffness  Skin: Negative for rash and wound  Neurological: Negative for dizziness, light-headedness and headaches  Hematological: Negative for adenopathy  Psychiatric/Behavioral: The patient is not nervous/anxious  All other systems reviewed and are negative  PHYSICAL EXAMINATION     ED Triage Vitals   Temperature Pulse Respirations Blood Pressure SpO2   04/21/21 0700 04/21/21 0631 04/21/21 0631 04/21/21 0631 04/21/21 0631   97 9 °F (36 6 °C) 80 16 154/73 97 %      Temp Source Heart Rate Source Patient Position - Orthostatic VS BP Location FiO2 (%)   04/21/21 0631 -- 04/21/21 0631 04/21/21 0631 --   Oral  Lying Right arm       Pain Score       --                Wt Readings from Last 3 Encounters:   02/07/21 116 kg (255 lb)   11/23/20 109 kg (240 lb)   08/13/20 112 kg (248 lb)         Physical Exam  Vitals signs and nursing note reviewed  Constitutional:       General: He is not in acute distress  Appearance: He is well-developed  He is obese   He is not ill-appearing, toxic-appearing or diaphoretic  HENT:      Head: Normocephalic and atraumatic  Mouth/Throat:      Mouth: Mucous membranes are moist    Eyes:      Conjunctiva/sclera: Conjunctivae normal       Pupils: Pupils are equal, round, and reactive to light  Neck:      Musculoskeletal: Normal range of motion and neck supple  Cardiovascular:      Rate and Rhythm: Normal rate and regular rhythm  Pulses: Normal pulses  Pulmonary:      Effort: Pulmonary effort is normal  No respiratory distress  Abdominal:      General: Bowel sounds are normal  There is no distension  Palpations: Abdomen is soft  Abdomen is not rigid  There is no mass  Tenderness: There is no abdominal tenderness  There is no right CVA tenderness, left CVA tenderness, guarding or rebound  Negative signs include Waters's sign and McBurney's sign  Hernia: No hernia is present  Comments: Negative Waters's  Negative Appendiceal signs (Psoas, Rovsing's, Obturator)  Negative Peritoneal Signs   Musculoskeletal: Normal range of motion  Cervical back: Normal       Thoracic back: He exhibits pain and spasm  He exhibits normal range of motion (painful ROM), no tenderness, no bony tenderness, no swelling, no edema, no deformity, no laceration and normal pulse  Lumbar back: He exhibits pain and spasm  He exhibits normal range of motion (painful ROM), no tenderness, no bony tenderness, no swelling, no edema, no deformity, no laceration and normal pulse  Right lower leg: No edema  Left lower leg: No edema  Comments: Ambulating, standing on own without difficulty       Special Testing:  Negative Straight Leg Raise  Negative Well Leg Raise  Negative Bowstring's  Negative Bragard  Negative Lasegue's    Nerve Testing:    L4:  5/5 Dorsiflexion   No sensory deficit medial aspect of leg   2+ Patellar reflex    L5: 5/5 Dorsiflexion/Plantarflexion of EHL   No sensory deficit anteriolateral leg, dorsum foot       S1:  5/5 Plantar Flexion   No sensory deficit sole   2+ achilles reflex      Skin:     General: Skin is warm and dry  Capillary Refill: Capillary refill takes less than 2 seconds  Findings: No rash  Neurological:      General: No focal deficit present  Mental Status: He is alert and oriented to person, place, and time  DIAGNOSTIC RESULTS     Laboratory results:    Labs Reviewed - No data to display    All labs reviewed and utilized in the medical decision making process    Radiology results:    No orders to display       All radiology studies independently viewed by me and interpreted by the radiologist       PROCEDURES     Procedures      Invasive Devices     None                 ASSESSMENT AND PLAN     MDM  Number of Diagnoses or Management Options  Strain of lumbar region, initial encounter: new, needed workup     Amount and/or Complexity of Data Reviewed  Review and summarize past medical records: yes    Risk of Complications, Morbidity, and/or Mortality  Presenting problems: low  Diagnostic procedures: low  Management options: low    Patient Progress  Patient progress: stable      Initial ED assessment:  Oswaldo Banda is a 77 y o  male  who presents with Back Pain  Vitals signs reviewed and WNL  Physical examination is remarkable for pain spasm R lower back with painful range of motion    Initial Ddx  includes but is not limited to:   sciatica, herniated disc, arthritis, spinal stenosis, strain, sprain, fracture, cauda equina syndrome, epidural abscess, AAA  Initial ED plan:   Plan will be to treat symptomatically   Final ED summary/disposition: Home care recommendations given with discharge paperwork  Return to ED instructions given if new/worsening sxs  Verbalized understanding      MDM  Reviewed: previous chart, nursing note and vitals          ED COURSE OF CARE AND REASSESSMENT                                          Medications   lidocaine (LIDODERM) 5 % patch 1 patch (1 patch Topical Medication Applied 4/21/21 0655)   ketorolac (TORADOL) injection 30 mg (30 mg Intramuscular Given 4/21/21 0655)         FINAL IMPRESSION     Final diagnoses:   Strain of lumbar region, initial encounter         Katja Bustos 171     Time reflects when diagnosis was documented in both MDM as applicable and the Disposition within this note     Time User Action Codes Description Comment    4/21/2021  6:48 AM Dianne Oviedo Isma [[id number]] Strain of lumbar region, initial encounter       ED Disposition     ED Disposition Condition Date/Time Comment    Discharge Stable Wed Apr 21, 2021  6:47 AM Shar Baker discharge to home/self care              Follow-up Information     Follow up With Specialties Details Why Contact Info Additional Information    Yana Gallegos MD Family Medicine Call in 1 day For follow up [address]       [address] [facility] Orthopedic Surgery Call  For follow up, As needed [address] [facility] [address] [facility]8 [facility] [address] [facility] 100, [address], [address] [facility]    [address] Emergency Department Emergency Medicine Go to  If symptoms worsen [address] [facility] [address] Emergency Department,  [address], [address]  [phone]    Call in 1 day  For follow up    [address] [facility]  [address] [facility] [address] [facility]8 [facility]  Call   For follow up, As needed    [address] Emergency [facility] 8 [address]  [phone]  Go to   If symptoms worsen        DISCHARGE MEDICATIONS     Discharge Medication List as of 4/21/2021  6:50 AM START taking these medications    Details   capsicum (ZOSTRIX) 0 075 % topical cream Apply topically 3 (three) times a day, Starting Wed 4/21/2021, Normal      diazepam (VALIUM) 5 mg tablet Take 1 tablet (5 mg total) by mouth 2 (two) times a day for 5 days, Starting Wed 4/21/2021, Until Mon 4/26/2021, Normal      ibuprofen (MOTRIN) 600 mg tablet Take 1 tablet (600 mg total) by mouth every 8 (eight) hours as needed for mild pain or moderate pain for up to 5 days, Starting Wed 4/21/2021, Until Mon 4/26/2021, Normal                 PDMP Review       Value Time User    PDMP Reviewed  Yes 4/21/2021  6:23 AM MD John Paul Ruiz PA-C Ricard Moder, PA-C  04/21/21 5694

## 2021-04-22 ENCOUNTER — NURSE TRIAGE (OUTPATIENT)
Dept: PHYSICAL THERAPY | Facility: OTHER | Age: 66
End: 2021-04-22

## 2021-04-22 DIAGNOSIS — M54.50 ACUTE RIGHT-SIDED LOW BACK PAIN, UNSPECIFIED WHETHER SCIATICA PRESENT: Primary | ICD-10-CM

## 2021-04-22 NOTE — TELEPHONE ENCOUNTER
Additional Information   Negative: Is this related to a work injury?  Negative: Is this related to an MVA?  Negative: Are you currently recieving homecare services?  Negative: Does the patient have a fever?  Negative: Has the patient had unexplained weight loss?  Negative: Is the patient experiencing blood in sputum?  Negative: Has the patient experienced major trauma? (fall from height, high speed collision, direct blow to spine) and is also experiencing nausea, light-headedness, or loss of consciousness?  Negative: Is the patient experiencing urine retention?  Negative: Is the patient experiencing acute drop foot or paralysis?  Negative: Is this a chronic condition? Background - Initial Assessment  Clinical complaint: right sided lower back pain that radiates to R hip  Date of onset: Pain started on Sunday 4/18/21 after lifting heavy objects  Frequency of pain: intermittent  Quality of pain: "sore", spasm like    Protocols used: SL AMB COMPREHENSIVE SPINE PROGRAM PROTOCOL    Patient seen in ED yesterday=PLEASE SEE NOTES  Triaged for above complaints and NO RF s/s present  Referral entered for [place] site and contact info given to him as well

## 2021-04-23 ENCOUNTER — EVALUATION (OUTPATIENT)
Dept: PHYSICAL THERAPY | Facility: REHABILITATION | Age: 66
End: 2021-04-23
Payer: COMMERCIAL

## 2021-04-23 VITALS — SYSTOLIC BLOOD PRESSURE: 130 MMHG | TEMPERATURE: 98.6 F | HEART RATE: 62 BPM | DIASTOLIC BLOOD PRESSURE: 78 MMHG

## 2021-04-23 DIAGNOSIS — M54.50 ACUTE RIGHT-SIDED LOW BACK PAIN, UNSPECIFIED WHETHER SCIATICA PRESENT: Primary | ICD-10-CM

## 2021-04-23 PROCEDURE — 97162 PT EVAL MOD COMPLEX 30 MIN: CPT | Performed by: PHYSICAL THERAPIST

## 2021-04-23 PROCEDURE — 97140 MANUAL THERAPY 1/> REGIONS: CPT | Performed by: PHYSICAL THERAPIST

## 2021-04-23 PROCEDURE — 97112 NEUROMUSCULAR REEDUCATION: CPT | Performed by: PHYSICAL THERAPIST

## 2021-04-23 NOTE — PROGRESS NOTES
PT Evaluation     Today's date: 2021  Patient name: King Dwain  : 1955   MRN: [id number]  Referring provider: Dilshad Perkins PT  Dx:   Encounter Diagnosis     ICD-10-CM    1  Acute right-sided low back pain, unspecified whether sciatica present  M54 5 Ambulatory referral to PT spine       Start Time: 0750  Stop Time: 0850  Total time in clinic (min): 60 minutes    Assessment  Assessment details: King Dwain is a 77 y o  male presenting to outpatient physical therapy on 21 with referral from [facility] for acute R side LBP that is non radicular in nature  MSIS consistent with R side LS ERS syndrome with underlying hip hypomobility and bending/squatting deficits as a result  Direction preference into extension  Upon evaluation, Leonel Schlatter demonstrates impaired LS AROM, joint mobility and gluteal strength  The listed impairments and functional limitation are effecting Leonel Schlatter ability to function at prior level  They can continue to benefit from physical therapy services at this times in order to address the above discussed impairments and functional limitation in order to allow for a return to premorbid status  HEP: nava BROWN    He reported feeling well post session, mild soreness  He demonstrated improved R hip extension with improved trunk rotation post treatment  Educated on HEP, sitting postures and prolonged sitting       Impairments: abnormal gait, abnormal muscle firing, abnormal muscle tone, abnormal or restricted ROM, abnormal movement, activity intolerance, impaired physical strength and pain with function  Understanding of Dx/Px/POC: good   Prognosis: good    Plan  Patient would benefit from: skilled PT  Planned modality interventions: thermotherapy: hydrocollator packs  Planned therapy interventions: joint mobilization, manual therapy, ADL training, neuromuscular re-education, home exercise program, therapeutic exercise, therapeutic activities, strengthening, patient education and functional ROM exercises  Frequency: 2x week  Duration in weeks: 6  Treatment plan discussed with: patient        Subjective Evaluation    History of Present Illness  Mechanism of injury: Zan Mack is a 77 y o  male presenting to physical therapy on 21 with referral from [facility] for acute LBP  He does report that his pain will come and go in that spot but generally, in the past has resolved with in a few days  He reports that he was carrying about 50# bag and pouring bag of renzo  Felt pain immediately but went away  Then sat for about 30 minutes, pain upon rising but went away  Pain had began 2021  He reports his pain had increased to the point where he went to the ER on 2021  He reports pain with with cough/sneeze  Pain is non radicular  Denies feeling coordination loss in LE or weakness  Denies and bowel and bladder dysfunction  Reports pain with driving  Pain is better with movement or once seated/lying down  Denies night pain unless he shift/roll in the night  Not a recurrent problem   Quality of life: good    Pain  Current pain ratin  At worst pain ratin  Location: R side LS, SIJ  Quality: sharp      Diagnostic Tests  No diagnostic tests performed  Treatments  Previous treatment: injection treatment  Patient Goals  Patient goals for therapy: decreased pain  Patient goal: Return to PLOF, bend/lift pain free    Short Term Goals:   1  Patient will be Independent with hep  2  Patient will improve pain with activity by 50%  3  Patient will perform transition movements from chair, car, squat pfree      Long Term Goals:   1  Patient will improve FOTO to greater then goal  2  Patient will improve pain with activity to 2/10 or less  3  Patient will continue with HEP independence to allow for decreased future reoccurrence of pain and loss in function  4   Patient will report functional activity with baseball pfree (bending, twisting, lifting)  Objective      Palpation: TTP LS paraspinals with tone increases L3-5  Myotomes (L/R): normal LE  Dermatome: (pinprick- L/R):  Normal LE     Reflexes:  (L/R) L3-4: 2+ B/L       S1:     2+ B/L        Clonus= negative    GAIT: decreased R hip extension, decreased trunk rot on R when in terminal stance on R  Squat assess: fair form - pfree      Lumbar  % of normal   Flex  75 - pain in return   Extn  75   SB Left -   SB Right -   ROT Left -   ROT Right -   Repetitive testing: extension= decreased, better    Flexion= P, NW        MMT         AROM          PROM    Hip       L       R        L           R      L     R   Flex  100 100   Extn  Abd  Add  IR      15 15   ER  45 35            G  Max 4 3+       G  Med  Iliop               Neuro Dynamic Testing:  Slump test: L=  neg   R=   neg     Straight leg raise:   L=   neg   R=    neg        Pelvic Motor Control:  LS ext on R with resisted hip extn in prone    Functional Testing:  Seated bend with dowel: pain on return      Hip:   capsular mobility=  R        Segmental mobility:   LS= Hypomobile L4-5 into L side glide                 Precautions: standard      Manuals 4/23            Gapping LS - L3-5 on R G4/5            Hip LAD                                       Neuro Re-Ed 4/23            KEVIN (standing) 4x10            Bridge 4x5-5"            Seated hip hinge             Multif pressout             Dead lift                                       Ther Ex             VG                                                                                                        Ther Activity                                       Gait Training                                       Modalities

## 2021-04-27 ENCOUNTER — OFFICE VISIT (OUTPATIENT)
Dept: PHYSICAL THERAPY | Facility: REHABILITATION | Age: 66
End: 2021-04-27
Payer: COMMERCIAL

## 2021-04-27 DIAGNOSIS — M54.50 ACUTE RIGHT-SIDED LOW BACK PAIN, UNSPECIFIED WHETHER SCIATICA PRESENT: Primary | ICD-10-CM

## 2021-04-27 PROCEDURE — 97112 NEUROMUSCULAR REEDUCATION: CPT | Performed by: PHYSICAL THERAPIST

## 2021-04-27 PROCEDURE — 97140 MANUAL THERAPY 1/> REGIONS: CPT | Performed by: PHYSICAL THERAPIST

## 2021-04-27 NOTE — PROGRESS NOTES
Daily Note     Today's date: 2021  Patient name: Janak Howard  : 1955  MRN: [id number]  Referring provider: Maurene Essex, PT  Dx:   Encounter Diagnosis     ICD-10-CM    1  Acute right-sided low back pain, unspecified whether sciatica present  M54 5        Start Time: 0745  Stop Time: 08  Total time in clinic (min): 45 minutes    Subjective: Patient reports less pain over the last week  Bending and moving with less pain  Still with ERP flexion on R  Reports doing HEP but not as much as prescribed  Objective: See treatment diary below  ERP flexion - reports less post session  (-) SLR    Assessment: Tolerated treatment well  Patient demonstrates nearly full LS AROM pfree post session with only mild pain with forward bend and squat  Will continue with POC, progressing well  Plan: Continue per plan of care        Precautions: standard      Manuals            Gapping LS - L3-5 on R G4/5 G4           Hip LAD - R  G4/5           SIJ LAD - R  G5                        Neuro Re-Ed            KEVIN (standing) 4x10 3x10           Bridge 4x5-5" 5"x8  3 sets  GTB           Seated hip hinge             Multif pressout             Dead lift             Clam  3x8                        Ther Ex             VG  3' L5           R hip flexion mobility on chair  10x                                                                                         Ther Activity                                       Gait Training                                       Modalities

## 2021-04-30 ENCOUNTER — OFFICE VISIT (OUTPATIENT)
Dept: PHYSICAL THERAPY | Facility: REHABILITATION | Age: 66
End: 2021-04-30
Payer: COMMERCIAL

## 2021-04-30 DIAGNOSIS — M54.50 ACUTE RIGHT-SIDED LOW BACK PAIN, UNSPECIFIED WHETHER SCIATICA PRESENT: Primary | ICD-10-CM

## 2021-04-30 PROCEDURE — 97112 NEUROMUSCULAR REEDUCATION: CPT | Performed by: PHYSICAL THERAPIST

## 2021-04-30 PROCEDURE — 97140 MANUAL THERAPY 1/> REGIONS: CPT | Performed by: PHYSICAL THERAPIST

## 2021-04-30 NOTE — PROGRESS NOTES
Daily Note     Today's date: 2021  Patient name: Magy Edwards  : 1955  MRN: [id number]  Referring provider: Mayra Glass, PT  Dx:   Encounter Diagnosis     ICD-10-CM    1  Acute right-sided low back pain, unspecified whether sciatica present  M54 5        Start Time: 1045  Stop Time: 1130  Total time in clinic (min): 45 minutes    Subjective: Patient reports doing well, mild pain at times but much less with activity (bending, picking up baseballs)  Reports 80-85% improvement since injury/starting PT  Objective: See treatment diary below  Hypomobile hip IR - improved with MWM by ~ 10 deg  FOTO improved    Assessment: Tolerated treatment well  Patient performs all bending/lifting pfree as he worked on dead lift with DL support and UL as well to simualte him pikc up baseball as well as to strengthen posteriotr chain  Educated to continue with HEP, especially LS standing ext as he does a lot for forward flexion  Plan: Continue per plan of care        Precautions: standard      Manuals           Gapping LS - L3-5 on R G4/5 G4           Hip LAD - R  G4/5           SIJ LAD - R  G5           assessment   5'          MWM - R hip IR   3x8  2x5 CR at end range          Neuro Re-Ed           KEVIN (standing) 4x10 3x10 3x10          Bridge 4x5-5" 5"x8  3 sets  GTB NP          Seated hip hinge             Multif pressout             Dead lift   15# KB  10x 2 arm support  15# KB  UL support (R) 10x          Clam  3x8           VG- hip neutral focus   DL-ball-2x20  SL-3x10          Ther Ex             VG  3' L5           R hip flexion mobility on chair  10x                                                                                         Ther Activity                                       Gait Training                                       Modalities

## 2021-05-03 ENCOUNTER — OFFICE VISIT (OUTPATIENT)
Dept: PHYSICAL THERAPY | Facility: REHABILITATION | Age: 66
End: 2021-05-03
Payer: COMMERCIAL

## 2021-05-03 DIAGNOSIS — M54.50 ACUTE RIGHT-SIDED LOW BACK PAIN, UNSPECIFIED WHETHER SCIATICA PRESENT: Primary | ICD-10-CM

## 2021-05-03 PROCEDURE — 97140 MANUAL THERAPY 1/> REGIONS: CPT | Performed by: PHYSICAL THERAPIST

## 2021-05-03 PROCEDURE — 97112 NEUROMUSCULAR REEDUCATION: CPT | Performed by: PHYSICAL THERAPIST

## 2021-05-03 NOTE — PROGRESS NOTES
Daily Note     Today's date: 5/3/2021  Patient name: Amber Bishop  : 1955  MRN: [id number]  Referring provider: Jodi Marley PT  Dx:   Encounter Diagnosis     ICD-10-CM    1  Acute right-sided low back pain, unspecified whether sciatica present  M54 5        Start Time: 0900  Stop Time: 0945  Total time in clinic (min): 45 minutes    Subjective: Patient reports doing well overall, was sore after last session in his legs  Objective: See treatment diary below  ERP flexion (R side LS)  ERP R Rot (R side LS)    Assessment: Tolerated treatment well  Patient with reports of pain free bending post session  He has improved hip mobility from last session but still stiff compared to left in regards to ER/IR  ANTT normal, negative with testing  He does shift to his left with forward bending with weight (DL) as well as with hand heel rock for assessing hip/pelvic mobility  Updated HEP to add in [address]  Plan: Continue per plan of care        Precautions: standard      Manuals 4/23 4/27 4/30 5/3         Gapping LS - L3-5 on R G4/5 G4  AB         Hip LAD - R  G4/5           SIJ LAD - R  G5           assessment   5' 5'         MWM - R hip IR   3x8  2x5 CR at end range 3x8-IR, 3x8 ER         Neuro Re-Ed  53         KEVIN (standing) 4x10 3x10 3x10 NP         Bridge 4x5-5" 5"x8  3 sets  GTB NP NP         HHR    15x         Multif pressout    OJB  5"x10         Dead lift   15# KB  10x 2 arm support  15# KB  UL support (R) 10x 15# KB  2x10 double arm-10x UL support         Clam  3x8           VG- hip neutral focus   DL-ball-2x20  SL-3x10 DL-2x20         Ther Ex             VG  3' L5           R hip flexion mobility on chair  10x                                                                                         Ther Activity                                       Gait Training                                       Modalities

## 2021-05-07 ENCOUNTER — OFFICE VISIT (OUTPATIENT)
Dept: PHYSICAL THERAPY | Facility: REHABILITATION | Age: 66
End: 2021-05-07
Payer: COMMERCIAL

## 2021-05-07 DIAGNOSIS — M54.50 ACUTE RIGHT-SIDED LOW BACK PAIN, UNSPECIFIED WHETHER SCIATICA PRESENT: Primary | ICD-10-CM

## 2021-05-07 PROCEDURE — 97140 MANUAL THERAPY 1/> REGIONS: CPT | Performed by: PHYSICAL THERAPIST

## 2021-05-07 PROCEDURE — 97112 NEUROMUSCULAR REEDUCATION: CPT | Performed by: PHYSICAL THERAPIST

## 2021-05-07 NOTE — PROGRESS NOTES
Daily Note     Today's date: 2021  Patient name: Juan Miguel Plaza  : 1955  MRN: [id number]  Referring provider: Lora Moore PT  Dx:   Encounter Diagnosis     ICD-10-CM    1  Acute right-sided low back pain, unspecified whether sciatica present  M54 5        Start Time:   Stop Time: 0915  Total time in clinic (min): 40 minutes    Subjective: Patient reports doing well, 75% better, not feeling much pain with lifting when at baseball field  Objective: See treatment diary below  ERP R side LS with fwd flexion - resolved with repeated movements    Assessment: Tolerated treatment well  Patient initially needing cues for hip drive with dead lift, progressed well  Wendall Manilla in next 2-3 visits  Plan: Continue per plan of care        Precautions: standard      Manuals 4/23 4/27 4/30 5/3 5/7        Gapping LS - L3-5 on R G4/5 G4  AB         Hip LAD - R  G4/5   AB        SIJ LAD - R  G5           assessment   5' 5' 3'        MWM - R hip IR   3x8  2x5 CR at end range 3x8-IR, 3x8 ER 3x8 IR        Neuro Re-Ed 4/23 4/27 4/30 5/3 5        KEVIN (standing) 4x10 3x10 3x10 NP 3x10        Bridge 4x5-5" 5"x8  3 sets  GTB NP NP         HHR    15x         Multif pressout    OJB  5"x10 OJB  5"x15 ea side        Dead lift   15# KB  10x 2 arm support  15# KB  UL support (R) 10x 15# KB  2x10 double arm-10x UL support 25# KB  2x10        Clam  3x8           VG- hip neutral focus   DL-ball-2x20  SL-3x10 DL-2x20         Ther Ex          VG  3' L5   5'        R hip flexion mobility on chair  10x                                                                                         Ther Activity                                       Gait Training                                       Modalities

## 2021-05-11 ENCOUNTER — OFFICE VISIT (OUTPATIENT)
Dept: PHYSICAL THERAPY | Facility: REHABILITATION | Age: 66
End: 2021-05-11
Payer: COMMERCIAL

## 2021-05-11 DIAGNOSIS — M54.50 ACUTE RIGHT-SIDED LOW BACK PAIN, UNSPECIFIED WHETHER SCIATICA PRESENT: Primary | ICD-10-CM

## 2021-05-11 PROCEDURE — 97140 MANUAL THERAPY 1/> REGIONS: CPT | Performed by: PHYSICAL THERAPIST

## 2021-05-11 PROCEDURE — 97112 NEUROMUSCULAR REEDUCATION: CPT | Performed by: PHYSICAL THERAPIST

## 2021-05-11 NOTE — PROGRESS NOTES
Daily Note     Today's date: 2021  Patient name: Shar Baker  : 1955  MRN: [id number]  Referring provider: Frank Vasquez PT  Dx:   Encounter Diagnosis     ICD-10-CM    1  Acute right-sided low back pain, unspecified whether sciatica present  M54 5        Start Time: 0830  Stop Time: 0915  Total time in clinic (min): 45 minutes    Subjective: Patient reports feeling well  States that he feels just about back to normal        Objective: See treatment diary below  LS AROM pain free      Assessment: Tolerated treatment well  Patient initially needing cued for hip extension vs LS ext with lifting  Improving hip mobility into Ir which is likley allowing for decreased LS strain throughout day  Educated on continued HEP  Plan: Continue per plan of care        Precautions: standard      Manuals 4/23 4/27 4/30 5/3 5/7 5/11       Gapping LS - L3-5 on R G4/5 G4  AB         Hip LAD - R  G4/5   AB        SIJ LAD - R  G5           assessment   5' 5' 3' 3'       MWM - R hip IR   3x8  2x5 CR at end range 3x8-IR, 3x8 ER 3x8 IR 3x8 IR  3 reps with CR       Neuro Re-Ed 4/23 4/27 4/30 5/3 5/7 5/11       KEVIN (standing) 4x10 3x10 3x10 NP 3x10        Bridge 4x5-5" 5"x8  3 sets  GTB NP NP         HHR    15x         Multif pressout    OJB  5"x10 OJB  5"x15 ea side OJB  5"x15  Ea side       Flexion in step standing      3x10 -r leg on chair       Dead lift   15# KB  10x 2 arm support  15# KB  UL support (R) 10x 15# KB  2x10 double arm-10x UL support 25# KB  2x10 25# KB 3x10       Clam  3x8           VG- hip neutral focus   DL-ball-2x20  SL-3x10 DL-2x20         Ther Ex         VG  3' L5   5' 3'       R hip flexion mobility on chair  10x                                                                                         Ther Activity                                       Gait Training                                       Modalities

## 2021-05-14 ENCOUNTER — OFFICE VISIT (OUTPATIENT)
Dept: PHYSICAL THERAPY | Facility: REHABILITATION | Age: 66
End: 2021-05-14
Payer: COMMERCIAL

## 2021-05-14 DIAGNOSIS — M54.50 ACUTE RIGHT-SIDED LOW BACK PAIN, UNSPECIFIED WHETHER SCIATICA PRESENT: Primary | ICD-10-CM

## 2021-05-14 PROCEDURE — 97140 MANUAL THERAPY 1/> REGIONS: CPT | Performed by: PHYSICAL THERAPIST

## 2021-05-14 PROCEDURE — 97112 NEUROMUSCULAR REEDUCATION: CPT | Performed by: PHYSICAL THERAPIST

## 2021-05-14 NOTE — PROGRESS NOTES
PT Re-evaluation and Discharge    Today's date: 2021  Patient name: Nora Luna  : 1955   MRN: [id number]  Referring provider: Neeta Galarza, PT  Dx:   Encounter Diagnosis     ICD-10-CM    1  Acute right-sided low back pain, unspecified whether sciatica present  M54 5        Start Time: 0915  Stop Time: 1000  Total time in clinic (min): 45 minutes    Assessment  Assessment details: Patient is a 77y o  year old male who attended physical therapy for 7 treatment sessions regarding acute on chronic R sided LBP  Patient reports 90% improvement at this time which correlates to improved impairments and functionality as he feels the pain the brought him in is gone, will feels his normal R sided stiffness at times  Patient has shown improvement throughout PT by demonstrating decreased pain, increased range of motion, increased strength and improved tolerance to activity  He has returned to his normal activity with ADLs, recreational activity with baseball pfree  Educated on continued HEP for both LS and hip mobility  DC PT    Understanding of Dx/Px/POC: good   Prognosis: good    Plan  Treatment plan discussed with: patient        Subjective Evaluation    History of Present Illness  Mechanism of injury: Subjective:  Janae Baez is a 77 y o  male presenting to physical therapy on 21 with referral from [facility] for acute LBP  He does report that his pain will come and go in that spot but generally, in the past has resolved with in a few days  He reports that he was carrying about 50# bag and pouring bag of renzo  Felt pain immediately but went away  Then sat for about 30 minutes, pain upon rising but went away  Pain had began 2021  He reports his pain had increased to the point where he went to the ER on 2021  He reports pain with with cough/sneeze  Pain is non radicular  Denies feeling coordination loss in LE or weakness   Denies and bowel and bladder dysfunction  Reports pain with driving  Pain is better with movement or once seated/lying down  Denies night pain unless he shift/roll in the night  Not a recurrent problem   Quality of life: good    Pain  Current pain ratin  At worst pain ratin  Location: R side LS, SIJ  Quality: sharp      Diagnostic Tests  No diagnostic tests performed  Treatments  Previous treatment: injection treatment  Patient Goals  Patient goals for therapy: decreased pain  Patient goal: Return to PLOF, bend/lift pain free    Short Term Goals:   1  Patient will be Independent with hep - MET  2  Patient will improve pain with activity by 50% - MET  3  Patient will perform transition movements from chair, car, squat pfree - MET      Long Term Goals:   1  Patient will improve FOTO to greater then goal - MET  2  Patient will improve pain with activity to 2/10 or less - MET  3  Patient will continue with HEP independence to allow for decreased future reoccurrence of pain and loss in function - MET  4  Patient will report functional activity with baseball pfree (bending, twisting, lifting)  - MET        Objective      Palpation: TTP LS paraspinals with tone increases L3-5  Myotomes (L/R): normal LE  Dermatome: (pinprick- L/R):  Normal LE     Reflexes:  (L/R) L3-4: 2+ B/L       S1:     2+ B/L        Clonus= negative    GAIT: decreased R hip extension, decreased trunk rot on R when in terminal stance on R  Squat assess: fair form - pfree      Lumbar  % of normal   Flex  100   Extn  100   SB Left 75   SB Right 75 ERP on R   ROT Left 75   ROT Right 75   n= P, NW        MMT         AROM          PROM    Hip       L       R        L           R      L     R   Flex  100 100   Extn  Abd  Add  IR      15 15   ER  45 35            G  Max 4 4       G  Med  Iliop               Neuro Dynamic Testing:  Slump test: L=  neg   R=   neg     Straight leg raise:   L=   neg   R=    neg         Hip: capsular mobility=  R        Segmental mobility:   LS= Hypomobile L4-5 into L side glide (IMPROVED)                 Precautions: standard      Manuals 4/23 5/14           Assessment  10'           MWM hip IR  2x10                                     Neuro Re-Ed 4/23 5/14           KEVIN (standing) 4x10 2x10           Flexion in step standing w/ R leg on chair  2x10           Standing Ir  15x           Supine hip ER mobility  10x  5"           Education  3'                                     Ther Ex             VG                                                                                                        Ther Activity                                       Gait Training                                       Modalities

## 2021-05-17 ENCOUNTER — APPOINTMENT (OUTPATIENT)
Dept: PHYSICAL THERAPY | Facility: REHABILITATION | Age: 66
End: 2021-05-17
Payer: COMMERCIAL

## 2021-05-19 ENCOUNTER — APPOINTMENT (OUTPATIENT)
Dept: PHYSICAL THERAPY | Facility: REHABILITATION | Age: 66
End: 2021-05-19
Payer: COMMERCIAL

## 2021-07-20 ENCOUNTER — TELEPHONE (OUTPATIENT)
Dept: GASTROENTEROLOGY | Facility: AMBULARY SURGERY CENTER | Age: 66
End: 2021-07-20

## 2021-07-20 DIAGNOSIS — K76.0 FATTY LIVER: Primary | ICD-10-CM

## 2021-07-21 ENCOUNTER — OFFICE VISIT (OUTPATIENT)
Dept: URGENT CARE | Facility: CLINIC | Age: 66
End: 2021-07-21
Payer: COMMERCIAL

## 2021-07-21 VITALS
HEIGHT: 69 IN | SYSTOLIC BLOOD PRESSURE: 169 MMHG | BODY MASS INDEX: 38.51 KG/M2 | HEART RATE: 74 BPM | TEMPERATURE: 97.8 F | DIASTOLIC BLOOD PRESSURE: 76 MMHG | OXYGEN SATURATION: 97 % | WEIGHT: 260 LBS | RESPIRATION RATE: 20 BRPM

## 2021-07-21 DIAGNOSIS — H61.21 IMPACTED CERUMEN OF RIGHT EAR: ICD-10-CM

## 2021-07-21 DIAGNOSIS — T16.1XXA FOREIGN BODY OF RIGHT EAR, INITIAL ENCOUNTER: Primary | ICD-10-CM

## 2021-07-21 PROCEDURE — S9088 SERVICES PROVIDED IN URGENT: HCPCS | Performed by: PHYSICIAN ASSISTANT

## 2021-07-21 PROCEDURE — 69200 CLEAR OUTER EAR CANAL: CPT | Performed by: PHYSICIAN ASSISTANT

## 2021-07-21 PROCEDURE — 99213 OFFICE O/P EST LOW 20 MIN: CPT | Performed by: PHYSICIAN ASSISTANT

## 2021-07-21 RX ORDER — CIPROFLOXACIN AND DEXAMETHASONE 3; 1 MG/ML; MG/ML
4 SUSPENSION/ DROPS AURICULAR (OTIC) 2 TIMES DAILY
Qty: 7.5 ML | Refills: 0 | OUTPATIENT
Start: 2021-07-21 | End: 2021-11-06

## 2021-07-21 NOTE — PROGRESS NOTES
[address] [facility]        NAME: Xuan Linares is a 77 y o  male  : 1955    MRN: [id number]  DATE: 2021  TIME: 10:19 AM    Assessment and Plan   Foreign body of right ear, initial encounter Devonte Gore  1XXA]  1  Foreign body of right ear, initial encounter  Ambulatory Referral to Otolaryngology    ciprofloxacin-dexamethasone (CIPRODEX) otic suspension   2  Impacted cerumen of right ear  Ambulatory Referral to Otolaryngology    carbamide peroxide (DEBROX) 6 5 % otic solution         Patient Instructions       Follow up with PCP in 3-5 days  Proceed to  ER if symptoms worsen  Chief Complaint     Chief Complaint   Patient presents with    Foreign Body in Ear     started last ngiht, pt has tip of hearing aide in right ear  slight discomfot, tried getting it out         History of Present Illness       Patient is a 77year old male [facility] with tip of hearing aid stuck in right ear  Patient reports the dome tip became lodged last evening  Pt reports pressure and worsening hearing  Patient in NAD  Foreign Body in [facility] incident occurred 12 to 24 hours ago  Suspected object: hearing aid dome tip  The incident was reported  The incident was witnessed/reported by the patient  Associated symptoms include hearing loss  Pertinent negatives include no abdominal pain, chest pain, cough, fever, sore throat or vomiting  Review of Systems   Review of Systems   Constitutional: Negative for chills and fever  HENT: Positive for ear pain and hearing loss  Negative for sore throat  Eyes: Negative for pain and visual disturbance  Respiratory: Negative for cough and shortness of breath  Cardiovascular: Negative for chest pain and palpitations  Gastrointestinal: Negative for abdominal pain and vomiting  Genitourinary: Negative for dysuria and hematuria  Musculoskeletal: Negative for arthralgias and back pain  Skin: Negative for color change and rash     Neurological: Negative for seizures and syncope  All other systems reviewed and are negative  Current Medications       Current Outpatient Medications:     capsicum (ZOSTRIX) 0 075 % topical cream, Apply topically 3 (three) times a day (Patient not taking: Reported on 7/21/2021), Disp: 28 3 g, Rfl: 0    carbamide peroxide (DEBROX) 6 5 % otic solution, Administer 5 drops to the right ear 2 (two) times a day, Disp: 15 mL, Rfl: 0    ciprofloxacin-dexamethasone (CIPRODEX) otic suspension, Administer 4 drops to the right ear 2 (two) times a day for 7 days, Disp: 7 5 mL, Rfl: 0    diazepam (VALIUM) 5 mg tablet, Take 1 tablet (5 mg total) by mouth 2 (two) times a day for 5 days, Disp: 10 tablet, Rfl: 0    ibuprofen (MOTRIN) 600 mg tablet, Take 1 tablet (600 mg total) by mouth every 8 (eight) hours as needed for mild pain or moderate pain for up to 5 days, Disp: 15 tablet, Rfl: 0    Current Allergies     Allergies as of 07/21/2021 - Reviewed 07/21/2021   Allergen Reaction Noted    Cefadroxil  11/27/2017            The following portions of the patient's history were reviewed and updated as appropriate: allergies, current medications, past family history, past medical history, past social history, past surgical history and problem list      Past Medical History:   Diagnosis Date    Arthritis        Past Surgical History:   Procedure Laterality Date    HAND SURGERY      [place] COLONOSCOPY FLX DX W/COLLJ SPEC WHEN PFRMD N/A 3/22/2018    Procedure: COLONOSCOPY;  Surgeon: Santa Amin MD;  [facility]; Service: Gastroenterology    TONSILECTOMY AND ADNOIDECTOMY      TOOTH EXTRACTION         Family History   Problem Relation Age of Onset    Breast cancer Mother     Heart disease Father         MI in his 52's         Medications have been verified          Objective   /76   Pulse 74   Temp 97 8 °F (36 6 °C) (Tympanic)   Resp 20   Ht 5' 9" (1 753 m)   Wt 118 kg (260 lb)   SpO2 97%   BMI 38 40 kg/m²   No LMP for male patient  Physical Exam     Physical Exam  Constitutional:       Appearance: Normal appearance  He is normal weight  HENT:      Head: Normocephalic and atraumatic  Right Ear: There is impacted cerumen  A foreign body (Dome hearing aid tip) is present  Nose: Nose normal       Mouth/Throat:      Mouth: Mucous membranes are moist    Eyes:      Extraocular Movements: Extraocular movements intact  Conjunctiva/sclera: Conjunctivae normal       Pupils: Pupils are equal, round, and reactive to light  Cardiovascular:      Rate and Rhythm: Normal rate  Pulmonary:      Effort: Pulmonary effort is normal    Musculoskeletal:         General: Normal range of motion  Cervical back: Normal range of motion and neck supple  Skin:     General: Skin is warm and dry  Neurological:      General: No focal deficit present  Mental Status: He is alert and oriented to person, place, and time  Psychiatric:         Mood and Affect: Mood normal          Behavior: Behavior normal            Universal Protocol:  Consent: Verbal consent obtained  Risks and benefits: risks, benefits and alternatives were discussed  Consent given by: patient  Patient understanding: patient states understanding of the procedure being performed  Patient consent: the patient's understanding of the procedure matches consent given    Foreign body removal    Date/Time: 7/21/2021 10:22 AM  Performed by: Guilherme Mg PA-C  Authorized by: Guilherme Mg PA-C   Body area: ear  Location details: right ear    Sedation:  Patient sedated: no  Patient restrained: no  Patient cooperative: yes  Localization method: visualized  Removal mechanism: alligator forceps  Complexity: simple  1 objects recovered    Objects recovered: Hearing aid dome tip  Post-procedure assessment: foreign body removed  Patient tolerance: patient tolerated the procedure well with no immediate complications

## 2021-07-21 NOTE — PATIENT INSTRUCTIONS
Follow up with your PCP within 2-3 days  Follow up with ENT and your Audiologist at earliest availability  Use medications as prescribed  ED if symptoms worsen  Cerumen Impaction   WHAT YOU NEED TO KNOW:   Cerumen impaction is the blockage of the outer ear canal by tightly packed cerumen (earwax)  It is generally treated with procedures such as flushing or suctioning the ear canal or the use of instruments to remove the impaction  DISCHARGE INSTRUCTIONS:   Medicines:  · Ear drops: These are used to soften the wax in your ear  Wax softening ear drops may be bought without a prescription  Ask your healthcare provider how often you should use this medicine  Read the instructions carefully before you use the ear drops  Do the following when you put in ear drops:     ? Warm the drops by holding the bottle in your hands for a few minutes  Cold ear drops may make you dizzy  ? Lie down with the affected ear toward the ceiling  You may also stand with your head tilted to one side  ? Pull your ear lobe up and back, and place the correct number of drops into the ear  ? Keep your ear facing up for 5 to 10 minutes so the drops coat the outer ear canal      ? Gently clean the outer part of the ear with a cotton swab  Do not  place the cotton swab or anything inside your ear canal  This increases the risk of damaging your eardrum  · Take your medicine as directed  Contact your healthcare provider if you think your medicine is not helping or if you have side effects  Tell him of her if you are allergic to any medicine  Keep a list of the medicines, vitamins, and herbs you take  Include the amounts, and when and why you take them  Bring the list or the pill bottles to follow-up visits  Carry your medicine list with you in case of an emergency  Follow up with your healthcare provider as directed:  Write down your questions so you remember to ask them during your visits     Contact your healthcare provider if:   · You have a fever  · You have trouble hearing or ringing in your ear  · You have questions about your condition or care  Return to the emergency department if:   · You feel dizzy  · You have discharge or blood coming out of your ear  · Your ear pain does not go away or gets worse  © Copyright [employer] 2018 Information is for End User's use only and may not be sold, redistributed or otherwise used for commercial purposes  All illustrations and images included in CareNotes® are the copyrighted property of [employer]  or [facility] Ngozi Mueller   The above information is an  only  It is not intended as medical advice for individual conditions or treatments  Talk to your doctor, nurse or pharmacist before following any medical regimen to see if it is safe and effective for you

## 2021-07-27 ENCOUNTER — APPOINTMENT (OUTPATIENT)
Dept: LAB | Facility: CLINIC | Age: 66
End: 2021-07-27
Payer: COMMERCIAL

## 2021-07-27 DIAGNOSIS — K76.0 FATTY LIVER: ICD-10-CM

## 2021-07-27 LAB
ALBUMIN SERPL BCP-MCNC: 3.7 G/DL (ref 3.5–5)
ALP SERPL-CCNC: 56 U/L (ref 46–116)
ALT SERPL W P-5'-P-CCNC: 24 U/L (ref 12–78)
AST SERPL W P-5'-P-CCNC: 26 U/L (ref 5–45)
BILIRUB DIRECT SERPL-MCNC: 0.4 MG/DL (ref 0–0.2)
BILIRUB SERPL-MCNC: 3.21 MG/DL (ref 0.2–1)
PROT SERPL-MCNC: 7.2 G/DL (ref 6.4–8.2)

## 2021-07-27 PROCEDURE — 80076 HEPATIC FUNCTION PANEL: CPT

## 2021-07-27 PROCEDURE — 36415 COLL VENOUS BLD VENIPUNCTURE: CPT

## 2021-09-03 ENCOUNTER — OFFICE VISIT (OUTPATIENT)
Dept: URGENT CARE | Facility: CLINIC | Age: 66
End: 2021-09-03
Payer: COMMERCIAL

## 2021-09-03 VITALS
RESPIRATION RATE: 20 BRPM | HEIGHT: 69 IN | OXYGEN SATURATION: 98 % | WEIGHT: 260 LBS | BODY MASS INDEX: 38.51 KG/M2 | HEART RATE: 97 BPM | TEMPERATURE: 98.5 F

## 2021-09-03 DIAGNOSIS — R09.81 NOSE CONGESTION: Primary | ICD-10-CM

## 2021-09-03 PROCEDURE — 99213 OFFICE O/P EST LOW 20 MIN: CPT | Performed by: PHYSICIAN ASSISTANT

## 2021-09-03 PROCEDURE — S9088 SERVICES PROVIDED IN URGENT: HCPCS | Performed by: PHYSICIAN ASSISTANT

## 2021-09-03 NOTE — PROGRESS NOTES
[address] [facility]        NAME: Jacqulyne Lennox is a 77 y o  male  : 1955    MRN: [id number]  DATE: September 3, 2021  TIME: 5:07 PM    Assessment and Plan   Nose congestion [R09 81]  1  Nose congestion           Patient Instructions       Recommend over-the-counter antihistamine and Flonase   Monitor for worsening symptoms such as fever  Follow up with PCP in 3-5 days  Proceed to  ER if symptoms worsen  Chief Complaint     Chief Complaint   Patient presents with    Nasal Congestion     x 1 day, fully vaccinated against covid         History of Present Illness        Patient presents with complaint of congestion since this morning  He reports that congestion was worse this morning and has improved this evening  He denies any other symptoms including fever, chills, sweats, cough, sore throat, chest tightness, dyspnea, abdominal pain, nausea, vomiting, and diarrhea  He reports taking Tylenol but denies trying other palliative measures  He states has allergies and experiences congestion intermittently throughout the year but denies taking any allergy medication  He denies any known recent sick contacts  Review of Systems   Review of Systems   Constitutional: Negative for chills and fever  HENT: Positive for congestion  Negative for ear pain and sore throat  Eyes: Negative for pain and visual disturbance  Respiratory: Negative for cough and shortness of breath  Cardiovascular: Negative for chest pain and palpitations  Gastrointestinal: Negative for abdominal pain and vomiting  Genitourinary: Negative for dysuria and hematuria  Musculoskeletal: Negative for arthralgias and back pain  Skin: Negative for color change and rash  Neurological: Negative for seizures and syncope  All other systems reviewed and are negative          Current Medications       Current Outpatient Medications:     capsicum (ZOSTRIX) 0 075 % topical cream, Apply topically 3 (three) times a day (Patient not taking: Reported on 7/21/2021), Disp: 28 3 g, Rfl: 0    carbamide peroxide (DEBROX) 6 5 % otic solution, Administer 5 drops to the right ear 2 (two) times a day (Patient not taking: Reported on 8/16/2021), Disp: 15 mL, Rfl: 0    ciprofloxacin-dexamethasone (CIPRODEX) otic suspension, Administer 4 drops to the right ear 2 (two) times a day for 7 days (Patient not taking: Reported on 9/3/2021), Disp: 7 5 mL, Rfl: 0    diazepam (VALIUM) 5 mg tablet, Take 1 tablet (5 mg total) by mouth 2 (two) times a day for 5 days (Patient not taking: Reported on 9/3/2021), Disp: 10 tablet, Rfl: 0    ibuprofen (MOTRIN) 600 mg tablet, Take 1 tablet (600 mg total) by mouth every 8 (eight) hours as needed for mild pain or moderate pain for up to 5 days (Patient not taking: Reported on 9/3/2021), Disp: 15 tablet, Rfl: 0    Current Allergies     Allergies as of 09/03/2021 - Reviewed 09/03/2021   Allergen Reaction Noted    Cefadroxil  11/27/2017            The following portions of the patient's history were reviewed and updated as appropriate: allergies, current medications, past family history, past medical history, past social history, past surgical history and problem list      Past Medical History:   Diagnosis Date    Arthritis        Past Surgical History:   Procedure Laterality Date    HAND SURGERY      [place] COLONOSCOPY FLX DX W/COLLJ SPEC WHEN PFRMD N/A 3/22/2018    Procedure: COLONOSCOPY;  Surgeon: Bola Burrell MD;  [facility]; Service: Gastroenterology    TONSILECTOMY AND ADNOIDECTOMY      TOOTH EXTRACTION         Family History   Problem Relation Age of Onset    Breast cancer Mother     Heart disease Father         MI in his 52's         Medications have been verified  Objective   Pulse 97   Temp 98 5 °F (36 9 °C) (Tympanic)   Resp 20   Ht 5' 9" (1 753 m)   Wt 118 kg (260 lb)   SpO2 98%   BMI 38 40 kg/m²   No LMP for male patient         Physical Exam     Physical Exam  Vitals and nursing note reviewed  Constitutional:       General: He is not in acute distress  Appearance: Normal appearance  He is well-developed  He is not ill-appearing or diaphoretic  HENT:      Head: Normocephalic and atraumatic  Right Ear: Tympanic membrane, ear canal and external ear normal       Left Ear: Tympanic membrane, ear canal and external ear normal       Nose: Congestion present  Mouth/Throat:      Mouth: Mucous membranes are moist       Pharynx: Oropharynx is clear  No oropharyngeal exudate or posterior oropharyngeal erythema  Eyes:      Conjunctiva/sclera: Conjunctivae normal       Pupils: Pupils are equal, round, and reactive to light  Cardiovascular:      Rate and Rhythm: Normal rate and regular rhythm  Heart sounds: Normal heart sounds  Pulmonary:      Effort: Pulmonary effort is normal  No respiratory distress  Breath sounds: Normal breath sounds  No stridor  No wheezing, rhonchi or rales  Musculoskeletal:      Cervical back: Normal range of motion and neck supple  Lymphadenopathy:      Cervical: No cervical adenopathy  Skin:     General: Skin is warm and dry  Capillary Refill: Capillary refill takes less than 2 seconds  Findings: No rash  Neurological:      Mental Status: He is alert and oriented to person, place, and time  Cranial Nerves: No cranial nerve deficit  Sensory: No sensory deficit  Psychiatric:         Behavior: Behavior normal          Thought Content:  Thought content normal

## 2021-09-05 ENCOUNTER — HOSPITAL ENCOUNTER (EMERGENCY)
Facility: HOSPITAL | Age: 66
Discharge: HOME | End: 2021-09-05
Attending: EMERGENCY MEDICINE | Admitting: EMERGENCY MEDICINE
Payer: COMMERCIAL

## 2021-09-05 VITALS
RESPIRATION RATE: 20 BRPM | HEART RATE: 82 BPM | SYSTOLIC BLOOD PRESSURE: 158 MMHG | TEMPERATURE: 99.3 F | OXYGEN SATURATION: 98 % | DIASTOLIC BLOOD PRESSURE: 71 MMHG

## 2021-09-05 DIAGNOSIS — R09.81 NASAL CONGESTION: Primary | ICD-10-CM

## 2021-09-05 LAB — SARS-COV-2 RNA RESP QL NAA+PROBE: NEGATIVE

## 2021-09-05 PROCEDURE — 99282 EMERGENCY DEPT VISIT SF MDM: CPT | Performed by: EMERGENCY MEDICINE

## 2021-09-05 PROCEDURE — U0003 INFECTIOUS AGENT DETECTION BY NUCLEIC ACID (DNA OR RNA); SEVERE ACUTE RESPIRATORY SYNDROME CORONAVIRUS 2 (SARS-COV-2) (CORONAVIRUS DISEASE [COVID-19]), AMPLIFIED PROBE TECHNIQUE, MAKING USE OF HIGH THROUGHPUT TECHNOLOGIES AS DESCRIBED BY CMS-2020-01-R: HCPCS | Performed by: EMERGENCY MEDICINE

## 2021-09-05 PROCEDURE — U0005 INFEC AGEN DETEC AMPLI PROBE: HCPCS | Performed by: EMERGENCY MEDICINE

## 2021-09-05 PROCEDURE — 99283 EMERGENCY DEPT VISIT LOW MDM: CPT

## 2021-09-05 NOTE — ED PROVIDER NOTES
History  Chief Complaint   Patient presents with    Nasal Congestion     pt presents with nasal congestion since Friday morning, denies fevers,sob     Nasal congestion for 1 week, is actually been improving over the past couple of days still very concerned about COVID, wants COVID testing  No fevers no chills no shortness of breath      History provided by:  Patient  URI  Presenting symptoms: congestion    Presenting symptoms: no cough, no fever and no sore throat    Severity:  Moderate  Onset quality:  Gradual  Duration:  1 week  Timing:  Intermittent  Progression:  Improving  Chronicity:  New  Relieved by:  None tried  Worsened by:  Nothing  Ineffective treatments:  None tried  Associated symptoms: no headaches, no neck pain, no sinus pain, no swollen glands and no wheezing        Prior to Admission Medications   Prescriptions Last Dose Informant Patient Reported?  Taking?   capsicum (ZOSTRIX) 0 075 % topical cream   No No   Sig: Apply topically 3 (three) times a day   Patient not taking: Reported on 7/21/2021   carbamide peroxide (DEBROX) 6 5 % otic solution   No No   Sig: Administer 5 drops to the right ear 2 (two) times a day   Patient not taking: Reported on 8/16/2021   ciprofloxacin-dexamethasone (CIPRODEX) otic suspension   No No   Sig: Administer 4 drops to the right ear 2 (two) times a day for 7 days   Patient not taking: Reported on 9/3/2021   diazepam (VALIUM) 5 mg tablet   No No   Sig: Take 1 tablet (5 mg total) by mouth 2 (two) times a day for 5 days   Patient not taking: Reported on 9/3/2021   ibuprofen (MOTRIN) 600 mg tablet   No No   Sig: Take 1 tablet (600 mg total) by mouth every 8 (eight) hours as needed for mild pain or moderate pain for up to 5 days   Patient not taking: Reported on 9/3/2021      Facility-Administered Medications: None       Past Medical History:   Diagnosis Date    Arthritis        Past Surgical History:   Procedure Laterality Date    HAND SURGERY      [place] COLONOSCOPY FLX DX W/COLLJ SPEC WHEN PFRMD N/A 3/22/2018    Procedure: COLONOSCOPY;  Surgeon: Magalie Lewis MD;  [facility]; Service: Gastroenterology    TONSILECTOMY AND ADNOIDECTOMY      TOOTH EXTRACTION         Family History   Problem Relation Age of Onset    Breast cancer Mother     Heart disease Father         MI in his 52's     I have reviewed and agree with the history as documented  E-Cigarette/Vaping    E-Cigarette Use Never User      E-Cigarette/Vaping Substances     Social History     Tobacco Use    Smoking status: Never Smoker    Smokeless tobacco: Never Used   Vaping Use    Vaping Use: Never used   Substance Use Topics    Alcohol use: Yes     Comment: occasional    Drug use: No       Review of Systems   Constitutional: Negative for activity change, chills, diaphoresis and fever  HENT: Positive for congestion  Negative for sinus pressure, sinus pain and sore throat  Eyes: Negative for pain and visual disturbance  Respiratory: Negative for cough, chest tightness, shortness of breath, wheezing and stridor  Cardiovascular: Negative for chest pain and palpitations  Gastrointestinal: Negative for abdominal distention, abdominal pain, constipation, diarrhea, nausea and vomiting  Genitourinary: Negative for dysuria and frequency  Musculoskeletal: Negative for neck pain and neck stiffness  Skin: Negative for rash  Neurological: Negative for dizziness, speech difficulty, light-headedness, numbness and headaches  Physical Exam  Physical Exam  Vitals reviewed  Constitutional:       General: He is not in acute distress  Appearance: He is well-developed  He is not diaphoretic  HENT:      Head: Normocephalic and atraumatic  Right Ear: External ear normal       Left Ear: External ear normal       Nose: Nose normal    Eyes:      General:         Right eye: No discharge  Left eye: No discharge  Pupils: Pupils are equal, round, and reactive to light     Neck: Trachea: No tracheal deviation  Cardiovascular:      Rate and Rhythm: Normal rate and regular rhythm  Heart sounds: Normal heart sounds  No murmur heard  Pulmonary:      Effort: Pulmonary effort is normal  No respiratory distress  Breath sounds: Normal breath sounds  No stridor  Abdominal:      General: There is no distension  Palpations: Abdomen is soft  Tenderness: There is no abdominal tenderness  There is no guarding or rebound  Musculoskeletal:         General: Normal range of motion  Cervical back: Normal range of motion and neck supple  Skin:     General: Skin is warm and dry  Coloration: Skin is not pale  Findings: No erythema  Neurological:      General: No focal deficit present  Mental Status: He is alert and oriented to person, place, and time  Vital Signs  ED Triage Vitals   Temperature Pulse Respirations Blood Pressure SpO2   09/05/21 1745 09/05/21 1743 09/05/21 1743 09/05/21 1743 09/05/21 1743   99 3 °F (37 4 °C) 82 20 158/71 98 %      Temp Source Heart Rate Source Patient Position - Orthostatic VS BP Location FiO2 (%)   09/05/21 1745 09/05/21 1743 09/05/21 1743 09/05/21 1743 --   Oral Monitor Sitting Right arm       Pain Score       --                  Vitals:    09/05/21 1743   BP: 158/71   Pulse: 82   Patient Position - Orthostatic VS: Sitting         Visual Acuity      ED Medications  Medications - No data to display    Diagnostic Studies  Results Reviewed     Procedure Component Value Units Date/Time    Novel Coronavirus (Covid-19),PCR [facility] - 24 Hour Routine [[id number]]  (Normal) Collected: 09/05/21 1822    Lab Status: Final result Specimen: Nares from Nose Updated: 09/05/21 1932     SARS-CoV-2 Negative    Narrative:       The specimen collection materials, transport medium, and/or testing methodology utilized in the production of these test results have been proven to be reliable in a limited validation with an abbreviated program under the Emergency Utilization Authorization provided by the FDA  Testing reported as "Presumptive positive" will be confirmed with secondary testing to ensure result accuracy  Clinical caution and judgement should be used with the interpretation of these results with consideration of the clinical impression and other laboratory testing  Testing reported as "Positive" or "Negative" has been proven to be accurate according to standard laboratory validation requirements  All testing is performed with control materials showing appropriate reactivity at standard intervals  No orders to display              Procedures  Procedures         ED Course                             SBIRT 22yo+      Most Recent Value   SBIRT (24 yo +)   In order to provide better care to our patients, we are screening all of our patients for alcohol and drug use  Would it be okay to ask you these screening questions? Yes Filed at: 09/05/2021 1818   Initial Alcohol Screen: US AUDIT-C    1  How often do you have a drink containing alcohol?  0 Filed at: 09/05/2021 1818   2  How many drinks containing alcohol do you have on a typical day you are drinking? 0 Filed at: 09/05/2021 1818   3b  FEMALE Any Age, or MALE 65+: How often do you have 4 or more drinks on one occassion? 0 Filed at: 09/05/2021 1818   Audit-C Score  0 Filed at: 09/05/2021 1818   LOLI: How many times in the past year have you    Used an illegal drug or used a prescription medication for non-medical reasons?   Never Filed at: 09/05/2021 1818                    MDM  Number of Diagnoses or Management Options  Nasal congestion: new and requires workup  Diagnosis management comments: Nontoxic, nasal congestion, tested for COVID        Post discharge reviewed results, patient COVID negative       Amount and/or Complexity of Data Reviewed  Clinical lab tests: ordered and reviewed  Review and summarize past medical records: yes        Disposition  Final diagnoses:   Nasal congestion     Time reflects when diagnosis was documented in both MDM as applicable and the Disposition within this note     Time User Action Codes Description Comment    9/5/2021  6:11 PM Alfonzo Sharma Add [R09 81] Nasal congestion       ED Disposition     ED Disposition Condition Date/Time Comment    Discharge Stable Sun Sep 5, 2021  6:11 PM Jessica Rogelio discharge to home/self care  Follow-up Information    None         Discharge Medication List as of 9/5/2021  6:11 PM      CONTINUE these medications which have NOT CHANGED    Details   capsicum (ZOSTRIX) 0 075 % topical cream Apply topically 3 (three) times a day, Starting Wed 4/21/2021, Normal      carbamide peroxide (DEBROX) 6 5 % otic solution Administer 5 drops to the right ear 2 (two) times a day, Starting Wed 7/21/2021, Normal      ciprofloxacin-dexamethasone (CIPRODEX) otic suspension Administer 4 drops to the right ear 2 (two) times a day for 7 days, Starting Wed 7/21/2021, Until Wed 7/28/2021, Normal      diazepam (VALIUM) 5 mg tablet Take 1 tablet (5 mg total) by mouth 2 (two) times a day for 5 days, Starting Wed 4/21/2021, Until Mon 4/26/2021, Normal      ibuprofen (MOTRIN) 600 mg tablet Take 1 tablet (600 mg total) by mouth every 8 (eight) hours as needed for mild pain or moderate pain for up to 5 days, Starting Wed 4/21/2021, Until Mon 4/26/2021, Normal           No discharge procedures on file      PDMP Review       Value Time User    PDMP Reviewed  Yes 4/21/2021  6:23 AM Narciso Garay MD          ED Provider  Electronically Signed by           Joe Prater DO  09/05/21 3952

## 2021-10-15 ENCOUNTER — TELEPHONE (OUTPATIENT)
Dept: GASTROENTEROLOGY | Facility: AMBULARY SURGERY CENTER | Age: 66
End: 2021-10-15

## 2021-10-15 DIAGNOSIS — K76.0 FATTY LIVER: Primary | ICD-10-CM

## 2021-10-30 ENCOUNTER — IMMUNIZATIONS (OUTPATIENT)
Dept: FAMILY MEDICINE CLINIC | Facility: HOSPITAL | Age: 66
End: 2021-10-30

## 2021-10-30 DIAGNOSIS — Z23 ENCOUNTER FOR IMMUNIZATION: Primary | ICD-10-CM

## 2021-10-30 PROCEDURE — 0001A COVID-19 PFIZER VACC 0.3 ML: CPT

## 2021-10-30 PROCEDURE — 91300 COVID-19 PFIZER VACC 0.3 ML: CPT

## 2021-11-02 ENCOUNTER — APPOINTMENT (OUTPATIENT)
Dept: LAB | Facility: CLINIC | Age: 66
End: 2021-11-02
Payer: COMMERCIAL

## 2021-11-02 DIAGNOSIS — K76.0 FATTY LIVER: ICD-10-CM

## 2021-11-02 LAB
ALBUMIN SERPL BCP-MCNC: 3.4 G/DL (ref 3.5–5)
ALP SERPL-CCNC: 65 U/L (ref 46–116)
ALT SERPL W P-5'-P-CCNC: 29 U/L (ref 12–78)
AST SERPL W P-5'-P-CCNC: 31 U/L (ref 5–45)
BILIRUB DIRECT SERPL-MCNC: 0.16 MG/DL (ref 0–0.2)
BILIRUB SERPL-MCNC: 1.06 MG/DL (ref 0.2–1)
PROT SERPL-MCNC: 7.1 G/DL (ref 6.4–8.2)

## 2021-11-02 PROCEDURE — 36415 COLL VENOUS BLD VENIPUNCTURE: CPT

## 2021-11-02 PROCEDURE — 80076 HEPATIC FUNCTION PANEL: CPT

## 2021-11-03 ENCOUNTER — TELEPHONE (OUTPATIENT)
Dept: GASTROENTEROLOGY | Facility: CLINIC | Age: 66
End: 2021-11-03

## 2021-11-05 ENCOUNTER — HOSPITAL ENCOUNTER (OUTPATIENT)
Facility: HOSPITAL | Age: 66
Setting detail: OBSERVATION
Discharge: HOME | End: 2021-11-06
Attending: EMERGENCY MEDICINE | Admitting: HOSPITALIST
Payer: COMMERCIAL

## 2021-11-05 ENCOUNTER — APPOINTMENT (EMERGENCY)
Dept: RADIOLOGY | Facility: HOSPITAL | Age: 66
End: 2021-11-05
Payer: COMMERCIAL

## 2021-11-05 DIAGNOSIS — R07.9 CHEST PAIN, UNSPECIFIED TYPE: Primary | ICD-10-CM

## 2021-11-05 DIAGNOSIS — R94.31 ABNORMAL EKG: ICD-10-CM

## 2021-11-05 LAB
ALBUMIN SERPL BCP-MCNC: 4 G/DL (ref 3.5–5)
ALP SERPL-CCNC: 76 U/L (ref 46–116)
ALT SERPL W P-5'-P-CCNC: 26 U/L (ref 12–78)
ANION GAP SERPL CALCULATED.3IONS-SCNC: 9 MMOL/L (ref 4–13)
AST SERPL W P-5'-P-CCNC: 20 U/L (ref 5–45)
BASOPHILS # BLD AUTO: 0.06 THOUSANDS/ΜL (ref 0–0.1)
BASOPHILS NFR BLD AUTO: 1 % (ref 0–1)
BILIRUB SERPL-MCNC: 0.91 MG/DL (ref 0.2–1)
BUN SERPL-MCNC: 16 MG/DL (ref 5–25)
CALCIUM SERPL-MCNC: 8.4 MG/DL (ref 8.3–10.1)
CHLORIDE SERPL-SCNC: 105 MMOL/L (ref 100–108)
CO2 SERPL-SCNC: 27 MMOL/L (ref 21–32)
CREAT SERPL-MCNC: 0.89 MG/DL (ref 0.6–1.3)
EOSINOPHIL # BLD AUTO: 0.35 THOUSAND/ΜL (ref 0–0.61)
EOSINOPHIL NFR BLD AUTO: 6 % (ref 0–6)
ERYTHROCYTE [DISTWIDTH] IN BLOOD BY AUTOMATED COUNT: 13.3 % (ref 11.6–15.1)
GFR SERPL CREATININE-BSD FRML MDRD: 88 ML/MIN/1.73SQ M
GLUCOSE SERPL-MCNC: 92 MG/DL (ref 65–140)
HCT VFR BLD AUTO: 46.5 % (ref 36.5–49.3)
HGB BLD-MCNC: 15.4 G/DL (ref 12–17)
IMM GRANULOCYTES # BLD AUTO: 0.01 THOUSAND/UL (ref 0–0.2)
IMM GRANULOCYTES NFR BLD AUTO: 0 % (ref 0–2)
LYMPHOCYTES # BLD AUTO: 1.78 THOUSANDS/ΜL (ref 0.6–4.47)
LYMPHOCYTES NFR BLD AUTO: 31 % (ref 14–44)
MCH RBC QN AUTO: 30.4 PG (ref 26.8–34.3)
MCHC RBC AUTO-ENTMCNC: 33.1 G/DL (ref 31.4–37.4)
MCV RBC AUTO: 92 FL (ref 82–98)
MONOCYTES # BLD AUTO: 0.56 THOUSAND/ΜL (ref 0.17–1.22)
MONOCYTES NFR BLD AUTO: 10 % (ref 4–12)
NEUTROPHILS # BLD AUTO: 3.04 THOUSANDS/ΜL (ref 1.85–7.62)
NEUTS SEG NFR BLD AUTO: 52 % (ref 43–75)
NRBC BLD AUTO-RTO: 0 /100 WBCS
PLATELET # BLD AUTO: 185 THOUSANDS/UL (ref 149–390)
PMV BLD AUTO: 9 FL (ref 8.9–12.7)
POTASSIUM SERPL-SCNC: 3.8 MMOL/L (ref 3.5–5.3)
PROT SERPL-MCNC: 7.4 G/DL (ref 6.4–8.2)
RBC # BLD AUTO: 5.07 MILLION/UL (ref 3.88–5.62)
SODIUM SERPL-SCNC: 141 MMOL/L (ref 136–145)
TROPONIN I SERPL-MCNC: <0.02 NG/ML
TROPONIN I SERPL-MCNC: <0.02 NG/ML
WBC # BLD AUTO: 5.8 THOUSAND/UL (ref 4.31–10.16)

## 2021-11-05 PROCEDURE — 71045 X-RAY EXAM CHEST 1 VIEW: CPT

## 2021-11-05 PROCEDURE — 80053 COMPREHEN METABOLIC PANEL: CPT | Performed by: EMERGENCY MEDICINE

## 2021-11-05 PROCEDURE — 83036 HEMOGLOBIN GLYCOSYLATED A1C: CPT | Performed by: NURSE PRACTITIONER

## 2021-11-05 PROCEDURE — 99285 EMERGENCY DEPT VISIT HI MDM: CPT

## 2021-11-05 PROCEDURE — 99285 EMERGENCY DEPT VISIT HI MDM: CPT | Performed by: EMERGENCY MEDICINE

## 2021-11-05 PROCEDURE — 93005 ELECTROCARDIOGRAM TRACING: CPT

## 2021-11-05 PROCEDURE — 36415 COLL VENOUS BLD VENIPUNCTURE: CPT

## 2021-11-05 PROCEDURE — 84484 ASSAY OF TROPONIN QUANT: CPT | Performed by: EMERGENCY MEDICINE

## 2021-11-05 PROCEDURE — 85025 COMPLETE CBC W/AUTO DIFF WBC: CPT | Performed by: EMERGENCY MEDICINE

## 2021-11-05 RX ORDER — NITROGLYCERIN 0.4 MG/1
0.4 TABLET SUBLINGUAL
Status: DISCONTINUED | OUTPATIENT
Start: 2021-11-05 | End: 2021-11-06 | Stop reason: HOSPADM

## 2021-11-05 RX ORDER — ASPIRIN 81 MG/1
324 TABLET, CHEWABLE ORAL ONCE
Status: COMPLETED | OUTPATIENT
Start: 2021-11-05 | End: 2021-11-05

## 2021-11-05 RX ORDER — ASPIRIN 81 MG/1
81 TABLET, CHEWABLE ORAL DAILY
Status: DISCONTINUED | OUTPATIENT
Start: 2021-11-06 | End: 2021-11-06 | Stop reason: HOSPADM

## 2021-11-05 RX ORDER — ACETAMINOPHEN 325 MG/1
650 TABLET ORAL EVERY 6 HOURS PRN
Status: DISCONTINUED | OUTPATIENT
Start: 2021-11-05 | End: 2021-11-06 | Stop reason: HOSPADM

## 2021-11-05 RX ORDER — POTASSIUM CHLORIDE 20 MEQ/1
20 TABLET, EXTENDED RELEASE ORAL ONCE
Status: COMPLETED | OUTPATIENT
Start: 2021-11-06 | End: 2021-11-06

## 2021-11-05 RX ADMIN — ASPIRIN 324 MG: 81 TABLET, CHEWABLE ORAL at 23:17

## 2021-11-06 VITALS
DIASTOLIC BLOOD PRESSURE: 74 MMHG | HEART RATE: 76 BPM | SYSTOLIC BLOOD PRESSURE: 135 MMHG | RESPIRATION RATE: 16 BRPM | OXYGEN SATURATION: 99 % | TEMPERATURE: 99.2 F

## 2021-11-06 LAB
ANION GAP SERPL CALCULATED.3IONS-SCNC: 9 MMOL/L (ref 4–13)
ATRIAL RATE: 69 BPM
BUN SERPL-MCNC: 13 MG/DL (ref 5–25)
CALCIUM SERPL-MCNC: 8.1 MG/DL (ref 8.3–10.1)
CHLORIDE SERPL-SCNC: 105 MMOL/L (ref 100–108)
CHOLEST SERPL-MCNC: 173 MG/DL (ref 50–200)
CO2 SERPL-SCNC: 27 MMOL/L (ref 21–32)
CREAT SERPL-MCNC: 0.86 MG/DL (ref 0.6–1.3)
EST. AVERAGE GLUCOSE BLD GHB EST-MCNC: 100 MG/DL
GFR SERPL CREATININE-BSD FRML MDRD: 90 ML/MIN/1.73SQ M
GLUCOSE SERPL-MCNC: 86 MG/DL (ref 65–140)
HBA1C MFR BLD: 5.1 %
HDLC SERPL-MCNC: 44 MG/DL
LDLC SERPL CALC-MCNC: 115 MG/DL (ref 0–100)
MAGNESIUM SERPL-MCNC: 2.3 MG/DL (ref 1.6–2.6)
NONHDLC SERPL-MCNC: 129 MG/DL
P AXIS: 27 DEGREES
POTASSIUM SERPL-SCNC: 3.5 MMOL/L (ref 3.5–5.3)
PR INTERVAL: 178 MS
QRS AXIS: -7 DEGREES
QRSD INTERVAL: 96 MS
QT INTERVAL: 408 MS
QTC INTERVAL: 437 MS
SODIUM SERPL-SCNC: 141 MMOL/L (ref 136–145)
T WAVE AXIS: -17 DEGREES
TRIGL SERPL-MCNC: 68 MG/DL
TROPONIN I SERPL-MCNC: <0.02 NG/ML
TROPONIN I SERPL-MCNC: <0.02 NG/ML
VENTRICULAR RATE: 69 BPM

## 2021-11-06 PROCEDURE — 99236 HOSP IP/OBS SAME DATE HI 85: CPT | Performed by: HOSPITALIST

## 2021-11-06 PROCEDURE — 93010 ELECTROCARDIOGRAM REPORT: CPT | Performed by: INTERNAL MEDICINE

## 2021-11-06 PROCEDURE — 80061 LIPID PANEL: CPT | Performed by: NURSE PRACTITIONER

## 2021-11-06 PROCEDURE — 84484 ASSAY OF TROPONIN QUANT: CPT | Performed by: NURSE PRACTITIONER

## 2021-11-06 PROCEDURE — 80048 BASIC METABOLIC PNL TOTAL CA: CPT | Performed by: NURSE PRACTITIONER

## 2021-11-06 PROCEDURE — 93005 ELECTROCARDIOGRAM TRACING: CPT

## 2021-11-06 PROCEDURE — 36415 COLL VENOUS BLD VENIPUNCTURE: CPT | Performed by: NURSE PRACTITIONER

## 2021-11-06 PROCEDURE — 83735 ASSAY OF MAGNESIUM: CPT | Performed by: NURSE PRACTITIONER

## 2021-11-06 RX ORDER — NITROGLYCERIN 0.4 MG/1
0.4 TABLET SUBLINGUAL
Qty: 30 TABLET | Refills: 0 | Status: CANCELLED | OUTPATIENT
Start: 2021-11-06

## 2021-11-06 RX ORDER — ASPIRIN 81 MG/1
81 TABLET, CHEWABLE ORAL DAILY
Qty: 30 TABLET | Refills: 0 | Status: CANCELLED | OUTPATIENT
Start: 2021-11-06

## 2021-11-08 ENCOUNTER — TRANSITIONAL CARE MANAGEMENT (OUTPATIENT)
Dept: FAMILY MEDICINE CLINIC | Facility: CLINIC | Age: 66
End: 2021-11-08

## 2021-11-08 LAB
ATRIAL RATE: 66 BPM
ATRIAL RATE: 72 BPM
ATRIAL RATE: 83 BPM
P AXIS: 33 DEGREES
P AXIS: 48 DEGREES
P AXIS: 59 DEGREES
PR INTERVAL: 168 MS
PR INTERVAL: 168 MS
PR INTERVAL: 182 MS
QRS AXIS: -14 DEGREES
QRS AXIS: -18 DEGREES
QRS AXIS: -30 DEGREES
QRSD INTERVAL: 100 MS
QRSD INTERVAL: 102 MS
QRSD INTERVAL: 102 MS
QT INTERVAL: 362 MS
QT INTERVAL: 386 MS
QT INTERVAL: 412 MS
QTC INTERVAL: 414 MS
QTC INTERVAL: 418 MS
QTC INTERVAL: 422 MS
T WAVE AXIS: 23 DEGREES
T WAVE AXIS: 41 DEGREES
T WAVE AXIS: 43 DEGREES
VENTRICULAR RATE: 63 BPM
VENTRICULAR RATE: 69 BPM
VENTRICULAR RATE: 80 BPM

## 2021-11-08 PROCEDURE — 93010 ELECTROCARDIOGRAM REPORT: CPT | Performed by: INTERNAL MEDICINE

## 2021-11-16 ENCOUNTER — OFFICE VISIT (OUTPATIENT)
Dept: FAMILY MEDICINE CLINIC | Facility: CLINIC | Age: 66
End: 2021-11-16
Payer: COMMERCIAL

## 2021-11-16 VITALS
WEIGHT: 277.6 LBS | TEMPERATURE: 97.6 F | BODY MASS INDEX: 41.12 KG/M2 | HEIGHT: 69 IN | HEART RATE: 77 BPM | DIASTOLIC BLOOD PRESSURE: 82 MMHG | OXYGEN SATURATION: 98 % | RESPIRATION RATE: 18 BRPM | SYSTOLIC BLOOD PRESSURE: 130 MMHG

## 2021-11-16 DIAGNOSIS — Z00.00 MEDICARE ANNUAL WELLNESS VISIT, SUBSEQUENT: ICD-10-CM

## 2021-11-16 DIAGNOSIS — E66.01 OBESITY, MORBID (HCC): ICD-10-CM

## 2021-11-16 DIAGNOSIS — R07.89 ATYPICAL CHEST PAIN: Primary | ICD-10-CM

## 2021-11-16 DIAGNOSIS — Z23 NEED FOR PNEUMOCOCCAL VACCINATION: ICD-10-CM

## 2021-11-16 PROBLEM — R17 ELEVATED BILIRUBIN: Status: RESOLVED | Noted: 2018-12-11 | Resolved: 2021-11-16

## 2021-11-16 PROBLEM — I47.2 NSVT (NONSUSTAINED VENTRICULAR TACHYCARDIA) (HCC): Status: RESOLVED | Noted: 2019-01-17 | Resolved: 2021-11-16

## 2021-11-16 PROBLEM — I47.29 NSVT (NONSUSTAINED VENTRICULAR TACHYCARDIA): Status: RESOLVED | Noted: 2019-01-17 | Resolved: 2021-11-16

## 2021-11-16 PROBLEM — R07.9 CHEST PAIN: Status: RESOLVED | Noted: 2021-11-05 | Resolved: 2021-11-16

## 2021-11-16 PROBLEM — R73.01 IMPAIRED FASTING GLUCOSE: Status: RESOLVED | Noted: 2018-06-13 | Resolved: 2021-11-16

## 2021-11-16 PROBLEM — I45.9 SKIPPED BEATS: Status: RESOLVED | Noted: 2018-12-11 | Resolved: 2021-11-16

## 2021-11-16 PROBLEM — Z12.11 COLON CANCER SCREENING: Status: RESOLVED | Noted: 2018-03-13 | Resolved: 2021-11-16

## 2021-11-16 PROCEDURE — 90732 PPSV23 VACC 2 YRS+ SUBQ/IM: CPT

## 2021-11-16 PROCEDURE — 99495 TRANSJ CARE MGMT MOD F2F 14D: CPT | Performed by: FAMILY MEDICINE

## 2021-11-16 PROCEDURE — 1111F DSCHRG MED/CURRENT MED MERGE: CPT | Performed by: FAMILY MEDICINE

## 2021-11-16 PROCEDURE — G0009 ADMIN PNEUMOCOCCAL VACCINE: HCPCS

## 2021-12-16 ENCOUNTER — OFFICE VISIT (OUTPATIENT)
Dept: URGENT CARE | Facility: CLINIC | Age: 66
End: 2021-12-16
Payer: COMMERCIAL

## 2021-12-16 ENCOUNTER — OFFICE VISIT (OUTPATIENT)
Dept: FAMILY MEDICINE CLINIC | Facility: CLINIC | Age: 66
End: 2021-12-16
Payer: COMMERCIAL

## 2021-12-16 VITALS
RESPIRATION RATE: 20 BRPM | BODY MASS INDEX: 41.53 KG/M2 | WEIGHT: 280.4 LBS | DIASTOLIC BLOOD PRESSURE: 80 MMHG | TEMPERATURE: 97.8 F | OXYGEN SATURATION: 98 % | HEART RATE: 91 BPM | SYSTOLIC BLOOD PRESSURE: 154 MMHG | HEIGHT: 69 IN

## 2021-12-16 VITALS
HEIGHT: 69 IN | BODY MASS INDEX: 41.5 KG/M2 | RESPIRATION RATE: 16 BRPM | HEART RATE: 100 BPM | DIASTOLIC BLOOD PRESSURE: 75 MMHG | OXYGEN SATURATION: 97 % | SYSTOLIC BLOOD PRESSURE: 147 MMHG | WEIGHT: 280.2 LBS | TEMPERATURE: 98.6 F

## 2021-12-16 DIAGNOSIS — R03.0 ELEVATED BLOOD PRESSURE READING WITHOUT DIAGNOSIS OF HYPERTENSION: Primary | ICD-10-CM

## 2021-12-16 DIAGNOSIS — Z00.00 MEDICARE ANNUAL WELLNESS VISIT, SUBSEQUENT: Primary | ICD-10-CM

## 2021-12-16 DIAGNOSIS — I10 ESSENTIAL HYPERTENSION: ICD-10-CM

## 2021-12-16 PROCEDURE — 3008F BODY MASS INDEX DOCD: CPT | Performed by: FAMILY MEDICINE

## 2021-12-16 PROCEDURE — 1170F FXNL STATUS ASSESSED: CPT | Performed by: FAMILY MEDICINE

## 2021-12-16 PROCEDURE — 99214 OFFICE O/P EST MOD 30 MIN: CPT | Performed by: FAMILY MEDICINE

## 2021-12-16 PROCEDURE — 1036F TOBACCO NON-USER: CPT | Performed by: FAMILY MEDICINE

## 2021-12-16 PROCEDURE — 3725F SCREEN DEPRESSION PERFORMED: CPT | Performed by: FAMILY MEDICINE

## 2021-12-16 PROCEDURE — 3288F FALL RISK ASSESSMENT DOCD: CPT | Performed by: FAMILY MEDICINE

## 2021-12-16 PROCEDURE — 1125F AMNT PAIN NOTED PAIN PRSNT: CPT | Performed by: FAMILY MEDICINE

## 2021-12-16 PROCEDURE — 99213 OFFICE O/P EST LOW 20 MIN: CPT | Performed by: PHYSICIAN ASSISTANT

## 2021-12-16 PROCEDURE — 1160F RVW MEDS BY RX/DR IN RCRD: CPT | Performed by: FAMILY MEDICINE

## 2021-12-16 PROCEDURE — G0439 PPPS, SUBSEQ VISIT: HCPCS | Performed by: FAMILY MEDICINE

## 2021-12-16 PROCEDURE — S9088 SERVICES PROVIDED IN URGENT: HCPCS | Performed by: PHYSICIAN ASSISTANT

## 2021-12-16 RX ORDER — AMLODIPINE BESYLATE 5 MG/1
5 TABLET ORAL DAILY
Qty: 90 TABLET | Refills: 3 | Status: SHIPPED | OUTPATIENT
Start: 2021-12-16 | End: 2022-01-18 | Stop reason: SINTOL

## 2022-01-03 ENCOUNTER — APPOINTMENT (OUTPATIENT)
Dept: LAB | Facility: CLINIC | Age: 67
End: 2022-01-03
Payer: COMMERCIAL

## 2022-01-03 ENCOUNTER — TRANSCRIBE ORDERS (OUTPATIENT)
Dept: LAB | Facility: CLINIC | Age: 67
End: 2022-01-03

## 2022-01-03 DIAGNOSIS — H91.8X9 ASYMMETRICAL HEARING LOSS: Primary | ICD-10-CM

## 2022-01-03 DIAGNOSIS — H91.8X9 ASYMMETRICAL HEARING LOSS: ICD-10-CM

## 2022-01-03 LAB
BUN SERPL-MCNC: 14 MG/DL (ref 5–25)
CREAT SERPL-MCNC: 0.76 MG/DL (ref 0.6–1.3)
GFR SERPL CREATININE-BSD FRML MDRD: 93 ML/MIN/1.73SQ M

## 2022-01-03 PROCEDURE — 82565 ASSAY OF CREATININE: CPT

## 2022-01-03 PROCEDURE — 84520 ASSAY OF UREA NITROGEN: CPT

## 2022-01-03 PROCEDURE — 36415 COLL VENOUS BLD VENIPUNCTURE: CPT

## 2022-01-18 ENCOUNTER — OFFICE VISIT (OUTPATIENT)
Dept: FAMILY MEDICINE CLINIC | Facility: CLINIC | Age: 67
End: 2022-01-18
Payer: COMMERCIAL

## 2022-01-18 VITALS
RESPIRATION RATE: 24 BRPM | BODY MASS INDEX: 41.2 KG/M2 | WEIGHT: 279 LBS | TEMPERATURE: 97.6 F | DIASTOLIC BLOOD PRESSURE: 88 MMHG | SYSTOLIC BLOOD PRESSURE: 136 MMHG | OXYGEN SATURATION: 99 %

## 2022-01-18 DIAGNOSIS — H91.92 UNILATERAL HEARING LOSS, LEFT: ICD-10-CM

## 2022-01-18 DIAGNOSIS — I10 ESSENTIAL HYPERTENSION: Primary | ICD-10-CM

## 2022-01-18 DIAGNOSIS — E66.01 OBESITY, MORBID (HCC): ICD-10-CM

## 2022-01-18 PROCEDURE — 1036F TOBACCO NON-USER: CPT | Performed by: FAMILY MEDICINE

## 2022-01-18 PROCEDURE — 3075F SYST BP GE 130 - 139MM HG: CPT | Performed by: FAMILY MEDICINE

## 2022-01-18 PROCEDURE — 3079F DIAST BP 80-89 MM HG: CPT | Performed by: FAMILY MEDICINE

## 2022-01-18 PROCEDURE — 99214 OFFICE O/P EST MOD 30 MIN: CPT | Performed by: FAMILY MEDICINE

## 2022-01-18 PROCEDURE — 1160F RVW MEDS BY RX/DR IN RCRD: CPT | Performed by: FAMILY MEDICINE

## 2022-01-18 NOTE — PATIENT INSTRUCTIONS
Blood pressure readings are okay at home so will continue to observe without medication  Review of his left-sided neuro sensory hearing loss as he has been seen by ENT  MRI has been ordered although his symptoms have been present and unchanged for the last 20 years  Recheck in 6 months

## 2022-01-18 NOTE — PROGRESS NOTES
Chief Complaint   Patient presents with    Follow-up        HPI   Here for follow-up of hypertension  At last visit, started on amlodipine 5 mg daily  It gave him brain fog and he stopped it  He did obtain an upper arm blood pressure cuff and his blood pressure readings at home are all fine  Systolics usually run between 120 and 130  Overall, he is feeling well    notes he has had hearing loss in the left ear for 20 years  Recently, an MRI was ordered  Past Medical History:   Diagnosis Date    Arthritis     Essential hypertension 12/16/2021    Obesity     Unilateral hearing loss, left 1/18/2022        Past Surgical History:   Procedure Laterality Date    HAND SURGERY      HI COLONOSCOPY FLX DX W/COLLJ SPEC WHEN PFRMD N/A 3/22/2018    Procedure: COLONOSCOPY;  Surgeon: Shalonda Pleitez MD;  Location: [facility]; Service: Gastroenterology    TONSILECTOMY AND ADNOIDECTOMY      TOOTH EXTRACTION         Social History     Tobacco Use    Smoking status: Never Smoker    Smokeless tobacco: Never Used   Substance Use Topics    Alcohol use: Yes     Comment: occasional       Social History     Social History Narrative      Single  Lives alone  Retired  Worked for the [employer] retiring in 2000  Worked part-time jobs afterward  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 6 miles a day  The following portions of the patient's history were reviewed and updated as appropriate: allergies, current medications, past family history, past medical history, past social history, past surgical history and problem list       Review of Systems       /88   Temp 97 6 °F (36 4 °C)   Resp (!) 24   Wt 127 kg (279 lb)   SpO2 99%   BMI 41 20 kg/m²      Physical Exam     Appears well  Repeat blood pressure 160/90  Lungs are clear  Heart regular  BMI Counseling: Body mass index is 41 2 kg/m²   The BMI is above normal  Nutrition recommendations include encouraging healthy choices of fruits and vegetables, consuming healthier snacks and limiting drinks that contain sugar  Exercise recommendations include moderate physical activity 150 minutes/week  Rationale for BMI follow-up plan is due to patient being overweight or obese  No current outpatient medications on file  No problem-specific Assessment & Plan notes found for this encounter  Diagnoses and all orders for this visit:    Essential hypertension    Obesity, morbid ([address] )    Unilateral hearing loss, left        Patient Instructions    Blood pressure readings are okay at home so will continue to observe without medication  Review of his left-sided neuro sensory hearing loss as he has been seen by ENT  MRI has been ordered although his symptoms have been present and unchanged for the last 20 years  Recheck in 6 months

## 2022-07-16 ENCOUNTER — RA CDI HCC (OUTPATIENT)
Dept: OTHER | Facility: HOSPITAL | Age: 67
End: 2022-07-16

## 2022-07-16 NOTE — PROGRESS NOTES
[facility]  coding opportunities       Chart reviewed, no opportunity found:   [address]        Patients Insurance     Medicare Insurance: [employer]

## 2022-07-26 ENCOUNTER — OFFICE VISIT (OUTPATIENT)
Dept: FAMILY MEDICINE CLINIC | Facility: CLINIC | Age: 67
End: 2022-07-26
Payer: COMMERCIAL

## 2022-07-26 VITALS
DIASTOLIC BLOOD PRESSURE: 82 MMHG | RESPIRATION RATE: 20 BRPM | HEART RATE: 72 BPM | HEIGHT: 69 IN | SYSTOLIC BLOOD PRESSURE: 142 MMHG | TEMPERATURE: 98 F | WEIGHT: 286 LBS | BODY MASS INDEX: 42.36 KG/M2 | OXYGEN SATURATION: 98 %

## 2022-07-26 DIAGNOSIS — I10 ESSENTIAL HYPERTENSION: Primary | ICD-10-CM

## 2022-07-26 DIAGNOSIS — K76.0 FATTY LIVER: ICD-10-CM

## 2022-07-26 DIAGNOSIS — Z86.010 HISTORY OF ADENOMATOUS POLYP OF COLON: ICD-10-CM

## 2022-07-26 DIAGNOSIS — E66.01 OBESITY, MORBID (HCC): ICD-10-CM

## 2022-07-26 PROCEDURE — 99214 OFFICE O/P EST MOD 30 MIN: CPT | Performed by: FAMILY MEDICINE

## 2022-07-26 PROCEDURE — 1160F RVW MEDS BY RX/DR IN RCRD: CPT | Performed by: FAMILY MEDICINE

## 2022-07-26 PROCEDURE — 3079F DIAST BP 80-89 MM HG: CPT | Performed by: FAMILY MEDICINE

## 2022-07-26 PROCEDURE — 3077F SYST BP >= 140 MM HG: CPT | Performed by: FAMILY MEDICINE

## 2022-07-26 PROCEDURE — 3725F SCREEN DEPRESSION PERFORMED: CPT | Performed by: FAMILY MEDICINE

## 2022-07-26 NOTE — PROGRESS NOTES
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  Blood pressure readings at home are okay  Is not on any medications  At home this morning, systolic 734  Yesterday, systolic 461  Getting colonoscopies every 3 years because of polyps  Past Medical History:   Diagnosis Date    Arthritis     Essential hypertension 12/16/2021    Obesity     Unilateral hearing loss, left 1/18/2022        Past Surgical History:   Procedure Laterality Date    HAND SURGERY      RI COLONOSCOPY FLX DX W/COLLJ SPEC WHEN PFRMD N/A 3/22/2018    Procedure: COLONOSCOPY;  Surgeon: Delaney Truong MD;  [facility]; Service: Gastroenterology    TONSILECTOMY AND ADNOIDECTOMY      TOOTH EXTRACTION         Social History     Tobacco Use    Smoking status: Never Smoker    Smokeless tobacco: Never Used   Substance Use Topics    Alcohol use: Yes     Comment: occasional       Social History     Social History Narrative      Single  Lives alone  Retired  Worked for the [employer] retiring in 2000  Worked part-time jobs afterward  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 6 miles a day  The following portions of the patient's history were reviewed and updated as appropriate: allergies, current medications, past family history, past medical history, past social history, past surgical history and problem list       Review of Systems   Constitutional: Negative for activity change and appetite change  HENT: Negative for ear pain and hearing loss  Eyes: Negative for visual disturbance  Respiratory: Negative for shortness of breath and wheezing  Cardiovascular: Negative for chest pain and leg swelling  Gastrointestinal: Negative for abdominal pain, constipation and diarrhea  Genitourinary: Negative for difficulty urinating  Musculoskeletal: Negative for arthralgias and back pain  Skin: Negative for rash  Neurological: Negative for headaches  Psychiatric/Behavioral: Negative for dysphoric mood  The patient is not nervous/anxious  /82 (BP Location: Left arm, Patient Position: Sitting, Cuff Size: Large)   Pulse 72   Temp 98 °F (36 7 °C) (Temporal)   Resp 20   Ht 5' 9" (1 753 m)   Wt 130 kg (286 lb)   SpO2 98%   BMI 42 23 kg/m²      Physical Exam   Repeat blood pressure 160/90 with a normal size cuff which fits correctly  Lungs are clear  Heart regular  Weight noted to be of 25 lb in the last year  Last A1c 5 4  Lipid profile always excellent  No current outpatient medications on file  No problem-specific Assessment & Plan notes found for this encounter  Diagnoses and all orders for this visit:    Essential hypertension  -     Comprehensive metabolic panel; Future  -     Lipid panel; Future    Obesity, morbid ([address] )  -     Hemoglobin A1C; Future    Fatty liver    History of adenomatous polyp of colon        Patient Instructions   Blood pressure 20 points too high in the office but readings at home have been okay so will continue off medication  Weight gain noted but A1c has always been okay, including just 9 lb ago  Review of liver function tests which were normal including his last bilirubin  Will repeat blood work prior to next visit  A 2nd COVID booster is recommended  Recheck in 6 months

## 2022-07-26 NOTE — PATIENT INSTRUCTIONS
Blood pressure 20 points too high in the office but readings at home have been okay so will continue off medication  Weight gain noted but A1c has always been okay, including just 9 lb ago  Review of liver function tests which were normal including his last bilirubin  Will repeat blood work prior to next visit  A 2nd COVID booster is recommended  Recheck in 6 months

## 2022-12-08 ENCOUNTER — APPOINTMENT (OUTPATIENT)
Dept: LAB | Facility: CLINIC | Age: 67
End: 2022-12-08

## 2022-12-08 DIAGNOSIS — M54.2 NECK PAIN: ICD-10-CM

## 2022-12-08 DIAGNOSIS — R07.0 THROAT PAIN IN ADULT: ICD-10-CM

## 2022-12-08 LAB
BUN SERPL-MCNC: 22 MG/DL (ref 5–25)
CREAT SERPL-MCNC: 0.78 MG/DL (ref 0.6–1.3)
GFR SERPL CREATININE-BSD FRML MDRD: 92 ML/MIN/1.73SQ M

## 2022-12-14 ENCOUNTER — HOSPITAL ENCOUNTER (OUTPATIENT)
Dept: RADIOLOGY | Facility: HOSPITAL | Age: 67
Discharge: HOME | End: 2022-12-14
Attending: OTOLARYNGOLOGY

## 2022-12-14 DIAGNOSIS — M54.2 NECK PAIN: ICD-10-CM

## 2022-12-14 DIAGNOSIS — R07.0 THROAT PAIN IN ADULT: ICD-10-CM

## 2022-12-14 RX ADMIN — IOHEXOL 100 ML: 350 INJECTION, SOLUTION INTRAVENOUS at 11:18

## 2023-01-04 ENCOUNTER — EVALUATION (OUTPATIENT)
Dept: PHYSICAL THERAPY | Facility: REHABILITATION | Age: 68
End: 2023-01-04

## 2023-01-04 DIAGNOSIS — M79.18 MYOFASCIAL PAIN: ICD-10-CM

## 2023-01-04 DIAGNOSIS — M54.2 CERVICALGIA: ICD-10-CM

## 2023-01-04 DIAGNOSIS — M54.2 NECK PAIN: ICD-10-CM

## 2023-01-04 NOTE — PROGRESS NOTES
PT Evaluation     Today's date: 2023  Patient name: Stephanie Rothman  : 1955  MRN: [id number]  Referring provider: Dotti Snellen, P*  Dx:   Encounter Diagnosis     ICD-10-CM    1  Neck pain  M54 2 Ambulatory Referral to Physical Therapy      2  Cervicalgia  M54 2 Ambulatory Referral to Physical Therapy      3  Myofascial pain  M79 18 Ambulatory Referral to Physical Therapy          Start Time: 845  Stop Time: 930  Total time in clinic (min): 45 minutes    Assessment  Assessment details: Stephanie Rohtman is a 69 y o  male presenting to outpatient physical therapy on 23 with referral from MD for right sided neck pain and burning that is intermittent in nature but improving over the last month  Did report jaw pain yesterday but only yesterday  Upon evaluation, Meryle Nares demonstrates impaired CS R rot, tone increase R SCM and decreased tone R masseter  ADDWR noted but pain free  The listed impairments and functional limitation are effecting Meryle Nares ability to function at prior level  They can continue to benefit from physical therapy services at this times in order to address the above discussed impairments and functional limitation in order to allow for a return to premorbid status      HEP: biting exercise on R, CS snag to R rot    Impairments: abnormal muscle firing, abnormal muscle tone, abnormal or restricted ROM, abnormal movement, activity intolerance, impaired physical strength and pain with function  Understanding of Dx/Px/POC: good   Prognosis: good    Plan  Patient would benefit from: skilled PT  Planned modality interventions: thermotherapy: hydrocollator packs  Planned therapy interventions: joint mobilization, manual therapy, ADL training, balance, balance/weight bearing training, neuromuscular re-education, home exercise program, therapeutic exercise, therapeutic activities, strengthening, patient education, functional ROM exercises and gait training  Frequency: 2x week  Duration in weeks: 12  Treatment plan discussed with: patient        Subjective Evaluation    History of Present Illness  Mechanism of injury: Jaclyn Espinoza is a 69 y o  male presenting to physical therapy on 23 with referral from MD for     Pain began in 2020 with right sided lateral neck pain of insidious onset that became a burning sensation after a day or so  Pain is random and intermittent, mostly lateal neck below the jaw line  At this time pain is located on the right side of his jaw  He denies any pain with chewing, denies popping or clicking  Denies any hearing deficits of new onset  Denies pain with neck movement  Sometimes will fell a burning on the right side of neck with swallowing but not always  CT was negative for pathology    Since onset, pain in neck and burning are better  Pain  Current pain ratin  At worst pain rating: 3  Location: Right side neck and jaw      Diagnostic Tests  CT scan: normal  Treatments  No previous or current treatments  Patient Goals  Patient goals for therapy: decreased pain      Short Term Goals:   1  Patient will be Independent with hep  2  Patient will improve pain with activity by 50%  3  Patient will have open/close of jaw without click  4  Patient will have normalized tone R masseter and SCM without TTP      Long Term Goals:   1  Patient will improve FOTO to greater then goal  2  Patient will improve pain with activity to 0/10 or less  3  Patient will continue with HEP independence to allow for decreased future reoccurrence of pain and loss in function  4  Patient will report resolved burning in neck, neck pain and jaw pain over a 4 week span        Objective     Posture: forward head, extended at OA  Dermatome: (pinprick- L/R):  Normal face and UE                 Palpation: increased tone R SCM, decreased tone R masseter vs L, Daniela lateral pterygoid      Cervical  % of normal   Flex  76   Extn   50   SB Left -   SB Right -   ROT Left 75   ROT Right 50 Jaw openin mm - opening click resolved post masseter isometrics)         Segmental mobility:   OAJ=  normal   AAJ= NT     Mid CS=  normal                 Precautions: standard, gilberts syndrome       Manuals                                                                 Neuro Re-Ed                                                                                                        Ther Ex                                                                                                                     Ther Activity                                       Gait Training                                       Modalities

## 2023-01-11 ENCOUNTER — OFFICE VISIT (OUTPATIENT)
Dept: PHYSICAL THERAPY | Facility: REHABILITATION | Age: 68
End: 2023-01-11

## 2023-01-11 DIAGNOSIS — M54.2 NECK PAIN: Primary | ICD-10-CM

## 2023-01-11 DIAGNOSIS — M54.2 CERVICALGIA: ICD-10-CM

## 2023-01-11 DIAGNOSIS — M79.18 MYOFASCIAL PAIN: ICD-10-CM

## 2023-01-11 NOTE — PROGRESS NOTES
Daily Note     Today's date: 2023  Patient name: Katrina Zhu  : 1955  MRN: [id number]  Referring provider: FATUMA Hooks  Dx:   Encounter Diagnosis     ICD-10-CM    1  Neck pain  M54 2       2  Cervicalgia  M54 2       3  Myofascial pain  M79 18           Start Time: 0845  Stop Time: 0915  Total time in clinic (min): 30 minutes    Subjective: Patient reports doing well overall, exercises going well  States that only time he had some neck tingle was when in prolonged neck position of R rot and flexion as when looking at phone and or TV  Objective: See treatment diary below  TTP R SCM  Improved biting on R (masseter tone)    Assessment: Tolerated treatment well  Discussed making adjustments to positioning throughout the day as I feel this playing a major role in his symptoms  Plan: Continue per plan of care        Precautions: standard, gilberts syndrome       Manuals             assessment 10'            STM R SCM 5'                                      Neuro Re-Ed             educationt time 5'                                                                                          Ther Ex             Biting R side 5"x10            R snag 6x                                                                                          Ther Activity                                       Gait Training                                       Modalities

## 2023-01-16 ENCOUNTER — OFFICE VISIT (OUTPATIENT)
Dept: PHYSICAL THERAPY | Facility: REHABILITATION | Age: 68
End: 2023-01-16

## 2023-01-16 DIAGNOSIS — M54.2 CERVICALGIA: Primary | ICD-10-CM

## 2023-01-16 DIAGNOSIS — M79.18 MYOFASCIAL PAIN: ICD-10-CM

## 2023-01-16 DIAGNOSIS — M54.2 NECK PAIN: ICD-10-CM

## 2023-01-16 NOTE — PROGRESS NOTES
Daily Note     Today's date: 2023  Patient name: Smita Rapp  : 1955  MRN: [id number]  Referring provider: FATUMA Crawford  Dx:   Encounter Diagnosis     ICD-10-CM    1  Cervicalgia  M54 2       2  Neck pain  M54 2       3  Myofascial pain  M79 18           Start Time: 1410  Stop Time: 1435  Total time in clinic (min): 25 minutes    Subjective: Patient reports that he continues to feel intermittent burning at random times in her neck along right side anteriorly  Felt in walking today but was short lived  Objective: See treatment diary below  Improved tone R SCM  TTP R lateral pterygoid  Weakness DNF    Assessment: Tolerated treatment well  Added DNF training to HEP as initial testing did reproduce a similar pain that he gets (resolved by 3rd rep)  Will continue with assessment as needed, will assess lateral pterygoid NV and response to today as well as updated HEP  Plan: Continue per plan of care        Precautions: standard, gilberts syndrome       Manuals            assessment 10' 10'           STM R SCM 5' assessed           STM R lat pteryogid  3 rd 30"                        Neuro Re-Ed             educationt time 5'                                                                                          Ther Ex             Biting R side 5"x10            R snag 6x            Supine DNF  7"x5  2 sets                                                                            Ther Activity                                       Gait Training                                       Modalities

## 2023-01-18 ENCOUNTER — OFFICE VISIT (OUTPATIENT)
Dept: PHYSICAL THERAPY | Facility: REHABILITATION | Age: 68
End: 2023-01-18

## 2023-01-18 DIAGNOSIS — M54.2 CERVICALGIA: Primary | ICD-10-CM

## 2023-01-18 DIAGNOSIS — M54.2 NECK PAIN: ICD-10-CM

## 2023-01-18 DIAGNOSIS — M79.18 MYOFASCIAL PAIN: ICD-10-CM

## 2023-01-18 NOTE — PROGRESS NOTES
Daily Note     Today's date: 2023  Patient name: Elo Beltran  : 1955  MRN: [id number]  Referring provider: FATUMA Castro  Dx:   Encounter Diagnosis     ICD-10-CM    1  Cervicalgia  M54 2       2  Neck pain  M54 2       3  Myofascial pain  M79 18           Start Time: 0820  Stop Time: 0845  Total time in clinic (min): 25 minutes    Subjective: Patient reports that he had felt better yesterday with less frequency and intensity of burning when it did come on  Objective: See treatment diary below      Assessment: Tolerated treatment well  Patient with normalized tone to R SCM without TTP  Mild limitation in R CS rot that was improved post SNAGs  Tolerated STM to lateral pterygoid well  Educated on continued HEP with SNAGs, DNF and bitting exercises as well as being conscious of his prolonged neck postures  Plan: Continue per plan of care        Precautions: standard, gilberts syndrome       Manuals           assessment 10' 10' 5'          STM R SCM 5' assessed           STM R lat pteryogid  3 rd 30" 3 rd 30"                       Neuro Re-Ed            educationt time 5'                                                                                          Ther Ex            Biting R side 5"x10  HEP/review          R snag 6x  2x6          Supine DNF  7"x5  2 sets 5"x10                                                                           Ther Activity                                       Gait Training                                       Modalities

## 2023-01-23 ENCOUNTER — OFFICE VISIT (OUTPATIENT)
Dept: PHYSICAL THERAPY | Facility: REHABILITATION | Age: 68
End: 2023-01-23

## 2023-01-23 DIAGNOSIS — M54.2 NECK PAIN: Primary | ICD-10-CM

## 2023-01-23 DIAGNOSIS — M79.18 MYOFASCIAL PAIN: ICD-10-CM

## 2023-01-23 DIAGNOSIS — M54.2 CERVICALGIA: ICD-10-CM

## 2023-01-23 NOTE — PROGRESS NOTES
Daily Note     Today's date: 2023  Patient name: Claude Hoots  : 1955  MRN: [id number]  Referring provider: FATUMA Kay  Dx:   Encounter Diagnosis     ICD-10-CM    1  Neck pain  M54 2       2  Myofascial pain  M79 18       3  Cervicalgia  M54 2           Start Time: 2996  Stop Time:   Total time in clinic (min): 35 minutes    Subjective: Patient continues to report intermittent pain at back of tongue and in mouth  Pain will be very short at times, less 2-3 seconds  Other time maybe a minute but goes away  Symptoms will occur anywhere from 5-10 x/ day  Objective: See treatment diary below  No difference L/R with palpation of lateral pterygoid  Normal opening  Decreased R masseter activation     Assessment: Tolerated treatment well  With improved masseter activation when using 2 tongue depressors vs 3  Will assess patient response NV  May hold/DC PT if not improvements are made in the next 1-2 sessions as I am questioning if this is orthopedic in nature  Plan: Continue per plan of care        Precautions: standard, gilberts syndrome       Manuals          assessment 10' 10' 5' 5'         STM R SCM 5' assessed  5'         STM R lat pteryogid  3 rd 30" 3 rd 27" assessed                      Neuro Re-Ed           educationt time 5'   5'                                                                                       Ther Ex           Biting R side 5"x10  HEP/review 10"x10         R snag 6x  2x6          Supine DNF  7"x5  2 sets 5"x10 10"x10                                                                          Ther Activity                                       Gait Training                                       Modalities

## 2023-01-25 ENCOUNTER — OFFICE VISIT (OUTPATIENT)
Dept: PHYSICAL THERAPY | Facility: REHABILITATION | Age: 68
End: 2023-01-25

## 2023-01-25 DIAGNOSIS — M79.18 MYOFASCIAL PAIN: ICD-10-CM

## 2023-01-25 DIAGNOSIS — M54.2 NECK PAIN: Primary | ICD-10-CM

## 2023-01-25 DIAGNOSIS — M54.2 CERVICALGIA: ICD-10-CM

## 2023-02-01 ENCOUNTER — LAB (OUTPATIENT)
Dept: LAB | Facility: CLINIC | Age: 68
End: 2023-02-01

## 2023-02-01 DIAGNOSIS — I10 ESSENTIAL HYPERTENSION: ICD-10-CM

## 2023-02-01 DIAGNOSIS — E66.01 OBESITY, MORBID (HCC): ICD-10-CM

## 2023-02-01 LAB
ALBUMIN SERPL BCP-MCNC: 3.8 G/DL (ref 3.5–5)
ALP SERPL-CCNC: 66 U/L (ref 46–116)
ALT SERPL W P-5'-P-CCNC: 21 U/L (ref 12–78)
ANION GAP SERPL CALCULATED.3IONS-SCNC: 6 MMOL/L (ref 4–13)
AST SERPL W P-5'-P-CCNC: 18 U/L (ref 5–45)
BILIRUB SERPL-MCNC: 2.46 MG/DL (ref 0.2–1)
BUN SERPL-MCNC: 14 MG/DL (ref 5–25)
CALCIUM SERPL-MCNC: 9.1 MG/DL (ref 8.3–10.1)
CHLORIDE SERPL-SCNC: 107 MMOL/L (ref 96–108)
CHOLEST SERPL-MCNC: 172 MG/DL
CO2 SERPL-SCNC: 27 MMOL/L (ref 21–32)
CREAT SERPL-MCNC: 0.78 MG/DL (ref 0.6–1.3)
EST. AVERAGE GLUCOSE BLD GHB EST-MCNC: 94 MG/DL
GFR SERPL CREATININE-BSD FRML MDRD: 92 ML/MIN/1.73SQ M
GLUCOSE P FAST SERPL-MCNC: 104 MG/DL (ref 65–99)
HBA1C MFR BLD: 4.9 %
HDLC SERPL-MCNC: 36 MG/DL
LDLC SERPL CALC-MCNC: 115 MG/DL (ref 0–100)
NONHDLC SERPL-MCNC: 136 MG/DL
POTASSIUM SERPL-SCNC: 3.8 MMOL/L (ref 3.5–5.3)
PROT SERPL-MCNC: 7.2 G/DL (ref 6.4–8.4)
SODIUM SERPL-SCNC: 140 MMOL/L (ref 135–147)
TRIGL SERPL-MCNC: 104 MG/DL

## 2023-02-07 ENCOUNTER — OFFICE VISIT (OUTPATIENT)
Dept: FAMILY MEDICINE CLINIC | Facility: CLINIC | Age: 68
End: 2023-02-07

## 2023-02-07 VITALS
WEIGHT: 284.4 LBS | SYSTOLIC BLOOD PRESSURE: 136 MMHG | OXYGEN SATURATION: 100 % | TEMPERATURE: 98.1 F | BODY MASS INDEX: 42.12 KG/M2 | RESPIRATION RATE: 16 BRPM | DIASTOLIC BLOOD PRESSURE: 80 MMHG | HEIGHT: 69 IN | HEART RATE: 70 BPM

## 2023-02-07 DIAGNOSIS — E66.01 OBESITY, MORBID (HCC): ICD-10-CM

## 2023-02-07 DIAGNOSIS — I10 ESSENTIAL HYPERTENSION: ICD-10-CM

## 2023-02-07 DIAGNOSIS — E80.4 GILBERT'S SYNDROME: ICD-10-CM

## 2023-02-07 DIAGNOSIS — Z00.00 MEDICARE ANNUAL WELLNESS VISIT, SUBSEQUENT: Primary | ICD-10-CM

## 2023-02-07 NOTE — PATIENT INSTRUCTIONS
Medicare wellness exam is completed  Blood pressure okay without medication  Discussion of elevated bilirubin compatible with Gilbert's syndrome  Weight has been the same  Immunizations are up-to-date  Aside from bilirubin, blood work including blood sugar, kidney function, and cholesterol is excellent  Due for repeat colonoscopy in June of this year  Recheck in 1 year  Medicare Preventive Visit Patient Instructions  Thank you for completing your Welcome to Medicare Visit or Medicare Annual Wellness Visit today  Your next wellness visit will be due in one year (2/8/2024)  The screening/preventive services that you may require over the next 5-10 years are detailed below  Some tests may not apply to you based off risk factors and/or age  Screening tests ordered at today's visit but not completed yet may show as past due  Also, please note that scanned in results may not display below  Preventive Screenings:  Service Recommendations Previous Testing/Comments   Colorectal Cancer Screening  Colonoscopy    Fecal Occult Blood Test (FOBT)/Fecal Immunochemical Test (FIT)  Fecal DNA/Cologuard Test  Flexible Sigmoidoscopy Age: 39-70 years old   Colonoscopy: every 10 years (May be performed more frequently if at higher risk)  OR  FOBT/FIT: every 1 year  OR  Cologuard: every 3 years  OR  Sigmoidoscopy: every 5 years  Screening may be recommended earlier than age 39 if at higher risk for colorectal cancer  Also, an individualized decision between you and your healthcare provider will decide whether screening between the ages of 74-80 would be appropriate   Colonoscopy: 06/29/2020  FOBT/FIT: Not on file  Cologuard: Not on file  Sigmoidoscopy: Not on file    Screening Current     Prostate Cancer Screening Individualized decision between patient and health care provider in men between ages of 53-78   Medicare will cover every 12 months beginning on the day after your 50th birthday PSA: 0 6 ng/mL           Hepatitis C Screening Once for adults born between 1945 and 1965  More frequently in patients at high risk for Hepatitis C Hep C Antibody: 11/26/2019    Screening Current   Diabetes Screening 1-2 times per year if you're at risk for diabetes or have pre-diabetes Fasting glucose: 104 mg/dL (2/1/2023)  A1C: 4 9 % (2/1/2023)  Screening Current   Cholesterol Screening Once every 5 years if you don't have a lipid disorder  May order more often based on risk factors  Lipid panel: 02/01/2023  Screening Current      Other Preventive Screenings Covered by Medicare:  Abdominal Aortic Aneurysm (AAA) Screening: covered once if your at risk  You're considered to be at risk if you have a family history of AAA or a male between the age of 73-68 who smoking at least 100 cigarettes in your lifetime  Lung Cancer Screening: covers low dose CT scan once per year if you meet all of the following conditions: (1) Age 50-69; (2) No signs or symptoms of lung cancer; (3) Current smoker or have quit smoking within the last 15 years; (4) You have a tobacco smoking history of at least 20 pack years (packs per day x number of years you smoked); (5) You get a written order from a healthcare provider  Glaucoma Screening: covered annually if you're considered high risk: (1) You have diabetes OR (2) Family history of glaucoma OR (3)  aged 48 and older OR (3)  American aged 72 and older  Osteoporosis Screening: covered every 2 years if you meet one of the following conditions: (1) Have a vertebral abnormality; (2) On glucocorticoid therapy for more than 3 months; (3) Have primary hyperparathyroidism; (4) On osteoporosis medications and need to assess response to drug therapy  HIV Screening: covered annually if you're between the age of 12-76  Also covered annually if you are younger than 13 and older than 72 with risk factors for HIV infection   For pregnant patients, it is covered up to 3 times per pregnancy  Immunizations:  Immunization Recommendations   Influenza Vaccine Annual influenza vaccination during flu season is recommended for all persons aged >= 6 months who do not have contraindications   Pneumococcal Vaccine   * Pneumococcal conjugate vaccine = PCV13 (Prevnar 13), PCV15 (Vaxneuvance), PCV20 (Prevnar 20)  * Pneumococcal polysaccharide vaccine = PPSV23 (Pneumovax) Adults 25-60 years old: 1-3 doses may be recommended based on certain risk factors  Adults 72 years old: 1-2 doses may be recommended based off what pneumonia vaccine you previously received   Hepatitis B Vaccine 3 dose series if at intermediate or high risk (ex: diabetes, end stage renal disease, liver disease)   Tetanus (Td) Vaccine - COST NOT COVERED BY MEDICARE PART B Following completion of primary series, a booster dose should be given every 10 years to maintain immunity against tetanus  Td may also be given as tetanus wound prophylaxis  Tdap Vaccine - COST NOT COVERED BY MEDICARE PART B Recommended at least once for all adults  For pregnant patients, recommended with each pregnancy  Shingles Vaccine (Shingrix) - COST NOT COVERED BY MEDICARE PART B  2 shot series recommended in those aged 48 and above     Health Maintenance Due:      Topic Date Due    Colorectal Cancer Screening  06/29/2022    Hepatitis C Screening  Completed     Immunizations Due:      Topic Date Due    COVID-19 Vaccine (4 - Booster for Pfizer series) 12/25/2021    Pneumococcal Vaccine: 65+ Years (2 - PCV) 11/16/2022     Advance Directives   What are advance directives? Advance directives are legal documents that state your wishes and plans for medical care  These plans are made ahead of time in case you lose your ability to make decisions for yourself  Advance directives can apply to any medical decision, such as the treatments you want, and if you want to donate organs  What are the types of advance directives?   There are many types of advance directives, and each state has rules about how to use them  You may choose a combination of any of the following:  Living will: This is a written record of the treatment you want  You can also choose which treatments you do not want, which to limit, and which to stop at a certain time  This includes surgery, medicine, IV fluid, and tube feedings  Durable power of  for [facility]): This is a written record that states who you want to make healthcare choices for you when you are unable to make them for yourself  This person, called a proxy, is usually a family member or a friend  You may choose more than 1 proxy  Do not resuscitate (DNR) order:  A DNR order is used in case your heart stops beating or you stop breathing  It is a request not to have certain forms of treatment, such as CPR  A DNR order may be included in other types of advance directives  Medical directive: This covers the care that you want if you are in a coma, near death, or unable to make decisions for yourself  You can list the treatments you want for each condition  Treatment may include pain medicine, surgery, blood transfusions, dialysis, IV or tube feedings, and a ventilator (breathing machine)  Values history: This document has questions about your views, beliefs, and how you feel and think about life  This information can help others choose the care that you would choose  Why are advance directives important? An advance directive helps you control your care  Although spoken wishes may be used, it is better to have your wishes written down  Spoken wishes can be misunderstood, or not followed  Treatments may be given even if you do not want them  An advance directive may make it easier for your family to make difficult choices about your care     Weight Management   Why it is important to manage your weight:  Being overweight increases your risk of health conditions such as heart disease, high blood pressure, type 2 diabetes, and certain types of cancer  It can also increase your risk for osteoarthritis, sleep apnea, and other respiratory problems  Aim for a slow, steady weight loss  Even a small amount of weight loss can lower your risk of health problems  How to lose weight safely:  A safe and healthy way to lose weight is to eat fewer calories and get regular exercise  You can lose up about 1 pound a week by decreasing the number of calories you eat by 500 calories each day  Healthy meal plan for weight management:  A healthy meal plan includes a variety of foods, contains fewer calories, and helps you stay healthy  A healthy meal plan includes the following:  Eat whole-grain foods more often  A healthy meal plan should contain fiber  Fiber is the part of grains, fruits, and vegetables that is not broken down by your body  Whole-grain foods are healthy and provide extra fiber in your diet  Some examples of whole-grain foods are whole-wheat breads and pastas, oatmeal, brown rice, and bulgur  Eat a variety of vegetables every day  Include dark, leafy greens such as spinach, kale, mary greens, and mustard greens  Eat yellow and orange vegetables such as carrots, sweet potatoes, and winter squash  Eat a variety of fruits every day  Choose fresh or canned fruit (canned in its own juice or light syrup) instead of juice  Fruit juice has very little or no fiber  Eat low-fat dairy foods  Drink fat-free (skim) milk or 1% milk  Eat fat-free yogurt and low-fat cottage cheese  Try low-fat cheeses such as mozzarella and other reduced-fat cheeses  Choose meat and other protein foods that are low in fat  Choose beans or other legumes such as split peas or lentils  Choose fish, skinless poultry (chicken or turkey), or lean cuts of red meat (beef or pork)  Before you cook meat or poultry, cut off any visible fat  Use less fat and oil  Try baking foods instead of frying them   Add less fat, such as margarine, sour cream, regular salad dressing and mayonnaise to foods  Eat fewer high-fat foods  Some examples of high-fat foods include french fries, doughnuts, ice cream, and cakes  Eat fewer sweets  Limit foods and drinks that are high in sugar  This includes candy, cookies, regular soda, and sweetened drinks  Exercise:  Exercise at least 30 minutes per day on most days of the week  Some examples of exercise include walking, biking, dancing, and swimming  You can also fit in more physical activity by taking the stairs instead of the elevator or parking farther away from stores  Ask your healthcare provider about the best exercise plan for you  © Copyright [address] 2018 Information is for End User's use only and may not be sold, redistributed or otherwise used for commercial purposes   All illustrations and images included in CareNotes® are the copyrighted property of [employer]  or [address] [place] [address]

## 2023-02-07 NOTE — PROGRESS NOTES
Chief Complaint   Patient presents with   • Follow-up     6 month   • Medicare Wellness Visit   • [place] Colonoscopy     Pt Due        HPI   Here for follow-up of hypertension, arthritis, and obesity  Feeling well  Had a 69 birthday  Continues to walk on a regular basis  Weight is stable  Struggles with eating at nighttime  Review of intermittent elevated bili movements  Has a diagnosis of Gilbert syndrome  Had a liver ultrasound in the past which was okay  Also here for Medicare wellness exam   Blood pressure readings at home are okay  Was having a burning sensation on the right side of his neck  Had a CAT scan ordered by ENT which was unremarkable  Was sent to physical therapy  Discomfort is less although occurs intermittently  Past Medical History:   Diagnosis Date   • Arthritis    • Essential hypertension 12/16/2021   • Gilbert's syndrome 1/6/2020    Intermittent elevated bilirubin  Liver ultrasound  • Obesity    • Unilateral hearing loss, left 1/18/2022        Past Surgical History:   Procedure Laterality Date   • HAND SURGERY     • MA COLONOSCOPY FLX DX W/COLLJ SPEC WHEN PFRMD N/A 3/22/2018    Procedure: COLONOSCOPY;  Surgeon: Mickey Oneal MD;  [facility]; Service: Gastroenterology   • TONSILECTOMY AND ADNOIDECTOMY     • TOOTH EXTRACTION         Social History     Tobacco Use   • Smoking status: Never   • Smokeless tobacco: Never   Substance Use Topics   • Alcohol use: Yes     Comment: occasional       Social History     Social History Narrative      Single  Lives alone  Retired  Worked for the [employer] in 2000  Worked part-time jobs afterward  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 6 miles a day          The following portions of the patient's history were reviewed and updated as appropriate: allergies, current medications, past family history, past medical history, past social history, past surgical history and problem list       Review of Systems       /80 (BP Location: Left arm, Patient Position: Sitting, Cuff Size: Large)   Pulse 70   Temp 98 1 °F (36 7 °C) (Temporal)   Resp 16   Ht 5' 9" (1 753 m)   Wt 129 kg (284 lb 6 4 oz)   SpO2 100%   BMI 42 00 kg/m²      Physical Exam   Repeat blood pressure 140/84  Peers well  Weight stable  Lungs clear  Heart regular  Abdomen obese  Under  Tosin  Mood is okay  Bilirubin 2 46  Has been elevated in the past   Kidney function normal   Sugar normal   Lipid profile excellent  No current outpatient medications on file  No problem-specific Assessment & Plan notes found for this encounter  Diagnoses and all orders for this visit:    Medicare annual wellness visit, subsequent    Essential hypertension    Gilbert's syndrome    Obesity, morbid [facility])        Patient Instructions     Medicare wellness exam is completed  Blood pressure okay without medication  Discussion of elevated bilirubin compatible with Gilbert's syndrome  Weight has been the same  Immunizations are up-to-date  Aside from bilirubin, blood work including blood sugar, kidney function, and cholesterol is excellent  Due for repeat colonoscopy in June of this year  Recheck in 1 year  Medicare Preventive Visit Patient Instructions  Thank you for completing your Welcome to Medicare Visit or Medicare Annual Wellness Visit today  Your next wellness visit will be due in one year (2/8/2024)  The screening/preventive services that you may require over the next 5-10 years are detailed below  Some tests may not apply to you based off risk factors and/or age  Screening tests ordered at today's visit but not completed yet may show as past due  Also, please note that scanned in results may not display below    Preventive Screenings:  Service Recommendations Previous Testing/Comments   Colorectal Cancer Screening  · Colonoscopy    · Fecal Occult Blood Test (FOBT)/Fecal Immunochemical Test (FIT)  · Fecal DNA/Cologuard Test  · Flexible Sigmoidoscopy Age: 39-70 years old   Colonoscopy: every 10 years (May be performed more frequently if at higher risk)  OR  FOBT/FIT: every 1 year  OR  Cologuard: every 3 years  OR  Sigmoidoscopy: every 5 years  Screening may be recommended earlier than age 39 if at higher risk for colorectal cancer  Also, an individualized decision between you and your healthcare provider will decide whether screening between the ages of 74-80 would be appropriate  Colonoscopy: 06/29/2020  FOBT/FIT: Not on file  Cologuard: Not on file  Sigmoidoscopy: Not on file    Screening Current     Prostate Cancer Screening Individualized decision between patient and health care provider in men between ages of 53-78   Medicare will cover every 12 months beginning on the day after your 50th birthday PSA: 0 6 ng/mL           Hepatitis C Screening Once for adults born between 1945 and 1965  More frequently in patients at high risk for Hepatitis C Hep C Antibody: 11/26/2019    Screening Current   Diabetes Screening 1-2 times per year if you're at risk for diabetes or have pre-diabetes Fasting glucose: 104 mg/dL (2/1/2023)  A1C: 4 9 % (2/1/2023)  Screening Current   Cholesterol Screening Once every 5 years if you don't have a lipid disorder  May order more often based on risk factors  Lipid panel: 02/01/2023  Screening Current      Other Preventive Screenings Covered by Medicare:  1  Abdominal Aortic Aneurysm (AAA) Screening: covered once if your at risk  You're considered to be at risk if you have a family history of AAA or a male between the age of 73-68 who smoking at least 100 cigarettes in your lifetime    2  Lung Cancer Screening: covers low dose CT scan once per year if you meet all of the following conditions: (1) Age 50-69; (2) No signs or symptoms of lung cancer; (3) Current smoker or have quit smoking within the last 15 years; (4) You have a tobacco smoking history of at least 20 pack years (packs per day x number of years you smoked); (5) You get a written order from a healthcare provider  3  Glaucoma Screening: covered annually if you're considered high risk: (1) You have diabetes OR (2) Family history of glaucoma OR (3)  aged 48 and older OR (3)  American aged 72 and older  3  Osteoporosis Screening: covered every 2 years if you meet one of the following conditions: (1) Have a vertebral abnormality; (2) On glucocorticoid therapy for more than 3 months; (3) Have primary hyperparathyroidism; (4) On osteoporosis medications and need to assess response to drug therapy  5  HIV Screening: covered annually if you're between the age of 12-76  Also covered annually if you are younger than 13 and older than 72 with risk factors for HIV infection  For pregnant patients, it is covered up to 3 times per pregnancy  Immunizations:  Immunization Recommendations   Influenza Vaccine Annual influenza vaccination during flu season is recommended for all persons aged >= 6 months who do not have contraindications   Pneumococcal Vaccine   * Pneumococcal conjugate vaccine = PCV13 (Prevnar 13), PCV15 (Vaxneuvance), PCV20 (Prevnar 20)  * Pneumococcal polysaccharide vaccine = PPSV23 (Pneumovax) Adults 25-60 years old: 1-3 doses may be recommended based on certain risk factors  Adults 72 years old: 1-2 doses may be recommended based off what pneumonia vaccine you previously received   Hepatitis B Vaccine 3 dose series if at intermediate or high risk (ex: diabetes, end stage renal disease, liver disease)   Tetanus (Td) Vaccine - COST NOT COVERED BY MEDICARE PART B Following completion of primary series, a booster dose should be given every 10 years to maintain immunity against tetanus  Td may also be given as tetanus wound prophylaxis  Tdap Vaccine - COST NOT COVERED BY MEDICARE PART B Recommended at least once for all adults  For pregnant patients, recommended with each pregnancy     Shingles Vaccine (Shingrix) - COST NOT COVERED BY MEDICARE PART B  2 shot series recommended in those aged 48 and above     Health Maintenance Due:      Topic Date Due   • Colorectal Cancer Screening  06/29/2022   • Hepatitis C Screening  Completed     Immunizations Due:      Topic Date Due   • COVID-19 Vaccine (4 - Booster for Pfizer series) 12/25/2021   • Pneumococcal Vaccine: 65+ Years (2 - PCV) 11/16/2022     Advance Directives   What are advance directives? Advance directives are legal documents that state your wishes and plans for medical care  These plans are made ahead of time in case you lose your ability to make decisions for yourself  Advance directives can apply to any medical decision, such as the treatments you want, and if you want to donate organs  What are the types of advance directives? There are many types of advance directives, and each state has rules about how to use them  You may choose a combination of any of the following:  · Living will: This is a written record of the treatment you want  You can also choose which treatments you do not want, which to limit, and which to stop at a certain time  This includes surgery, medicine, IV fluid, and tube feedings  · Durable power of  for healthcare [facility]): This is a written record that states who you want to make healthcare choices for you when you are unable to make them for yourself  This person, called a proxy, is usually a family member or a friend  You may choose more than 1 proxy  · Do not resuscitate (DNR) order:  A DNR order is used in case your heart stops beating or you stop breathing  It is a request not to have certain forms of treatment, such as CPR  A DNR order may be included in other types of advance directives  · Medical directive: This covers the care that you want if you are in a coma, near death, or unable to make decisions for yourself  You can list the treatments you want for each condition   Treatment may include pain medicine, surgery, blood transfusions, dialysis, IV or tube feedings, and a ventilator (breathing machine)  · Values history: This document has questions about your views, beliefs, and how you feel and think about life  This information can help others choose the care that you would choose  Why are advance directives important? An advance directive helps you control your care  Although spoken wishes may be used, it is better to have your wishes written down  Spoken wishes can be misunderstood, or not followed  Treatments may be given even if you do not want them  An advance directive may make it easier for your family to make difficult choices about your care  Weight Management   Why it is important to manage your weight:  Being overweight increases your risk of health conditions such as heart disease, high blood pressure, type 2 diabetes, and certain types of cancer  It can also increase your risk for osteoarthritis, sleep apnea, and other respiratory problems  Aim for a slow, steady weight loss  Even a small amount of weight loss can lower your risk of health problems  How to lose weight safely:  A safe and healthy way to lose weight is to eat fewer calories and get regular exercise  You can lose up about 1 pound a week by decreasing the number of calories you eat by 500 calories each day  Healthy meal plan for weight management:  A healthy meal plan includes a variety of foods, contains fewer calories, and helps you stay healthy  A healthy meal plan includes the following:  · Eat whole-grain foods more often  A healthy meal plan should contain fiber  Fiber is the part of grains, fruits, and vegetables that is not broken down by your body  Whole-grain foods are healthy and provide extra fiber in your diet  Some examples of whole-grain foods are whole-wheat breads and pastas, oatmeal, brown rice, and bulgur  · Eat a variety of vegetables every day    Include dark, leafy greens such as spinach, kale, mary greens, and mustard greens  Eat yellow and orange vegetables such as carrots, sweet potatoes, and winter squash  · Eat a variety of fruits every day  Choose fresh or canned fruit (canned in its own juice or light syrup) instead of juice  Fruit juice has very little or no fiber  · Eat low-fat dairy foods  Drink fat-free (skim) milk or 1% milk  Eat fat-free yogurt and low-fat cottage cheese  Try low-fat cheeses such as mozzarella and other reduced-fat cheeses  · Choose meat and other protein foods that are low in fat  Choose beans or other legumes such as split peas or lentils  Choose fish, skinless poultry (chicken or turkey), or lean cuts of red meat (beef or pork)  Before you cook meat or poultry, cut off any visible fat  · Use less fat and oil  Try baking foods instead of frying them  Add less fat, such as margarine, sour cream, regular salad dressing and mayonnaise to foods  Eat fewer high-fat foods  Some examples of high-fat foods include french fries, doughnuts, ice cream, and cakes  · Eat fewer sweets  Limit foods and drinks that are high in sugar  This includes candy, cookies, regular soda, and sweetened drinks  Exercise:  Exercise at least 30 minutes per day on most days of the week  Some examples of exercise include walking, biking, dancing, and swimming  You can also fit in more physical activity by taking the stairs instead of the elevator or parking farther away from stores  Ask your healthcare provider about the best exercise plan for you  © Copyright [employer] 2018 Information is for End User's use only and may not be sold, redistributed or otherwise used for commercial purposes   All illustrations and images included in CareNotes® are the copyrighted property of A D A M , Inc  or [address] [place] [address] [employer]

## 2023-02-07 NOTE — PROGRESS NOTES
Assessment and Plan:     Problem List Items Addressed This Visit    None  Visit Diagnoses     Medicare annual wellness visit, subsequent    -  Primary           Preventive health issues were discussed with patient, and age appropriate screening tests were ordered as noted in patient's After Visit Summary  Personalized health advice and appropriate referrals for health education or preventive services given if needed, as noted in patient's After Visit Summary  History of Present Illness:     Patient presents for a Medicare Wellness Visit    HPI   Patient Care Team:  Emilia Ramos MD as PCP - General (Family Medicine)  Luli Couch MD as PCP - [address] [place] [address] (RTE)  Cindy Jiménez MD as PCP - [facility] ([facility])  MD Dwayne Barton MD Burnis Camel, MD as Endoscopist     Review of Systems:     Review of Systems     Problem List:     Patient Active Problem List   Diagnosis   • History of adenomatous polyp of colon   • Gilbert's syndrome   • Fatty liver   • Obesity, morbid [facility])   • Essential hypertension   • Unilateral hearing loss, left      Past Medical and Surgical History:     Past Medical History:   Diagnosis Date   • Arthritis    • Essential hypertension 12/16/2021   • Obesity    • Unilateral hearing loss, left 1/18/2022     Past Surgical History:   Procedure Laterality Date   • HAND SURGERY     • [place] COLONOSCOPY FLX DX W/COLLJ SPEC WHEN PFRMD N/A 3/22/2018    Procedure: COLONOSCOPY;  Surgeon: Brigid Keen MD;  [facility];   Service: Gastroenterology   • TONSILECTOMY AND ADNOIDECTOMY     • TOOTH EXTRACTION        Family History:     Family History   Problem Relation Age of Onset   • Breast cancer Mother    • Heart disease Father         MI in his 52's      Social History:     Social History     Socioeconomic History   • Marital status: Single     Spouse name: None   • Number of children: None   • Years of education: None   • Highest education level: None Occupational History   • Occupation: retired   Tobacco Use   • Smoking status: Never   • Smokeless tobacco: Never   Vaping Use   • Vaping Use: Never used   Substance and Sexual Activity   • Alcohol use: Yes     Comment: occasional   • Drug use: No   • Sexual activity: Not Currently   Other Topics Concern   • None   Social History Narrative      Single  Lives alone  Retired  Worked for the [employer] retiring in 2000  Worked part-time jobs afterward  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 6 miles a day  Social Determinants of Health     Financial Resource Strain: Not on file   Food Insecurity: Not on file   Transportation Needs: Not on file   Physical Activity: Not on file   Stress: Not on file   Social Connections: Not on file   Intimate Partner Violence: Not on file   Housing Stability: Not on file      Medications and Allergies:     No current outpatient medications on file  No current facility-administered medications for this visit  Allergies   Allergen Reactions   • Cefadroxil      Action Taken: fever;    • Amlodipine Other (See Comments)      Brain fog      Immunizations:     Immunization History   Administered Date(s) Administered   • COVID-19 PFIZER VACCINE 0 3 ML IM 02/17/2021, 03/08/2021, 10/30/2021   • INFLUENZA 10/19/2022   • Pneumococcal Polysaccharide PPV23 11/16/2021      Health Maintenance:         Topic Date Due   • Colorectal Cancer Screening  06/29/2022   • Hepatitis C Screening  Completed         Topic Date Due   • COVID-19 Vaccine (4 - Booster for Her Peter series) 12/25/2021   • Pneumococcal Vaccine: 65+ Years (2 - PCV) 11/16/2022      Medicare Screening Tests and Risk Assessments:     Eugene Salmeron is here for his Subsequent Wellness visit  Last Medicare Wellness visit information reviewed, patient interviewed and updates made to the record today  Health Risk Assessment:   Patient rates overall health as good   Patient feels that their physical health rating is same  Patient is satisfied with their life  Eyesight was rated as same  Hearing was rated as same  Patient feels that their emotional and mental health rating is same  Patients states they are never, rarely angry  Patient states they are never, rarely unusually tired/fatigued  Pain experienced in the last 7 days has been none  Patient states that he has experienced no weight loss or gain in last 6 months  Fall Risk Screening: In the past year, patient has experienced: no history of falling in past year      Home Safety:  Patient does not have trouble with stairs inside or outside of their home  Patient has working smoke alarms and has working carbon monoxide detector  Home safety hazards include: none  Nutrition:   Current diet is Regular  Medications:   Patient is not currently taking any over-the-counter supplements  Patient is able to manage medications  Activities of Daily Living (ADLs)/Instrumental Activities of Daily Living (IADLs):   Walk and transfer into and out of bed and chair?: Yes  Dress and groom yourself?: Yes    Bathe or shower yourself?: Yes    Feed yourself? Yes  Do your laundry/housekeeping?: Yes  Manage your money, pay your bills and track your expenses?: Yes  Make your own meals?: Yes    Do your own shopping?: Yes    Previous Hospitalizations:   Any hospitalizations or ED visits within the last 12 months?: No      Advance Care Planning:   Living will: Yes    Durable POA for healthcare:  Yes    Advanced directive: Yes      PREVENTIVE SCREENINGS      Cardiovascular Screening:    General: Screening Current      Diabetes Screening:     General: Screening Current      Colorectal Cancer Screening:     General: Screening Current      Abdominal Aortic Aneurysm (AAA) Screening:    Risk factors include: age between 73-67 yo        Lung Cancer Screening:     General: Screening Not Indicated      Hepatitis C Screening:    General: Screening Current    Screening, Brief Intervention, and Referral to Treatment (SBIRT)    Screening    Typical number of drinks in a week: 0    Single Item Drug Screening:  How often have you used an illegal drug (including marijuana) or a prescription medication for non-medical reasons in the past year? never    Single Item Drug Screen Score: 0  Interpretation: Negative screen for possible drug use disorder    No results found       Physical Exam:     Pulse 70   Temp 98 1 °F (36 7 °C) (Temporal)   Resp 16   Ht 5' 9" (1 753 m)   Wt 129 kg (284 lb 6 4 oz)   SpO2 100%   BMI 42 00 kg/m²     Physical Exam     Author MD Ernestine

## 2023-04-23 ENCOUNTER — HOSPITAL ENCOUNTER (EMERGENCY)
Facility: HOSPITAL | Age: 68
Discharge: HOME | End: 2023-04-23
Attending: EMERGENCY MEDICINE

## 2023-04-23 ENCOUNTER — APPOINTMENT (EMERGENCY)
Dept: RADIOLOGY | Facility: HOSPITAL | Age: 68
End: 2023-04-23

## 2023-04-23 ENCOUNTER — APPOINTMENT (EMERGENCY)
Dept: CT IMAGING | Facility: HOSPITAL | Age: 68
End: 2023-04-23

## 2023-04-23 VITALS
RESPIRATION RATE: 16 BRPM | OXYGEN SATURATION: 98 % | WEIGHT: 268.96 LBS | TEMPERATURE: 98.7 F | SYSTOLIC BLOOD PRESSURE: 154 MMHG | HEART RATE: 78 BPM | DIASTOLIC BLOOD PRESSURE: 73 MMHG | BODY MASS INDEX: 39.72 KG/M2

## 2023-04-23 DIAGNOSIS — M85.00 FIBROUS DYSPLASIA (MONOSTOTIC), UNSPECIFIED SITE: Primary | ICD-10-CM

## 2023-04-23 DIAGNOSIS — M54.16 RIGHT LUMBAR RADICULITIS: ICD-10-CM

## 2023-04-23 DIAGNOSIS — M54.16 LUMBAR RADICULOPATHY, ACUTE: ICD-10-CM

## 2023-04-23 RX ORDER — ACETAMINOPHEN 325 MG/1
650 TABLET ORAL ONCE
Status: COMPLETED | OUTPATIENT
Start: 2023-04-23 | End: 2023-04-23

## 2023-04-23 RX ORDER — OXYCODONE HYDROCHLORIDE AND ACETAMINOPHEN 5; 325 MG/1; MG/1
1 TABLET ORAL EVERY 6 HOURS PRN
Qty: 12 TABLET | Refills: 0 | Status: SHIPPED | OUTPATIENT
Start: 2023-04-23 | End: 2023-05-05

## 2023-04-23 RX ORDER — NAPROXEN 375 MG/1
375 TABLET ORAL 2 TIMES DAILY WITH MEALS
Qty: 14 TABLET | Refills: 0 | Status: SHIPPED | OUTPATIENT
Start: 2023-04-23 | End: 2023-05-07

## 2023-04-23 RX ORDER — KETOROLAC TROMETHAMINE 30 MG/ML
15 INJECTION, SOLUTION INTRAMUSCULAR; INTRAVENOUS ONCE
Status: COMPLETED | OUTPATIENT
Start: 2023-04-23 | End: 2023-04-23

## 2023-04-23 RX ADMIN — KETOROLAC TROMETHAMINE 15 MG: 30 INJECTION, SOLUTION INTRAMUSCULAR; INTRAVENOUS at 09:52

## 2023-04-23 RX ADMIN — ACETAMINOPHEN 650 MG: 325 TABLET ORAL at 09:51

## 2023-04-23 NOTE — ED PROVIDER NOTES
History  Chief Complaint   Patient presents with   • Hip Pain     Pt reports ongoing R posterior hip pain radiating to upper thigh  Hx of same ususally goes away fast  Pt reports today d/t continual pain from last night  History provided by:  Patient  Back Pain  Location:  Lumbar spine  Quality:  Aching  Radiates to:  R thigh  Pain severity:  Moderate  Pain is: Worse during the day  Onset quality:  Gradual  Duration:  2 days  Timing:  Constant  Progression:  Waxing and waning  Chronicity:  New  Context: not emotional stress, not falling, not jumping from heights, not lifting heavy objects, not MCA, not MVA, not occupational injury, not pedestrian accident, not physical stress, not recent illness, not recent injury and not twisting    Relieved by:  Bed rest  Worsened by:  Bending, movement and twisting  Ineffective treatments:  None tried  Associated symptoms: leg pain    Associated symptoms: no abdominal pain, no abdominal swelling, no bladder incontinence, no bowel incontinence, no chest pain, no dysuria, no fever, no headaches, no numbness, no paresthesias, no pelvic pain, no perianal numbness, no tingling, no weakness and no weight loss    Risk factors: lack of exercise and obesity    Risk factors: no hx of cancer, no hx of osteoporosis, no recent surgery, no steroid use and no vascular disease        None       Past Medical History:   Diagnosis Date   • Arthritis    • Essential hypertension 12/16/2021   • Gilbert's syndrome 1/6/2020    Intermittent elevated bilirubin  Liver ultrasound  • Obesity    • Unilateral hearing loss, left 1/18/2022       Past Surgical History:   Procedure Laterality Date   • HAND SURGERY     • MN COLONOSCOPY FLX DX W/COLLJ SPEC WHEN PFRMD N/A 3/22/2018    Procedure: COLONOSCOPY;  Surgeon: Jaqueline Sow MD;  Location: [facility];   Service: Gastroenterology   • TONSILECTOMY AND ADNOIDECTOMY     • TOOTH EXTRACTION         Family History   Problem Relation Age of Onset   • Breast cancer Mother    • Heart disease Father         MI in his 52's     I have reviewed and agree with the history as documented  E-Cigarette/Vaping   • E-Cigarette Use Never User      E-Cigarette/Vaping Substances   • Nicotine No    • THC No    • CBD No    • Flavoring No    • Other No    • Unknown No      Social History     Tobacco Use   • Smoking status: Never   • Smokeless tobacco: Never   Vaping Use   • Vaping Use: Never used   Substance Use Topics   • Alcohol use: Yes     Comment: occasional   • Drug use: No       Review of Systems   Constitutional: Positive for activity change  Negative for appetite change, chills, fatigue, fever and weight loss  HENT: Negative for congestion, ear discharge, ear pain, postnasal drip, rhinorrhea and sore throat  Respiratory: Negative for cough, chest tightness, shortness of breath and wheezing  Cardiovascular: Negative for chest pain and palpitations  Gastrointestinal: Negative for abdominal pain and bowel incontinence  Genitourinary: Negative for bladder incontinence, dysuria, flank pain, frequency, hematuria, pelvic pain and urgency  Musculoskeletal: Positive for back pain  Skin: Negative for color change, pallor and rash  Neurological: Negative for tingling, weakness, numbness, headaches and paresthesias  Psychiatric/Behavioral: Negative for confusion  All other systems reviewed and are negative  Physical Exam  Physical Exam  Vitals and nursing note reviewed  Constitutional:       General: He is not in acute distress  Appearance: Normal appearance  He is obese  He is not ill-appearing, toxic-appearing or diaphoretic  HENT:      Head: Normocephalic and atraumatic  Right Ear: External ear normal       Left Ear: External ear normal    Eyes:      General:         Right eye: No discharge  Left eye: No discharge  Conjunctiva/sclera: Conjunctivae normal    Cardiovascular:      Rate and Rhythm: Normal rate and regular rhythm  Heart sounds: Normal heart sounds  Pulmonary:      Effort: Pulmonary effort is normal       Breath sounds: Normal breath sounds  Abdominal:      General: There is no distension  Tenderness: There is no abdominal tenderness  There is no guarding or rebound  Musculoskeletal:      Cervical back: Neck supple  Comments: Examination lumbar spine-it is atraumatic upon inspection  There is decreased range of motion with forward flexion and lateral flexion secondary to pain and spasm  Patient has a positive Trendelenburg sign  He has had range of motion of his right hip  He does lack a internal rotation most likely secondary to arthritis and age  He has no tenderness with range of motion  His legs are aligned  Reflexes are +1 and symmetrical   There is no weakness of the extensor houses longus bilaterally  Patient has an antalgic gait  Skin:     General: Skin is warm  Capillary Refill: Capillary refill takes less than 2 seconds  Neurological:      General: No focal deficit present  Mental Status: He is alert and oriented to person, place, and time  Gait: Gait abnormal    Psychiatric:         Mood and Affect: Mood normal          Behavior: Behavior normal          Thought Content:  Thought content normal          Judgment: Judgment normal          Vital Signs  ED Triage Vitals [04/23/23 0846]   Temperature Pulse Respirations Blood Pressure SpO2   98 7 °F (37 1 °C) 86 16 (!) 176/81 97 %      Temp Source Heart Rate Source Patient Position - Orthostatic VS BP Location FiO2 (%)   Oral Monitor Lying Right arm --      Pain Score       7           Vitals:    04/23/23 0846   BP: (!) 176/81   Pulse: 86   Patient Position - Orthostatic VS: Lying         Visual Acuity      ED Medications  Medications - No data to display    Diagnostic Studies  Results Reviewed     None                 No orders to display              Procedures  Procedures         ED Course Medical Decision Making  Patient presents emergency room with an onset of right buttocks pain  He states that the pain radiates from his buttocks to his medial lateral right thigh  He denies any numbness, tingling, weakness  He does have pain with ambulation and range of motion of his back  He denies any specific low back pain  He denies any abdominal pain  He denies any bowel or bladder incontinence  He denies any saddle anesthesia  He denies any injuries  He does have a history of previous back pain in the past   He has not had any problems recently up until now  He denies any injury  He denies any recent illness  He denies any fever or chills  He is not diabetic  Upon reviewing his charts  His GFR is 92  Physical exam-this 67-year-old male is alert and oriented x3  He is in no acute distress  Inspection of his lumbar spine-she does have a positive Trendelenburg sign  He has paravertebral spasm palpated over the right  He has decreased forward flexion and  right lateral flexion  Reflexes are +1 and symmetrical   There is no weakness of the extensor houses longus  Patient does have an antalgic gait  Please see the full history and physical under physical exam     Hospital course includes a medicating the patient with IM Toradol 15 mg  He will also receive 650 mg of Tylenol  A CAT scan of the lumbar spine was ordered for a right L3 4 radiculopathy  X-rays of the head are ordered as well  Impression is fibrous dysplasia-posterior left iliac spine  Lumbar radiculopathy  Lumbar radiculitis    Plan-  Patient was given a prescription for Naprosyn 375 mg to take twice daily with food for the next 7 days  He was also given a prescription for Percocet 1 pill every 6 hours as needed for pain  He was given a [facility]  He was also given a referral to physical therapy      Fibrous dysplasia (monostotic), unspecified site: acute illness or injury  Lumbar radiculopathy, acute: acute illness or injury  Right lumbar radiculitis: acute illness or injury  Amount and/or Complexity of Data Reviewed  Radiology: ordered  Risk  OTC drugs  Prescription drug management  Disposition  Final diagnoses:   None     ED Disposition     None      Follow-up Information    None         Patient's Medications    No medications on file       No discharge procedures on file      PDMP Review       Value Time User    PDMP Reviewed  Yes 4/21/2021  6:23 AM Daily Parekh MD          ED Provider  Electronically Signed by           Emiliano Piper PA-C  04/23/23 5306

## 2023-04-25 ENCOUNTER — TELEPHONE (OUTPATIENT)
Dept: PHYSICAL THERAPY | Facility: OTHER | Age: 68
End: 2023-04-25

## 2023-04-25 NOTE — TELEPHONE ENCOUNTER
Call placed to the patient per [facility] referral     Spoke with patient, explained [facility] and reason for the call  Patient has a DIRECT PT  REFERRAL and he is already scheduled at [place] [id number] tomorrow 04/26/23 for an evaluation  Will close [facility] referral per protocol   (duplicate)

## 2023-04-26 ENCOUNTER — EVALUATION (OUTPATIENT)
Dept: PHYSICAL THERAPY | Facility: REHABILITATION | Age: 68
End: 2023-04-26

## 2023-04-26 DIAGNOSIS — M54.16 LUMBAR RADICULOPATHY, ACUTE: ICD-10-CM

## 2023-04-26 DIAGNOSIS — M54.16 RIGHT LUMBAR RADICULITIS: ICD-10-CM

## 2023-04-26 NOTE — PROGRESS NOTES
PT Evaluation     Today's date: 2023  Patient name: Jillian Chandler  : 1955  MRN: [id number]  Referring provider: Saira Burgos,*  Dx:   Encounter Diagnosis     ICD-10-CM    1  Right lumbar radiculitis  M54 16 Ambulatory Referral to Physical Therapy      2  Lumbar radiculopathy, acute  M54 16 Ambulatory Referral to Physical Therapy    Right L 3-4          Start Time: 845  Stop Time: 930  Total time in clinic (min): 45 minutes    Assessment  Assessment details: Jillian Chandler is a 69 y o  male presenting to outpatient physical therapy on 23 with referral from MD for acute on chronic R sided LBP  Based on my talks with Zuhair Reyes I feel this should continue to improve and visit with ortho may not be needed at this time  MSI consistent with LS ERS R and hip hypomobility syndrome   Upon evaluation, Zuhair Reyes demonstrates impaired hip ROM LS AROM and resulting pain with function   The listed impairments and functional limitation are effecting Zuhair Reyes ability to function at prior level   They can continue to benefit from physical therapy services at this times in order to address the above discussed impairments and functional limitation in order to allow for a return to premorbid status     HEP: KEVIN, Hip IR in standing, bridges   Impairments: abnormal gait, abnormal muscle firing, abnormal muscle tone, abnormal or restricted ROM, abnormal movement, activity intolerance, impaired physical strength and pain with function  Understanding of Dx/Px/POC: good   Prognosis: good    Plan  Patient would benefit from: skilled PT  Planned modality interventions: thermotherapy: hydrocollator packs  Planned therapy interventions: joint mobilization, manual therapy, ADL training, balance, balance/weight bearing training, neuromuscular re-education, home exercise program, therapeutic exercise, therapeutic activities, strengthening, patient education, functional ROM exercises and gait training  Frequency: 2x week  Duration in weeks: 6  Plan of Care beginning date: 2023  Plan of Care expiration date: 2023  Treatment plan discussed with: patient        Subjective Evaluation    History of Present Illness  Mechanism of injury: Noa Cool is a 69 y o  male presenting to physical therapy on 23 with referral from MD for acute on chronic R sided LBP that began 23  States that he stepped down when he noticed an immediate pain  Pain worse with in morning  Pain worse with sitting, bending forward, rising from sitting  Denies pain with cough or sneeze  Patient symptoms are located on the right sided of his lower LS    Denies night pain  Patient symptoms are intermittent  Patient symptoms are localized to the right side of his LS  Patient denies bowel and bladder changes (denies urinary retention)/saddle numbness    [employer]     Pain  Current pain ratin  At worst pain ratin  Location: see above  Quality: sharp      Diagnostic Tests  X-ray: abnormal  Treatments  Previous treatment: physical therapy  Patient Goals  Patient goals for therapy: decreased pain  Patient goal: lifting and bending pain free, baseball practice with swinging pain free    Short Term Goals:   1  Patient will be Independent with hep  2  Patient will improve pain with activity by 50%  3  PAtient will have pain free LS AROM    Long Term Goals:   1  Patient will improve FOTO to greater then goal  2  Patient will improve pain with activity to 2/10 or less  3  Patient will continue with HEP independence to allow for decreased future reoccurrence of pain and loss in function  4  Patient will bend forward to  baseball pain free  5  Patient will get off toilet pain free        Objective     Myotomes (L/R): normal LE  Dermatome: (pinprick- L/R):  Normal LE     Reflexes:  (L/R) L3-4:  2+ b/l      S1:   1+ b/l        Clonus= neg    GAIT: decreased trunk rotation b/l  Squat assess: 75% - mild P R      Lumbar  % of normal   Flex  76   Extn   75 SG Left 75   SG Right 75   ROT Left 75   ROT Right 75 (ERP) R    Repeated Movements  Standing:  extension=     Flexion=     Lying:   extension=     Flexion=         MMT         AROM          PROM    Hip       L       R        L           R      L     R   Flex  Extn  Abd  Add  IR      10 0   ER  G  Max         G  Med  Iliop               Neuro Dynamic Testing:  Slump test: L= neg    R=  neg     Straight leg raise:   L=   neg   R=   neg           Hip:   Limited IR R, noted limitation in R rot as well                           Precautions: standard       Manuals                                                                 Neuro Re-Ed                                                                                                        Ther Ex             KEVIN 10x            bridge 5x            Repeated R hip IR 10x                                                                             Ther Activity                                       Gait Training                                       Modalities

## 2023-05-01 ENCOUNTER — OFFICE VISIT (OUTPATIENT)
Dept: PHYSICAL THERAPY | Facility: REHABILITATION | Age: 68
End: 2023-05-01

## 2023-05-01 DIAGNOSIS — M54.16 RIGHT LUMBAR RADICULITIS: Primary | ICD-10-CM

## 2023-05-01 DIAGNOSIS — M54.16 LUMBAR RADICULOPATHY, ACUTE: ICD-10-CM

## 2023-05-01 NOTE — PROGRESS NOTES
"Daily Note     Today's date: 2023  Patient name: Wyatt Treviño  : 1955  MRN: [id number]  Referring provider: Sameer Carnes,*  Dx:   Encounter Diagnosis     ICD-10-CM    1  Right lumbar radiculitis  M54 16       2  Lumbar radiculopathy, acute  M54 16           Start Time: 0945  Stop Time: 1020  Total time in clinic (min): 35 minutes    Subjective: Patient reports that he has had improvements in pain, canceled appointment with ortho  Objective: See treatment diary below  LS AROM:  ERP R rot and flexion  Limited hip IR R (improved from eval at ~ 10 deg  Assessment: Tolerated treatment well  Patient with improved pain after R hip IR mobility MT and exercises  Educated ton continue with HEP as he reports he could have had better compliance  Plan: Continue per plan of care        Precautions: standard       Manuals             Hip distraction with IR  G4 - R hip           assessment  2'                                     Neuro Re-Ed             palloff press  5\"x10 OJB           education time  3'                                                                            Ther Ex             KEVIN 10x 15x           bridge 5x 2x10 - ball           Repeated R hip IR 10x 15x                                                                            Ther Activity                                       Gait Training                                       Modalities                                            "

## 2023-05-05 ENCOUNTER — OFFICE VISIT (OUTPATIENT)
Dept: PHYSICAL THERAPY | Facility: REHABILITATION | Age: 68
End: 2023-05-05

## 2023-05-05 DIAGNOSIS — M54.16 RIGHT LUMBAR RADICULITIS: Primary | ICD-10-CM

## 2023-05-05 DIAGNOSIS — M54.16 LUMBAR RADICULOPATHY, ACUTE: ICD-10-CM

## 2023-05-05 NOTE — PROGRESS NOTES
"Daily Note     Today's date: 2023  Patient name: Dianna Chino  : 1955  MRN: [id number]  Referring provider: Bunny Zurita,*  Dx:   Encounter Diagnosis     ICD-10-CM    1  Right lumbar radiculitis  M54 16       2  Lumbar radiculopathy, acute  M54 16           Start Time: 0745  Stop Time: 0830  Total time in clinic (min): 45 minutes    Subjective: Patient reports doing well, minimal to no pain at this time  States that he feels just about back to normal      Objective: See treatment diary below  LS AROM pain free - WNL    Assessment: Tolerated treatment well  Patient progressing as expected  Educated on continued HEP at home with hip and LS mobility  Added squats with weight to todays session which he did well with  Plan: Continue per plan of care        Precautions: standard       Manuals            Hip distraction with IR  G4 - R hip G4 - R hip          assessment  2'           Hip IR/Add with dist   AB                       Neuro Re-Ed            palloff press  5\"x10 OJB 5\"x10 OJB          education time  3' 3'          squat   20# KB - 3x5                                                              Ther Ex            KEVIN 10x 15x 20x          bridge 5x 2x10 - ball 2x10 - ball          Repeated R hip IR 10x 15x 15x on chair                       VG   L5 - 4' DL                                                 Ther Activity                                       Gait Training                                       Modalities                                            "

## 2023-05-10 ENCOUNTER — OFFICE VISIT (OUTPATIENT)
Dept: PHYSICAL THERAPY | Facility: REHABILITATION | Age: 68
End: 2023-05-10

## 2023-05-10 DIAGNOSIS — M54.16 LUMBAR RADICULOPATHY, ACUTE: ICD-10-CM

## 2023-05-10 DIAGNOSIS — M54.16 RIGHT LUMBAR RADICULITIS: Primary | ICD-10-CM

## 2023-05-10 NOTE — PROGRESS NOTES
"Daily Note     Today's date: 5/10/2023  Patient name: Ruddy Marroquin  : 1955  MRN: [id number]  Referring provider: Wolf Juarez,*  Dx:   Encounter Diagnosis     ICD-10-CM    1  Right lumbar radiculitis  M54 16       2  Lumbar radiculopathy, acute  M54 16           Start Time: 915  Stop Time: 465  Total time in clinic (min): 38 minutes    Subjective: Patient reports that he feels just about back to normal  Feeling good  Picking up baseball without pain or limitations  Objective: See treatment diary below  LS AROM pfree  FOTO goal met    Assessment: Tolerated treatment well  Patient performs exercises pain free, squatting pain free  Will likely DC after next session as long as patients symptoms remain improved  Discussed importance of continued HEP for hip and LS mobility  Plan: Continue per plan of care        Precautions: standard       Manuals  5/1 5/5 5/10         Hip distraction with IR  G4 - R hip G4 - R hip AB G4 R hip         assessment  2'           Hip IR/Add with dist   AB                       Neuro Re-Ed  5/1 5/5 5/10         palloff press  5\"x10 OJB 5\"x10 OJB 5\"x10 OJB         education time  3' 3'          squat   20# KB - 3x5 20#  10x                                                             Ther Ex  5/1 5/5 5/10         KEVIN 10x 15x 20x 20x         bridge 5x 2x10 - ball 2x10 - ball 2x10         Repeated R hip IR 10x 15x 15x on chair 20x on chair                      VG   L5 - 4' DL L5 - 5' DL                                                Ther Activity                                       Gait Training                                       Modalities                                            "

## 2023-05-12 ENCOUNTER — OFFICE VISIT (OUTPATIENT)
Dept: PHYSICAL THERAPY | Facility: REHABILITATION | Age: 68
End: 2023-05-12

## 2023-05-12 DIAGNOSIS — M54.16 LUMBAR RADICULOPATHY, ACUTE: ICD-10-CM

## 2023-05-12 DIAGNOSIS — M54.16 RIGHT LUMBAR RADICULITIS: Primary | ICD-10-CM

## 2023-05-12 NOTE — PROGRESS NOTES
PT Re-evaluation and Discharge     Today's date: 2023  Patient name: General Miller  : 1955  MRN: [id number]  Referring provider: Lance Hamilton,*  Dx:   Encounter Diagnosis     ICD-10-CM    1  Right lumbar radiculitis  M54 16       2  Lumbar radiculopathy, acute  M54 16           Start Time: 0750  Stop Time: 0825  Total time in clinic (min): 35 minutes    Assessment  Assessment details: Patient is a 69y o  year old male who attended physical therapy for 5 treatment sessions regarding acute R sided LBP  Patient reports full improvement at this time which correlates to improved impairments and functionality  Reports that he has not pain walking, bending, reaching for  Baseballs as he prior did  Patient has shown improvement throughout PT by demonstrating decreased pain, increased range of motion, increased strength and improved tolerance to activity  Will DC PT at this time  HEP reviewed with emphasis on R hip and LS mobility to prevent reoccurrence  Understanding of Dx/Px/POC: good   Prognosis: good    Plan  Duration in weeks: 6  Plan of Care beginning date: 2023  Plan of Care expiration date: 2023  Treatment plan discussed with: patient        Subjective Evaluation    History of Present Illness  Mechanism of injury: Evaluation:    Supriya Stephenson is a 69 y o  male presenting to physical therapy on 23 with referral from MD for acute on chronic R sided LBP that began 23  States that he stepped down when he noticed an immediate pain  Pain worse with in morning  Pain worse with sitting, bending forward, rising from sitting  Denies pain with cough or sneeze  Patient symptoms are located on the right sided of his lower LS    Denies night pain       Patient symptoms are intermittent  Patient symptoms are localized to the right side of his LS  Patient denies bowel and bladder changes (denies urinary retention)/saddle numbness    [employer] baseball     Pain  Current pain ratin  At worst pain ratin  Location: see above  Quality: sharp      Diagnostic Tests  X-ray: abnormal  Treatments  Previous treatment: physical therapy  Patient Goals  Patient goals for therapy: decreased pain  Patient goal: lifting and bending pain free, baseball practice with swinging pain free    Short Term Goals:   1  Patient will be Independent with hep - MET  2  Patient will improve pain with activity by 50% - MET  3  PAtient will have pain free LS AROM - MET    Long Term Goals:   1  Patient will improve FOTO to greater then goal - MET  2  Patient will improve pain with activity to 2/10 or less - MET  3  Patient will continue with HEP independence to allow for decreased future reoccurrence of pain and loss in function - PARTIALLY MET  4  Patient will bend forward to  baseball pain free - MET  5  Patient will get off toilet pain free - MET        Objective     Myotomes (L/R): normal LE  Dermatome: (pinprick- L/R):  Normal LE     Reflexes:  (L/R) L3-4:  2+ b/l      S1:   1+ b/l        Clonus= neg    GAIT: decreased trunk rotation b/l  Squat assess: 75% - pfree      Lumbar  % of normal   Flex  76   Extn  75   SG Left 75   SG Right 75   ROT Left 75   ROT Right 75            MMT         AROM          PROM    Hip       L       R        L           R      L     R   Flex  Extn  Abd  Add  IR      10 10   ER  G  Max         G  Med  Iliop               Neuro Dynamic Testing:  Slump test: L= neg    R=  neg     Straight leg raise:   L=   neg   R=   neg                                      Precautions: standard       Manuals             assessment 5'            Hip Ir mob AB                                      Neuro Re-Ed                                                                                                        Ther Ex             KEVIN 20x            bridge 2x10            Repeated R hip IR 2x10            palloff press OJB  2x10 Ther Activity                                       Gait Training                                       Modalities

## 2023-05-17 ENCOUNTER — APPOINTMENT (OUTPATIENT)
Dept: PHYSICAL THERAPY | Facility: REHABILITATION | Age: 68
End: 2023-05-17
Payer: COMMERCIAL

## 2023-10-18 ENCOUNTER — TELEPHONE (OUTPATIENT)
Dept: GASTROENTEROLOGY | Facility: AMBULARY SURGERY CENTER | Age: 68
End: 2023-10-18

## 2024-01-07 ENCOUNTER — HOSPITAL ENCOUNTER (EMERGENCY)
Facility: HOSPITAL | Age: 69
Discharge: HOME | End: 2024-01-07
Attending: EMERGENCY MEDICINE
Payer: COMMERCIAL

## 2024-01-07 VITALS
RESPIRATION RATE: 18 BRPM | TEMPERATURE: 98 F | OXYGEN SATURATION: 99 % | SYSTOLIC BLOOD PRESSURE: 165 MMHG | HEART RATE: 89 BPM | DIASTOLIC BLOOD PRESSURE: 76 MMHG

## 2024-01-07 DIAGNOSIS — M25.551 RIGHT HIP PAIN: ICD-10-CM

## 2024-01-07 DIAGNOSIS — M54.10 RADICULOPATHY: Primary | ICD-10-CM

## 2024-01-07 DIAGNOSIS — M54.9 BACK PAIN: ICD-10-CM

## 2024-01-07 PROCEDURE — 99283 EMERGENCY DEPT VISIT LOW MDM: CPT

## 2024-01-07 PROCEDURE — 99284 EMERGENCY DEPT VISIT MOD MDM: CPT | Performed by: EMERGENCY MEDICINE

## 2024-01-07 RX ORDER — LIDOCAINE 50 MG/G
1 PATCH TOPICAL ONCE
Status: DISCONTINUED | OUTPATIENT
Start: 2024-01-07 | End: 2024-01-07 | Stop reason: HOSPADM

## 2024-01-07 RX ORDER — NAPROXEN 375 MG/1
375 TABLET ORAL 2 TIMES DAILY WITH MEALS
Qty: 28 TABLET | Refills: 0 | Status: SHIPPED | OUTPATIENT
Start: 2024-01-07 | End: 2024-01-21

## 2024-01-07 RX ADMIN — LIDOCAINE 1 PATCH: 50 PATCH TOPICAL at 11:44

## 2024-01-07 NOTE — ED ATTENDING ATTESTATION
"1/7/2024  I, Dorcas Shin MD, saw and evaluated the patient. I have discussed the patient with the resident/non-physician practitioner and agree with the resident's/non-physician practitioner's findings, Plan of Care, and MDM as documented in the resident's/non-physician practitioner's note, except where noted. All available labs and Radiology studies were reviewed.  I was present for key portions of any procedure(s) performed by the resident/non-physician practitioner and I was immediately available to provide assistance.       At this point I agree with the current assessment done in the Emergency Department.  I have conducted an independent evaluation of this patient a history and physical is as follows:      69 yo male with h/o HTN, obesity p/w right lumbar/hip pain.  Pt has known arthritis, reports 3-4 days pain on the right.  No trauma or inciting event.  Pain has been intermittent x \"years\", worse over last couple days.  No f/c/ changes/able to ambulate.  NO systemic complaints.  No reason to be immunocompromised, no recent instrumentation.  Seen in ED in past for same, work up reassuring at that time.  Symptoms similar.  At present pt currently symptom free.  Exam reassuring, pt NV intact, able to arise from stretcher with ease, strength/sensation intact.  Able to toe/heel walk.  No midline TTP or significant findings in the right hip joint.  AT this time no indication for emergent imaging.  Recommend symptomatic treatment for likely right lumbar radiculopathy and referral to Spine for PT.      Past Medical History:   Diagnosis Date    Arthritis     Essential hypertension 12/16/2021    Gilbert's syndrome 1/6/2020    Intermittent elevated bilirubin.  Liver ultrasound.    Obesity     Unilateral hearing loss, left 1/18/2022         ED Course         Critical Care Time  Procedures      "

## 2024-01-07 NOTE — ED PROVIDER NOTES
History  Chief Complaint   Patient presents with    Hip Pain     R hip pain for past 3-4 days. No numbness or tingling down R leg.      HPI    (Dior Lock) Dior Lock is a 68 y.o. male who identifies as male with a significant PMHx of arthritis presents to the emergency department on January 7, 2024 for R lower back pain onset 4 days ago. Pain is intermittent, described as sharp, located in R lower back, no radiation, rated 0/10 currently. Worse in the AM, improves as he moves and stretches throughout the day, aggravated by certain twisting positions and alleviated by stretching. Took Advil yesterday with temporary relief. No new trauma or falls. Denies loss of sensation to his LE, no difficulty ambulating, no bowel or urinary incontinence. Patient denies fever, chills, LOC, head stroke, nausea, vomiting, or any other complaint at this time.      Allergies include:  Allergies   Allergen Reactions    Cefadroxil      Action Taken: fever;     Amlodipine Other (See Comments)      Brain fog         Immunizations:  Immunization History   Administered Date(s) Administered    COVID-19 PFIZER VACCINE 0.3 ML IM 02/17/2021, 03/08/2021, 10/30/2021    COVID-19 Pfizer Vac BIVALENT Lamonte-sucrose 12 Yr+ IM 10/12/2022    INFLUENZA 10/19/2022    Pneumococcal Polysaccharide PPV23 11/16/2021     Immunizations Reviewed.    None       Past Medical History:   Diagnosis Date    Arthritis     Essential hypertension 12/16/2021    Gilbert's syndrome 1/6/2020    Intermittent elevated bilirubin.  Liver ultrasound.    Obesity     Unilateral hearing loss, left 1/18/2022       Past Surgical History:   Procedure Laterality Date    HAND SURGERY      [place] COLONOSCOPY FLX DX W/COLLJ SPEC WHEN PFRMD N/A 3/22/2018    Procedure: COLONOSCOPY;  Surgeon: Mark Bland MD;  Location: [facility];  Service: Gastroenterology    TONSILECTOMY AND ADNOIDECTOMY      TOOTH EXTRACTION         Family History   Problem Relation Age of Onset    Breast cancer Mother      Heart disease Father         MI in his 50's     I have reviewed and agree with the history as documented.    E-Cigarette/Vaping    E-Cigarette Use Never User      E-Cigarette/Vaping Substances    Nicotine No     THC No     CBD No     Flavoring No     Other No     Unknown No      Social History     Tobacco Use    Smoking status: Never    Smokeless tobacco: Never   Vaping Use    Vaping status: Never Used   Substance Use Topics    Alcohol use: Not Currently     Comment: occasional    Drug use: No        Review of Systems   Constitutional:  Negative for chills and fever.   HENT:  Negative for ear pain and sore throat.    Eyes:  Negative for pain and visual disturbance.   Respiratory:  Negative for cough and shortness of breath.    Cardiovascular:  Negative for chest pain and palpitations.   Gastrointestinal:  Negative for abdominal pain and vomiting.   Genitourinary:  Negative for dysuria and hematuria.   Musculoskeletal:  Positive for back pain (R side). Negative for arthralgias.   Skin:  Negative for color change and rash.   Neurological:  Negative for seizures, syncope and weakness.   All other systems reviewed and are negative.      Physical Exam  ED Triage Vitals [01/07/24 1055]   Temperature Pulse Respirations Blood Pressure SpO2   98 °F (36.7 °C) 89 18 165/76 99 %      Temp Source Heart Rate Source Patient Position - Orthostatic VS BP Location FiO2 (%)   Oral Monitor -- Left arm --      Pain Score       5             Orthostatic Vital Signs  Vitals:    01/07/24 1055   BP: 165/76   Pulse: 89       Physical Exam  Vitals and nursing note reviewed.   Constitutional:       General: He is not in acute distress.     Appearance: He is well-developed.   HENT:      Head: Normocephalic and atraumatic.   Eyes:      Conjunctiva/sclera: Conjunctivae normal.   Cardiovascular:      Rate and Rhythm: Normal rate and regular rhythm.      Heart sounds: No murmur heard.  Pulmonary:      Effort: Pulmonary effort is normal. No  respiratory distress.      Breath sounds: Normal breath sounds.   Abdominal:      Palpations: Abdomen is soft.      Tenderness: There is no abdominal tenderness.   Musculoskeletal:         General: No swelling.      Cervical back: Neck supple.      Comments: No tenderness to palpation of C, T, L spine. No step offs or deformities. Sensation intact to bilateral lower extremities. No saddle anesthesia. 2+ Patellar and achilles tendon reflexes. 2+ DP pulses. Negative SLR bilaterally. Gait intact   Skin:     General: Skin is warm and dry.      Capillary Refill: Capillary refill takes less than 2 seconds.   Neurological:      Mental Status: He is alert.   Psychiatric:         Mood and Affect: Mood normal.         ED Medications  Medications - No data to display      Diagnostic Studies  Results Reviewed       None                   No orders to display         Procedures  Procedures      ED Course  ED Course as of 01/07/24 1508   Sun Jan 07, 2024   1130 Patient is ambulating well. Will hold off on imaging as patient denies any acute trauma. Pain is likely due to radiculopathy or his known arthritic pain. Will apply lidocaine patch now as patient has minimal pain currently. No further workup indicated at this time. Pt will need to follow up with his PCP and spine, referral placed. Will prescribe naproxen for his back pain management.   1142 DDx includes but not limited to: fracture, radiculopathy, muscle sprain/strain, arthritic changes   1143 Discussed results with patient and plan for discharge with outpatient follow up with his PCP and Spine, referral placed. Instructed patient to take Naproxen and to return to ED for new or worsening symptoms. Patient voices understanding and agrees with plan. No other concerns at this time.                                         Medical Decision Making  Risk  Prescription drug management.        See ED course for MDM.    Disposition  Final diagnoses:   Back pain   Right hip pain    Radiculopathy     Time reflects when diagnosis was documented in both MDM as applicable and the Disposition within this note       Time User Action Codes Description Comment    1/7/2024 11:31 AM Magy Madera Add [M54.9] Back pain     1/7/2024 11:36 AM Magy Madera Add [M25.551] Right hip pain     1/7/2024 11:43 AM SantyOtiliae Add [M54.10] Radiculopathy     1/7/2024 11:43 AM SantyRomán nicebie Modify [M54.9] Back pain     1/7/2024 11:43 AM SantyRomán nicebie Modify [M54.10] Radiculopathy           ED Disposition       ED Disposition   Discharge    Condition   Stable    Date/Time   Sun Jan 7, 2024 11:43 AM    Comment   Dior Lock discharge to home/self care.                   Follow-up Information       Follow up With Specialties Details Why Contact Info    Lazaro Kapoor MD Family Medicine Schedule an appointment as soon as possible for a visit  your appointment on 2/13 [address] [place] [address]  [phone]              Discharge Medication List as of 1/7/2024 11:43 AM        START taking these medications    Details   naproxen (NAPROSYN) 375 mg tablet Take 1 tablet (375 mg total) by mouth 2 (two) times a day with meals for 14 days, Starting Sun 1/7/2024, Until Sun 1/21/2024, Normal               PDMP Review         Value Time User    PDMP Reviewed  Yes 4/21/2021  6:23 AM Avery Monroy MD             ED Provider  Attending physically available and evaluated Dior Lock. I managed the patient along with the ED Attending.    Electronically Signed by           Magy Madera MD  01/07/24 5056

## 2024-01-08 ENCOUNTER — NURSE TRIAGE (OUTPATIENT)
Dept: PHYSICAL THERAPY | Facility: OTHER | Age: 69
End: 2024-01-08

## 2024-01-08 DIAGNOSIS — G89.29 ACUTE EXACERBATION OF CHRONIC LOW BACK PAIN: Primary | ICD-10-CM

## 2024-01-08 DIAGNOSIS — M54.50 ACUTE EXACERBATION OF CHRONIC LOW BACK PAIN: Primary | ICD-10-CM

## 2024-01-08 NOTE — TELEPHONE ENCOUNTER
Additional Information   Negative: Has the patient had unexplained weight loss?   Negative: Does the patient have a fever?   Negative: Is the patient experiencing urine retention?   Negative: Is the patient experiencing acute drop foot or paralysis?   Negative: Has the patient experienced major trauma? (fall from height, high speed collision, direct blow to spine) and is also experiencing nausea, light-headedness, or loss of consciousness?   Negative: Is the patient experiencing blood in sputum?   Affirmative: Is this a chronic condition?    Protocols used: [facility] Protocol    This RN did review in detail the [facility] and what we can provide for their back pain.  Patient is agreeable to being triaged by this RN and would like to proceed with Physical Therapy.    Referral was placed for Physical Therapy at the [place] site. Patients information was sent to the  to make evaluation appointment. Patient made aware that the PT office  will be calling to schedule the appointment.  Patient was provided with the phone number to the PT office.    No further questions and/or concerns were voiced by the patient at this time. Patient states understanding of the referral that was placed.

## 2024-01-08 NOTE — TELEPHONE ENCOUNTER
Patient called into [facility] today and left v/m @12:21pm.    Returned patient's call @12:25pm.    Pt has an appt scheduled with [facility] PT on 01/15 but he needs his referral. Scheduled as direct access with advanced therapist.    Additional Information   Negative: Is this related to a work injury?   Negative: Is this related to an MVA?   Negative: Are you currently recieving homecare services?    Background - Initial Assessment  Clinical complaint: ED visit on 01/7 due to Right-Sided Lower Back pain and Right Hip. Hx of back pain in the past, has done PT last year. Patient states new flare up started 4-5 days ago. More severe in his right hip. No radiation. No numbness or tingling. NKI. Patient states pain is intermittent, worse in the morning or when twisting. Patient described pain as sharp.  Date of onset: 3-4 days ago (new flare up)  Frequency of pain: intermittent  Quality of pain: sharp    Protocols used: [facility] Protocol

## 2024-01-10 ENCOUNTER — EVALUATION (OUTPATIENT)
Dept: PHYSICAL THERAPY | Facility: REHABILITATION | Age: 69
End: 2024-01-10
Payer: COMMERCIAL

## 2024-01-10 DIAGNOSIS — G89.29 ACUTE EXACERBATION OF CHRONIC LOW BACK PAIN: ICD-10-CM

## 2024-01-10 DIAGNOSIS — M54.50 ACUTE EXACERBATION OF CHRONIC LOW BACK PAIN: ICD-10-CM

## 2024-01-10 PROCEDURE — 97140 MANUAL THERAPY 1/> REGIONS: CPT | Performed by: PHYSICAL THERAPIST

## 2024-01-10 PROCEDURE — 97162 PT EVAL MOD COMPLEX 30 MIN: CPT | Performed by: PHYSICAL THERAPIST

## 2024-01-10 NOTE — PROGRESS NOTES
PT Evaluation     Today's date: 1/10/2024  Patient name: Dior Lock  : 1955  MRN: [id number]  Referring provider: Lazaro Kapoor MD  Dx:   Encounter Diagnosis     ICD-10-CM    1. Acute exacerbation of chronic low back pain  M54.50 Ambulatory referral to PT spine    G89.29           Start Time: 1330  Stop Time: 1415  Total time in clinic (min): 45 minutes    Assessment  Assessment details: Dior Lock is a 68 y.o. male presenting to outpatient physical therapy on 01/10/24 with referral from [facility] spine for acute on chronic R sided LBP/posterior hip pain. Pain nociceptive in nature, MSI consistent with hip hypomobility syndrome. Upon evaluation, Dior demonstrates impaired LS AROM but mainly hip ROM with + capsular pattern. Hip vs LS suspected  at this time due to testing and signs/symptoms. The listed impairments and functional limitation are effecting Dior ability to function at prior level. They can continue to benefit from physical therapy services at this times in order to address the above discussed impairments and functional limitation in order to allow for a return to premorbid status.    HEP: standing hip IR on chair, KEVIN, bridges    Impairments: abnormal gait, abnormal muscle firing, abnormal muscle tone, abnormal or restricted ROM, abnormal movement, activity intolerance, impaired physical strength and pain with function  Understanding of Dx/Px/POC: good   Prognosis: good    Plan  Patient would benefit from: skilled PT  Planned modality interventions: thermotherapy: hydrocollator packs  Planned therapy interventions: joint mobilization, manual therapy, ADL training, neuromuscular re-education, home exercise program, therapeutic exercise, therapeutic activities, strengthening, patient education and functional ROM exercises  Frequency: 2x week  Duration in weeks: 5  Treatment plan discussed with: patient        Subjective Evaluation    History of Present Illness  Mechanism of injury: Dior is a  68 y.o. male presenting to physical therapy on 01/10/24 with referral through [facility] spine program for R sided LBP. Patient reports that his pain was gradual onset as this has been an acute on chronic issue. He denies any symptoms into his leg as the are felt localized in his spine. Pain worse in the morning, worse after inactivity. Pain worse with sitting and turning in the morning especially when sitting on the toilet.     + pain with cough an sneeze in the morning    Patient symptoms are intermittent  Patient symptoms are localized  Pain is better with movement such as walking.   Patient denies bowel and bladder changes (denies urinary retention)/saddle numbness  Denies night pain, recent illness or fatigue  Patient Goals  Patient goals for therapy: decreased pain    Pain  Current pain ratin  At worst pain ratin  Location: posterior buttock region  Quality: sharp      Diagnostic Tests  No diagnostic tests performed    Short Term Goals:   1. Patient will be Independent with hep  2. Patient will improve pain with activity by 50%  3. Patient will report GROC 50% or greater  4. Patient will have pain free LS AROM      Long Term Goals:   1. Patient will improve FOTO to greater then goal  2. Patient will improve pain with activity to 2/10 or less  3. Patient will continue with HEP independence to allow for decreased future reoccurrence of pain and loss in function  4. Patient will report GROC 75% or greater  5. Patient will turn on toilet (R) without pain  6. Patient will report pfree movement with first steps/movement in the morning      Objective    Posture: flat LS  Palpation: increased tone with TTP R psoas  Myotomes (L/R): normal LE  Dermatome: (pinprick- L/R):  normal LE     Reflexes:  (L/R) L3-4:   2+ b/l     S1:   1+ b/l            GAIT: decreased hip ext b/l  Squat assess: 50% pfree      Lumbar  % of normal   Flex. 50 - squat with wide CINTHIA   Extn. 75   SG Left 75   SG Right 75   ROT Left 75   ROT Right  75           MMT         AROM          PROM    Hip       L       R        L           R      L     R   Flex.         Extn.         Abd.         Add.         IR.         ER.                  G. Max 4+ 4+       G. Med.         Iliop.             Neuro Dynamic Testing:  Slump test: L= neg    R=  neg     Straight leg raise:   L=    neg  R=   neg         Hip:   issa = +   Capsular pattern: +         Segmental mobility:   LS= hypomobile R PA L3-5               Precautions: standard       Manuals 1/10            STM R psoas AB-10'            R hip distraction                                       Neuro Re-Ed             Bridge                                                                                           Ther Ex 1/10            KEVIN 10x            R hip IR on chair 10x                                                                                          Ther Activity                                       Gait Training                                       Modalities

## 2024-01-12 ENCOUNTER — OFFICE VISIT (OUTPATIENT)
Dept: FAMILY MEDICINE CLINIC | Facility: CLINIC | Age: 69
End: 2024-01-12
Payer: COMMERCIAL

## 2024-01-12 VITALS
BODY MASS INDEX: 42.51 KG/M2 | RESPIRATION RATE: 16 BRPM | WEIGHT: 287 LBS | HEIGHT: 69 IN | SYSTOLIC BLOOD PRESSURE: 120 MMHG | OXYGEN SATURATION: 98 % | TEMPERATURE: 98.2 F | DIASTOLIC BLOOD PRESSURE: 90 MMHG | HEART RATE: 68 BPM

## 2024-01-12 DIAGNOSIS — Z12.5 PROSTATE CANCER SCREENING: ICD-10-CM

## 2024-01-12 DIAGNOSIS — E66.01 OBESITY, MORBID (HCC): ICD-10-CM

## 2024-01-12 DIAGNOSIS — R39.15 URINARY URGENCY: ICD-10-CM

## 2024-01-12 DIAGNOSIS — R35.0 URINARY FREQUENCY: Primary | ICD-10-CM

## 2024-01-12 DIAGNOSIS — R73.01 IMPAIRED FASTING GLUCOSE: ICD-10-CM

## 2024-01-12 DIAGNOSIS — I10 ESSENTIAL HYPERTENSION: ICD-10-CM

## 2024-01-12 DIAGNOSIS — Z13.220 LIPID SCREENING: ICD-10-CM

## 2024-01-12 LAB
SL AMB  POCT GLUCOSE, UA: NORMAL
SL AMB LEUKOCYTE ESTERASE,UA: NORMAL
SL AMB POCT BILIRUBIN,UA: NORMAL
SL AMB POCT BLOOD,UA: NORMAL
SL AMB POCT CLARITY,UA: CLEAR
SL AMB POCT COLOR,UA: YELLOW
SL AMB POCT KETONES,UA: NORMAL
SL AMB POCT NITRITE,UA: NORMAL
SL AMB POCT PH,UA: 6
SL AMB POCT SPECIFIC GRAVITY,UA: 1.02
SL AMB POCT URINE PROTEIN: NORMAL
SL AMB POCT UROBILINOGEN: NORMAL

## 2024-01-12 PROCEDURE — 99213 OFFICE O/P EST LOW 20 MIN: CPT | Performed by: NURSE PRACTITIONER

## 2024-01-12 PROCEDURE — 87086 URINE CULTURE/COLONY COUNT: CPT | Performed by: NURSE PRACTITIONER

## 2024-01-12 PROCEDURE — 81003 URINALYSIS AUTO W/O SCOPE: CPT | Performed by: NURSE PRACTITIONER

## 2024-01-12 NOTE — PROGRESS NOTES
FAMILY PRACTICE OFFICE VISIT       NAME: Dior Lock  AGE: 68 y.o. SEX: male       : 1955        MRN: [id number]    Assessment and Plan   1. Urinary frequency  Urinary frequency and urgency.   Urine dip clear.   Will check urine culture.   Check blood work for diabetes screening.  Check PSA.   If urine culture, blood work negative, may consider urology follow up.   -     Hemoglobin A1C; Future  -     PSA, Total Screen; Future  -     POCT urine dip auto non-scope  -     Urine culture    2. Urinary urgency  -     Hemoglobin A1C; Future  -     PSA, Total Screen; Future  -     POCT urine dip auto non-scope  -     Urine culture    3. Obesity, morbid (HCC)  Blood work as noted.   -     CBC and differential; Future  -     Comprehensive metabolic panel; Future  -     Hemoglobin A1C; Future  -     TSH, 3rd generation; Future    4. Essential hypertension  Blood work as noted, /90 today. Not currently on medications.   -     CBC and differential; Future  -     Comprehensive metabolic panel; Future  -     TSH, 3rd generation; Future    5. Impaired fasting glucose  Last fasting blood glucose 104.   -     Hemoglobin A1C; Future    6. Lipid screening  -     Lipid panel; Future    7. Prostate cancer screening  -     PSA, Total Screen; Future         Chief Complaint     Chief Complaint   Patient presents with    Urinary Tract Infection     Frequency, weak stream  1 + wk       History of Present Illness     Dior Lock is a 68 year old male presenting today for urinary urgency and frequency.     Not clear on timing of symptoms.   Notes symptoms were present few months, but worse recently.     Increased frequency.  Increased urgency.  Urine stream is good in the morning.   Weaker during the day--but going often and only small amounts.     Has not increased fluid intake for this.   No dysuria.   No pelvic pain or abdominal discomfort.     No new back pain.   Currently undergoing PT for right hip.     No fevers,  "chills, nausea, or vomiting.     No discoloration of urine. No odor to urine.   No fevers.     Never had a UTI in the past.     Last PSA 2019 0.6.              Review of Systems   Review of Systems   Constitutional: Negative.    HENT: Negative.     Gastrointestinal:  Negative for nausea and vomiting.   Genitourinary:  Positive for frequency and urgency. Negative for difficulty urinating, dysuria, flank pain and hematuria.   Musculoskeletal: Negative.    Skin: Negative.        I have reviewed the patient's medical history in detail; there are no changes to the history as noted in the electronic medical record.    Objective     Vitals:    01/12/24 1007   BP: 120/90   Pulse: 68   Resp: 16   Temp: 98.2 °F (36.8 °C)   TempSrc: Temporal   SpO2: 98%   Weight: 130 kg (287 lb)   Height: 5' 9\" (1.753 m)     Wt Readings from Last 3 Encounters:   01/12/24 130 kg (287 lb)   04/23/23 122 kg (268 lb 15.4 oz)   02/07/23 129 kg (284 lb 6.4 oz)     Physical Exam  Vitals and nursing note reviewed.   Constitutional:       General: He is not in acute distress.     Appearance: Normal appearance. He is not ill-appearing.   HENT:      Head: Atraumatic.   Cardiovascular:      Rate and Rhythm: Normal rate and regular rhythm.      Heart sounds: No murmur heard.  Pulmonary:      Effort: Pulmonary effort is normal. No respiratory distress.      Breath sounds: Normal breath sounds.   Abdominal:      General: Bowel sounds are normal.      Palpations: Abdomen is soft.      Tenderness: There is no abdominal tenderness. There is no right CVA tenderness or left CVA tenderness.   Musculoskeletal:      Right lower leg: No edema.      Left lower leg: No edema.   Neurological:      Mental Status: He is alert.   Psychiatric:         Mood and Affect: Mood normal.            ALLERGIES:  Allergies   Allergen Reactions    Cefadroxil      Action Taken: fever;     Amlodipine Other (See Comments)      Brain fog       Current Medications     Current Outpatient " Medications   Medication Sig Dispense Refill    naproxen (NAPROSYN) 375 mg tablet Take 1 tablet (375 mg total) by mouth 2 (two) times a day with meals for 14 days 28 tablet 0     No current facility-administered medications for this visit.         Health Maintenance     Health Maintenance   Topic Date Due    Zoster Vaccine (1 of 2) Never done    Colorectal Cancer Screening  06/29/2022    Pneumococcal Vaccine: 65+ Years (2 - PCV) 11/16/2022    Influenza Vaccine (1) 09/01/2023    COVID-19 Vaccine (5 - 2023-24 season) 09/01/2023    Depression Screening  02/07/2024    Medicare Annual Wellness Visit (AWV)  02/07/2024    PT PLAN OF CARE  02/09/2024    Fall Risk  01/10/2025    Hepatitis C Screening  Completed    HIB Vaccine  Aged Out    IPV Vaccine  Aged Out    Hepatitis A Vaccine  Aged Out    Meningococcal ACWY Vaccine  Aged Out    HPV Vaccine  Aged Out     Immunization History   Administered Date(s) Administered    COVID-19 PFIZER VACCINE 0.3 ML IM 02/17/2021, 03/08/2021, 10/30/2021    COVID-19 Pfizer Vac BIVALENT Lamonte-sucrose 12 Yr+ IM 10/12/2022    INFLUENZA 10/19/2022    Pneumococcal Polysaccharide PPV23 11/16/2021       JOSE Choi

## 2024-01-13 ENCOUNTER — APPOINTMENT (OUTPATIENT)
Dept: LAB | Facility: CLINIC | Age: 69
End: 2024-01-13
Payer: COMMERCIAL

## 2024-01-13 DIAGNOSIS — R35.89 POLYURIA: ICD-10-CM

## 2024-01-13 DIAGNOSIS — R35.0 URINARY FREQUENCY: ICD-10-CM

## 2024-01-13 DIAGNOSIS — Z12.5 PROSTATE CANCER SCREENING: ICD-10-CM

## 2024-01-13 DIAGNOSIS — E66.01 OBESITY, MORBID (HCC): ICD-10-CM

## 2024-01-13 DIAGNOSIS — R73.01 IMPAIRED FASTING GLUCOSE: ICD-10-CM

## 2024-01-13 DIAGNOSIS — Z13.220 LIPID SCREENING: ICD-10-CM

## 2024-01-13 DIAGNOSIS — R39.15 URINARY URGENCY: ICD-10-CM

## 2024-01-13 DIAGNOSIS — I10 ESSENTIAL HYPERTENSION: ICD-10-CM

## 2024-01-13 LAB
ALBUMIN SERPL BCP-MCNC: 4.1 G/DL (ref 3.5–5)
ALP SERPL-CCNC: 57 U/L (ref 34–104)
ALT SERPL W P-5'-P-CCNC: 15 U/L (ref 7–52)
ANION GAP SERPL CALCULATED.3IONS-SCNC: 7 MMOL/L
AST SERPL W P-5'-P-CCNC: 17 U/L (ref 13–39)
BASOPHILS # BLD AUTO: 0.05 THOUSANDS/ÂΜL (ref 0–0.1)
BASOPHILS NFR BLD AUTO: 1 % (ref 0–1)
BILIRUB SERPL-MCNC: 1.25 MG/DL (ref 0.2–1)
BUN SERPL-MCNC: 14 MG/DL (ref 5–25)
CALCIUM SERPL-MCNC: 8.9 MG/DL (ref 8.4–10.2)
CHLORIDE SERPL-SCNC: 107 MMOL/L (ref 96–108)
CHOLEST SERPL-MCNC: 150 MG/DL
CO2 SERPL-SCNC: 27 MMOL/L (ref 21–32)
CREAT SERPL-MCNC: 0.71 MG/DL (ref 0.6–1.3)
EOSINOPHIL # BLD AUTO: 0.28 THOUSAND/ÂΜL (ref 0–0.61)
EOSINOPHIL NFR BLD AUTO: 5 % (ref 0–6)
ERYTHROCYTE [DISTWIDTH] IN BLOOD BY AUTOMATED COUNT: 13.8 % (ref 11.6–15.1)
GFR SERPL CREATININE-BSD FRML MDRD: 95 ML/MIN/1.73SQ M
GLUCOSE P FAST SERPL-MCNC: 98 MG/DL (ref 65–99)
HCT VFR BLD AUTO: 45.6 % (ref 36.5–49.3)
HDLC SERPL-MCNC: 43 MG/DL
HGB BLD-MCNC: 15.3 G/DL (ref 12–17)
IMM GRANULOCYTES # BLD AUTO: 0.03 THOUSAND/UL (ref 0–0.2)
IMM GRANULOCYTES NFR BLD AUTO: 1 % (ref 0–2)
LDLC SERPL CALC-MCNC: 89 MG/DL (ref 0–100)
LYMPHOCYTES # BLD AUTO: 1.2 THOUSANDS/ÂΜL (ref 0.6–4.47)
LYMPHOCYTES NFR BLD AUTO: 23 % (ref 14–44)
MCH RBC QN AUTO: 30.7 PG (ref 26.8–34.3)
MCHC RBC AUTO-ENTMCNC: 33.6 G/DL (ref 31.4–37.4)
MCV RBC AUTO: 92 FL (ref 82–98)
MONOCYTES # BLD AUTO: 0.5 THOUSAND/ÂΜL (ref 0.17–1.22)
MONOCYTES NFR BLD AUTO: 10 % (ref 4–12)
NEUTROPHILS # BLD AUTO: 3.19 THOUSANDS/ÂΜL (ref 1.85–7.62)
NEUTS SEG NFR BLD AUTO: 60 % (ref 43–75)
NONHDLC SERPL-MCNC: 107 MG/DL
NRBC BLD AUTO-RTO: 0 /100 WBCS
PLATELET # BLD AUTO: 221 THOUSANDS/UL (ref 149–390)
PMV BLD AUTO: 9.9 FL (ref 8.9–12.7)
POTASSIUM SERPL-SCNC: 3.9 MMOL/L (ref 3.5–5.3)
PROT SERPL-MCNC: 6.4 G/DL (ref 6.4–8.4)
PSA SERPL-MCNC: 0.81 NG/ML (ref 0–4)
RBC # BLD AUTO: 4.98 MILLION/UL (ref 3.88–5.62)
SODIUM SERPL-SCNC: 141 MMOL/L (ref 135–147)
TRIGL SERPL-MCNC: 92 MG/DL
TSH SERPL DL<=0.05 MIU/L-ACNC: 1.92 UIU/ML (ref 0.45–4.5)
WBC # BLD AUTO: 5.25 THOUSAND/UL (ref 4.31–10.16)

## 2024-01-13 PROCEDURE — 83036 HEMOGLOBIN GLYCOSYLATED A1C: CPT

## 2024-01-13 PROCEDURE — 80061 LIPID PANEL: CPT

## 2024-01-13 PROCEDURE — 84443 ASSAY THYROID STIM HORMONE: CPT

## 2024-01-13 PROCEDURE — 80053 COMPREHEN METABOLIC PANEL: CPT

## 2024-01-13 PROCEDURE — G0103 PSA SCREENING: HCPCS

## 2024-01-13 PROCEDURE — 36415 COLL VENOUS BLD VENIPUNCTURE: CPT

## 2024-01-13 PROCEDURE — 85025 COMPLETE CBC W/AUTO DIFF WBC: CPT

## 2024-01-14 LAB — BACTERIA UR CULT: NORMAL

## 2024-01-15 ENCOUNTER — TELEPHONE (OUTPATIENT)
Dept: FAMILY MEDICINE CLINIC | Facility: CLINIC | Age: 69
End: 2024-01-15

## 2024-01-15 ENCOUNTER — APPOINTMENT (OUTPATIENT)
Dept: PHYSICAL THERAPY | Facility: REHABILITATION | Age: 69
End: 2024-01-15
Payer: COMMERCIAL

## 2024-01-15 ENCOUNTER — OFFICE VISIT (OUTPATIENT)
Dept: PHYSICAL THERAPY | Facility: REHABILITATION | Age: 69
End: 2024-01-15
Payer: COMMERCIAL

## 2024-01-15 DIAGNOSIS — R35.0 URINARY FREQUENCY: Primary | ICD-10-CM

## 2024-01-15 DIAGNOSIS — R39.15 URINARY URGENCY: ICD-10-CM

## 2024-01-15 DIAGNOSIS — G89.29 ACUTE EXACERBATION OF CHRONIC LOW BACK PAIN: Primary | ICD-10-CM

## 2024-01-15 DIAGNOSIS — M54.50 ACUTE EXACERBATION OF CHRONIC LOW BACK PAIN: Primary | ICD-10-CM

## 2024-01-15 LAB
EST. AVERAGE GLUCOSE BLD GHB EST-MCNC: 103 MG/DL
HBA1C MFR BLD: 5.2 %

## 2024-01-15 PROCEDURE — 97110 THERAPEUTIC EXERCISES: CPT | Performed by: PHYSICAL THERAPIST

## 2024-01-15 PROCEDURE — 97140 MANUAL THERAPY 1/> REGIONS: CPT | Performed by: PHYSICAL THERAPIST

## 2024-01-15 NOTE — TELEPHONE ENCOUNTER
Spoke with patient and provided results and instructions.  Patient has an appointment with urology tomorrow.  No other questions at this time

## 2024-01-15 NOTE — PROGRESS NOTES
"Daily Note     Today's date: 1/15/2024  Patient name: Dior Lock  : 1955  MRN: [id number]  Referring provider: Lazaro Kapoor MD  Dx:   Encounter Diagnosis     ICD-10-CM    1. Acute exacerbation of chronic low back pain  M54.50     G89.29           Start Time: 0950  Stop Time: 1020  Total time in clinic (min): 30 minutes    Subjective: Patient reports feeling some pain waking this morning. Slacked on his exercises since last session.       Objective: See treatment diary below  Hip IR R = 0 deg  LS AROM WNL and pain free with exception of return from flexion (improved post bridges).       Assessment: Tolerated treatment well. Patient  with improve pain post bridges as noted above, pain abolished after hip ext sliders. Discussed performing sliders for HEP along with continued bridges.       Plan: Continue per plan of care.      Precautions: standard       Manuals 1/10 1/15           STM R psoas AB-10'            R hip distraction  AB - with IR MWM  3 rds                                     Neuro Re-Ed  1/15           Bridge  BHB  5\"x10           Palloff press  OJB- 5\"x10                                                                            Ther Ex 1/10 1/15           KEVIN 10x 10x           R hip IR on chair 10x HEP           Hip ext slider R  15x                                                                            Ther Activity                                       Gait Training                                       Modalities                                            "

## 2024-01-15 NOTE — TELEPHONE ENCOUNTER
----- Message from JOSE Choi sent at 1/15/2024 12:52 PM EST -----  Urine culture is negative for infection.   All blood work is stable, no diabetes, no elevation of PSA.  Recommend urology follow up.   Please provide [facility] contact information.

## 2024-01-16 ENCOUNTER — OFFICE VISIT (OUTPATIENT)
Dept: UROLOGY | Facility: CLINIC | Age: 69
End: 2024-01-16
Payer: COMMERCIAL

## 2024-01-16 VITALS
OXYGEN SATURATION: 98 % | BODY MASS INDEX: 42.36 KG/M2 | WEIGHT: 286.03 LBS | HEIGHT: 69 IN | DIASTOLIC BLOOD PRESSURE: 82 MMHG | HEART RATE: 87 BPM | SYSTOLIC BLOOD PRESSURE: 136 MMHG

## 2024-01-16 DIAGNOSIS — R35.0 URINARY FREQUENCY: Primary | ICD-10-CM

## 2024-01-16 LAB
POST-VOID RESIDUAL VOLUME, ML POC: 15 ML
SL AMB  POCT GLUCOSE, UA: NORMAL
SL AMB LEUKOCYTE ESTERASE,UA: NORMAL
SL AMB POCT BILIRUBIN,UA: NORMAL
SL AMB POCT BLOOD,UA: NORMAL
SL AMB POCT CLARITY,UA: CLEAR
SL AMB POCT COLOR,UA: YELLOW
SL AMB POCT KETONES,UA: NORMAL
SL AMB POCT NITRITE,UA: NORMAL
SL AMB POCT PH,UA: 5
SL AMB POCT SPECIFIC GRAVITY,UA: 1.01
SL AMB POCT URINE PROTEIN: NORMAL
SL AMB POCT UROBILINOGEN: 0.2

## 2024-01-16 PROCEDURE — 81002 URINALYSIS NONAUTO W/O SCOPE: CPT | Performed by: PHYSICIAN ASSISTANT

## 2024-01-16 PROCEDURE — 99203 OFFICE O/P NEW LOW 30 MIN: CPT | Performed by: PHYSICIAN ASSISTANT

## 2024-01-16 PROCEDURE — 51798 US URINE CAPACITY MEASURE: CPT | Performed by: PHYSICIAN ASSISTANT

## 2024-01-16 NOTE — PROGRESS NOTES
UROLOGY CONSULTATION NOTE     Patient Identifiers: Dior Lock (MRN: [id number])  Service Requesting Consultation: Lazaro Kapoor MD   Service Providing Consultation:  Urology, Johnny Mace PA-C  Consults  Date of Service: 1/16/2024    Reason for Consultation: Urinary frequency    History of Present Illness:     Dior Lock is a 68 y.o. male who saw Dr. Marino several years ago for orchialgia.  He required no treatment at that time.  He comes in complaining of perceived urinary frequency and urgency.  He has no nocturia.  He is not had any burning or hematuria.  He feels he goes to the bathroom and then a few minutes later has an urge to go again.  Bladder is empty PVR 35 mL.  Urine dip was clear.  PSA 0.81.  Denies any family history of prostate bladder or kidney diseases.  He says he is concerned about cancer.    Past Medical, Past Surgical History:     Past Medical History:   Diagnosis Date    Arthritis     Essential hypertension 12/16/2021    Gilbert's syndrome 1/6/2020    Intermittent elevated bilirubin.  Liver ultrasound.    Obesity     Unilateral hearing loss, left 1/18/2022   :    Past Surgical History:   Procedure Laterality Date    HAND SURGERY      [place] COLONOSCOPY FLX DX W/COLLJ SPEC WHEN PFRMD N/A 3/22/2018    Procedure: COLONOSCOPY;  Surgeon: Mark Bland MD;  Location: [facility];  Service: Gastroenterology    TONSILECTOMY AND ADNOIDECTOMY      TOOTH EXTRACTION     :    Medications, Allergies:     Current Outpatient Medications:     naproxen (NAPROSYN) 375 mg tablet, Take 1 tablet (375 mg total) by mouth 2 (two) times a day with meals for 14 days (Patient not taking: Reported on 1/16/2024), Disp: 28 tablet, Rfl: 0    Allergies:  Allergies   Allergen Reactions    Cefadroxil      Action Taken: fever;     Amlodipine Other (See Comments)      Brain fog   :    Social and Family History:   Social History:   Social History     Tobacco Use    Smoking status: Never    Smokeless tobacco: Never  "  Vaping Use    Vaping status: Never Used   Substance Use Topics    Alcohol use: Not Currently     Comment: occasional    Drug use: No   .    Social History     Tobacco Use   Smoking Status Never   Smokeless Tobacco Never       Family History:  Family History   Problem Relation Age of Onset    Breast cancer Mother     Heart disease Father         MI in his 50's   :     Review of Systems:     General: Fever, chills, or night sweats: negative  Cardiac: Negative for chest pain.    Pulmonary: Negative for shortness of breath.  Gastrointestinal: Abdominal pain negative.  Nausea, vomiting, or diarrhea negative,  Genitourinary: See HPI above.  Patient does not have hematuria.  All other systems queried were negative.    Physical Exam:   General: Patient is pleasant and in NAD. Awake and alert  /82 (BP Location: Left arm, Patient Position: Sitting, Cuff Size: Adult)   Pulse 87   Ht 5' 9\" (1.753 m)   Wt 130 kg (286 lb 0.5 oz)   SpO2 98%   BMI 42.24 kg/m²   HEENT:  Conjunctiva are clear  Constitutional:  pleasant and cooperative     no apparent distress  Cardiac: Peripheral edema: negative  Pulmonary: Non-labored breathing  Abdomen: Soft, non-tender, non-distended.  No surgical scars.  No masses, tenderness, hernias noted.    Genitourinary: Negative CVA tenderness, negative suprapubic tenderness.  Extremities:  Moves all extremities  Neurological:CNII-XII intact. No numbness or tingling. Essentially non focal neurologic exam  Psychiatric:mood affect and behavior normal    (Male): Penis circumcised, phallus normal, meatus patent.  Testicles descended into scrotum bilaterally without masses nor tenderness.  No inguinal hernias bilaterally.  BRAD: Prostate is not enlarged at 32 grams. The prostate is not boggy. The prostate is not tender.  No nodules noted.      Labs:     Lab Results   Component Value Date    HGB 15.3 01/13/2024    HCT 45.6 01/13/2024    WBC 5.25 01/13/2024     01/13/2024   ]    Lab Results "   Component Value Date     10/29/2014    K 3.9 01/13/2024     01/13/2024    CO2 27 01/13/2024    BUN 14 01/13/2024    CREATININE 0.71 01/13/2024    CALCIUM 8.9 01/13/2024    GLUCOSE 104 10/29/2014   ]    Imaging:   I personally reviewed the images and report of the following studies, and reviewed them with the patient:  None    ASSESSMENT:     #1.  Voiding dysfunction    PLAN:   -I reviewed the findings and the options of management with the patient  -We talked about obstructive symptoms versus storage symptoms  -Reviewed the possibility that anxiety is playing a role in his symptoms which he brought up  -I do not recommend any treatment at this time  -We talked about postponing urination during the day when possible  -He also feels his symptoms may be improving  -He will call me in about 2 months if he is not any better otherwise I will see him in the summer for a regular follow-up and recheck his symptoms and PVR      Thank you for allowing me to participate in this patients’ care.  Please do not hesitate to call with any additional questions.  Johnny Mace PA-C

## 2024-01-22 ENCOUNTER — OFFICE VISIT (OUTPATIENT)
Dept: PHYSICAL THERAPY | Facility: REHABILITATION | Age: 69
End: 2024-01-22
Payer: COMMERCIAL

## 2024-01-22 DIAGNOSIS — M54.50 ACUTE EXACERBATION OF CHRONIC LOW BACK PAIN: Primary | ICD-10-CM

## 2024-01-22 DIAGNOSIS — G89.29 ACUTE EXACERBATION OF CHRONIC LOW BACK PAIN: Primary | ICD-10-CM

## 2024-01-22 PROCEDURE — 97112 NEUROMUSCULAR REEDUCATION: CPT | Performed by: PHYSICAL THERAPIST

## 2024-01-22 PROCEDURE — 97140 MANUAL THERAPY 1/> REGIONS: CPT | Performed by: PHYSICAL THERAPIST

## 2024-01-22 PROCEDURE — 97110 THERAPEUTIC EXERCISES: CPT | Performed by: PHYSICAL THERAPIST

## 2024-01-22 NOTE — PROGRESS NOTES
"Daily Note     Today's date: 2024  Patient name: Dior Lock  : 1955  MRN: [id number]  Referring provider: Lazaro Kapoor MD  Dx:   Encounter Diagnosis     ICD-10-CM    1. Acute exacerbation of chronic low back pain  M54.50     G89.29           Start Time: 0830  Stop Time: 09  Total time in clinic (min): 42 minutes    Subjective: Patient reports pain is improving, feeling better. Morning still painful but he is having mornings that are pain free      Objective: See treatment diary below  Improving hip IR ~ 15 deg today  FOTO goal met    Assessment: Tolerated treatment well. Patient performs all exercises pain free, good form. Maintains squat position with anti rotation press out without pain.       Plan: Continue per plan of care.      Precautions: standard       Manuals 1/10 1/15 1/22          STM R psoas AB-10'            R hip distraction  AB - with IR MWM  3 rds AB- IR MWM  3x8                                    Neuro Re-Ed  1/15 1/22          Bridge  BHB  5\"x10 On pball  2x10  3\"hold          Palloff press  OJB- 5\"x10 OJB  5\"x10          Prone hip ext off table   3\"x10 ea side          VG   L7-10x SL                                                 Ther Ex 1/10 1/15 1/22          KEVIN 10x 10x           R hip IR on chair 10x HEP           Hip ext slider R  15x 15x          Deadlift   20# KB  2x8                                                              Ther Activity                                       Gait Training                                       Modalities                                              "

## 2024-01-24 ENCOUNTER — OFFICE VISIT (OUTPATIENT)
Dept: PHYSICAL THERAPY | Facility: REHABILITATION | Age: 69
End: 2024-01-24
Payer: COMMERCIAL

## 2024-01-24 DIAGNOSIS — M54.50 ACUTE EXACERBATION OF CHRONIC LOW BACK PAIN: Primary | ICD-10-CM

## 2024-01-24 DIAGNOSIS — G89.29 ACUTE EXACERBATION OF CHRONIC LOW BACK PAIN: Primary | ICD-10-CM

## 2024-01-24 PROCEDURE — 97140 MANUAL THERAPY 1/> REGIONS: CPT | Performed by: PHYSICAL THERAPIST

## 2024-01-24 PROCEDURE — 97112 NEUROMUSCULAR REEDUCATION: CPT | Performed by: PHYSICAL THERAPIST

## 2024-01-24 PROCEDURE — 97110 THERAPEUTIC EXERCISES: CPT | Performed by: PHYSICAL THERAPIST

## 2024-01-24 NOTE — PROGRESS NOTES
"Daily Note     Today's date: 2024  Patient name: Dior Lock  : 1955  MRN: [id number]  Referring provider: Lazaro Kapoor MD  Dx:   Encounter Diagnosis     ICD-10-CM    1. Acute exacerbation of chronic low back pain  M54.50     G89.29           Start Time: 0820  Stop Time: 0859  Total time in clinic (min): 39 minutes    Subjective: Patient reports some soreness this morning, yesterday felt good when waking up first thing in the morning.       Objective: See treatment diary below  LS AROM pfree    Assessment: Tolerated treatment well. Patient IR ~ 10 deg as this has improved over the last 2 weeks. Still with some difficulty achieving full hip extension actively as this was seen on prone hip ext exercises. Overall he is doing well, reports minimal to no limitation at this time. Will consider DC in 1-2 session pending progress.       Plan: Continue per plan of care.      Precautions: standard       Manuals 1/10 1/15 1/22 1/24         STM R psoas AB-10'            R hip distraction  AB - with IR MWM  3 rds AB- IR MWM  3x8 AB IR 3x8                                   Neuro Re-Ed  1/15 1/22 1/24         Bridge  BHB  5\"x10 On pball  2x10  3\"hold Ball adduction   20x         Palloff press  OJB- 5\"x10 OJB  5\"x10 OJB 5\"x10         Prone hip ext off table   3\"x10 ea side 3\"x10 ea side         VG   L7-10x SL L7-3' pre                                                Ther Ex 1/10 1/15 1/22 1/24         KEVIN 10x 10x           R hip IR on chair 10x HEP           Hip ext slider R  15x 15x 20x         Deadlift   20# KB  2x8 20# KB  2x10                                                             Ther Activity                                       Gait Training                                       Modalities                                                "

## 2024-01-29 ENCOUNTER — OFFICE VISIT (OUTPATIENT)
Dept: PHYSICAL THERAPY | Facility: REHABILITATION | Age: 69
End: 2024-01-29
Payer: COMMERCIAL

## 2024-01-29 DIAGNOSIS — M54.50 ACUTE EXACERBATION OF CHRONIC LOW BACK PAIN: Primary | ICD-10-CM

## 2024-01-29 DIAGNOSIS — G89.29 ACUTE EXACERBATION OF CHRONIC LOW BACK PAIN: Primary | ICD-10-CM

## 2024-01-29 PROCEDURE — 97140 MANUAL THERAPY 1/> REGIONS: CPT | Performed by: PHYSICAL THERAPIST

## 2024-01-29 PROCEDURE — 97110 THERAPEUTIC EXERCISES: CPT | Performed by: PHYSICAL THERAPIST

## 2024-01-29 PROCEDURE — 97112 NEUROMUSCULAR REEDUCATION: CPT | Performed by: PHYSICAL THERAPIST

## 2024-01-29 NOTE — PROGRESS NOTES
"Daily Note     Today's date: 2024  Patient name: Dior Lock  : 1955  MRN: [id number]  Referring provider: Lazaro Kapoor MD  Dx:   Encounter Diagnosis     ICD-10-CM    1. Acute exacerbation of chronic low back pain  M54.50     G89.29           Start Time: 0835  Stop Time: 0915  Total time in clinic (min): 40 minutes    Subjective: Patient reports feeling well overall. Only time he feels pain is in the morning. Doing well moving throughout the day. He does not feel the pain in the morning long, just a few steps. Reports 80% GROC      Objective: See treatment diary below  LS AROM Pfree, WNL    Assessment: Tolerated treatment well. Patient with good form when performing all exercises. He is progressing well as we did add weight to todays squats which he perform without pain or limitation. Likely DC in nex 2-3 sessions with FOTO goal met. Will add bridge with march to isolate R hip strength.       Plan: Continue per plan of care.      Precautions: standard       Manuals 1/10 1/15 1/22 1/24 1/29        STM R psoas AB-10'            R hip distraction  AB - with IR MWM  3 rds AB- IR MWM  3x8 AB IR 3x8 AB Ir 3x8 with Cr holds last 2 rds                                  Neuro Re-Ed  1/15 1/22 1/24 1/29        Bridge  BHB  5\"x10 On pball  2x10  3\"hold Ball adduction   20x Band - YHB  20x        Palloff press  OJB- 5\"x10 OJB  5\"x10 OJB 5\"x10 OJB  5\"x10        Prone hip ext off table   3\"x10 ea side 3\"x10 ea side 3\"x10        VG   L7-10x SL L7-3' pre L7  3' pre                                               Ther Ex 1/10 1/15 1/22 1/24 1/29        KEVIN 10x 10x   15x        R hip IR on chair 10x HEP           Hip ext slider R  15x 15x 20x         Deadlift   20# KB  2x8 20# KB  2x10 25#  KB  2x10                                                            Ther Activity                                       Gait Training                                       Modalities                                                "

## 2024-01-31 ENCOUNTER — OFFICE VISIT (OUTPATIENT)
Dept: PHYSICAL THERAPY | Facility: REHABILITATION | Age: 69
End: 2024-01-31
Payer: COMMERCIAL

## 2024-01-31 DIAGNOSIS — G89.29 ACUTE EXACERBATION OF CHRONIC LOW BACK PAIN: Primary | ICD-10-CM

## 2024-01-31 DIAGNOSIS — M54.50 ACUTE EXACERBATION OF CHRONIC LOW BACK PAIN: Primary | ICD-10-CM

## 2024-01-31 PROCEDURE — 97140 MANUAL THERAPY 1/> REGIONS: CPT | Performed by: PHYSICAL THERAPIST

## 2024-01-31 PROCEDURE — 97112 NEUROMUSCULAR REEDUCATION: CPT | Performed by: PHYSICAL THERAPIST

## 2024-01-31 PROCEDURE — 97110 THERAPEUTIC EXERCISES: CPT | Performed by: PHYSICAL THERAPIST

## 2024-01-31 NOTE — PROGRESS NOTES
"Daily Note     Today's date: 2024  Patient name: Dior Lock  : 1955  MRN: [id number]  Referring provider: Lazaro Kapoor MD  Dx:   Encounter Diagnosis     ICD-10-CM    1. Acute exacerbation of chronic low back pain  M54.50     G89.29           Start Time: 0900  Stop Time: 0945  Total time in clinic (min): 45 minutes    Subjective: Patient reports minimal to no pain the last 2 days. Feeling good. Feels he would be even better if he did his HEP.       Objective: See treatment diary below  LS AROM WNL pfree  FOTO goal MET    Assessment: Tolerated treatment well. Patient overall is progressing very well with likely DC in next 1-2 session. No pain with ADLs and morning pain improving. He also has had improvements in hip IR motion which I feel was largely contributing to his pain.       Plan: Continue per plan of care.      Precautions: standard       Manuals 1/10 1/15 1/22 1/24 1/29 1/31       STM R psoas AB-10'            R hip distraction  AB - with IR MWM  3 rds AB- IR MWM  3x8 AB IR 3x8 AB Ir 3x8 with Cr holds last 2 rds AB Ir 3x8 with Cr holds last 2 rds                                 Neuro Re-Ed  1/15 1/22 1/24 1/29        Bridge  BHB  5\"x10 On pball  2x10  3\"hold Ball adduction   20x Band - YHB  20x ball -   20x       Palloff press  OJB- 5\"x10 OJB  5\"x10 OJB 5\"x10 OJB  5\"x10 OJB  5\"x10       Prone hip ext off table   3\"x10 ea side 3\"x10 ea side 3\"x10 3\"x10       VG   L7-10x SL L7-3' pre L7  3' pre L7  3' pre                                              Ther Ex 1/10 1/15 1/22 1/24 1/29 1/31       KEVIN 10x 10x   15x 15x       R hip IR on chair 10x HEP           Hip ext slider R  15x 15x 20x         Deadlift   20# KB  2x8 20# KB  2x10 25#  KB  2x10 25#  KB  2x10                                                           Ther Activity                                       Gait Training                                       Modalities                                                    "

## 2024-02-01 ENCOUNTER — RA CDI HCC (OUTPATIENT)
Dept: OTHER | Facility: HOSPITAL | Age: 69
End: 2024-02-01

## 2024-02-02 ENCOUNTER — TELEPHONE (OUTPATIENT)
Age: 69
End: 2024-02-02

## 2024-02-02 NOTE — TELEPHONE ENCOUNTER
Scheduled date of colonoscopy (as of today): 4/2/2024  Physician performing colonoscopy: Dr. Bland  Location of colonoscopy: [facility]  Bowel prep reviewed with patient: Miralax/Duloclax  Instructions reviewed with patient by: Lupe FRIAS, Sent via [employer]  Clearances: N/A

## 2024-02-02 NOTE — TELEPHONE ENCOUNTER
Pt stated in response to the heart valve disease that at one point a long time ago he was told doctors heard an extra heart beat but he is not under the care of a cardiologist and everything is good now.

## 2024-02-02 NOTE — TELEPHONE ENCOUNTER
02/02/24  Screened by: Lupe Landaverde    Referring Provider     Pre- Screening:     There is no height or weight on file to calculate BMI.  Has patient been referred for a routine screening Colonoscopy? yes  Is the patient between 45-75 years old? yes      Previous Colonoscopy yes   If yes:    Date: June 2020    Facility:     Reason:         Does the patient want to see a Gastroenterologist prior to their procedure OR are they having any GI symptoms? no    Has the patient been hospitalized or had abdominal surgery in the past 6 months? no    Does the patient use supplemental oxygen? no    Does the patient take Coumadin, Lovenox, Plavix, Elliquis, Xarelto, or other blood thinning medication? no    Has the patient had a stroke, cardiac event, or stent placed in the past year? no    If patient is between 45yrs - 49yrs, please advise patient that we will have to confirm benefits & coverage with their insurance company for a routine screening colonoscopy.

## 2024-02-08 ENCOUNTER — OFFICE VISIT (OUTPATIENT)
Dept: PHYSICAL THERAPY | Facility: REHABILITATION | Age: 69
End: 2024-02-08
Payer: COMMERCIAL

## 2024-02-08 DIAGNOSIS — M54.50 ACUTE EXACERBATION OF CHRONIC LOW BACK PAIN: Primary | ICD-10-CM

## 2024-02-08 DIAGNOSIS — G89.29 ACUTE EXACERBATION OF CHRONIC LOW BACK PAIN: Primary | ICD-10-CM

## 2024-02-08 PROCEDURE — 97110 THERAPEUTIC EXERCISES: CPT | Performed by: PHYSICAL THERAPIST

## 2024-02-08 PROCEDURE — 97140 MANUAL THERAPY 1/> REGIONS: CPT | Performed by: PHYSICAL THERAPIST

## 2024-02-12 ENCOUNTER — RA CDI HCC (OUTPATIENT)
Dept: OTHER | Facility: HOSPITAL | Age: 69
End: 2024-02-12

## 2024-02-12 PROBLEM — E66.01 MORBID OBESITY WITH BMI OF 40.0-44.9, ADULT (HCC): Status: ACTIVE | Noted: 2021-11-16

## 2024-02-12 PROBLEM — E66.01 MORBID OBESITY WITH BMI OF 40.0-44.9, ADULT (HCC): Status: ACTIVE | Noted: 2024-02-12

## 2024-02-15 ENCOUNTER — OFFICE VISIT (OUTPATIENT)
Dept: FAMILY MEDICINE CLINIC | Facility: CLINIC | Age: 69
End: 2024-02-15
Payer: COMMERCIAL

## 2024-02-15 VITALS
RESPIRATION RATE: 16 BRPM | SYSTOLIC BLOOD PRESSURE: 120 MMHG | OXYGEN SATURATION: 99 % | HEIGHT: 69 IN | WEIGHT: 275 LBS | TEMPERATURE: 97.3 F | HEART RATE: 69 BPM | BODY MASS INDEX: 40.73 KG/M2 | DIASTOLIC BLOOD PRESSURE: 80 MMHG

## 2024-02-15 DIAGNOSIS — Z00.00 MEDICARE ANNUAL WELLNESS VISIT, SUBSEQUENT: Primary | ICD-10-CM

## 2024-02-15 PROCEDURE — G0439 PPPS, SUBSEQ VISIT: HCPCS | Performed by: FAMILY MEDICINE

## 2024-02-15 NOTE — PROGRESS NOTES
Assessment and Plan:     Problem List Items Addressed This Visit    None  Visit Diagnoses       Medicare annual wellness visit, subsequent    -  Primary             Preventive health issues were discussed with patient, and age appropriate screening tests were ordered as noted in patient's After Visit Summary.  Personalized health advice and appropriate referrals for health education or preventive services given if needed, as noted in patient's After Visit Summary.     History of Present Illness:     Patient presents for a Medicare Wellness Visit    HPI   Patient Care Team:  Lazaro Kapoor MD as PCP - General (Family Medicine)  Dariela Garrido MD as PCP - [facility]CP-[facility]  Ely Mahajan [facility]  MD Ladarius Faria MD Loveleen K Sidhu, MD as Endoscopist    Continues on no medications.  Recently went to physical therapy for exacerbation of back pain.  Back is better.  Has no complaints.  Not interested in vaccines although did have 3 COVID shots.  Extensive blood work done 1 month ago was normal including PSA, thyroid, lipid profile, A1c, CBC, and chemistry.  Urinary urgency resolved on its own.  Was seen by urology who did not offer any treatment.     Review of Systems:     Review of Systems     Problem List:     Patient Active Problem List   Diagnosis    History of adenomatous polyp of colon    Gilbert's syndrome    Fatty liver    Essential hypertension    Unilateral hearing loss, left    Morbid obesity with BMI of 40.0-44.9, adult (HCC)      Past Medical and Surgical History:     Past Medical History:   Diagnosis Date    Arthritis     Essential hypertension 12/16/2021    Gilbert's syndrome 1/6/2020    Intermittent elevated bilirubin.  Liver ultrasound.    Obesity     Unilateral hearing loss, left 1/18/2022     Past Surgical History:   Procedure Laterality Date    HAND SURGERY      [place] COLONOSCOPY FLX DX W/COLLJ SPEC WHEN PFRMD N/A 3/22/2018    Procedure:  COLONOSCOPY;  Surgeon: Mark Bland MD;  Location: [facility];  Service: Gastroenterology    TONSILECTOMY AND ADNOIDECTOMY      TOOTH EXTRACTION        Family History:     Family History   Problem Relation Age of Onset    Breast cancer Mother     Heart disease Father         MI in his 50's      Social History:     Social History     Socioeconomic History    Marital status: Single     Spouse name: None    Number of children: None    Years of education: None    Highest education level: None   Occupational History    Occupation: retired   Tobacco Use    Smoking status: Never    Smokeless tobacco: Never   Vaping Use    Vaping status: Never Used   Substance and Sexual Activity    Alcohol use: Yes     Comment: occasional    Drug use: No    Sexual activity: Not Currently   Other Topics Concern    None   Social History Narrative      Single.  Lives alone.      Retired.  Worked for the [employer] retiring in 2000. Worked part-time jobs afterward.      Coaches youth football.  And baseball.    Walks about 6 miles a day.     Social Determinants of Health     Financial Resource Strain: Low Risk  (2/7/2023)    Overall Financial Resource Strain (CARDIA)     Difficulty of Paying Living Expenses: Not hard at all   Food Insecurity: Not on file   Transportation Needs: No Transportation Needs (2/7/2023)    PRAPARE - Transportation     Lack of Transportation (Medical): No     Lack of Transportation (Non-Medical): No   Physical Activity: Not on file   Stress: Not on file   Social Connections: Not on file   Intimate Partner Violence: Not on file   Housing Stability: Not on file      Medications and Allergies:     Current Outpatient Medications   Medication Sig Dispense Refill    naproxen (NAPROSYN) 375 mg tablet Take 1 tablet (375 mg total) by mouth 2 (two) times a day with meals for 14 days 28 tablet 0     No current facility-administered medications for this visit.     Allergies   Allergen Reactions    Cefadroxil      Action  Taken: fever;     Amlodipine Other (See Comments)      Brain fog      Immunizations:     Immunization History   Administered Date(s) Administered    COVID-19 PFIZER VACCINE 0.3 ML IM 02/17/2021, 03/08/2021, 10/30/2021    COVID-19 Pfizer Vac BIVALENT Lamonte-sucrose 12 Yr+ IM 09/27/2022, 10/12/2022    INFLUENZA 10/19/2022    Pneumococcal Polysaccharide PPV23 11/16/2021      Health Maintenance:         Topic Date Due    Colorectal Cancer Screening  06/29/2022    Hepatitis C Screening  Completed         Topic Date Due    Pneumococcal Vaccine: 65+ Years (2 of 2 - PCV) 11/16/2022    Influenza Vaccine (1) 09/01/2023    COVID-19 Vaccine (6 - 2023-24 season) 09/01/2023      Medicare Screening Tests and Risk Assessments:     Dior is here for his Subsequent Wellness visit. Last Medicare Wellness visit information reviewed, patient interviewed and updates made to the record today.      Health Risk Assessment:   Patient rates overall health as good. Patient feels that their physical health rating is same. Patient is satisfied with their life. Eyesight was rated as same. Hearing was rated as same. Patient feels that their emotional and mental health rating is same. Patients states they are never, rarely angry. Patient states they are never, rarely unusually tired/fatigued. Pain experienced in the last 7 days has been none. Patient states that he has experienced no weight loss or gain in last 6 months.     Depression Screening:   PHQ-2 Score: 0      Fall Risk Screening:   In the past year, patient has experienced: no history of falling in past year      Home Safety:  Patient does not have trouble with stairs inside or outside of their home. Patient has working smoke alarms and has working carbon monoxide detector. Home safety hazards include: none.     Nutrition:   Current diet is Regular and Limited junk food.     Medications:   Patient is not currently taking any over-the-counter supplements. Patient is able to manage medications.  "    Activities of Daily Living (ADLs)/Instrumental Activities of Daily Living (IADLs):   Walk and transfer into and out of bed and chair?: Yes  Dress and groom yourself?: Yes    Bathe or shower yourself?: Yes    Feed yourself? Yes  Do your laundry/housekeeping?: Yes  Manage your money, pay your bills and track your expenses?: Yes  Make your own meals?: Yes    Do your own shopping?: Yes    Previous Hospitalizations:   Any hospitalizations or ED visits within the last 12 months?: Yes    How many hospitalizations have you had in the last year?: 1-2    Advance Care Planning:   Living will: Yes    Durable POA for healthcare: Yes    Advanced directive: Yes      PREVENTIVE SCREENINGS      Cardiovascular Screening:    General: Screening Current      Diabetes Screening:     General: Screening Current      Colorectal Cancer Screening:     General: Screening Current      Prostate Cancer Screening:    General: Screening Current      Abdominal Aortic Aneurysm (AAA) Screening:    Risk factors include: age between 65-74 yo        Lung Cancer Screening:     General: Screening Not Indicated      Hepatitis C Screening:    General: Screening Current    Screening, Brief Intervention, and Referral to Treatment (SBIRT)    Screening    Typical number of drinks in a week: 0    Single Item Drug Screening:  How often have you used an illegal drug (including marijuana) or a prescription medication for non-medical reasons in the past year? never    Single Item Drug Screen Score: 0  Interpretation: Negative screen for possible drug use disorder    No results found.     Physical Exam:     /80   Pulse 69   Temp (!) 97.3 °F (36.3 °C) (Temporal)   Resp 16   Ht 5' 9\" (1.753 m)   Wt 125 kg (275 lb)   SpO2 99%   BMI 40.61 kg/m²     Physical Exam   Appears well.  Lungs are clear.  Heart regular with no murmur.  Abdomen obese and nontender.    Lazaro Kapoor MD  "

## 2024-02-15 NOTE — PATIENT INSTRUCTIONS
Medicare Preventive Visit Patient Instructions  Thank you for completing your Welcome to Medicare Visit or Medicare Annual Wellness Visit today. Your next wellness visit will be due in one year (2/15/2025).  The screening/preventive services that you may require over the next 5-10 years are detailed below. Some tests may not apply to you based off risk factors and/or age. Screening tests ordered at today's visit but not completed yet may show as past due. Also, please note that scanned in results may not display below.  Preventive Screenings:  Service Recommendations Previous Testing/Comments   Colorectal Cancer Screening  Colonoscopy    Fecal Occult Blood Test (FOBT)/Fecal Immunochemical Test (FIT)  Fecal DNA/Cologuard Test  Flexible Sigmoidoscopy Age: 45-75 years old   Colonoscopy: every 10 years (May be performed more frequently if at higher risk)  OR  FOBT/FIT: every 1 year  OR  Cologuard: every 3 years  OR  Sigmoidoscopy: every 5 years  Screening may be recommended earlier than age 45 if at higher risk for colorectal cancer. Also, an individualized decision between you and your healthcare provider will decide whether screening between the ages of 76-85 would be appropriate. Colonoscopy: 06/29/2020  FOBT/FIT: Not on file  Cologuard: Not on file  Sigmoidoscopy: Not on file    Screening Current     Prostate Cancer Screening Individualized decision between patient and health care provider in men between ages of 55-69   Medicare will cover every 12 months beginning on the day after your 50th birthday PSA: 0.81 ng/mL     Screening Current     Hepatitis C Screening Once for adults born between 1945 and 1965  More frequently in patients at high risk for Hepatitis C Hep C Antibody: 11/26/2019    Screening Current   Diabetes Screening 1-2 times per year if you're at risk for diabetes or have pre-diabetes Fasting glucose: 98 mg/dL (1/13/2024)  A1C: 5.2 % (1/13/2024)  Screening Current   Cholesterol Screening Once every 5  years if you don't have a lipid disorder. May order more often based on risk factors. Lipid panel: 01/13/2024  Screening Current      Other Preventive Screenings Covered by Medicare:  Abdominal Aortic Aneurysm (AAA) Screening: covered once if your at risk. You're considered to be at risk if you have a family history of AAA or a male between the age of 65-75 who smoking at least 100 cigarettes in your lifetime.  Lung Cancer Screening: covers low dose CT scan once per year if you meet all of the following conditions: (1) Age 55-77; (2) No signs or symptoms of lung cancer; (3) Current smoker or have quit smoking within the last 15 years; (4) You have a tobacco smoking history of at least 20 pack years (packs per day x number of years you smoked); (5) You get a written order from a healthcare provider.  Glaucoma Screening: covered annually if you're considered high risk: (1) You have diabetes OR (2) Family history of glaucoma OR (3)  aged 50 and older OR (4)  American aged 65 and older  Osteoporosis Screening: covered every 2 years if you meet one of the following conditions: (1) Have a vertebral abnormality; (2) On glucocorticoid therapy for more than 3 months; (3) Have primary hyperparathyroidism; (4) On osteoporosis medications and need to assess response to drug therapy.  HIV Screening: covered annually if you're between the age of 15-65. Also covered annually if you are younger than 15 and older than 65 with risk factors for HIV infection. For pregnant patients, it is covered up to 3 times per pregnancy.    Immunizations:  Immunization Recommendations   Influenza Vaccine Annual influenza vaccination during flu season is recommended for all persons aged >= 6 months who do not have contraindications   Pneumococcal Vaccine   * Pneumococcal conjugate vaccine = PCV13 (Prevnar 13), PCV15 (Vaxneuvance), PCV20 (Prevnar 20)  * Pneumococcal polysaccharide vaccine = PPSV23 (Pneumovax) Adults 19-63 yo  with certain risk factors or if 65+ yo  If never received any pneumonia vaccine: recommend Prevnar 20 (PCV20)  Give PCV20 if previously received 1 dose of PCV13 or PPSV23   Hepatitis B Vaccine 3 dose series if at intermediate or high risk (ex: diabetes, end stage renal disease, liver disease)   Respiratory syncytial virus (RSV) Vaccine - COVERED BY MEDICARE PART D  * RSVPreF3 (Arexvy) CDC recommends that adults 60 years of age and older may receive a single dose of RSV vaccine using shared clinical decision-making (SCDM)   Tetanus (Td) Vaccine - COST NOT COVERED BY MEDICARE PART B Following completion of primary series, a booster dose should be given every 10 years to maintain immunity against tetanus. Td may also be given as tetanus wound prophylaxis.   Tdap Vaccine - COST NOT COVERED BY MEDICARE PART B Recommended at least once for all adults. For pregnant patients, recommended with each pregnancy.   Shingles Vaccine (Shingrix) - COST NOT COVERED BY MEDICARE PART B  2 shot series recommended in those 19 years and older who have or will have weakened immune systems or those 50 years and older     Health Maintenance Due:      Topic Date Due   • Colorectal Cancer Screening  06/29/2022   • Hepatitis C Screening  Completed     Immunizations Due:      Topic Date Due   • Pneumococcal Vaccine: 65+ Years (2 of 2 - PCV) 11/16/2022   • Influenza Vaccine (1) 09/01/2023   • COVID-19 Vaccine (6 - 2023-24 season) 09/01/2023     Advance Directives   What are advance directives?  Advance directives are legal documents that state your wishes and plans for medical care. These plans are made ahead of time in case you lose your ability to make decisions for yourself. Advance directives can apply to any medical decision, such as the treatments you want, and if you want to donate organs.   What are the types of advance directives?  There are many types of advance directives, and each state has rules about how to use them. You may choose  a combination of any of the following:  Living will:  This is a written record of the treatment you want. You can also choose which treatments you do not want, which to limit, and which to stop at a certain time. This includes surgery, medicine, IV fluid, and tube feedings.   Durable power of  for healthcare (DPAHC):  This is a written record that states who you want to make healthcare choices for you when you are unable to make them for yourself. This person, called a proxy, is usually a family member or a friend. You may choose more than 1 proxy.  Do not resuscitate (DNR) order:  A DNR order is used in case your heart stops beating or you stop breathing. It is a request not to have certain forms of treatment, such as CPR. A DNR order may be included in other types of advance directives.  Medical directive:  This covers the care that you want if you are in a coma, near death, or unable to make decisions for yourself. You can list the treatments you want for each condition. Treatment may include pain medicine, surgery, blood transfusions, dialysis, IV or tube feedings, and a ventilator (breathing machine).  Values history:  This document has questions about your views, beliefs, and how you feel and think about life. This information can help others choose the care that you would choose.  Why are advance directives important?  An advance directive helps you control your care. Although spoken wishes may be used, it is better to have your wishes written down. Spoken wishes can be misunderstood, or not followed. Treatments may be given even if you do not want them. An advance directive may make it easier for your family to make difficult choices about your care.   Weight Management   Why it is important to manage your weight:  Being overweight increases your risk of health conditions such as heart disease, high blood pressure, type 2 diabetes, and certain types of cancer. It can also increase your risk for  osteoarthritis, sleep apnea, and other respiratory problems. Aim for a slow, steady weight loss. Even a small amount of weight loss can lower your risk of health problems.  How to lose weight safely:  A safe and healthy way to lose weight is to eat fewer calories and get regular exercise. You can lose up about 1 pound a week by decreasing the number of calories you eat by 500 calories each day.   Healthy meal plan for weight management:  A healthy meal plan includes a variety of foods, contains fewer calories, and helps you stay healthy. A healthy meal plan includes the following:  Eat whole-grain foods more often.  A healthy meal plan should contain fiber. Fiber is the part of grains, fruits, and vegetables that is not broken down by your body. Whole-grain foods are healthy and provide extra fiber in your diet. Some examples of whole-grain foods are whole-wheat breads and pastas, oatmeal, brown rice, and bulgur.  Eat a variety of vegetables every day.  Include dark, leafy greens such as spinach, kale, mary greens, and mustard greens. Eat yellow and orange vegetables such as carrots, sweet potatoes, and winter squash.   Eat a variety of fruits every day.  Choose fresh or canned fruit (canned in its own juice or light syrup) instead of juice. Fruit juice has very little or no fiber.  Eat low-fat dairy foods.  Drink fat-free (skim) milk or 1% milk. Eat fat-free yogurt and low-fat cottage cheese. Try low-fat cheeses such as mozzarella and other reduced-fat cheeses.  Choose meat and other protein foods that are low in fat.  Choose beans or other legumes such as split peas or lentils. Choose fish, skinless poultry (chicken or turkey), or lean cuts of red meat (beef or pork). Before you cook meat or poultry, cut off any visible fat.   Use less fat and oil.  Try baking foods instead of frying them. Add less fat, such as margarine, sour cream, regular salad dressing and mayonnaise to foods. Eat fewer high-fat foods. Some  examples of high-fat foods include french fries, doughnuts, ice cream, and cakes.  Eat fewer sweets.  Limit foods and drinks that are high in sugar. This includes candy, cookies, regular soda, and sweetened drinks.  Exercise:  Exercise at least 30 minutes per day on most days of the week. Some examples of exercise include walking, biking, dancing, and swimming. You can also fit in more physical activity by taking the stairs instead of the elevator or parking farther away from stores. Ask your healthcare provider about the best exercise plan for you.      © Copyright [employer] 2018 Information is for End User's use only and may not be sold, redistributed or otherwise used for commercial purposes. All illustrations and images included in CareNotes® are the copyrighted property of A.D.A.M., Inc. or [employer]

## 2024-03-19 ENCOUNTER — ANESTHESIA EVENT (OUTPATIENT)
Dept: ANESTHESIOLOGY | Facility: HOSPITAL | Age: 69
End: 2024-03-19

## 2024-03-19 ENCOUNTER — ANESTHESIA (OUTPATIENT)
Dept: ANESTHESIOLOGY | Facility: HOSPITAL | Age: 69
End: 2024-03-19

## 2024-04-02 ENCOUNTER — ANESTHESIA EVENT (OUTPATIENT)
Dept: GASTROENTEROLOGY | Facility: AMBULARY SURGERY CENTER | Age: 69
End: 2024-04-02

## 2024-04-02 ENCOUNTER — ANESTHESIA (OUTPATIENT)
Dept: GASTROENTEROLOGY | Facility: AMBULARY SURGERY CENTER | Age: 69
End: 2024-04-02

## 2024-04-02 ENCOUNTER — HOSPITAL ENCOUNTER (OUTPATIENT)
Dept: GASTROENTEROLOGY | Facility: AMBULARY SURGERY CENTER | Age: 69
Setting detail: OUTPATIENT SURGERY
Discharge: HOME | End: 2024-04-02
Attending: INTERNAL MEDICINE
Payer: COMMERCIAL

## 2024-04-02 VITALS
HEART RATE: 72 BPM | TEMPERATURE: 97.8 F | SYSTOLIC BLOOD PRESSURE: 132 MMHG | RESPIRATION RATE: 20 BRPM | OXYGEN SATURATION: 99 % | DIASTOLIC BLOOD PRESSURE: 90 MMHG

## 2024-04-02 DIAGNOSIS — Z86.010 HISTORY OF ADENOMATOUS POLYP OF COLON: ICD-10-CM

## 2024-04-02 PROCEDURE — 88305 TISSUE EXAM BY PATHOLOGIST: CPT | Performed by: PATHOLOGY

## 2024-04-02 RX ORDER — SODIUM CHLORIDE, SODIUM LACTATE, POTASSIUM CHLORIDE, CALCIUM CHLORIDE 600; 310; 30; 20 MG/100ML; MG/100ML; MG/100ML; MG/100ML
INJECTION, SOLUTION INTRAVENOUS CONTINUOUS PRN
Status: DISCONTINUED | OUTPATIENT
Start: 2024-04-02 | End: 2024-04-02

## 2024-04-02 RX ORDER — PROPOFOL 10 MG/ML
INJECTION, EMULSION INTRAVENOUS AS NEEDED
Status: DISCONTINUED | OUTPATIENT
Start: 2024-04-02 | End: 2024-04-02

## 2024-04-02 RX ADMIN — PROPOFOL 100 MG: 10 INJECTION, EMULSION INTRAVENOUS at 11:46

## 2024-04-02 RX ADMIN — PROPOFOL 30 MG: 10 INJECTION, EMULSION INTRAVENOUS at 12:00

## 2024-04-02 RX ADMIN — PROPOFOL 50 MG: 10 INJECTION, EMULSION INTRAVENOUS at 11:54

## 2024-04-02 RX ADMIN — PROPOFOL 50 MG: 10 INJECTION, EMULSION INTRAVENOUS at 11:49

## 2024-04-02 RX ADMIN — SODIUM CHLORIDE, SODIUM LACTATE, POTASSIUM CHLORIDE, AND CALCIUM CHLORIDE: .6; .31; .03; .02 INJECTION, SOLUTION INTRAVENOUS at 11:46

## 2024-04-02 NOTE — H&P
History and Physical - [facility]  Dior Lock 68 y.o. male MRN: [id number]    HPI: Dior Lock is a 68 y.o. year old male who presents for cancer screening evaluation.      Review of Systems    Historical Information   Past Medical History:   Diagnosis Date    Arthritis     Essential hypertension 12/16/2021    Gilbert's syndrome 1/6/2020    Intermittent elevated bilirubin.  Liver ultrasound.    Obesity     Unilateral hearing loss, left 1/18/2022     Past Surgical History:   Procedure Laterality Date    HAND SURGERY      TX COLONOSCOPY FLX DX W/COLLJ SPEC WHEN PFRMD N/A 3/22/2018    Procedure: COLONOSCOPY;  Surgeon: Mark Bland MD;  Location: [facility];  Service: Gastroenterology    TONSILECTOMY AND ADNOIDECTOMY      TOOTH EXTRACTION       Social History   Social History     Substance and Sexual Activity   Alcohol Use Yes    Comment: occasional     Social History     Substance and Sexual Activity   Drug Use No     Social History     Tobacco Use   Smoking Status Never   Smokeless Tobacco Never     Family History   Problem Relation Age of Onset    Breast cancer Mother     Heart disease Father         MI in his 50's       Meds/Allergies     (Not in a hospital admission)      Allergies   Allergen Reactions    Cefadroxil      Action Taken: fever;     Amlodipine Other (See Comments)      Brain fog       Objective     /75   Pulse 88   Temp (!) 97 °F (36.1 °C) (Temporal)   Resp 20   SpO2 100%       PHYSICAL EXAM    Gen: NAD  CV: RRR  CHEST: Clear  ABD: soft, NT/ND  EXT: no edema  Neuro: AAO      ASSESSMENT/PLAN:  This is a 68 y.o. year old male here for colon cancer screening evaluation.  Patient was explained about the risks and benefits of the procedure. Risks including but not limited to bleeding, infection, perforation were explained in detail.     PLAN:   Procedure: Colonoscopy.

## 2024-04-02 NOTE — ANESTHESIA POSTPROCEDURE EVALUATION
Post-Op Assessment Note    CV Status:  Stable    Pain management: adequate       Mental Status:  Sleepy   Hydration Status:  Stable   PONV Controlled:  None   Airway Patency:  Patent and adequate     Post Op Vitals Reviewed: Yes    No anethesia notable event occurred.    Staff: CRNA               /63 (04/02/24 1212)    Temp 97.8 °F (36.6 °C) (04/02/24 1212)    Pulse 64 (04/02/24 1212)   Resp 18 (04/02/24 1212)    SpO2 92 % (04/02/24 1212)

## 2024-04-02 NOTE — ANESTHESIA PREPROCEDURE EVALUATION
Procedure:  COLONOSCOPY    Relevant Problems   CARDIO   (+) Essential hypertension      GI/HEPATIC   (+) Fatty liver      Other   (+) Gilbert's syndrome      BMI: 40.61 kg/m²   Physical Exam    Airway    Mallampati score: II  TM Distance: >3 FB  Neck ROM: full     Dental       Cardiovascular      Pulmonary      Other Findings        Anesthesia Plan  ASA Score- 2     Anesthesia Type- IV sedation with anesthesia with ASA Monitors.         Additional Monitors:     Airway Plan:            Plan Factors-    Chart reviewed.                      Induction- intravenous.    Postoperative Plan-     Informed Consent- Anesthetic plan and risks discussed with patient.  I personally reviewed this patient with the CRNA. Discussed and agreed on the Anesthesia Plan with the CRNA..

## 2024-04-04 PROCEDURE — 88305 TISSUE EXAM BY PATHOLOGIST: CPT | Performed by: PATHOLOGY

## 2024-05-29 ENCOUNTER — EVALUATION (OUTPATIENT)
Dept: PHYSICAL THERAPY | Facility: REHABILITATION | Age: 69
End: 2024-05-29
Payer: COMMERCIAL

## 2024-05-29 DIAGNOSIS — M54.50 RIGHT-SIDED LOW BACK PAIN WITHOUT SCIATICA, UNSPECIFIED CHRONICITY: ICD-10-CM

## 2024-05-29 DIAGNOSIS — M54.50 ACUTE RIGHT-SIDED LOW BACK PAIN WITHOUT SCIATICA: Primary | ICD-10-CM

## 2024-05-29 PROCEDURE — 97162 PT EVAL MOD COMPLEX 30 MIN: CPT | Performed by: PHYSICAL THERAPIST

## 2024-05-29 NOTE — PROGRESS NOTES
PT Evaluation     Today's date: 2024  Patient name: Dior Lock  : 1955  MRN: [id number]  Referring provider: Isaiah Lucia DO  Dx:   Encounter Diagnosis     ICD-10-CM    1. Acute right-sided low back pain without sciatica  M54.50           Start Time: 1105  Stop Time: 1150  Total time in clinic (min): 45 minutes    Assessment  Impairments: abnormal muscle firing, abnormal muscle tone, abnormal or restricted ROM, abnormal movement, activity intolerance, impaired physical strength and pain with function    Assessment details: Dior Lock is a 68 y.o. male presenting to outpatient physical therapy on 24 with referral from [facility] for acute on chronic non radicular LBP or R side. Pain is of gradual onset of the last week and mechanical in nature. Pain nociceptive in nature. Upon evaluation, Dior demonstrates impaired R hip ROM with + capsular pattern noted. Overall his LS is moving well and he has greater hip ROM deficits as I feel this is largly contributing to his pain. The listed impairments and functional limitation are effecting Dior ability to function at prior level. They can continue to benefit from physical therapy services at this times in order to address the above discussed impairments and functional limitation in order to allow for a return to premorbid status.    HEP: KEVIN LS, prone hip ext/glute kicks    Understanding of Dx/Px/POC: good     Prognosis: good    Plan  Patient would benefit from: skilled PT  Planned modality interventions: thermotherapy: hydrocollator packs    Planned therapy interventions: joint mobilization, manual therapy, ADL training, neuromuscular re-education, home exercise program, therapeutic exercise, therapeutic activities, strengthening, patient education and functional ROM exercises    Frequency: 2x week  Duration in weeks: 8  Treatment plan discussed with: patient        Subjective Evaluation    History of Present Illness  Mechanism of injury: Dior is a 68 y.o. male  "presenting to physical therapy on 05/29/24 with referral from [facility] for acute R sided LBP that began about 1 week ago of gradual onset. He has had multiple bouts of LBP with each bout becoming more frequent. States that he feels pain in the morning that gets better throughout the day. Pain is non radicular in nature. Over the last week pain is unchanging. He did have a few \"tweaks\" since seen in February that resolved in 1-2 days.     Patient symptoms are located on R side of LS.     Patient symptoms are intermittent. Pain felt with transitional movements. Walking has some soreness but tolerable. Pain with return from fwd bend   Patient symptoms are localized  Patient denies bowel and bladder changes (denies urinary retention)/saddle numbness    + pain with sneeze/cough       Patient Goals  Patient goals for therapy: decreased pain      Short Term Goals:   1. Patient will be Independent with hep  2. Patient will improve pain with activity by 50%  3. Patient will report GROC 50% or greater      Long Term Goals:   1. Patient will improve FOTO to greater then goal  2. Patient will improve pain with activity to 2/10 or less  3. Patient will continue with HEP independence to allow for decreased future reoccurrence of pain and loss in function  4. Patient will report GROC 75% or greater  5. Patient will have pain free LS AROM  6. Patient will have neg slump L  7. Patient will reach to  baseball without pain/limitation  8. Patient will get on/off toilet pfree      Objective    Flowsheet Rows      Flowsheet Row Most Recent Value   PT/OT G-Codes    Current Score 62   Projected Score 81          Myotomes (L/R): normal  Dermatome: (pinprick- L/R):  normal     Reflexes:  (L/R) L3-4:    2+ b/l    S1:    2+ bl    GAIT: decreased R hip ext, trunk rot comp  Squat assess: pain last 10% of return, abolishes with end range ext        Lumbar  % of normal   Flex. 75 - hip hinge - wide CINTHIA   Extn. 50   SG Left 75   SG " Right 75   ROT Left 75   ROT Right 75   Repeated Movements  Standing:  extension=   NE,NE  Flexion= NT          MMT         AROM          PROM    Hip       L       R        L           R      L     R   Flex.      100   Extn.         Abd.      30   Add.         IR.      0   ER.      30            G. Max 4+ 3+       G. Med.         Iliop.             Neuro Dynamic Testing:  Slump test: L=   + for R LBP  R=  neg    Straight leg raise:   L=   neg   R=    neg          Hip:   issa =  +  faddir=    capsular mobility=   +         Segmental mobility:   LS= hypomobile L4-5 b/l               Precautions: standard       Manuals                                                                 Neuro Re-Ed                                                                                                        Ther Ex                                                                                                                     Ther Activity                                       Gait Training                                       Modalities

## 2024-05-30 ENCOUNTER — OFFICE VISIT (OUTPATIENT)
Dept: FAMILY MEDICINE CLINIC | Facility: CLINIC | Age: 69
End: 2024-05-30
Payer: COMMERCIAL

## 2024-05-30 VITALS
TEMPERATURE: 98 F | RESPIRATION RATE: 16 BRPM | DIASTOLIC BLOOD PRESSURE: 80 MMHG | HEIGHT: 69 IN | OXYGEN SATURATION: 98 % | SYSTOLIC BLOOD PRESSURE: 130 MMHG | BODY MASS INDEX: 39.25 KG/M2 | HEART RATE: 70 BPM | WEIGHT: 265 LBS

## 2024-05-30 DIAGNOSIS — M54.50 RIGHT-SIDED LOW BACK PAIN WITHOUT SCIATICA, UNSPECIFIED CHRONICITY: Primary | ICD-10-CM

## 2024-05-30 DIAGNOSIS — I10 ESSENTIAL HYPERTENSION: ICD-10-CM

## 2024-05-30 DIAGNOSIS — E66.01 CLASS 2 SEVERE OBESITY DUE TO EXCESS CALORIES WITH SERIOUS COMORBIDITY AND BODY MASS INDEX (BMI) OF 39.0 TO 39.9 IN ADULT (HCC): ICD-10-CM

## 2024-05-30 PROBLEM — E66.812 CLASS 2 SEVERE OBESITY DUE TO EXCESS CALORIES WITH SERIOUS COMORBIDITY IN ADULT (HCC): Status: ACTIVE | Noted: 2021-11-16

## 2024-05-30 PROCEDURE — 99214 OFFICE O/P EST MOD 30 MIN: CPT | Performed by: FAMILY MEDICINE

## 2024-05-30 PROCEDURE — G2211 COMPLEX E/M VISIT ADD ON: HCPCS | Performed by: FAMILY MEDICINE

## 2024-05-30 NOTE — ASSESSMENT & PLAN NOTE
Has had similar issues in the past which resolved with PT. Last imaging in 2023 showed no arthritis in the hip and mild degenerative changes in the lumbar spine but nothing to account for his radicular symptoms. Recommend another round of PT and can use Naproxen prn which is very infrequent for him. Follow up if not improved.

## 2024-05-30 NOTE — PROGRESS NOTES
"Ambulatory Visit  Name: Dior Lock      : 1955      MRN: [id number]  Encounter Provider: Isaiah Lucia DO  Encounter Date: 2024   Encounter department: [facility]    Assessment & Plan   1. Right-sided low back pain without sciatica, unspecified chronicity  Assessment & Plan:  Has had similar issues in the past which resolved with PT. Last imaging in  showed no arthritis in the hip and mild degenerative changes in the lumbar spine but nothing to account for his radicular symptoms. Recommend another round of PT and can use Naproxen prn which is very infrequent for him. Follow up if not improved.  Orders:  -     Ambulatory Referral to Physical Therapy; Future  2. Essential hypertension  Assessment & Plan:  BP at goal today, controlled with lifestyle measures, no medication.  3. Class 2 severe obesity due to excess calories with serious comorbidity and body mass index (BMI) of 39.0 to 39.9 in adult (HCC)  Assessment & Plan:  Down 21 lbs since beginning of the year.      History of Present Illness     HPI    R hip pain. Would like a referral to PT. Not compliant with home exercises. Pain is worst in the morning upon waking, sharp. Once moving around feels somewhat better. Pain with standing from seated position. Not taking anything for pain relief at this time. Does have Naproxen at home as needed.    Review of Systems    Objective     /80   Pulse 70   Temp 98 °F (36.7 °C) (Temporal)   Resp 16   Ht 5' 9\" (1.753 m)   Wt 120 kg (265 lb)   SpO2 98%   BMI 39.13 kg/m²     Physical Exam  Constitutional:       Appearance: Normal appearance. He is obese.   Cardiovascular:      Rate and Rhythm: Normal rate.   Pulmonary:      Effort: Pulmonary effort is normal.   Musculoskeletal:      Comments: Mild pain with flexion and extension of the lumbar spine.    Neurological:      Mental Status: He is alert.   Psychiatric:         Mood and Affect: Mood normal.         Behavior: Behavior " normal.       Administrative Statements

## 2024-06-07 ENCOUNTER — OFFICE VISIT (OUTPATIENT)
Dept: PHYSICAL THERAPY | Facility: REHABILITATION | Age: 69
End: 2024-06-07
Payer: COMMERCIAL

## 2024-06-07 DIAGNOSIS — M54.50 RIGHT-SIDED LOW BACK PAIN WITHOUT SCIATICA, UNSPECIFIED CHRONICITY: ICD-10-CM

## 2024-06-07 DIAGNOSIS — M54.50 ACUTE RIGHT-SIDED LOW BACK PAIN WITHOUT SCIATICA: Primary | ICD-10-CM

## 2024-06-07 PROCEDURE — 97110 THERAPEUTIC EXERCISES: CPT | Performed by: PHYSICAL THERAPIST

## 2024-06-07 PROCEDURE — 97112 NEUROMUSCULAR REEDUCATION: CPT | Performed by: PHYSICAL THERAPIST

## 2024-06-07 PROCEDURE — 97140 MANUAL THERAPY 1/> REGIONS: CPT | Performed by: PHYSICAL THERAPIST

## 2024-06-07 NOTE — PROGRESS NOTES
"Daily Note     Today's date: 2024  Patient name: Dior Lock  : 1955  MRN: [id number]  Referring provider: Isaiah Lucia DO  Dx:   Encounter Diagnosis     ICD-10-CM    1. Acute right-sided low back pain without sciatica  M54.50       2. Right-sided low back pain without sciatica, unspecified chronicity  M54.50           Start Time: 0820  Stop Time: 0900  Total time in clinic (min): 40 minutes    Subjective: Patient reports feeling similar as eval. Min compliance with HEP reported.       Objective: See treatment diary below  LS AROM WNL, pain on return from flexion (last 25%).     Assessment: Tolerated treatment well. Patient reports improved pain in buttock post session. He did have R hip IR deficit which I feel is contributing. HEP to continue, emphasized bridging and prone hip ext at home.       Plan: Continue per plan of care.      Precautions: standard       Manuals             R hip IR MWM AB-3x10                                                   Neuro Re-Ed             bridge 5\"  2x10            Palloff press OJB  5\"x10            Prone hip ext 15x            Dead lift 20#  3x10                                                   Ther Ex             LS KEVIN 20x            Hip ext slider NV                                                                                          Ther Activity                                       Gait Training                                       Modalities                                            "

## 2024-06-12 ENCOUNTER — OFFICE VISIT (OUTPATIENT)
Dept: PHYSICAL THERAPY | Facility: REHABILITATION | Age: 69
End: 2024-06-12
Payer: COMMERCIAL

## 2024-06-12 DIAGNOSIS — M54.50 ACUTE RIGHT-SIDED LOW BACK PAIN WITHOUT SCIATICA: Primary | ICD-10-CM

## 2024-06-12 DIAGNOSIS — M54.50 RIGHT-SIDED LOW BACK PAIN WITHOUT SCIATICA, UNSPECIFIED CHRONICITY: ICD-10-CM

## 2024-06-12 PROCEDURE — 97140 MANUAL THERAPY 1/> REGIONS: CPT | Performed by: PHYSICAL THERAPIST

## 2024-06-12 PROCEDURE — 97112 NEUROMUSCULAR REEDUCATION: CPT | Performed by: PHYSICAL THERAPIST

## 2024-06-12 NOTE — PROGRESS NOTES
"Daily Note     Today's date: 2024  Patient name: Dior Lock  : 1955  MRN: [id number]  Referring provider: Isaiah Lucia DO  Dx:   Encounter Diagnosis     ICD-10-CM    1. Acute right-sided low back pain without sciatica  M54.50       2. Right-sided low back pain without sciatica, unspecified chronicity  M54.50           Start Time: 0800  Stop Time: 0830  Total time in clinic (min): 30 minutes    Subjective: Patient reports feeling similar as last session.       Objective: See treatment diary below  LS AROM WNL, pfree with exception of last 25% of return from flexion.     Assessment: Tolerated treatment well. Patient with improved pain post bridge with march to isolate R glute. Patient strongly educated on HEP given response and improvement.       Plan: Continue per plan of care.      Precautions: standard       Manuals            R hip IR MWM AB-3x10            Prone hip ER mob  AB  Static and with CR                                     Neuro Re-Ed             bridge 5\"  2x10 5\"  2x10  2x5 with march.            Palloff press OJB  5\"x10 OJB  5\"x10           Prone hip ext 15x            Dead lift 20#  3x10 25#  3x10                                                  Ther Ex             LS KEVIN 20x            Hip ext slider NV                                                                                          Ther Activity                                       Gait Training                                       Modalities                                              "

## 2024-06-17 ENCOUNTER — OFFICE VISIT (OUTPATIENT)
Dept: PHYSICAL THERAPY | Facility: REHABILITATION | Age: 69
End: 2024-06-17
Payer: COMMERCIAL

## 2024-06-17 DIAGNOSIS — M54.50 RIGHT-SIDED LOW BACK PAIN WITHOUT SCIATICA, UNSPECIFIED CHRONICITY: ICD-10-CM

## 2024-06-17 DIAGNOSIS — M54.50 ACUTE RIGHT-SIDED LOW BACK PAIN WITHOUT SCIATICA: Primary | ICD-10-CM

## 2024-06-17 PROCEDURE — 97112 NEUROMUSCULAR REEDUCATION: CPT | Performed by: PHYSICAL THERAPIST

## 2024-06-17 NOTE — PROGRESS NOTES
"Daily Note     Today's date: 2024  Patient name: Dior Lock  : 1955  MRN: [id number]  Referring provider: Isaiah Lucia DO  Dx:   Encounter Diagnosis     ICD-10-CM    1. Acute right-sided low back pain without sciatica  M54.50       2. Right-sided low back pain without sciatica, unspecified chronicity  M54.50           Start Time: 0845  Stop Time: 09  Total time in clinic (min): 45 minutes    Subjective: Patient reports that aside from the mornings, he has been doing pretty good.       Objective: See treatment diary below  End range return from flexion painful that improved post session.     Assessment: Tolerated treatment well. Patient does have improved pain again after UL hip loading into extension to engage glute max. Patient educated on continued HEP with focus on UL bridge.       Plan: Continue per plan of care.      Precautions: standard       Manuals           R hip IR MWM AB-3x10            Prone hip ER mob  AB  Static and with CR AB - static and with end range CR hold                                    Neuro Re-Ed            bridge 5\"  2x10 5\"  2x10  2x5 with march.  5\"x10 DL    2x10 with march          Palloff press OJB  5\"x10 OJB  5\"x10 BJB  5\"x10 ea side          Prone hip ext 15x            Dead lift 20#  3x10 25#  3x10 253  10x          Side stepping   RHB  6 ft x6-2 rds                                    Ther Ex             LS KEVIN 20x            Hip ext slider NV            VG   L5  2'                                                                           Ther Activity                                       Gait Training                                       Modalities                                                "

## 2024-06-20 ENCOUNTER — OFFICE VISIT (OUTPATIENT)
Dept: PHYSICAL THERAPY | Facility: REHABILITATION | Age: 69
End: 2024-06-20
Payer: COMMERCIAL

## 2024-06-20 DIAGNOSIS — M54.50 RIGHT-SIDED LOW BACK PAIN WITHOUT SCIATICA, UNSPECIFIED CHRONICITY: ICD-10-CM

## 2024-06-20 DIAGNOSIS — M54.50 ACUTE RIGHT-SIDED LOW BACK PAIN WITHOUT SCIATICA: Primary | ICD-10-CM

## 2024-06-20 PROCEDURE — 97140 MANUAL THERAPY 1/> REGIONS: CPT | Performed by: PHYSICAL THERAPIST

## 2024-06-20 PROCEDURE — 97112 NEUROMUSCULAR REEDUCATION: CPT | Performed by: PHYSICAL THERAPIST

## 2024-06-20 NOTE — PROGRESS NOTES
"Daily Note     Today's date: 2024  Patient name: Dior Lock  : 1955  MRN: [id number]  Referring provider: Isaiah Lucia DO  Dx:   Encounter Diagnosis     ICD-10-CM    1. Acute right-sided low back pain without sciatica  M54.50       2. Right-sided low back pain without sciatica, unspecified chronicity  M54.50           Start Time: 1305  Stop Time: 1335  Total time in clinic (min): 30 minutes    Subjective: Patient reports that the last 2 days he has been doing better, less pain in the mornings. He also has adjusted how he gets OOB.       Objective: See treatment diary below  Ls AROM pfree, WFL.    Assessment: Tolerated treatment well. Patient with stiffness into IR with LBP felt with prone IR mobility. Improved with increased reps/time working into IR ROM.       Plan: Continue per plan of care.      Precautions: standard       Manuals          R hip IR MWM AB-3x10   2x10         Prone hip ER mob  AB  Static and with CR AB - static and with end range CR hold AB - prone with pelvic block, static and with CR                                   Neuro Re-Ed           bridge 5\"  2x10 5\"  2x10  2x5 with march.  5\"x10 DL    2x10 with march With ball  15x  UL R 15x         Palloff press OJB  5\"x10 OJB  5\"x10 BJB  5\"x10 ea side OJB  5\"x10         Prone hip ext 15x            Dead lift 20#  3x10 25#  3x10 253  10x 25#  2x10         Side stepping   RHB  6 ft x6-2 rds RHB  6 ft x6  2 rds                                   Ther Ex             LS KEVIN 20x            Hip ext slider NV            VG   L5  2'                                                                           Ther Activity                                       Gait Training                                       Modalities                                                  "

## 2024-06-21 ENCOUNTER — APPOINTMENT (OUTPATIENT)
Dept: PHYSICAL THERAPY | Facility: REHABILITATION | Age: 69
End: 2024-06-21
Payer: COMMERCIAL

## 2024-06-26 ENCOUNTER — OFFICE VISIT (OUTPATIENT)
Dept: PHYSICAL THERAPY | Facility: REHABILITATION | Age: 69
End: 2024-06-26
Payer: COMMERCIAL

## 2024-06-26 DIAGNOSIS — M54.50 ACUTE RIGHT-SIDED LOW BACK PAIN WITHOUT SCIATICA: ICD-10-CM

## 2024-06-26 DIAGNOSIS — M54.50 RIGHT-SIDED LOW BACK PAIN WITHOUT SCIATICA, UNSPECIFIED CHRONICITY: Primary | ICD-10-CM

## 2024-06-26 PROCEDURE — 97140 MANUAL THERAPY 1/> REGIONS: CPT | Performed by: PHYSICAL THERAPIST

## 2024-06-26 PROCEDURE — 97112 NEUROMUSCULAR REEDUCATION: CPT | Performed by: PHYSICAL THERAPIST

## 2024-06-26 NOTE — PROGRESS NOTES
"Daily Note     Today's date: 2024  Patient name: Dior Lock  : 1955  MRN: [id number]  Referring provider: Isaiah Lucia DO  Dx:   Encounter Diagnosis     ICD-10-CM    1. Right-sided low back pain without sciatica, unspecified chronicity  M54.50       2. Acute right-sided low back pain without sciatica  M54.50           Start Time: 0900  Stop Time: 09  Total time in clinic (min): 43 minutes    Subjective: Patient reports that he is not longer getting sharp pains. Gets some pain/stiffness early morning but nothing limiting throughout his day.       Objective: See treatment diary below      Assessment: Tolerated treatment well. Patient with greater ease when performing UL bridge today, worked through 2 rds x15 vs normal 1 rd.       Plan: Continue per plan of care.      Precautions: standard       Manuals         R hip IR MWM AB-3x10   2x10 Prone,  Ir mob and Ir mob with PA mob        Prone hip ER mob  AB  Static and with CR AB - static and with end range CR hold AB - prone with pelvic block, static and with CR See above                                  Neuro Re-Ed          bridge 5\"  2x10 5\"  2x10  2x5 with march.  5\"x10 DL    2x10 with march With ball  15x  UL R 15x 5\"X5  DL    15x UL  2 rds        Palloff press OJB  5\"x10 OJB  5\"x10 BJB  5\"x10 ea side OJB  5\"x10 OJB  5\"x10        Prone hip ext 15x            Dead lift 20#  3x10 25#  3x10 253  10x 25#  2x10         Side stepping   RHB  6 ft x6-2 rds RHB  6 ft x6  2 rds RHB  6 ft x6  2 rds        Multif walk out     5#  10x ea side        Step up     6\"  2x6 20# KB        Ther Ex             LS KEVIN 20x            Hip ext slider NV            VG   L5  2'                                                                           Ther Activity                                       Gait Training                                       Modalities                                                    "

## 2024-06-29 ENCOUNTER — APPOINTMENT (EMERGENCY)
Dept: CT IMAGING | Facility: HOSPITAL | Age: 69
End: 2024-06-29
Payer: COMMERCIAL

## 2024-06-29 ENCOUNTER — HOSPITAL ENCOUNTER (EMERGENCY)
Facility: HOSPITAL | Age: 69
Discharge: HOME | End: 2024-06-29
Attending: EMERGENCY MEDICINE
Payer: COMMERCIAL

## 2024-06-29 ENCOUNTER — APPOINTMENT (EMERGENCY)
Dept: RADIOLOGY | Facility: HOSPITAL | Age: 69
End: 2024-06-29
Payer: COMMERCIAL

## 2024-06-29 VITALS
SYSTOLIC BLOOD PRESSURE: 186 MMHG | TEMPERATURE: 98.5 F | HEART RATE: 81 BPM | OXYGEN SATURATION: 99 % | RESPIRATION RATE: 16 BRPM | DIASTOLIC BLOOD PRESSURE: 87 MMHG

## 2024-06-29 DIAGNOSIS — N20.1 URETEROLITHIASIS: Primary | ICD-10-CM

## 2024-06-29 DIAGNOSIS — M53.3 SACROILIAC PAIN: ICD-10-CM

## 2024-06-29 LAB
ALBUMIN SERPL BCG-MCNC: 4.4 G/DL (ref 3.5–5)
ALP SERPL-CCNC: 70 U/L (ref 34–104)
ALT SERPL W P-5'-P-CCNC: 12 U/L (ref 7–52)
AMORPH URATE CRY URNS QL MICRO: ABNORMAL
ANION GAP SERPL CALCULATED.3IONS-SCNC: 6 MMOL/L (ref 4–13)
AST SERPL W P-5'-P-CCNC: 14 U/L (ref 13–39)
BACTERIA UR QL AUTO: ABNORMAL /HPF
BASOPHILS # BLD AUTO: 0.04 THOUSANDS/ÂΜL (ref 0–0.1)
BASOPHILS NFR BLD AUTO: 0 % (ref 0–1)
BILIRUB SERPL-MCNC: 1.16 MG/DL (ref 0.2–1)
BILIRUB UR QL STRIP: NEGATIVE
BUN SERPL-MCNC: 25 MG/DL (ref 5–25)
CALCIUM SERPL-MCNC: 9.5 MG/DL (ref 8.4–10.2)
CHLORIDE SERPL-SCNC: 108 MMOL/L (ref 96–108)
CK SERPL-CCNC: 85 U/L (ref 39–308)
CLARITY UR: ABNORMAL
CO2 SERPL-SCNC: 27 MMOL/L (ref 21–32)
COLOR UR: YELLOW
CREAT SERPL-MCNC: 1.1 MG/DL (ref 0.6–1.3)
EOSINOPHIL # BLD AUTO: 0.12 THOUSAND/ÂΜL (ref 0–0.61)
EOSINOPHIL NFR BLD AUTO: 1 % (ref 0–6)
ERYTHROCYTE [DISTWIDTH] IN BLOOD BY AUTOMATED COUNT: 13.9 % (ref 11.6–15.1)
GFR SERPL CREATININE-BSD FRML MDRD: 67 ML/MIN/1.73SQ M
GLUCOSE SERPL-MCNC: 124 MG/DL (ref 65–140)
GLUCOSE UR STRIP-MCNC: NEGATIVE MG/DL
HCT VFR BLD AUTO: 45.2 % (ref 36.5–49.3)
HGB BLD-MCNC: 15.1 G/DL (ref 12–17)
HGB UR QL STRIP.AUTO: ABNORMAL
IMM GRANULOCYTES # BLD AUTO: 0.03 THOUSAND/UL (ref 0–0.2)
IMM GRANULOCYTES NFR BLD AUTO: 0 % (ref 0–2)
KETONES UR STRIP-MCNC: NEGATIVE MG/DL
LEUKOCYTE ESTERASE UR QL STRIP: NEGATIVE
LIPASE SERPL-CCNC: 16 U/L (ref 11–82)
LYMPHOCYTES # BLD AUTO: 0.88 THOUSANDS/ÂΜL (ref 0.6–4.47)
LYMPHOCYTES NFR BLD AUTO: 9 % (ref 14–44)
MCH RBC QN AUTO: 30.6 PG (ref 26.8–34.3)
MCHC RBC AUTO-ENTMCNC: 33.4 G/DL (ref 31.4–37.4)
MCV RBC AUTO: 92 FL (ref 82–98)
MONOCYTES # BLD AUTO: 0.51 THOUSAND/ÂΜL (ref 0.17–1.22)
MONOCYTES NFR BLD AUTO: 5 % (ref 4–12)
MUCOUS THREADS UR QL AUTO: ABNORMAL
NEUTROPHILS # BLD AUTO: 7.84 THOUSANDS/ÂΜL (ref 1.85–7.62)
NEUTS SEG NFR BLD AUTO: 85 % (ref 43–75)
NITRITE UR QL STRIP: NEGATIVE
NON-SQ EPI CELLS URNS QL MICRO: ABNORMAL /HPF
NRBC BLD AUTO-RTO: 0 /100 WBCS
PH UR STRIP.AUTO: 5.5 [PH]
PLATELET # BLD AUTO: 198 THOUSANDS/UL (ref 149–390)
PMV BLD AUTO: 9.7 FL (ref 8.9–12.7)
POTASSIUM SERPL-SCNC: 3.8 MMOL/L (ref 3.5–5.3)
PROT SERPL-MCNC: 7 G/DL (ref 6.4–8.4)
PROT UR STRIP-MCNC: ABNORMAL MG/DL
RBC # BLD AUTO: 4.93 MILLION/UL (ref 3.88–5.62)
RBC #/AREA URNS AUTO: ABNORMAL /HPF
SODIUM SERPL-SCNC: 141 MMOL/L (ref 135–147)
SP GR UR STRIP.AUTO: 1.03 (ref 1–1.03)
UROBILINOGEN UR STRIP-ACNC: <2 MG/DL
WBC # BLD AUTO: 9.42 THOUSAND/UL (ref 4.31–10.16)
WBC #/AREA URNS AUTO: ABNORMAL /HPF

## 2024-06-29 PROCEDURE — 74176 CT ABD & PELVIS W/O CONTRAST: CPT

## 2024-06-29 PROCEDURE — 82550 ASSAY OF CK (CPK): CPT | Performed by: EMERGENCY MEDICINE

## 2024-06-29 PROCEDURE — 36415 COLL VENOUS BLD VENIPUNCTURE: CPT

## 2024-06-29 PROCEDURE — 99284 EMERGENCY DEPT VISIT MOD MDM: CPT

## 2024-06-29 PROCEDURE — 73502 X-RAY EXAM HIP UNI 2-3 VIEWS: CPT

## 2024-06-29 PROCEDURE — 96365 THER/PROPH/DIAG IV INF INIT: CPT

## 2024-06-29 PROCEDURE — 83690 ASSAY OF LIPASE: CPT

## 2024-06-29 PROCEDURE — 85025 COMPLETE CBC W/AUTO DIFF WBC: CPT

## 2024-06-29 PROCEDURE — 96375 TX/PRO/DX INJ NEW DRUG ADDON: CPT

## 2024-06-29 PROCEDURE — 81001 URINALYSIS AUTO W/SCOPE: CPT

## 2024-06-29 PROCEDURE — 99285 EMERGENCY DEPT VISIT HI MDM: CPT | Performed by: EMERGENCY MEDICINE

## 2024-06-29 PROCEDURE — 96366 THER/PROPH/DIAG IV INF ADDON: CPT

## 2024-06-29 PROCEDURE — 80053 COMPREHEN METABOLIC PANEL: CPT

## 2024-06-29 RX ORDER — TAMSULOSIN HYDROCHLORIDE 0.4 MG/1
0.4 CAPSULE ORAL ONCE
Status: COMPLETED | OUTPATIENT
Start: 2024-06-29 | End: 2024-06-29

## 2024-06-29 RX ORDER — ONDANSETRON 4 MG/1
4 TABLET, FILM COATED ORAL EVERY 8 HOURS PRN
Qty: 12 TABLET | Refills: 0 | Status: SHIPPED | OUTPATIENT
Start: 2024-06-29

## 2024-06-29 RX ORDER — OXYCODONE HYDROCHLORIDE 5 MG/1
5 TABLET ORAL EVERY 4 HOURS PRN
Qty: 12 TABLET | Refills: 0 | Status: SHIPPED | OUTPATIENT
Start: 2024-06-29 | End: 2024-07-09

## 2024-06-29 RX ORDER — KETOROLAC TROMETHAMINE 30 MG/ML
15 INJECTION, SOLUTION INTRAMUSCULAR; INTRAVENOUS ONCE
Status: COMPLETED | OUTPATIENT
Start: 2024-06-29 | End: 2024-06-29

## 2024-06-29 RX ORDER — TAMSULOSIN HYDROCHLORIDE 0.4 MG/1
0.4 CAPSULE ORAL
Qty: 7 CAPSULE | Refills: 0 | Status: SHIPPED | OUTPATIENT
Start: 2024-06-29 | End: 2024-07-06

## 2024-06-29 RX ORDER — LIDOCAINE 50 MG/G
1 PATCH TOPICAL ONCE
Status: DISCONTINUED | OUTPATIENT
Start: 2024-06-29 | End: 2024-06-29 | Stop reason: HOSPADM

## 2024-06-29 RX ORDER — KETOROLAC TROMETHAMINE 10 MG/1
10 TABLET, FILM COATED ORAL EVERY 6 HOURS PRN
Qty: 20 TABLET | Refills: 0 | Status: SHIPPED | OUTPATIENT
Start: 2024-06-29 | End: 2024-07-04

## 2024-06-29 RX ORDER — ONDANSETRON 2 MG/ML
4 INJECTION INTRAMUSCULAR; INTRAVENOUS ONCE
Status: COMPLETED | OUTPATIENT
Start: 2024-06-29 | End: 2024-06-29

## 2024-06-29 RX ADMIN — TAMSULOSIN HYDROCHLORIDE 0.4 MG: 0.4 CAPSULE ORAL at 04:31

## 2024-06-29 RX ADMIN — ONDANSETRON 4 MG: 2 INJECTION INTRAMUSCULAR; INTRAVENOUS at 01:29

## 2024-06-29 RX ADMIN — LIDOCAINE 1 PATCH: 50 PATCH CUTANEOUS at 04:31

## 2024-06-29 RX ADMIN — SODIUM CHLORIDE, SODIUM LACTATE, POTASSIUM CHLORIDE, AND CALCIUM CHLORIDE 1000 ML: .6; .31; .03; .02 INJECTION, SOLUTION INTRAVENOUS at 01:31

## 2024-06-29 RX ADMIN — KETOROLAC TROMETHAMINE 15 MG: 30 INJECTION, SOLUTION INTRAMUSCULAR; INTRAVENOUS at 01:53

## 2024-06-29 NOTE — ED ATTENDING ATTESTATION
6/29/2024  I, Avery Monroy MD, saw and evaluated the patient. I have discussed the patient with the resident/non-physician practitioner and agree with the resident's/non-physician practitioner's findings, Plan of Care, and MDM as documented in the resident's/non-physician practitioner's note, except where noted. All available labs and Radiology studies were reviewed.  I was present for key portions of any procedure(s) performed by the resident/non-physician practitioner and I was immediately available to provide assistance.       At this point I agree with the current assessment done in the Emergency Department.  I have conducted an independent evaluation of this patient a history and physical is as follows: Patient is a 68 year old male with left flank pain tonight without radiation. (+) N/V. No diarrhea. No fever. No urinary sx. No trauma. Worsening R hip pain with movement tonight and has had this for weeks. Was last seen at  Urology in [place] on 1/16/24 for urinary frequency. [email or web address] website checked on this patient and last Rx filled was on 4/23/23 for percocet for 4 day supply. NCAT. No scleral icterus. Moist mucous membranes. Lungs clear. Heart regular without murmur. Abdomen soft and nontender. Good bowel sounds. (+) L CVAT. (+) R hip tenderness with movement. NVI. No edema. No rash noted. DDx including but not limited to: renal colic, pyelonephritis, UTI, GI etiology, appendicitis, diverticulitis, pancreatitis, cholecystitis, biliary colic, doubt AAA; rhabdomyolysis, tumor, zoster. DDx including but not limited to: hip fracture, hip dislocation, bursitis, tendinitis, arthritis; doubt septic arthritis, osteomyelitis, sprain, strain. Will check labs, x-ray and CT and give IV toradol.     ED Course         Critical Care Time  Procedures

## 2024-06-29 NOTE — ED PROVIDER NOTES
History  Chief Complaint   Patient presents with    Flank Pain     Pt states he has left flank pain x5 hours. Reports nausea. Denies diarrhea, constipation.     Hip Pain     Report right hip pain for weeks. Currently going to PT for it. States pain is getting worse over the last few days.     Dior is a 68 yom with history of chronic low back pain with sciatica who presents for both left flank pain and right lower back pain.    Flank pain:  Had mild left flank pain during the morning of 6/18/24, was aching and non-radiating, spontaneously resolved after taking motrin. This evening, pain returned, is throbbing to stabbing, is waxing and waning but never completely resolved, radiates towards the left upper quadrant, currently moderate in severity.  Is also nauseated and had 4 episodes of dry heaving this evening but has not vomited.  Denies fever, chills, weakness, chest pain, dyspnea, URI symptoms, midline back pain, diarrhea, constipation, blood in stool, dysuria, hematuria, or testicular pain or swelling. No history of nephro/ureterolithiasis.    Right lower back pain:  Has chronic low back pain for which he is in physical therapy.  Noted right lower back pain after physical therapy on 6/25/24.  Pain is located in the right lower back(points to SI joint area), is aching in nature, is non-radiating, is most notable when he first wakes up in the morning or if he stands up after sitting for long periods of time, eventually dissipates if he walks/stretches.  Pain is currently very minimal.  Denies saddle anesthesia, leg weakness or numbness, urinary retention, or bowel incontinence.  No history of trauma to the area, no recent falls.        None       Past Medical History:   Diagnosis Date    Arthritis     Essential hypertension 12/16/2021    Gilbert's syndrome 1/6/2020    Intermittent elevated bilirubin.  Liver ultrasound.    Obesity     Unilateral hearing loss, left 1/18/2022       Past Surgical History:   Procedure  Laterality Date    HAND SURGERY      GA COLONOSCOPY FLX DX W/COLLJ SPEC WHEN PFRMD N/A 3/22/2018    Procedure: COLONOSCOPY;  Surgeon: Mark Bland MD;  Location: [facility];  Service: Gastroenterology    TONSILECTOMY AND ADNOIDECTOMY      TOOTH EXTRACTION         Family History   Problem Relation Age of Onset    Breast cancer Mother     Heart disease Father         MI in his 50's     I have reviewed and agree with the history as documented.    E-Cigarette/Vaping    E-Cigarette Use Never User      E-Cigarette/Vaping Substances    Nicotine No     THC No     CBD No     Flavoring No     Other No     Unknown No      Social History     Tobacco Use    Smoking status: Never    Smokeless tobacco: Never   Vaping Use    Vaping status: Never Used   Substance Use Topics    Alcohol use: Yes     Comment: occasional    Drug use: No        Review of Systems   Constitutional: Negative.  Negative for appetite change, chills, diaphoresis, fatigue and fever.   HENT: Negative.     Eyes: Negative.  Negative for visual disturbance.   Respiratory: Negative.  Negative for cough and shortness of breath.    Cardiovascular: Negative.  Negative for chest pain, palpitations and leg swelling.   Gastrointestinal:  Positive for abdominal pain and nausea. Negative for abdominal distention, blood in stool, constipation and diarrhea.   Endocrine: Negative.    Genitourinary:  Positive for flank pain. Negative for decreased urine volume, difficulty urinating, dysuria, frequency, hematuria, scrotal swelling, testicular pain and urgency.   Musculoskeletal:  Positive for back pain. Negative for gait problem, neck pain and neck stiffness.   Skin: Negative.    Allergic/Immunologic: Negative.    Neurological: Negative.  Negative for dizziness, syncope, weakness, light-headedness, numbness and headaches.   Hematological: Negative.    Psychiatric/Behavioral: Negative.     All other systems reviewed and are negative.      Physical Exam  ED Triage Vitals  [06/29/24 0105]   Temperature Pulse Respirations Blood Pressure SpO2   98.5 °F (36.9 °C) 81 16 (!) 186/87 99 %      Temp Source Heart Rate Source Patient Position - Orthostatic VS BP Location FiO2 (%)   Oral Monitor Sitting Right arm --      Pain Score       7             Orthostatic Vital Signs  Vitals:    06/29/24 0105   BP: (!) 186/87   Pulse: 81   Patient Position - Orthostatic VS: Sitting       Physical Exam  Vitals and nursing note reviewed. Exam conducted with a chaperone present.   Constitutional:       General: He is not in acute distress.     Appearance: Normal appearance. He is not ill-appearing or diaphoretic.   HENT:      Head: Normocephalic and atraumatic.      Nose: Nose normal.      Mouth/Throat:      Mouth: Mucous membranes are moist.      Pharynx: Oropharynx is clear.   Eyes:      General: No scleral icterus.     Extraocular Movements: Extraocular movements intact.      Conjunctiva/sclera: Conjunctivae normal.   Cardiovascular:      Rate and Rhythm: Normal rate and regular rhythm.      Pulses: Normal pulses.      Heart sounds: Normal heart sounds. No murmur heard.     No friction rub. No gallop.   Pulmonary:      Effort: Pulmonary effort is normal. No respiratory distress.      Breath sounds: Normal breath sounds.   Chest:      Chest wall: No tenderness.   Abdominal:      General: Abdomen is flat. Bowel sounds are normal. There is no distension.      Palpations: Abdomen is soft.      Tenderness: There is no abdominal tenderness. There is left CVA tenderness. There is no right CVA tenderness, guarding or rebound.      Hernia: No hernia is present.   Musculoskeletal:         General: Normal range of motion.      Cervical back: Normal range of motion and neck supple. No tenderness.      Right lower leg: No edema.      Left lower leg: No edema.      Comments: Mild tenderness to palpation over the right sacroiliac joint without overlying skin changes or underlying crepitus or deformity.  No tenderness  to palpation of the cervical, thoracic, or lumbar spines.  Has full, active, pain-free range of motion of the spine.  Has full, active, pain-free range of motion of the bilateral hips.  Bilateral upper and lower extremities neurovascularly intact with normal strength.   Lymphadenopathy:      Cervical: No cervical adenopathy.   Skin:     General: Skin is warm and dry.      Capillary Refill: Capillary refill takes less than 2 seconds.      Coloration: Skin is not pale.      Findings: No rash.   Neurological:      General: No focal deficit present.      Mental Status: He is alert and oriented to person, place, and time.      Sensory: No sensory deficit.      Motor: No weakness.      Gait: Gait normal.   Psychiatric:         Mood and Affect: Mood normal.         Behavior: Behavior normal.         ED Medications  Medications   lactated ringers bolus 1,000 mL (0 mL Intravenous Stopped 6/29/24 0430)   ondansetron (ZOFRAN) injection 4 mg (4 mg Intravenous Given 6/29/24 0129)   ketorolac (TORADOL) injection 15 mg (15 mg Intravenous Given 6/29/24 0153)   tamsulosin (FLOMAX) capsule 0.4 mg (0.4 mg Oral Given 6/29/24 0431)       Diagnostic Studies  Results Reviewed       Procedure Component Value Units Date/Time    Urine Microscopic [[id number]]  (Abnormal) Collected: 06/29/24 0307    Lab Status: Final result Specimen: Urine, Clean Catch Updated: 06/29/24 0331     RBC, UA Innumerable /hpf      WBC, UA None Seen /hpf      Epithelial Cells Occasional /hpf      Bacteria, UA Occasional /hpf      MUCUS THREADS Occasional     Amorphous Crystals, UA Occasional    UA w Reflex to Microscopic w Reflex to Culture [[id number]]  (Abnormal) Collected: 06/29/24 0307    Lab Status: Final result Specimen: Urine, Clean Catch Updated: 06/29/24 0317     Color, UA Yellow     Clarity, UA Turbid     Specific Gravity, UA 1.031     pH, UA 5.5     Leukocytes, UA Negative     Nitrite, UA Negative     Protein, UA 30 (1+) mg/dl      Glucose, UA Negative  mg/dl      Ketones, UA Negative mg/dl      Urobilinogen, UA <2.0 mg/dl      Bilirubin, UA Negative     Occult Blood, UA Large    Comprehensive metabolic panel [[id number]]  (Abnormal) Collected: 06/29/24 0127    Lab Status: Final result Specimen: Blood from Arm, Right Updated: 06/29/24 0203     Sodium 141 mmol/L      Potassium 3.8 mmol/L      Chloride 108 mmol/L      CO2 27 mmol/L      ANION GAP 6 mmol/L      BUN 25 mg/dL      Creatinine 1.10 mg/dL      Glucose 124 mg/dL      Calcium 9.5 mg/dL      AST 14 U/L      ALT 12 U/L      Alkaline Phosphatase 70 U/L      Total Protein 7.0 g/dL      Albumin 4.4 g/dL      Total Bilirubin 1.16 mg/dL      eGFR 67 ml/min/1.73sq m     Narrative:      National Kid[employer] Disease Foundation guidelines for Chronic Kidney Disease (CKD):     Stage 1 with normal or high GFR (GFR > 90 mL/min/1.73 square meters)    Stage 2 Mild CKD (GFR = 60-89 mL/min/1.73 square meters)    Stage 3A Moderate CKD (GFR = 45-59 mL/min/1.73 square meters)    Stage 3B Moderate CKD (GFR = 30-44 mL/min/1.73 square meters)    Stage 4 Severe CKD (GFR = 15-29 mL/min/1.73 square meters)    Stage 5 End Stage CKD (GFR <15 mL/min/1.73 square meters)  Note: GFR calculation is accurate only with a steady state creatinine    Lipase [[id number]]  (Normal) Collected: 06/29/24 0127    Lab Status: Final result Specimen: Blood from Arm, Right Updated: 06/29/24 0203     Lipase 16 u/L     CK [[id number]]  (Normal) Collected: 06/29/24 0127    Lab Status: Final result Specimen: Blood from Arm, Right Updated: 06/29/24 0203     Total CK 85 U/L     CBC and differential [[id number]]  (Abnormal) Collected: 06/29/24 0127    Lab Status: Final result Specimen: Blood from Arm, Right Updated: 06/29/24 0139     WBC 9.42 Thousand/uL      RBC 4.93 Million/uL      Hemoglobin 15.1 g/dL      Hematocrit 45.2 %      MCV 92 fL      MCH 30.6 pg      MCHC 33.4 g/dL      RDW 13.9 %      MPV 9.7 fL      Platelets 198 Thousands/uL      nRBC 0 /100 WBCs       Segmented % 85 %      Immature Grans % 0 %      Lymphocytes % 9 %      Monocytes % 5 %      Eosinophils Relative 1 %      Basophils Relative 0 %      Absolute Neutrophils 7.84 Thousands/µL      Absolute Immature Grans 0.03 Thousand/uL      Absolute Lymphocytes 0.88 Thousands/µL      Absolute Monocytes 0.51 Thousand/µL      Eosinophils Absolute 0.12 Thousand/µL      Basophils Absolute 0.04 Thousands/µL                    CT renal stone study abdomen pelvis wo contrast   Final Result by Ganesh Zapata DO (06/29 0233)      3 mm obstructing proximal left ureteral calculus with mild left hydronephrosis.         Workstation performed: [id number]         XR hip/pelv 2-3 vws right if performed   ED Interpretation by Corazon Escobar DO (06/29 0231)   No obvious acute osseous abnormalities as per my independent interpretation.            Procedures  Procedures      ED Course  ED Course as of 06/29/24 0704   Sat Jun 29, 2024   0200 CBC and differential(!)  No leukocytosis, H&H WNL.   0205 Comprehensive metabolic panel(!)  Chronically elevated t bili, within normal limits.  No acute findings.   0206 LIPASE: 16  Doubt pancreatitis.   0206 Total CK: 85  Doubt rhabdomyolysis.   0231 XR hip/pelv 2-3 vws right if performed  No obvious acute osseous abnormalities as per my independent interpretation.   0236 CT renal stone study abdomen pelvis wo contrast  IMPRESSION:     3 mm obstructing proximal left ureteral calculus with mild left hydronephrosis.     0325 UA w Reflex to Microscopic w Reflex to Culture(!)  Pending micro.   0335 Urine Microscopic(!)  No evidence of UTI.   0335 Pt feels much better after toradol.  Is appropriate for discharge with rx for pain medications, flomax.  Return precautions and follow up discussed.                                       Medical Decision Making  Pt presents with two separate complaints as above.  Blood pressure elevated but remainder of VS WNL.  Differential includes but is not limited  to nephrolithiasis, ureterolithiasis, UTI, musculoskeletal pain, pancreatitis, sacroiliitis, DDD.  Doubt SEA, conus medullaris, cauda equina, colitis, diverticulitis.  Plan for labs, imaging, IV fluids, and pain medications.  See ED course for discussion of results.    Problems Addressed:  Ureterolithiasis: acute illness or injury    Amount and/or Complexity of Data Reviewed  External Data Reviewed: labs, radiology, ECG and notes.  Labs: ordered. Decision-making details documented in ED Course.  Radiology: ordered and independent interpretation performed. Decision-making details documented in ED Course.    Risk  Prescription drug management.          Disposition  Final diagnoses:   Sacroiliac pain   Ureterolithiasis     Time reflects when diagnosis was documented in both MDM as applicable and the Disposition within this note       Time User Action Codes Description Comment    6/29/2024  4:22 AM Corazon Escobar Add [M53.3] Sacroiliac pain     6/29/2024  4:22 AM Corazon Escobar Add [N20.1] Ureterolithiasis     6/29/2024  4:22 AM Corazon Escobar Modify [M53.3] Sacroiliac pain     6/29/2024  4:22 AM Corazon Escobar Modify [N20.1] Ureterolithiasis           ED Disposition       ED Disposition   Discharge    Condition   Stable    Date/Time   Sat Jun 29, 2024  4:02 AM    Comment   Dior Lock discharge to home/self care.                   Follow-up Information       Follow up With Specialties Details Why Contact Info Additional Information    Lazaro Kapoor MD Family Medicine In 3 days For right hip pain [address] [facility] [address] [phone]       [facility] Schedule an appointment as soon as possible for a visit   [address]  [facility] [id number] [phone] [facility], [address], [place], [place], [id number] [phone]            Discharge Medication List as of 6/29/2024  4:29 AM        START  taking these medications    Details   ketorolac (TORADOL) 10 mg tablet Take 1 tablet (10 mg total) by mouth every 6 (six) hours as needed for moderate pain for up to 5 days, Starting Sat 6/29/2024, Until Thu 7/4/2024 at 2359, Normal      ondansetron (ZOFRAN) 4 mg tablet Take 1 tablet (4 mg total) by mouth every 8 (eight) hours as needed for nausea or vomiting, Starting Sat 6/29/2024, Normal      oxyCODONE (Roxicodone) 5 immediate release tablet Take 1 tablet (5 mg total) by mouth every 4 (four) hours as needed for severe pain for up to 10 days Max Daily Amount: 30 mg, Starting Sat 6/29/2024, Until Tue 7/9/2024 at 2359, Normal      tamsulosin (FLOMAX) 0.4 mg Take 1 capsule (0.4 mg total) by mouth daily with dinner for 7 days, Starting Sat 6/29/2024, Until Sat 7/6/2024, Normal               PDMP Review         Value Time User    PDMP Reviewed  Yes 6/29/2024  4:26 AM Corazon Escobar DO             ED Provider  Attending physically available and evaluated Dior Lock. I managed the patient along with the ED Attending.    Electronically Signed by           Corazon Escobar DO  06/29/24 0700

## 2024-07-01 ENCOUNTER — TELEPHONE (OUTPATIENT)
Age: 69
End: 2024-07-01

## 2024-07-01 NOTE — TELEPHONE ENCOUNTER
Patient seen by Vladislav at [facility]        Patient called stating he was in the ER with flank pain and he had a ct scan and it showed   3 mm obstructing proximal left ureteral calculus with mild left hydronephrosis.    He has a follow up with Vladislav for his regular checkup on 07/16/24 and he wants to know if he can be seen sooner.  He is taking pain medication which it seems to be working at this time but he wants to see the doctor sooner he has not passed the stone.     Patient can be reached at [phone]

## 2024-07-02 ENCOUNTER — OFFICE VISIT (OUTPATIENT)
Dept: FAMILY MEDICINE CLINIC | Facility: CLINIC | Age: 69
End: 2024-07-02
Payer: COMMERCIAL

## 2024-07-02 VITALS
WEIGHT: 273 LBS | HEART RATE: 86 BPM | OXYGEN SATURATION: 96 % | BODY MASS INDEX: 40.43 KG/M2 | HEIGHT: 69 IN | DIASTOLIC BLOOD PRESSURE: 70 MMHG | SYSTOLIC BLOOD PRESSURE: 138 MMHG | TEMPERATURE: 98 F | RESPIRATION RATE: 16 BRPM

## 2024-07-02 DIAGNOSIS — N23 RENAL COLIC: Primary | ICD-10-CM

## 2024-07-02 DIAGNOSIS — M54.50 RIGHT-SIDED LOW BACK PAIN WITHOUT SCIATICA, UNSPECIFIED CHRONICITY: ICD-10-CM

## 2024-07-02 PROCEDURE — 99214 OFFICE O/P EST MOD 30 MIN: CPT | Performed by: FAMILY MEDICINE

## 2024-07-02 PROCEDURE — G2211 COMPLEX E/M VISIT ADD ON: HCPCS | Performed by: FAMILY MEDICINE

## 2024-07-02 RX ORDER — NAPROXEN 500 MG/1
500 TABLET ORAL 2 TIMES DAILY WITH MEALS
Qty: 60 TABLET | Refills: 5 | Status: SHIPPED | OUTPATIENT
Start: 2024-07-02

## 2024-07-02 NOTE — PROGRESS NOTES
Chief Complaint   Patient presents with    Hip Pain     Right hip         HPI   Here for follow-up of kidney stone and left-sided pelvic pain.  Kidney stone occurred 3 days ago.  Seen in the ER.  Found to have a 3 mm stone.  Given 20 tablets of Toradol to use at home along with 12 tablets of oxycodone.  Finds that the Toradol seems to work better than the oxycodone.  For pain on the right side of the pelvis, he had been prescribed naproxen which she is not using very much.  Is going to physical therapy.  Has appointment with urology tomorrow.    Past Medical History:   Diagnosis Date    Arthritis     Essential hypertension 12/16/2021    Gilbert's syndrome 01/06/2020    Intermittent elevated bilirubin.  Liver ultrasound.    Obesity     Renal colic 06/29/2024    Left-sided kidney stone      Unilateral hearing loss, left 01/18/2022        Past Surgical History:   Procedure Laterality Date    HAND SURGERY      SD COLONOSCOPY FLX DX W/COLLJ SPEC WHEN PFRMD N/A 3/22/2018    Procedure: COLONOSCOPY;  Surgeon: Mark Bland MD;  Location: [facility];  Service: Gastroenterology    TONSILECTOMY AND ADNOIDECTOMY      TOOTH EXTRACTION         Social History     Tobacco Use    Smoking status: Never    Smokeless tobacco: Never   Substance Use Topics    Alcohol use: Yes     Comment: occasional       Social History     Social History Narrative    Single.  Lives alone.      Retired.  Worked for the [employer] in 2000. Worked part-time jobs afterward.      Coaches youth football.  And baseball.    Walks about 6 miles a day.        The following portions of the patient's history were reviewed and updated as appropriate: allergies, current medications, past family history, past medical history, past social history, past surgical history, and problem list.      Review of Systems       /70 (BP Location: Left arm, Patient Position: Sitting, Cuff Size: Large)   Pulse 86   Temp 98 °F (36.7 °C) (Temporal)   Resp 16   " Ht 5' 9\" (1.753 m)   Wt 124 kg (273 lb)   SpO2 96%   BMI 40.32 kg/m²      Physical Exam   Comfortable.  No left flank tenderness.  No palpable tenderness on the right side of his pelvis.  Gait is normal.              Current Outpatient Medications:     ketorolac (TORADOL) 10 mg tablet, Take 1 tablet (10 mg total) by mouth every 6 (six) hours as needed for moderate pain for up to 5 days, Disp: 20 tablet, Rfl: 0    naproxen (Naprosyn) 500 mg tablet, Take 1 tablet (500 mg total) by mouth 2 (two) times a day with meals, Disp: 60 tablet, Rfl: 5    ondansetron (ZOFRAN) 4 mg tablet, Take 1 tablet (4 mg total) by mouth every 8 (eight) hours as needed for nausea or vomiting, Disp: 12 tablet, Rfl: 0    oxyCODONE (Roxicodone) 5 immediate release tablet, Take 1 tablet (5 mg total) by mouth every 4 (four) hours as needed for severe pain for up to 10 days Max Daily Amount: 30 mg, Disp: 12 tablet, Rfl: 0    tamsulosin (FLOMAX) 0.4 mg, Take 1 capsule (0.4 mg total) by mouth daily with dinner for 7 days, Disp: 7 capsule, Rfl: 0     No problem-specific Assessment & Plan notes found for this encounter.       Diagnoses and all orders for this visit:    Renal colic    Right-sided low back pain without sciatica, unspecified chronicity  -     naproxen (Naprosyn) 500 mg tablet; Take 1 tablet (500 mg total) by mouth 2 (two) times a day with meals        Patient Instructions   Discussion of kidney stone.  3 mm stone should pass on its own.  Does have a urology appointment tomorrow.  Continue tamsulosin.  For right-sided pelvic pain, suggest regular use of Naprosyn 500 mg twice daily along with physical therapy.  At least until the pain subsides.  Advised not to take Toradol and naproxen at the same time as they are both anti-inflammatories.  Follow-up as scheduled.   "

## 2024-07-02 NOTE — PATIENT INSTRUCTIONS
Discussion of kidney stone.  3 mm stone should pass on its own.  Does have a urology appointment tomorrow.  Continue tamsulosin.  For right-sided pelvic pain, suggest regular use of Naprosyn 500 mg twice daily along with physical therapy.  At least until the pain subsides.  Advised not to take Toradol and naproxen at the same time as they are both anti-inflammatories.  Also recommend 2 extra strength Tylenol 3 times a day.  Follow-up as scheduled.

## 2024-07-03 ENCOUNTER — OFFICE VISIT (OUTPATIENT)
Dept: UROLOGY | Facility: CLINIC | Age: 69
End: 2024-07-03
Payer: COMMERCIAL

## 2024-07-03 ENCOUNTER — APPOINTMENT (OUTPATIENT)
Dept: PHYSICAL THERAPY | Facility: REHABILITATION | Age: 69
End: 2024-07-03
Payer: COMMERCIAL

## 2024-07-03 VITALS
SYSTOLIC BLOOD PRESSURE: 138 MMHG | HEART RATE: 72 BPM | HEIGHT: 69 IN | OXYGEN SATURATION: 97 % | WEIGHT: 274 LBS | BODY MASS INDEX: 40.58 KG/M2 | DIASTOLIC BLOOD PRESSURE: 78 MMHG

## 2024-07-03 DIAGNOSIS — N20.0 NEPHROLITHIASIS: Primary | ICD-10-CM

## 2024-07-03 PROCEDURE — 99213 OFFICE O/P EST LOW 20 MIN: CPT

## 2024-07-03 RX ORDER — ACETAMINOPHEN 325 MG/1
650 TABLET ORAL EVERY 6 HOURS PRN
COMMUNITY

## 2024-07-03 NOTE — PROGRESS NOTES
7/3/2024      No chief complaint on file.        Assessment and Plan    68 y.o. male     Nephrolithiasis  -CT renal stone study on 6/29/24 shows a 3 mm left UPJ stone with mild left hydronephrosis. Left lower pole renal cyst.   -Taking Toradol for intermittent left flank pain; has improved. No current nausea or vomiting, had some nausea a few nights ago. No urinary symptoms. He denies dysuria, frequency, urgency, and gross hematuria.  -Increase fluid intake. On Flomax and tolerating well. Continue medical expulsive therapy.   -Renal ultrasound and KUB in 4-6 weeks to assess for stone passage; follow-up to discuss results.   -Patient understands and agrees with plan. All questions addressed.     History of Present Illness  Dior Lock is a 68 y.o. male here with history of chronic low back pain with sciatica who presents with nephrolithiasis. He has previously seen Dr. Marino several years ago for orchalgia (did not require treatment). Patient was seen in ED on 6/29/24 with left flank pain, right lower back pain and nausea. He has chronic low back pain for which he is in physical therapy for. CT renal stone study on 6/29/24 shows a 3 mm left UPJ stone with mild left hydronephrosis. Left lower pole renal cyst. Urine negative for UTI. He has been taking Toradol as needed for left flank pain, the pain has improved. He is taking Flomax. He denies dysuria, frequency, urgency, and gross hematuria. No fevers or chills. He did have nausea a few days ago but states that has subsided. No vomiting. He denies previous history of kidney stones.         Review of Systems   Constitutional:  Negative for activity change, fatigue and fever.   HENT:  Negative for congestion, rhinorrhea and sore throat.    Eyes:  Negative for photophobia, redness and visual disturbance.   Respiratory:  Negative for cough, shortness of breath and wheezing.    Cardiovascular:  Negative for chest pain, palpitations and leg swelling.   Gastrointestinal:   Negative for abdominal pain, diarrhea, nausea and vomiting.   Genitourinary:  Positive for flank pain. Negative for difficulty urinating, dysuria, frequency, hematuria and urgency.        Left-sided   Neurological:  Negative for weakness, light-headedness and headaches.           AUA SYMPTOM SCORE      Flowsheet Row Most Recent Value   AUA SYMPTOM SCORE    How often have you had a sensation of not emptying your bladder completely after you finished urinating? 0 (P)     How often have you had to urinate again less than two hours after you finished urinating? 0 (P)     How often have you found you stopped and started again several times when you urinate? 1 (P)     How often have you found it difficult to postpone urination? 0 (P)     How often have you had a weak urinary stream? 1 (P)     How often have you had to push or strain to begin urination? 0 (P)     How many times did you most typically get up to urinate from the time you went to bed at night until the time you got up in the morning? 1 (P)     Quality of Life: If you were to spend the rest of your life with your urinary condition just the way it is now, how would you feel about that? 1 (P)     AUA SYMPTOM SCORE 3 (P)               Vitals  There were no vitals filed for this visit.    Physical Exam  Constitutional:       Appearance: Normal appearance. He is obese. He is not toxic-appearing.   HENT:      Head: Normocephalic.      Mouth/Throat:      Pharynx: Oropharynx is clear.   Eyes:      Extraocular Movements: Extraocular movements intact.      Pupils: Pupils are equal, round, and reactive to light.   Pulmonary:      Effort: Pulmonary effort is normal. No respiratory distress.   Abdominal:      Tenderness: There is no guarding.   Musculoskeletal:         General: Normal range of motion.      Cervical back: Normal range of motion.   Neurological:      Mental Status: He is alert and oriented to person, place, and time. Mental status is at baseline.      Gait:  Gait normal.   Psychiatric:         Behavior: Behavior normal.           Past History  Past Medical History:   Diagnosis Date    Arthritis     Essential hypertension 12/16/2021    Gilbert's syndrome 01/06/2020    Intermittent elevated bilirubin.  Liver ultrasound.    Obesity     Renal colic 06/29/2024    Left-sided kidney stone      Unilateral hearing loss, left 01/18/2022     Social History     Socioeconomic History    Marital status: Single     Spouse name: Not on file    Number of children: Not on file    Years of education: Not on file    Highest education level: Not on file   Occupational History    Occupation: retired   Tobacco Use    Smoking status: Never    Smokeless tobacco: Never   Vaping Use    Vaping status: Never Used   Substance and Sexual Activity    Alcohol use: Yes     Comment: occasional    Drug use: No    Sexual activity: Not Currently   Other Topics Concern    Not on file   Social History Narrative    Single.  Lives alone.      Retired.  Worked for the [employer] retiring in 2000. Worked part-time jobs afterward.      Coaches youth football.  And baseball.    Walks about 6 miles a day.     Social Determinants of Health     Financial Resource Strain: Low Risk  (2/15/2024)    Overall Financial Resource Strain (CARDIA)     Difficulty of Paying Living Expenses: Not hard at all   Food Insecurity: Not on file   Transportation Needs: No Transportation Needs (2/15/2024)    PRAPARE - Transportation     Lack of Transportation (Medical): No     Lack of Transportation (Non-Medical): No   Physical Activity: Not on file   Stress: Not on file   Social Connections: Not on file   Intimate Partner Violence: Not on file   Housing Stability: Not on file     Social History     Tobacco Use   Smoking Status Never   Smokeless Tobacco Never     Family History   Problem Relation Age of Onset    Breast cancer Mother     Heart disease Father         MI in his 50's       The following portions of the patient's history  were reviewed and updated as appropriate: allergies, current medications, past medical history, past social history, past surgical history and problem list.    Results  No results found for this or any previous visit (from the past 1 hour(s)).]  Lab Results   Component Value Date    PSA 0.81 01/13/2024    PSA 0.6 11/26/2019    PSA 0.8 12/03/2018    PSA 3.4 11/30/2017     Lab Results   Component Value Date    GLUCOSE 104 10/29/2014    CALCIUM 9.5 06/29/2024     10/29/2014    K 3.8 06/29/2024    CO2 27 06/29/2024     06/29/2024    BUN 25 06/29/2024    CREATININE 1.10 06/29/2024     Lab Results   Component Value Date    WBC 9.42 06/29/2024    HGB 15.1 06/29/2024    HCT 45.2 06/29/2024    MCV 92 06/29/2024     06/29/2024       JOSE Mcleod

## 2024-07-10 ENCOUNTER — OFFICE VISIT (OUTPATIENT)
Dept: PHYSICAL THERAPY | Facility: REHABILITATION | Age: 69
End: 2024-07-10
Payer: COMMERCIAL

## 2024-07-10 DIAGNOSIS — M54.50 RIGHT-SIDED LOW BACK PAIN WITHOUT SCIATICA, UNSPECIFIED CHRONICITY: ICD-10-CM

## 2024-07-10 DIAGNOSIS — M54.50 ACUTE RIGHT-SIDED LOW BACK PAIN WITHOUT SCIATICA: Primary | ICD-10-CM

## 2024-07-10 PROCEDURE — 97112 NEUROMUSCULAR REEDUCATION: CPT | Performed by: PHYSICAL THERAPIST

## 2024-07-10 PROCEDURE — 97140 MANUAL THERAPY 1/> REGIONS: CPT | Performed by: PHYSICAL THERAPIST

## 2024-07-10 NOTE — PROGRESS NOTES
"Daily Note     Today's date: 7/10/2024  Patient name: Dior Lock  : 1955  MRN: [id number]  Referring provider: Isaiah Lucia DO  Dx:   Encounter Diagnosis     ICD-10-CM    1. Acute right-sided low back pain without sciatica  M54.50       2. Right-sided low back pain without sciatica, unspecified chronicity  M54.50           Start Time: 1345  Stop Time: 1423  Total time in clinic (min): 38 minutes    Subjective: Patient reports that he has been feeling good. He was in the hospital due to a kidney stone last week.       Objective: See treatment diary below   LS AROM WNL, pfree and no pain on return from flexion  Pain 0/10        Assessment: Tolerated treatment well. Patient  is doing much better at this time. He had felt no pain for about 2-3 days following last PT session but would like to schedule 1 more session in the event pain does return or worsen.       Plan: Continue per plan of care.      Precautions: standard       Manuals  7/10       R hip IR MWM AB-3x10   2x10 Prone,  Ir mob and Ir mob with PA mob With belt distraction  3x10       Prone hip ER mob  AB  Static and with CR AB - static and with end range CR hold AB - prone with pelvic block, static and with CR See above                                  Neuro Re-Ed   7/10       bridge 5\"  2x10 5\"  2x10  2x5 with march.  5\"x10 DL    2x10 with march With ball  15x  UL R 15x 5\"X5  DL    15x UL  2 rds 5\"x5 DL    15x UL       Palloff press OJB  5\"x10 OJB  5\"x10 BJB  5\"x10 ea side OJB  5\"x10 OJB  5\"x10 OJB    5\"x10        Prone hip ext 15x            Dead lift 20#  3x10 25#  3x10 253  10x 25#  2x10         Side stepping   RHB  6 ft x6-2 rds RHB  6 ft x6  2 rds RHB  6 ft x6  2 rds RHB  6 ftx6  2 rds       Multif walk out     5#  10x ea side 5#  10x ea side       Step up     6\"  2x6 20# KB 6\"  2x6 20# KB       Ther Ex             LS KEVIN 20x            Hip ext slider NV            VG   L5  2'                           "                                                 Ther Activity                                       Gait Training                                       Modalities

## 2024-07-22 ENCOUNTER — APPOINTMENT (EMERGENCY)
Dept: RADIOLOGY | Facility: HOSPITAL | Age: 69
DRG: 286 | End: 2024-07-22
Payer: COMMERCIAL

## 2024-07-22 ENCOUNTER — HOSPITAL ENCOUNTER (INPATIENT)
Facility: HOSPITAL | Age: 69
LOS: 5 days | Discharge: HOME | DRG: 286 | End: 2024-07-27
Attending: EMERGENCY MEDICINE | Admitting: INTERNAL MEDICINE
Payer: COMMERCIAL

## 2024-07-22 ENCOUNTER — APPOINTMENT (INPATIENT)
Dept: NON INVASIVE DIAGNOSTICS | Facility: HOSPITAL | Age: 69
DRG: 286 | End: 2024-07-22
Payer: COMMERCIAL

## 2024-07-22 DIAGNOSIS — R65.10 SIRS (SYSTEMIC INFLAMMATORY RESPONSE SYNDROME) (HCC): ICD-10-CM

## 2024-07-22 DIAGNOSIS — I21.4 NSTEMI (NON-ST ELEVATED MYOCARDIAL INFARCTION) (HCC): Primary | ICD-10-CM

## 2024-07-22 DIAGNOSIS — N41.9 PROSTATITIS: ICD-10-CM

## 2024-07-22 DIAGNOSIS — N20.1 URETEROLITHIASIS: ICD-10-CM

## 2024-07-22 DIAGNOSIS — A41.9 SEPSIS (HCC): ICD-10-CM

## 2024-07-22 LAB
2HR DELTA HS TROPONIN: -43 NG/L
4HR DELTA HS TROPONIN: -31 NG/L
ALBUMIN SERPL BCG-MCNC: 4 G/DL (ref 3.5–5)
ALP SERPL-CCNC: 68 U/L (ref 34–104)
ALT SERPL W P-5'-P-CCNC: 20 U/L (ref 7–52)
ANION GAP SERPL CALCULATED.3IONS-SCNC: 7 MMOL/L (ref 4–13)
AORTIC ROOT: 3.6 CM
APTT PPP: 33 SECONDS (ref 23–37)
APTT PPP: 37 SECONDS (ref 23–37)
ASCENDING AORTA: 3.3 CM
AST SERPL W P-5'-P-CCNC: 18 U/L (ref 13–39)
ATRIAL RATE: 86 BPM
BASOPHILS # BLD AUTO: 0.04 THOUSANDS/ÂΜL (ref 0–0.1)
BASOPHILS NFR BLD AUTO: 0 % (ref 0–1)
BILIRUB SERPL-MCNC: 1.61 MG/DL (ref 0.2–1)
BSA FOR ECHO PROCEDURE: 2.37 M2
BUN SERPL-MCNC: 21 MG/DL (ref 5–25)
CALCIUM SERPL-MCNC: 8.6 MG/DL (ref 8.4–10.2)
CARDIAC TROPONIN I PNL SERPL HS: 172 NG/L
CARDIAC TROPONIN I PNL SERPL HS: 184 NG/L
CARDIAC TROPONIN I PNL SERPL HS: 215 NG/L
CHLORIDE SERPL-SCNC: 108 MMOL/L (ref 96–108)
CO2 SERPL-SCNC: 24 MMOL/L (ref 21–32)
CREAT SERPL-MCNC: 0.75 MG/DL (ref 0.6–1.3)
D DIMER PPP FEU-MCNC: 0.59 UG/ML FEU
EOSINOPHIL # BLD AUTO: 0.18 THOUSAND/ÂΜL (ref 0–0.61)
EOSINOPHIL NFR BLD AUTO: 2 % (ref 0–6)
ERYTHROCYTE [DISTWIDTH] IN BLOOD BY AUTOMATED COUNT: 13.5 % (ref 11.6–15.1)
ERYTHROCYTE [DISTWIDTH] IN BLOOD BY AUTOMATED COUNT: 13.6 % (ref 11.6–15.1)
EST. AVERAGE GLUCOSE BLD GHB EST-MCNC: 111 MG/DL
FRACTIONAL SHORTENING: 32 (ref 28–44)
GFR SERPL CREATININE-BSD FRML MDRD: 92 ML/MIN/1.73SQ M
GLUCOSE SERPL-MCNC: 97 MG/DL (ref 65–140)
HBA1C MFR BLD: 5.5 %
HCT VFR BLD AUTO: 42.6 % (ref 36.5–49.3)
HCT VFR BLD AUTO: 43.4 % (ref 36.5–49.3)
HGB BLD-MCNC: 14.7 G/DL (ref 12–17)
HGB BLD-MCNC: 15.2 G/DL (ref 12–17)
IMM GRANULOCYTES # BLD AUTO: 0.06 THOUSAND/UL (ref 0–0.2)
IMM GRANULOCYTES NFR BLD AUTO: 1 % (ref 0–2)
INR PPP: 1.03 (ref 0.84–1.19)
INTERVENTRICULAR SEPTUM IN DIASTOLE (PARASTERNAL SHORT AXIS VIEW): 1 CM
INTERVENTRICULAR SEPTUM: 1 CM (ref 0.6–1.1)
LAAS-AP2: 20.1 CM2
LAAS-AP4: 18.5 CM2
LACTATE SERPL-SCNC: 0.8 MMOL/L (ref 0.5–2)
LEFT ATRIUM SIZE: 3.6 CM
LEFT ATRIUM VOLUME (MOD BIPLANE): 55 ML
LEFT ATRIUM VOLUME INDEX (MOD BIPLANE): 23.2 ML/M2
LEFT INTERNAL DIMENSION IN SYSTOLE: 3.2 CM (ref 2.1–4)
LEFT VENTRICULAR INTERNAL DIMENSION IN DIASTOLE: 4.7 CM (ref 3.5–6)
LEFT VENTRICULAR POSTERIOR WALL IN END DIASTOLE: 1.2 CM
LEFT VENTRICULAR STROKE VOLUME: 59 ML
LVSV (TEICH): 59 ML
LYMPHOCYTES # BLD AUTO: 1.02 THOUSANDS/ÂΜL (ref 0.6–4.47)
LYMPHOCYTES NFR BLD AUTO: 8 % (ref 14–44)
MCH RBC QN AUTO: 30.6 PG (ref 26.8–34.3)
MCH RBC QN AUTO: 31.2 PG (ref 26.8–34.3)
MCHC RBC AUTO-ENTMCNC: 34.5 G/DL (ref 31.4–37.4)
MCHC RBC AUTO-ENTMCNC: 35 G/DL (ref 31.4–37.4)
MCV RBC AUTO: 89 FL (ref 82–98)
MCV RBC AUTO: 89 FL (ref 82–98)
MONOCYTES # BLD AUTO: 0.84 THOUSAND/ÂΜL (ref 0.17–1.22)
MONOCYTES NFR BLD AUTO: 7 % (ref 4–12)
NEUTROPHILS # BLD AUTO: 10 THOUSANDS/ÂΜL (ref 1.85–7.62)
NEUTS SEG NFR BLD AUTO: 82 % (ref 43–75)
NRBC BLD AUTO-RTO: 0 /100 WBCS
P AXIS: 32 DEGREES
PLATELET # BLD AUTO: 183 THOUSANDS/UL (ref 149–390)
PLATELET # BLD AUTO: 187 THOUSANDS/UL (ref 149–390)
PMV BLD AUTO: 9.1 FL (ref 8.9–12.7)
PMV BLD AUTO: 9.8 FL (ref 8.9–12.7)
POTASSIUM SERPL-SCNC: 3.9 MMOL/L (ref 3.5–5.3)
PR INTERVAL: 162 MS
PROCALCITONIN SERPL-MCNC: 0.15 NG/ML
PROT SERPL-MCNC: 7 G/DL (ref 6.4–8.4)
PROTHROMBIN TIME: 13.4 SECONDS (ref 11.6–14.5)
QRS AXIS: -26 DEGREES
QRSD INTERVAL: 98 MS
QT INTERVAL: 366 MS
QTC INTERVAL: 437 MS
RBC # BLD AUTO: 4.8 MILLION/UL (ref 3.88–5.62)
RBC # BLD AUTO: 4.87 MILLION/UL (ref 3.88–5.62)
RIGHT ATRIUM AREA SYSTOLE A4C: 12 CM2
RIGHT VENTRICLE ID DIMENSION: 3.7 CM
SL CV LEFT ATRIUM LENGTH A2C: 5.5 CM
SL CV LV EF: 60
SL CV PED ECHO LEFT VENTRICLE DIASTOLIC VOLUME (MOD BIPLANE) 2D: 101 ML
SL CV PED ECHO LEFT VENTRICLE SYSTOLIC VOLUME (MOD BIPLANE) 2D: 42 ML
SODIUM SERPL-SCNC: 139 MMOL/L (ref 135–147)
T WAVE AXIS: 47 DEGREES
TRICUSPID ANNULAR PLANE SYSTOLIC EXCURSION: 2.4 CM
TSH SERPL DL<=0.05 MIU/L-ACNC: 2 UIU/ML (ref 0.45–4.5)
VENTRICULAR RATE: 86 BPM
WBC # BLD AUTO: 12.14 THOUSAND/UL (ref 4.31–10.16)
WBC # BLD AUTO: 14.11 THOUSAND/UL (ref 4.31–10.16)

## 2024-07-22 PROCEDURE — 84484 ASSAY OF TROPONIN QUANT: CPT | Performed by: EMERGENCY MEDICINE

## 2024-07-22 PROCEDURE — 93010 ELECTROCARDIOGRAM REPORT: CPT | Performed by: INTERNAL MEDICINE

## 2024-07-22 PROCEDURE — 99223 1ST HOSP IP/OBS HIGH 75: CPT | Performed by: INTERNAL MEDICINE

## 2024-07-22 PROCEDURE — C8929 TTE W OR WO FOL WCON,DOPPLER: HCPCS

## 2024-07-22 PROCEDURE — 87635 SARS-COV-2 COVID-19 AMP PRB: CPT | Performed by: STUDENT IN AN ORGANIZED HEALTH CARE EDUCATION/TRAINING PROGRAM

## 2024-07-22 PROCEDURE — 83036 HEMOGLOBIN GLYCOSYLATED A1C: CPT | Performed by: NURSE PRACTITIONER

## 2024-07-22 PROCEDURE — 84145 PROCALCITONIN (PCT): CPT | Performed by: STUDENT IN AN ORGANIZED HEALTH CARE EDUCATION/TRAINING PROGRAM

## 2024-07-22 PROCEDURE — 85610 PROTHROMBIN TIME: CPT

## 2024-07-22 PROCEDURE — 93306 TTE W/DOPPLER COMPLETE: CPT | Performed by: STUDENT IN AN ORGANIZED HEALTH CARE EDUCATION/TRAINING PROGRAM

## 2024-07-22 PROCEDURE — 83605 ASSAY OF LACTIC ACID: CPT | Performed by: STUDENT IN AN ORGANIZED HEALTH CARE EDUCATION/TRAINING PROGRAM

## 2024-07-22 PROCEDURE — 85730 THROMBOPLASTIN TIME PARTIAL: CPT

## 2024-07-22 PROCEDURE — 85027 COMPLETE CBC AUTOMATED: CPT | Performed by: STUDENT IN AN ORGANIZED HEALTH CARE EDUCATION/TRAINING PROGRAM

## 2024-07-22 PROCEDURE — 84443 ASSAY THYROID STIM HORMONE: CPT | Performed by: NURSE PRACTITIONER

## 2024-07-22 PROCEDURE — 99285 EMERGENCY DEPT VISIT HI MDM: CPT | Performed by: EMERGENCY MEDICINE

## 2024-07-22 PROCEDURE — 71046 X-RAY EXAM CHEST 2 VIEWS: CPT

## 2024-07-22 PROCEDURE — 93005 ELECTROCARDIOGRAM TRACING: CPT

## 2024-07-22 PROCEDURE — 85379 FIBRIN DEGRADATION QUANT: CPT | Performed by: STUDENT IN AN ORGANIZED HEALTH CARE EDUCATION/TRAINING PROGRAM

## 2024-07-22 PROCEDURE — 99285 EMERGENCY DEPT VISIT HI MDM: CPT

## 2024-07-22 PROCEDURE — 80053 COMPREHEN METABOLIC PANEL: CPT | Performed by: EMERGENCY MEDICINE

## 2024-07-22 PROCEDURE — 87040 BLOOD CULTURE FOR BACTERIA: CPT | Performed by: STUDENT IN AN ORGANIZED HEALTH CARE EDUCATION/TRAINING PROGRAM

## 2024-07-22 PROCEDURE — 85025 COMPLETE CBC W/AUTO DIFF WBC: CPT | Performed by: EMERGENCY MEDICINE

## 2024-07-22 PROCEDURE — 36415 COLL VENOUS BLD VENIPUNCTURE: CPT

## 2024-07-22 RX ORDER — ACETAMINOPHEN 325 MG/1
650 TABLET ORAL EVERY 4 HOURS PRN
Status: DISCONTINUED | OUTPATIENT
Start: 2024-07-22 | End: 2024-07-27 | Stop reason: HOSPADM

## 2024-07-22 RX ORDER — HEPARIN SODIUM 1000 [USP'U]/ML
4000 INJECTION, SOLUTION INTRAVENOUS; SUBCUTANEOUS EVERY 6 HOURS PRN
Status: DISCONTINUED | OUTPATIENT
Start: 2024-07-22 | End: 2024-07-24

## 2024-07-22 RX ORDER — ASPIRIN 81 MG/1
81 TABLET, CHEWABLE ORAL DAILY
Status: DISCONTINUED | OUTPATIENT
Start: 2024-07-23 | End: 2024-07-27 | Stop reason: HOSPADM

## 2024-07-22 RX ORDER — HEPARIN SODIUM 1000 [USP'U]/ML
4000 INJECTION, SOLUTION INTRAVENOUS; SUBCUTANEOUS ONCE
Status: COMPLETED | OUTPATIENT
Start: 2024-07-22 | End: 2024-07-22

## 2024-07-22 RX ORDER — SODIUM CHLORIDE 9 MG/ML
125 INJECTION, SOLUTION INTRAVENOUS CONTINUOUS
Status: DISCONTINUED | OUTPATIENT
Start: 2024-07-23 | End: 2024-07-24

## 2024-07-22 RX ORDER — ASPIRIN 325 MG
325 TABLET ORAL ONCE
Status: COMPLETED | OUTPATIENT
Start: 2024-07-22 | End: 2024-07-22

## 2024-07-22 RX ORDER — HEPARIN SODIUM 10000 [USP'U]/100ML
3-20 INJECTION, SOLUTION INTRAVENOUS
Status: DISCONTINUED | OUTPATIENT
Start: 2024-07-22 | End: 2024-07-24

## 2024-07-22 RX ORDER — NITROGLYCERIN 0.4 MG/1
0.4 TABLET SUBLINGUAL
Status: DISCONTINUED | OUTPATIENT
Start: 2024-07-22 | End: 2024-07-27 | Stop reason: HOSPADM

## 2024-07-22 RX ORDER — ONDANSETRON 2 MG/ML
4 INJECTION INTRAMUSCULAR; INTRAVENOUS EVERY 6 HOURS PRN
Status: DISCONTINUED | OUTPATIENT
Start: 2024-07-22 | End: 2024-07-27 | Stop reason: HOSPADM

## 2024-07-22 RX ORDER — ATORVASTATIN CALCIUM 80 MG/1
80 TABLET, FILM COATED ORAL EVERY EVENING
Status: DISCONTINUED | OUTPATIENT
Start: 2024-07-22 | End: 2024-07-27 | Stop reason: HOSPADM

## 2024-07-22 RX ORDER — SODIUM CHLORIDE 9 MG/ML
125 INJECTION, SOLUTION INTRAVENOUS CONTINUOUS
Status: DISCONTINUED | OUTPATIENT
Start: 2024-07-22 | End: 2024-07-22

## 2024-07-22 RX ORDER — HEPARIN SODIUM 1000 [USP'U]/ML
2000 INJECTION, SOLUTION INTRAVENOUS; SUBCUTANEOUS EVERY 6 HOURS PRN
Status: DISCONTINUED | OUTPATIENT
Start: 2024-07-22 | End: 2024-07-24

## 2024-07-22 RX ADMIN — ACETAMINOPHEN 650 MG: 325 TABLET, FILM COATED ORAL at 22:29

## 2024-07-22 RX ADMIN — METOPROLOL TARTRATE 25 MG: 25 TABLET, FILM COATED ORAL at 22:30

## 2024-07-22 RX ADMIN — ASPIRIN 325 MG ORAL TABLET 325 MG: 325 PILL ORAL at 13:11

## 2024-07-22 RX ADMIN — ATORVASTATIN CALCIUM 80 MG: 80 TABLET, FILM COATED ORAL at 20:09

## 2024-07-22 RX ADMIN — HEPARIN SODIUM 11.1 UNITS/KG/HR: 10000 INJECTION, SOLUTION INTRAVENOUS at 13:29

## 2024-07-22 RX ADMIN — HEPARIN SODIUM 4000 UNITS: 1000 INJECTION INTRAVENOUS; SUBCUTANEOUS at 20:48

## 2024-07-22 RX ADMIN — METOPROLOL TARTRATE 25 MG: 25 TABLET, FILM COATED ORAL at 14:07

## 2024-07-22 RX ADMIN — HEPARIN SODIUM 4000 UNITS: 1000 INJECTION INTRAVENOUS; SUBCUTANEOUS at 13:28

## 2024-07-22 RX ADMIN — PERFLUTREN 0.4 ML/MIN: 6.52 INJECTION, SUSPENSION INTRAVENOUS at 14:40

## 2024-07-22 NOTE — H&P
[facility]  H&P  Name: Dior Lock 68 y.o. male I MRN: [id number]  Unit/Bed#: ED 25 I Date of Admission: 7/22/2024   Date of Service: 7/22/2024 I Hospital Day: 0      Assessment & Plan   * Chest pain  Assessment & Plan  Chest pain with elevated troponins on arrival, concern for unstable angina vs NSTEMI  Received aspirin 325mg  Start baby aspirin, atorvastatin 80mg, metoprolol 25mg and PRN nitroglycerin  Check echo and monitor on telemetry  Start IV heparin cardiac dose  Trend troponin and donsult with cardiology    Class 2 severe obesity due to excess calories with serious comorbidity in adult (HCC)  Assessment & Plan  Weight loss counseling when stable as an outpatient    Essential hypertension  Assessment & Plan  BP is fairly well controlled at this time  Given the above findings will start metoprolol 25mg BID    Gilbert's syndrome  Assessment & Plan  Known history  Mildly elevated bilirubin noted         VTE Pharmacologic Prophylaxis:   Moderate Risk (Score 3-4) - Pharmacological DVT Prophylaxis Ordered: heparin drip.  Code Status: Level 1 - Full Code     Anticipated Length of Stay: Patient will be admitted on an inpatient basis with an anticipated length of stay of greater than 2 midnights secondary to NSTEMI.    Total Time Spent on Date of Encounter in care of patient: 45 mins. This time was spent on one or more of the following: performing physical exam; counseling and coordination of care; obtaining or reviewing history; documenting in the medical record; reviewing/ordering tests, medications or procedures; communicating with other healthcare professionals and discussing with patient's family/caregivers.    Chief Complaint: chest pain    History of Present Illness:  Dior Lock is a 68 y.o. male with a PMH of HTN, obesity, [place] who presents with chest pain. Chest pain is brief only lasts a couple second but is recurrent. Happens at rest for the past day. Located  substernal. Denies any shortness of breath. Presented to the ED and found to have elevated troponins and referred to [facility] for admission.    Review of Systems:  Review of Systems   All other systems reviewed and are negative.      Past Medical and Surgical History:   Past Medical History:   Diagnosis Date    Arthritis     Essential hypertension 12/16/2021    Gilbert's syndrome 01/06/2020    Intermittent elevated bilirubin.  Liver ultrasound.    Obesity     Renal colic 06/29/2024    Left-sided kidney stone      Unilateral hearing loss, left 01/18/2022       Past Surgical History:   Procedure Laterality Date    HAND SURGERY      [facility] COLONOSCOPY FLX DX W/COLLJ SPEC WHEN PFRMD N/A 3/22/2018    Procedure: COLONOSCOPY;  Surgeon: Mark Bland MD;  Location: [facility];  Service: Gastroenterology    TONSILECTOMY AND ADNOIDECTOMY      TOOTH EXTRACTION         Meds/Allergies:  Prior to Admission medications    Medication Sig Start Date End Date Taking? Authorizing Provider   acetaminophen (TYLENOL) 325 mg tablet Take 650 mg by mouth every 6 (six) hours as needed for mild pain    Historical Provider, MD   ketorolac (TORADOL) 10 mg tablet Take 1 tablet (10 mg total) by mouth every 6 (six) hours as needed for moderate pain for up to 5 days 6/29/24 7/4/24  Corazon Escobar DO   naproxen (Naprosyn) 500 mg tablet Take 1 tablet (500 mg total) by mouth 2 (two) times a day with meals  Patient not taking: Reported on 7/3/2024 7/2/24   Lazaro Kapoor MD   ondansetron (ZOFRAN) 4 mg tablet Take 1 tablet (4 mg total) by mouth every 8 (eight) hours as needed for nausea or vomiting 6/29/24   Corazon Escobar DO   tamsulosin (FLOMAX) 0.4 mg Take 1 capsule (0.4 mg total) by mouth daily with dinner for 7 days 6/29/24 7/6/24  Corazon Escobar DO     I have reviewed home medications with patient personally.    Allergies:   Allergies   Allergen Reactions    Cefadroxil      Action Taken: fever;     Amlodipine Other (See  Comments)      Brain fog       Social History:  Marital Status: Single   Occupation: retired  Patient Pre-hospital Living Situation: Home  Patient Pre-hospital Level of Mobility: walks  Patient Pre-hospital Diet Restrictions: None  Substance Use History:   Social History     Substance and Sexual Activity   Alcohol Use Yes    Comment: occasional     Social History     Tobacco Use   Smoking Status Never   Smokeless Tobacco Never     Social History     Substance and Sexual Activity   Drug Use No       Family History:  Family History   Problem Relation Age of Onset    Breast cancer Mother     Heart disease Father         MI in his 50's       Physical Exam:     Vitals:   Blood Pressure: 136/65 (07/22/24 1149)  Pulse: 82 (07/22/24 1149)  Temperature: 98.7 °F (37.1 °C) (07/22/24 1107)  Temp Source: Tympanic (07/22/24 1107)  Respirations: 20 (07/22/24 1149)  Weight - Scale: 125 kg (276 lb 7.3 oz) (07/22/24 1246)  SpO2: 95 % (07/22/24 1149)    Physical Exam  Constitutional:       Appearance: Normal appearance. He is obese.   HENT:      Head: Normocephalic and atraumatic.      Nose: Nose normal.   Eyes:      Extraocular Movements: Extraocular movements intact.   Cardiovascular:      Rate and Rhythm: Normal rate and regular rhythm.   Pulmonary:      Effort: Pulmonary effort is normal.      Breath sounds: No wheezing or rales.   Abdominal:      General: There is no distension.      Palpations: Abdomen is soft.      Tenderness: There is no abdominal tenderness.   Musculoskeletal:      Right lower leg: No edema.      Left lower leg: No edema.   Skin:     General: Skin is warm and dry.   Neurological:      Mental Status: He is alert and oriented to person, place, and time.   Psychiatric:         Mood and Affect: Mood normal.         Behavior: Behavior normal.          Additional Data:     Lab Results:  Results from last 7 days   Lab Units 07/22/24  1134   WBC Thousand/uL 12.14*   HEMOGLOBIN g/dL 15.2   HEMATOCRIT % 43.4    PLATELETS Thousands/uL 187   SEGS PCT % 82*   LYMPHO PCT % 8*   MONO PCT % 7   EOS PCT % 2     Results from last 7 days   Lab Units 07/22/24  1134   SODIUM mmol/L 139   POTASSIUM mmol/L 3.9   CHLORIDE mmol/L 108   CO2 mmol/L 24   BUN mg/dL 21   CREATININE mg/dL 0.75   ANION GAP mmol/L 7   CALCIUM mg/dL 8.6   ALBUMIN g/dL 4.0   TOTAL BILIRUBIN mg/dL 1.61*   ALK PHOS U/L 68   ALT U/L 20   AST U/L 18   GLUCOSE RANDOM mg/dL 97             Lab Results   Component Value Date    HGBA1C 5.2 01/13/2024    HGBA1C 4.9 02/01/2023    HGBA1C 5.1 11/05/2021           Lines/Drains:  Invasive Devices       Peripheral Intravenous Line  Duration             Peripheral IV 07/22/24 Right Antecubital <1 day                        Imaging: Reviewed radiology reports from this admission including: chest xray  XR chest 2 views    (Results Pending)       EKG and Other Studies Reviewed on Admission:   EKG: NSR. HR 86.    ** Please Note: This note has been constructed using a voice recognition system. **

## 2024-07-22 NOTE — ED PROVIDER NOTES
History  Chief Complaint   Patient presents with    Chest Pain     Pt c/o sharp intermittent CP starting last night, - SOB     HPI  Patient is 68 y.o. male with PMH htn, not on medication, Gilbert's disease, former smoker presenting to ED for evaluation of chest pain. Patient reports intermittent sharp 5/10 chest pain starting last night. No provoking or relieving symptoms. Patient repots comes and goes at rest or with exertion. Denies fever, chills, diaphoresis, SOB, n/v. Denies recent hospitalization, surgery, person history of clots, recent long plane/car travel.   Prior to Admission Medications   Prescriptions Last Dose Informant Patient Reported? Taking?   acetaminophen (TYLENOL) 325 mg tablet  Self Yes No   Sig: Take 650 mg by mouth every 6 (six) hours as needed for mild pain   ketorolac (TORADOL) 10 mg tablet  Self No No   Sig: Take 1 tablet (10 mg total) by mouth every 6 (six) hours as needed for moderate pain for up to 5 days   naproxen (Naprosyn) 500 mg tablet  Self No No   Sig: Take 1 tablet (500 mg total) by mouth 2 (two) times a day with meals   Patient not taking: Reported on 7/3/2024   ondansetron (ZOFRAN) 4 mg tablet  Self No No   Sig: Take 1 tablet (4 mg total) by mouth every 8 (eight) hours as needed for nausea or vomiting   tamsulosin (FLOMAX) 0.4 mg  Self No No   Sig: Take 1 capsule (0.4 mg total) by mouth daily with dinner for 7 days      Facility-Administered Medications: None       Past Medical History:   Diagnosis Date    Arthritis     Essential hypertension 12/16/2021    Gilbert's syndrome 01/06/2020    Intermittent elevated bilirubin.  Liver ultrasound.    Obesity     Renal colic 06/29/2024    Left-sided kidney stone      Unilateral hearing loss, left 01/18/2022       Past Surgical History:   Procedure Laterality Date    HAND SURGERY      AK COLONOSCOPY FLX DX W/COLLJ SPEC WHEN PFRMD N/A 3/22/2018    Procedure: COLONOSCOPY;  Surgeon: Mark Bland MD;  Location: [facility];  Service:  Gastroenterology    TONSILECTOMY AND ADNOIDECTOMY      TOOTH EXTRACTION         Family History   Problem Relation Age of Onset    Breast cancer Mother     Heart disease Father         MI in his 50's     I have reviewed and agree with the history as documented.    E-Cigarette/Vaping    E-Cigarette Use Never User      E-Cigarette/Vaping Substances    Nicotine No     THC No     CBD No     Flavoring No     Other No     Unknown No      Social History     Tobacco Use    Smoking status: Never    Smokeless tobacco: Never   Vaping Use    Vaping status: Never Used   Substance Use Topics    Alcohol use: Yes     Comment: occasional    Drug use: No        Review of Systems   Constitutional:  Negative for chills and fever.   HENT:  Negative for ear pain and sore throat.    Respiratory:  Negative for cough and shortness of breath.    Cardiovascular:  Positive for chest pain. Negative for palpitations and leg swelling.   Gastrointestinal:  Negative for abdominal pain, diarrhea, nausea and vomiting.   Genitourinary:  Negative for dysuria, frequency and hematuria.   Musculoskeletal:  Negative for back pain and neck pain.   Skin:  Negative for rash.   Neurological:  Negative for dizziness, light-headedness and headaches.       Physical Exam  ED Triage Vitals [07/22/24 1107]   Temperature Pulse Respirations Blood Pressure SpO2   98.7 °F (37.1 °C) 88 20 129/73 97 %      Temp Source Heart Rate Source Patient Position - Orthostatic VS BP Location FiO2 (%)   Tympanic Monitor Sitting Left arm --      Pain Score       5             Orthostatic Vital Signs  Vitals:    07/22/24 1107 07/22/24 1149   BP: 129/73 136/65   Pulse: 88 82   Patient Position - Orthostatic VS: Sitting        Physical Exam  Vitals reviewed.   Constitutional:       General: He is awake.   HENT:      Head: Normocephalic and atraumatic.      Mouth/Throat:      Mouth: Mucous membranes are moist.   Eyes:      Extraocular Movements: Extraocular movements intact.      Right  eye: No nystagmus.      Left eye: No nystagmus.      Conjunctiva/sclera: Conjunctivae normal.      Pupils: Pupils are equal, round, and reactive to light.   Cardiovascular:      Rate and Rhythm: Normal rate and regular rhythm.      Pulses: Normal pulses.      Heart sounds: Normal heart sounds, S1 normal and S2 normal. Heart sounds not distant. No murmur heard.     No friction rub. No gallop.   Pulmonary:      Breath sounds: No stridor. No wheezing, rhonchi or rales.      Comments: CTA b/l   Abdominal:      General: Bowel sounds are normal.      Palpations: Abdomen is soft.      Tenderness: There is no abdominal tenderness.   Musculoskeletal:      Right lower leg: No edema.      Left lower leg: No edema.   Skin:     General: Skin is warm and dry.      Capillary Refill: Capillary refill takes less than 2 seconds.   Neurological:      Mental Status: He is alert and oriented to person, place, and time.      GCS: GCS eye subscore is 4. GCS verbal subscore is 5. GCS motor subscore is 6.      Cranial Nerves: Cranial nerves 2-12 are intact.      Sensory: Sensation is intact.      Motor: No weakness or pronator drift.      Coordination: Coordination normal. Finger-Nose-Finger Test normal.         ED Medications  Medications   aspirin tablet 325 mg (has no administration in time range)   heparin (ACS LOW) (has no administration in time range)       Diagnostic Studies  Results Reviewed       Procedure Component Value Units Date/Time    APTT six (6) hours after Heparin bolus/drip initiation or dosing change [[id number]]     Lab Status: No result Specimen: Blood     Protime-INR [[id number]]     Lab Status: No result Specimen: Blood     HS Troponin I 4hr [[id number]]     Lab Status: No result Specimen: Blood     HS Troponin I 2hr [[id number]]     Lab Status: No result Specimen: Blood     HS Troponin 0hr (reflex protocol) [[id number]]  (Abnormal) Collected: 07/22/24 1134    Lab Status: Final result Specimen: Blood from Arm, Right  Updated: 07/22/24 1211     hs TnI 0hr 215 ng/L     Comprehensive metabolic panel [[id number]]  (Abnormal) Collected: 07/22/24 1134    Lab Status: Final result Specimen: Blood from Arm, Right Updated: 07/22/24 1207     Sodium 139 mmol/L      Potassium 3.9 mmol/L      Chloride 108 mmol/L      CO2 24 mmol/L      ANION GAP 7 mmol/L      BUN 21 mg/dL      Creatinine 0.75 mg/dL      Glucose 97 mg/dL      Calcium 8.6 mg/dL      AST 18 U/L      ALT 20 U/L      Alkaline Phosphatase 68 U/L      Total Protein 7.0 g/dL      Albumin 4.0 g/dL      Total Bilirubin 1.61 mg/dL      eGFR 92 ml/min/1.73sq m     Narrative:      [employer] guidelines for Chronic Kidney Disease (CKD):     Stage 1 with normal or high GFR (GFR > 90 mL/min/1.73 square meters)    Stage 2 Mild CKD (GFR = 60-89 mL/min/1.73 square meters)    Stage 3A Moderate CKD (GFR = 45-59 mL/min/1.73 square meters)    Stage 3B Moderate CKD (GFR = 30-44 mL/min/1.73 square meters)    Stage 4 Severe CKD (GFR = 15-29 mL/min/1.73 square meters)    Stage 5 End Stage CKD (GFR <15 mL/min/1.73 square meters)  Note: GFR calculation is accurate only with a steady state creatinine    CBC and differential [[id number]]  (Abnormal) Collected: 07/22/24 1134    Lab Status: Final result Specimen: Blood from Arm, Right Updated: 07/22/24 1143     WBC 12.14 Thousand/uL      RBC 4.87 Million/uL      Hemoglobin 15.2 g/dL      Hematocrit 43.4 %      MCV 89 fL      MCH 31.2 pg      MCHC 35.0 g/dL      RDW 13.6 %      MPV 9.1 fL      Platelets 187 Thousands/uL      nRBC 0 /100 WBCs      Segmented % 82 %      Immature Grans % 1 %      Lymphocytes % 8 %      Monocytes % 7 %      Eosinophils Relative 2 %      Basophils Relative 0 %      Absolute Neutrophils 10.00 Thousands/µL      Absolute Immature Grans 0.06 Thousand/uL      Absolute Lymphocytes 1.02 Thousands/µL      Absolute Monocytes 0.84 Thousand/µL      Eosinophils Absolute 0.18 Thousand/µL      Basophils Absolute 0.04  Thousands/µL                    XR chest 2 views   Final Result by Chao Gonzales MD (07/22 1335)      No acute cardiopulmonary disease.            Workstation performed: [id number]               Procedures  Procedures      ED Course  ED Course as of 07/22/24 1253   Mon Jul 22, 2024   1213 hs TnI 0hr(!): 215   1214 WBC(!): 12.14   1214 Platelet Count: 187   1214 Comprehensive metabolic panel(!)  Unremarkable    1228 Total Bilirubin(!): 1.61  H/o Gilbert's                                       Medical Decision Making  Amount and/or Complexity of Data Reviewed  Labs: ordered. Decision-making details documented in ED Course.  Radiology: ordered.    Risk  OTC drugs.  Decision regarding hospitalization.        Patient is 68 y.o. male with PMH of htn, not on medication, Gilbert's disease, former smoker presenting to ED for evaluation of chest pain. . See history and physical documented above.       Differential diagnosis included but not limited to ACS, arrhythmia, musculoskeletal pain,pneumothorax, doubt pneumonia, Wellso Low, cannot PERC out due to age, but low clinical suspicion of PE, . Plan CBC, CMP, troponin, CXR, EKG.    On my interpretation of EKG, NSR 86 bpm, left axis, normal intervals, poor R wave progression, no ST segment elevations or depressions.     View ED course above for further discussion on patient workup.         All labs reviewed and utilized in the medical decision making process  All radiology studies independently viewed by me and interpreted by the radiologist.  I reviewed all testing with the patient.     Upon re-evaluation patient remains stable. Initial troponin elevated, will give ASA start ACS heparin and admit to [facility].        Disposition  Final diagnoses:   NSTEMI (non-ST elevated myocardial infarction) (HCC)     Time reflects when diagnosis was documented in both MDM as applicable and the Disposition within this note       Time User Action Codes Description Comment    7/22/2024 12:30 PM  Ashley Kitchen Add [I21.4] NSTEMI (non-ST elevated myocardial infarction) ([facility])           ED Disposition       ED Disposition   Admit    Condition   Stable    Date/Time   Mon Jul 22, 2024 12:23 PM    Comment   Case was discussed with   and the patient's admission status was agreed to be Admission Status: inpatient status to the service of    .               Follow-up Information    None         Patient's Medications   Discharge Prescriptions    No medications on file     No discharge procedures on file.    PDMP Review         Value Time User    PDMP Reviewed  Yes 6/29/2024  4:26 AM Corazon Escobar DO             ED Provider  Attending physically available and evaluated Dior Lock. I managed the patient along with the ED Attending.    Electronically Signed by           Ashley Kitchen DO  07/23/24 0803

## 2024-07-22 NOTE — CONSULTS
Consultation - Cardiology Team One  Dior Lock 68 y.o. male MRN: [id number]  Unit/Bed#: ED 25 Encounter: [id number]    Inpatient consult to Cardiology  Consult performed by: JOSE Vang  Consult ordered by: Cesar Navas MD          Physician Requesting Consult: Cesar Navas MD    Reason for Consult / Principal Problem: chest pain      Assessment/ Plan    1. Chest pain with elevated troponin: pt presented with chest pain at rest and exertion. Pain is sharp and lasting only seconds without radiation or associated sytmpoms. EKG without ischemic changes. Somewhat atypical for angina however his initial troponin was elevated at 215; second one is pending.     Agree with ASA, beta blocker, statin and IV heparin. He is pain free currenlty and has SL nitroglvyerin ordered as needed.    Check echo, TSH, lipid panel and Hgb A1c.   ASCVD risk score 18.9%; started on high intensity statin    Given his trop elevation witout any other clear cause and symtpoms of chest pain althought somewhat atypical recommend proceeding with cardiac catheterization.   Procedure reviewed with patient; he is agreeable to proceed.   Cr 0.75  Start IVF in AM  NPO after MN  Further plan pending results of ehco and cath.     2. HTN: noted in hx; not on any antihypertensive meds as OP; BP currently 143/77. Monitor with addition of beta blocker given #1        History of Present Illness     HPI: Dior Lock is a 68 y.o. year old male who has a history of HTN, HLD, [place] syndrome, recent renal calculi. He does not follow with a cardiologist.     Pt presented to ED with complaints of chest pain. First noticed symptoms last night while sitting. Left sided; sharp in nature. Lasted only seconds. No associated nausea/sob/diaphoresis. No radiation. Took ASA last night. No recurrence overnight.  He had another episodes this morning at rest. And again towards the end of his morning walk prompting him to come to the emergency room.     On  presentation to ED he was HD stable with /73, afebrile, SPO2 97%.   EKG showed sinus rhythm without any ST/twave abnormalities.   Cxray NAD  Initial HS troponin 215  BMP and CBC unremarkable besides WBC 12    He was given ASA 325mg, metoprolol tartrate 25mg BID and stated on IV heparin.   Cardiology asked to evaluate for further management.     At time of my exam pt reports feeling ok; he is pain free currently. Has had a few episodes of sharp pain again lasting a few seconds. He has not reviewed any pain medication of SL Nitroglycerin.     No prior hx of CAD. Lifelong non-smoker. Father decreased of MI in his 50s; he was a smoker.     [facility] 2019 [facility] after a holter done by PCP showed 4 beats NSVT.   At that time he has an echo that showed LVEF 60%, grade 1 DD, trace Aortic regurgitation.   Exercise only stress test was negative for ishschemia. He has not seen cardiology since 2019.     He walks everyday 2-3 miles and can typically do so without any chest discomfort or SOB.     EKG reviewed personally:   7/22/2024  Sinus rhythm  No ST/twave changes    Telemetry reviewed personally:   Sinus rhythm, no events    Review of Systems   Constitutional: Negative for decreased appetite and fever.   Cardiovascular:  Positive for chest pain (as noted in HPI). Negative for dyspnea on exertion, irregular heartbeat, leg swelling, palpitations and syncope.   Respiratory:  Negative for cough, shortness of breath and wheezing.    Gastrointestinal:  Negative for nausea and vomiting.   Genitourinary:  Negative for dysuria.   Neurological:  Negative for dizziness and light-headedness.   Psychiatric/Behavioral:  Negative for altered mental status.    All other systems reviewed and are negative.    Historical Information   Past Medical History:   Diagnosis Date    Arthritis     Essential hypertension 12/16/2021    Gilbert's syndrome 01/06/2020    Intermittent elevated bilirubin.  Liver ultrasound.     Obesity     Renal colic 06/29/2024    Left-sided kidney stone      Unilateral hearing loss, left 01/18/2022     Past Surgical History:   Procedure Laterality Date    HAND SURGERY      MS COLONOSCOPY FLX DX W/COLLJ SPEC WHEN PFRMD N/A 3/22/2018    Procedure: COLONOSCOPY;  Surgeon: Mark Bland MD;  Location: [facility];  Service: Gastroenterology    TONSILECTOMY AND ADNOIDECTOMY      TOOTH EXTRACTION       Social History     Substance and Sexual Activity   Alcohol Use Yes    Comment: occasional     Social History     Substance and Sexual Activity   Drug Use No     Social History     Tobacco Use   Smoking Status Never   Smokeless Tobacco Never     Family History:   Family History   Problem Relation Age of Onset    Breast cancer Mother     Heart disease Father         MI in his 50's     Meds/Allergies   current meds:   Current Facility-Administered Medications   Medication Dose Route Frequency    acetaminophen (TYLENOL) tablet 650 mg  650 mg Oral Q4H PRN    [START ON 7/23/2024] aspirin chewable tablet 81 mg  81 mg Oral Daily    atorvastatin (LIPITOR) tablet 80 mg  80 mg Oral QPM    heparin (porcine) 25,000 units in 0.45% NaCl 250 mL infusion (premix)  3-20 Units/kg/hr (Order-Specific) Intravenous Titrated    heparin (porcine) injection 2,000 Units  2,000 Units Intravenous Q6H PRN    heparin (porcine) injection 4,000 Units  4,000 Units Intravenous Q6H PRN    metoprolol tartrate (LOPRESSOR) tablet 25 mg  25 mg Oral Q12H HEMALATHA    nitroglycerin (NITROSTAT) SL tablet 0.4 mg  0.4 mg Sublingual Q5 Min PRN    ondansetron (ZOFRAN) injection 4 mg  4 mg Intravenous Q6H PRN     heparin (porcine), 3-20 Units/kg/hr (Order-Specific), Last Rate: 11.1 Units/kg/hr (07/22/24 1329)        Allergies   Allergen Reactions    Cefadroxil      Action Taken: fever;     Amlodipine Other (See Comments)      Brain fog       Objective   Vitals: Blood pressure 143/82, pulse 88, temperature 98.7 °F (37.1 °C), temperature source Tympanic, resp.  rate 18, weight 125 kg (276 lb 7.3 oz), SpO2 96%.,     Body mass index is 40.83 kg/m².,     Systolic (24hrs), Av , Min:129 , Max:143     Diastolic (24hrs), Av, Min:65, Max:82          No intake or output data in the 24 hours ending 24 1347  Weight (last 2 days)       Date/Time Weight    24 1246 125 (276.46)          Invasive Devices       Peripheral Intravenous Line  Duration             Peripheral IV 24 Right Antecubital <1 day                      Physical Exam  Vitals reviewed.   Constitutional:       General: He is not in acute distress.     Appearance: Normal appearance.      Comments: Pt laying on stretcher in NAD   HENT:      Head: Normocephalic.      Mouth/Throat:      Mouth: Mucous membranes are moist.   Cardiovascular:      Rate and Rhythm: Normal rate and regular rhythm.      Heart sounds: S1 normal and S2 normal. No murmur heard.  Pulmonary:      Effort: Pulmonary effort is normal.      Breath sounds: Normal breath sounds. No wheezing or rales.   Abdominal:      Palpations: Abdomen is soft.   Musculoskeletal:      Right lower leg: No edema.      Left lower leg: No edema.   Skin:     General: Skin is warm.   Neurological:      Mental Status: He is alert and oriented to person, place, and time.   Psychiatric:         Mood and Affect: Mood normal.       LABORATORY RESULTS:      CBC with diff:   Results from last 7 days   Lab Units 24  1134   WBC Thousand/uL 12.14*   HEMOGLOBIN g/dL 15.2   HEMATOCRIT % 43.4   MCV fL 89   PLATELETS Thousands/uL 187   RBC Million/uL 4.87   MCH pg 31.2   MCHC g/dL 35.0   RDW % 13.6   MPV fL 9.1   NRBC AUTO /100 WBCs 0     CMP:  Results from last 7 days   Lab Units 24  1134   POTASSIUM mmol/L 3.9   CHLORIDE mmol/L 108   CO2 mmol/L 24   BUN mg/dL 21   CREATININE mg/dL 0.75   CALCIUM mg/dL 8.6   AST U/L 18   ALT U/L 20   ALK PHOS U/L 68   EGFR ml/min/1.73sq m 92     BMP:  Results from last 7 days   Lab Units 24  1134   POTASSIUM mmol/L  "3.9   CHLORIDE mmol/L 108   CO2 mmol/L 24   BUN mg/dL 21   CREATININE mg/dL 0.75   CALCIUM mg/dL 8.6       Lipid Profile:   No results found for: \"CHOL\"  Lab Results   Component Value Date    HDL 43 2024    HDL 36 (L) 2023    HDL 44 2021     Lab Results   Component Value Date    LDLCALC 89 2024    LDLCALC 115 (H) 2023    LDLCALC 115 (H) 2021     Lab Results   Component Value Date    TRIG 92 2024    TRIG 104 2023    TRIG 68 2021     Cardiac testing:   Results for orders placed during the hospital encounter of 19    Echo complete with contrast if indicated    [place] [address]  [phone]    Transthoracic Echocardiogram  2D, M-mode, Doppler, and Color Doppler    Study date:  2019    Patient: ACE REIS  MR number: [id number]  Account number: [id number]  : 1955  Age: 63 years  Gender: Male  Status: Outpatient  Location: [address] [place] [address] [facility] [place]  Height: 69 in  Weight: 204.6 lb  BP: 126/ 68 mmHg    Indications: Tachycardia    Diagnoses: R00.0 - Tachycardia, unspecified    Sonographer:  JORDAN Steel  Interpreting Physician:  Mishel Mendez MD  Primary Physician:  Dariela Garrido MD  Referring Physician:  Etta Woodruff MD  Group:  [facility]  Cardiology Fellow:  Mendez Godwin MD    SUMMARY    LEFT VENTRICLE:  Systolic function was normal. Ejection fraction was estimated to be 60 %.  Although no diagnostic regional wall motion abnormality was identified, this possibility cannot be completely excluded on the basis of this study.  Doppler parameters were consistent with abnormal left ventricular relaxation (grade 1 diastolic dysfunction).    RIGHT VENTRICLE:  The size was normal.  Systolic function was normal.    AORTIC VALVE:  There was trace to mild regurgitation.    HISTORY: PRIOR HISTORY: Tachycardia and skipped " beats.    PROCEDURE: The study was performed in the [address] [place] [address] [facility]. This was a routine study. The transthoracic approach was used. The study included complete 2D imaging, M-mode, complete spectral Doppler, and color Doppler. The  heart rate was 62 bpm, at the start of the study. Images were obtained from the parasternal, apical, subcostal, and suprasternal notch acoustic windows. This was a technically difficult study.    LEFT VENTRICLE: Size was normal. Systolic function was normal. Ejection fraction was estimated to be 60 %. Although no diagnostic regional wall motion abnormality was identified, this possibility cannot be completely excluded on the basis  of this study. Wall thickness was normal. DOPPLER: There was an increased relative contribution of atrial contraction to ventricular filling. Doppler parameters were consistent with abnormal left ventricular relaxation (grade 1 diastolic  dysfunction).    RIGHT VENTRICLE: The size was normal. Systolic function was normal.    LEFT ATRIUM: Size was normal.    RIGHT ATRIUM: Size was normal.    MITRAL VALVE: There was mild annular calcification. There was normal leaflet separation. DOPPLER: There was no evidence for stenosis. There was trace regurgitation.    AORTIC VALVE: The valve was trileaflet. Leaflets exhibited mildly increased thickness and normal cuspal separation. DOPPLER: There was no evidence for stenosis. There was trace to mild regurgitation.    TRICUSPID VALVE: The valve structure was normal. There was normal leaflet separation. DOPPLER: There was no evidence for stenosis. There was trace regurgitation. Pulmonary artery systolic pressure was within the normal range. Estimated  peak PA pressure was 25 mmHg.    PULMONIC VALVE: Leaflets exhibited normal thickness, no calcification, and normal cuspal separation. DOPPLER: The transpulmonic velocity was within the normal range. There was trace regurgitation.    PERICARDIUM: There was no  pericardial effusion.    AORTA: The root exhibited normal size.    SYSTEMIC VEINS: IVC: The inferior vena cava was not well visualized.    PULMONARY VEINS: DOPPLER: Doppler flow pattern was normal in the pulmonary vein(s).    SYSTEM MEASUREMENT TABLES    2D  %FS: 40.86 %  Ao Diam: 3.27 cm  EDV(Teich): 109.23 ml  EF(Cube): 79.32 %  EF(Teich): 71.55 %  ESV(Cube): 23.34 ml  ESV(Teich): 31.07 ml  IVSd: 1.01 cm  LA Area: 15.8 cm2  LA Diam: 4.34 cm  LVEDV MOD A4C: 105.19 ml  LVEF MOD A4C: 62.09 %  LVESV MOD A4C: 39.88 ml  LVIDd: 4.83 cm  LVIDs: 2.86 cm  LVLd A4C: 8.57 cm  LVLs A4C: 7.22 cm  LVPWd: 1.01 cm  RA Area: 12.87 cm2  RV Diam.: 3.77 cm  SI(Cube): 42.83 ml/m2  SI(Teich): 37.4 ml/m2  SV MOD A4C: 65.31 ml  SV(Cube): 89.52 ml  SV(Teich): 78.16 ml    CW  AR Dec Cecil: 1.22 m/s2  AR Dec Time: 2169.42 ms  AR PHT: 629.13 ms  AR Vmax: 2.66 m/s  AR maxP.23 mmHg  TR Vmax: 2.14 m/s  TR maxP.28 mmHg    MM  TAPSE: 2.49 cm    PW  E': 0.07 m/s  E/E': 11.13  MV A Kevin: 0.92 m/s  MV Dec Cecil: 2.98 m/s2  MV DecT: 244.44 ms  MV E Kevin: 0.73 m/s  MV E/A Ratio: 0.79    [employer] Commission Accredited Echocardiography Laboratory    Prepared and electronically signed by    Mishel Mendez MD  Signed 2019 14:32:16    Imaging: I have personally reviewed pertinent reports.    XR chest 2 views    Result Date: 2024  Narrative: XR CHEST PA & LATERAL INDICATION: chest pain. COMPARISON: 2021. FINDINGS: No focal airspace consolidation, pleural effusion, signs of congestion, or pneumothorax. Cardiomediastinal silhouette is unremarkable. Osseous structures are grossly intact.     Impression: No acute cardiopulmonary disease. Workstation performed: [id number]     XR hip/pelv 2-3 vws right if performed    Result Date: 2024  Narrative: XR HIP/PELV 2-3 VWS RIGHT W PELVIS IF PERFORMED INDICATION: pain over right hip and SI joint, chronic but worsening. COMPARISON: Compared with 2023 FINDINGS: No acute fracture or  dislocation. Acetabulum lucent area on the right unchanged No lytic or blastic osseous lesion. Unremarkable soft tissues. Unremarkable visualized lumbar spine.     Impression: No acute osseous abnormality. Computerized Assisted Algorithm (CAA) may have been used to analyze all applicable images. Workstation performed: [id number]     CT renal stone study abdomen pelvis wo contrast    Result Date: 6/29/2024  Narrative: CT ABDOMEN AND PELVIS WITHOUT IV CONTRAST - LOW DOSE RENAL STONE INDICATION: colicky left flank pain with nausea and vomiting for the past several hours, concern for nephrolithiasis. COMPARISON: CT lumbar spine dated 4/23/2023 TECHNIQUE: Low radiation dose thin section CT examination of the abdomen and pelvis was performed without intravenous or oral contrast according to a protocol specifically designed to evaluate for urinary tract calculus. Axial, sagittal, and coronal 2D reformatted images were created from the source data and submitted for interpretation. Evaluation for pathology in the abdomen and pelvis that is unrelated to urinary tract calculi is limited. Radiation dose length product (DLP) for this visit: 471 mGy-cm . This examination, like all CT scans performed in the [facility], was performed utilizing techniques to minimize radiation dose exposure, including the use of iterative reconstruction and automated exposure control. URINARY TRACT FINDINGS: RIGHT KIDNEY AND URETER: No urinary tract calculi. No hydronephrosis or hydroureter. LEFT KIDNEY AND URETER: 3 mm obstructing proximal left ureteral calculus with mild left hydronephrosis. Left lower pole renal cyst. URINARY BLADDER: Unremarkable. ADDITIONAL FINDINGS: LOWER CHEST: No clinically significant abnormality in the visualized lower chest. SOLID VISCERA: Limited low radiation dose noncontrast CT evaluation demonstrates no clinically significant abnormality of the imaged portions of the liver, spleen, pancreas, or adrenal  glands. GALLBLADDER/BILIARY TREE: No calcified gallstones. No pericholecystic inflammatory change. No biliary dilation. STOMACH AND BOWEL: Unremarkable. APPENDIX: No findings to suggest appendicitis. ABDOMINOPELVIC CAVITY: No ascites. No pneumoperitoneum. No lymphadenopathy. VESSELS: Unremarkable for patient's age. REPRODUCTIVE ORGANS: Unremarkable for patient's age. ABDOMINAL WALL/INGUINAL REGIONS: Unremarkable. BONES: No acute fracture or suspicious osseous lesion. Mildly expanded trabeculated pattern process involving the left shoulder again seen, likely benign. Differential includes Paget's disease.     Impression: 3 mm obstructing proximal left ureteral calculus with mild left hydronephrosis. Workstation performed: [id number]     Assessment  Principal Problem:    Chest pain  Active Problems:    Gilbert's syndrome    Essential hypertension    Class 2 severe obesity due to excess calories with serious comorbidity in adult (HCC)    Thank you for allowing us to participate in this patient's care.     Counseling / Coordination of Care  Total floor / unit time spent today 45 minutes.  Greater than 50% of total time was spent with the patient and / or family counseling and / or coordination of care.  A description of the counseling / coordination of care: Review of history, current assessment, development of a plan.      Code Status: Level 1 - Full Code    ** Please Note: Dragon 360 Dictation voice to text software may have been used in the creation of this document. **

## 2024-07-22 NOTE — ASSESSMENT & PLAN NOTE
Chest pain with elevated troponins on arrival, concern for unstable angina vs NSTEMI  Received aspirin 325mg  Start baby aspirin, atorvastatin 80mg, metoprolol 25mg and PRN nitroglycerin  Check echo and monitor on telemetry  Start IV heparin cardiac dose  Trend troponin and donsult with cardiology

## 2024-07-22 NOTE — ASSESSMENT & PLAN NOTE
BP is fairly well controlled at this time  Given the above findings will start metoprolol 25mg BID

## 2024-07-23 ENCOUNTER — APPOINTMENT (INPATIENT)
Dept: RADIOLOGY | Facility: HOSPITAL | Age: 69
DRG: 286 | End: 2024-07-23
Payer: COMMERCIAL

## 2024-07-23 PROBLEM — R65.10 SIRS (SYSTEMIC INFLAMMATORY RESPONSE SYNDROME) (HCC): Status: ACTIVE | Noted: 2024-07-23

## 2024-07-23 PROBLEM — K59.00 CONSTIPATION: Status: ACTIVE | Noted: 2024-07-23

## 2024-07-23 LAB
ANION GAP SERPL CALCULATED.3IONS-SCNC: 9 MMOL/L (ref 4–13)
APTT PPP: 68 SECONDS (ref 23–37)
APTT PPP: 69 SECONDS (ref 23–37)
ATRIAL RATE: 90 BPM
BACTERIA UR QL AUTO: ABNORMAL /HPF
BASOPHILS # BLD AUTO: 0.05 THOUSANDS/ÂΜL (ref 0–0.1)
BASOPHILS NFR BLD AUTO: 0 % (ref 0–1)
BILIRUB UR QL STRIP: NEGATIVE
BUN SERPL-MCNC: 15 MG/DL (ref 5–25)
CALCIUM SERPL-MCNC: 8.8 MG/DL (ref 8.4–10.2)
CHLORIDE SERPL-SCNC: 103 MMOL/L (ref 96–108)
CHOLEST SERPL-MCNC: 163 MG/DL
CLARITY UR: CLEAR
CO2 SERPL-SCNC: 26 MMOL/L (ref 21–32)
COLOR UR: YELLOW
CREAT SERPL-MCNC: 0.85 MG/DL (ref 0.6–1.3)
EOSINOPHIL # BLD AUTO: 0.11 THOUSAND/ÂΜL (ref 0–0.61)
EOSINOPHIL NFR BLD AUTO: 1 % (ref 0–6)
ERYTHROCYTE [DISTWIDTH] IN BLOOD BY AUTOMATED COUNT: 13.6 % (ref 11.6–15.1)
GFR SERPL CREATININE-BSD FRML MDRD: 88 ML/MIN/1.73SQ M
GLUCOSE SERPL-MCNC: 101 MG/DL (ref 65–140)
GLUCOSE UR STRIP-MCNC: NEGATIVE MG/DL
HCT VFR BLD AUTO: 44.6 % (ref 36.5–49.3)
HDLC SERPL-MCNC: 58 MG/DL
HGB BLD-MCNC: 15.1 G/DL (ref 12–17)
HGB UR QL STRIP.AUTO: ABNORMAL
IMM GRANULOCYTES # BLD AUTO: 0.11 THOUSAND/UL (ref 0–0.2)
IMM GRANULOCYTES NFR BLD AUTO: 1 % (ref 0–2)
KETONES UR STRIP-MCNC: NEGATIVE MG/DL
LDLC SERPL CALC-MCNC: 92 MG/DL (ref 0–100)
LEUKOCYTE ESTERASE UR QL STRIP: ABNORMAL
LYMPHOCYTES # BLD AUTO: 1 THOUSANDS/ÂΜL (ref 0.6–4.47)
LYMPHOCYTES NFR BLD AUTO: 6 % (ref 14–44)
MCH RBC QN AUTO: 30.6 PG (ref 26.8–34.3)
MCHC RBC AUTO-ENTMCNC: 33.9 G/DL (ref 31.4–37.4)
MCV RBC AUTO: 91 FL (ref 82–98)
MONOCYTES # BLD AUTO: 1.37 THOUSAND/ÂΜL (ref 0.17–1.22)
MONOCYTES NFR BLD AUTO: 8 % (ref 4–12)
MUCOUS THREADS UR QL AUTO: ABNORMAL
NEUTROPHILS # BLD AUTO: 14.71 THOUSANDS/ÂΜL (ref 1.85–7.62)
NEUTS SEG NFR BLD AUTO: 84 % (ref 43–75)
NITRITE UR QL STRIP: NEGATIVE
NON-SQ EPI CELLS URNS QL MICRO: ABNORMAL /HPF
NONHDLC SERPL-MCNC: 105 MG/DL
NRBC BLD AUTO-RTO: 0 /100 WBCS
P AXIS: 48 DEGREES
PH UR STRIP.AUTO: 5.5 [PH]
PLATELET # BLD AUTO: 179 THOUSANDS/UL (ref 149–390)
PMV BLD AUTO: 9.4 FL (ref 8.9–12.7)
POTASSIUM SERPL-SCNC: 3.6 MMOL/L (ref 3.5–5.3)
PR INTERVAL: 172 MS
PROCALCITONIN SERPL-MCNC: 0.19 NG/ML
PROCALCITONIN SERPL-MCNC: 0.2 NG/ML
PROT UR STRIP-MCNC: ABNORMAL MG/DL
QRS AXIS: -19 DEGREES
QRSD INTERVAL: 98 MS
QT INTERVAL: 366 MS
QTC INTERVAL: 447 MS
RBC # BLD AUTO: 4.93 MILLION/UL (ref 3.88–5.62)
RBC #/AREA URNS AUTO: ABNORMAL /HPF
SARS-COV-2 RNA RESP QL NAA+PROBE: NEGATIVE
SODIUM SERPL-SCNC: 138 MMOL/L (ref 135–147)
SP GR UR STRIP.AUTO: 1.02 (ref 1–1.03)
T WAVE AXIS: 42 DEGREES
TRIGL SERPL-MCNC: 67 MG/DL
UROBILINOGEN UR STRIP-ACNC: <2 MG/DL
VENTRICULAR RATE: 90 BPM
WBC # BLD AUTO: 17.35 THOUSAND/UL (ref 4.31–10.16)
WBC #/AREA URNS AUTO: ABNORMAL /HPF

## 2024-07-23 PROCEDURE — 93005 ELECTROCARDIOGRAM TRACING: CPT

## 2024-07-23 PROCEDURE — 80048 BASIC METABOLIC PNL TOTAL CA: CPT | Performed by: INTERNAL MEDICINE

## 2024-07-23 PROCEDURE — 85730 THROMBOPLASTIN TIME PARTIAL: CPT | Performed by: STUDENT IN AN ORGANIZED HEALTH CARE EDUCATION/TRAINING PROGRAM

## 2024-07-23 PROCEDURE — 87086 URINE CULTURE/COLONY COUNT: CPT | Performed by: STUDENT IN AN ORGANIZED HEALTH CARE EDUCATION/TRAINING PROGRAM

## 2024-07-23 PROCEDURE — 99233 SBSQ HOSP IP/OBS HIGH 50: CPT | Performed by: INTERNAL MEDICINE

## 2024-07-23 PROCEDURE — 84145 PROCALCITONIN (PCT): CPT | Performed by: STUDENT IN AN ORGANIZED HEALTH CARE EDUCATION/TRAINING PROGRAM

## 2024-07-23 PROCEDURE — 80061 LIPID PANEL: CPT | Performed by: INTERNAL MEDICINE

## 2024-07-23 PROCEDURE — 81001 URINALYSIS AUTO W/SCOPE: CPT | Performed by: STUDENT IN AN ORGANIZED HEALTH CARE EDUCATION/TRAINING PROGRAM

## 2024-07-23 PROCEDURE — 85025 COMPLETE CBC W/AUTO DIFF WBC: CPT | Performed by: STUDENT IN AN ORGANIZED HEALTH CARE EDUCATION/TRAINING PROGRAM

## 2024-07-23 PROCEDURE — 74176 CT ABD & PELVIS W/O CONTRAST: CPT

## 2024-07-23 PROCEDURE — 93010 ELECTROCARDIOGRAM REPORT: CPT | Performed by: INTERNAL MEDICINE

## 2024-07-23 PROCEDURE — 99232 SBSQ HOSP IP/OBS MODERATE 35: CPT | Performed by: STUDENT IN AN ORGANIZED HEALTH CARE EDUCATION/TRAINING PROGRAM

## 2024-07-23 PROCEDURE — 87077 CULTURE AEROBIC IDENTIFY: CPT | Performed by: STUDENT IN AN ORGANIZED HEALTH CARE EDUCATION/TRAINING PROGRAM

## 2024-07-23 PROCEDURE — 85730 THROMBOPLASTIN TIME PARTIAL: CPT | Performed by: INTERNAL MEDICINE

## 2024-07-23 PROCEDURE — 87186 SC STD MICRODIL/AGAR DIL: CPT | Performed by: STUDENT IN AN ORGANIZED HEALTH CARE EDUCATION/TRAINING PROGRAM

## 2024-07-23 RX ORDER — POTASSIUM CHLORIDE 20 MEQ/1
20 TABLET, EXTENDED RELEASE ORAL ONCE
Status: COMPLETED | OUTPATIENT
Start: 2024-07-23 | End: 2024-07-23

## 2024-07-23 RX ORDER — POLYETHYLENE GLYCOL 3350 17 G/17G
17 POWDER, FOR SOLUTION ORAL DAILY
Status: DISCONTINUED | OUTPATIENT
Start: 2024-07-23 | End: 2024-07-27 | Stop reason: HOSPADM

## 2024-07-23 RX ORDER — METOPROLOL TARTRATE 50 MG/1
50 TABLET, FILM COATED ORAL EVERY 12 HOURS SCHEDULED
Status: DISCONTINUED | OUTPATIENT
Start: 2024-07-23 | End: 2024-07-27 | Stop reason: HOSPADM

## 2024-07-23 RX ORDER — SENNOSIDES 8.6 MG
2 TABLET ORAL
Status: DISCONTINUED | OUTPATIENT
Start: 2024-07-23 | End: 2024-07-27 | Stop reason: HOSPADM

## 2024-07-23 RX ADMIN — ACETAMINOPHEN 650 MG: 325 TABLET, FILM COATED ORAL at 08:55

## 2024-07-23 RX ADMIN — SODIUM CHLORIDE 125 ML/HR: 0.9 INJECTION, SOLUTION INTRAVENOUS at 05:36

## 2024-07-23 RX ADMIN — SENNOSIDES 17.2 MG: 8.6 TABLET, FILM COATED ORAL at 21:15

## 2024-07-23 RX ADMIN — METOPROLOL TARTRATE 50 MG: 50 TABLET, FILM COATED ORAL at 21:15

## 2024-07-23 RX ADMIN — HEPARIN SODIUM 15.1 UNITS/KG/HR: 10000 INJECTION, SOLUTION INTRAVENOUS at 05:35

## 2024-07-23 RX ADMIN — VANCOMYCIN HYDROCHLORIDE 2000 MG: 10 INJECTION, POWDER, LYOPHILIZED, FOR SOLUTION INTRAVENOUS at 17:24

## 2024-07-23 RX ADMIN — POTASSIUM CHLORIDE 20 MEQ: 1500 TABLET, EXTENDED RELEASE ORAL at 10:35

## 2024-07-23 RX ADMIN — SODIUM CHLORIDE 125 ML/HR: 0.9 INJECTION, SOLUTION INTRAVENOUS at 13:01

## 2024-07-23 RX ADMIN — SENNOSIDES 17.2 MG: 8.6 TABLET, FILM COATED ORAL at 15:14

## 2024-07-23 RX ADMIN — CEFTRIAXONE SODIUM 2000 MG: 10 INJECTION, POWDER, FOR SOLUTION INTRAVENOUS at 15:34

## 2024-07-23 RX ADMIN — HEPARIN SODIUM 15.1 UNITS/KG/HR: 10000 INJECTION, SOLUTION INTRAVENOUS at 21:18

## 2024-07-23 RX ADMIN — METOPROLOL TARTRATE 50 MG: 50 TABLET, FILM COATED ORAL at 08:55

## 2024-07-23 RX ADMIN — ATORVASTATIN CALCIUM 80 MG: 80 TABLET, FILM COATED ORAL at 17:39

## 2024-07-23 RX ADMIN — ACETAMINOPHEN 650 MG: 325 TABLET, FILM COATED ORAL at 21:14

## 2024-07-23 RX ADMIN — POLYETHYLENE GLYCOL 3350 17 G: 17 POWDER, FOR SOLUTION ORAL at 15:14

## 2024-07-23 RX ADMIN — ASPIRIN 81 MG CHEWABLE TABLET 81 MG: 81 TABLET CHEWABLE at 08:55

## 2024-07-23 RX ADMIN — SODIUM CHLORIDE 125 ML/HR: 0.9 INJECTION, SOLUTION INTRAVENOUS at 20:01

## 2024-07-23 NOTE — ASSESSMENT & PLAN NOTE
Atypical  S/p high-dose aspirin  Mild troponin elevation  ECG w/ no ischemic changes  TTE LVEF 60%.  No wall motion abnormality.  Continue aspirin 81 mg daily, atorvastatin 80 mg daily, metoprolol 50 mg twice daily   Cardiology following, plan for heart cath today however canceled due to fever.  Tentative plan for tomorrow.  N.p.o. after midnight  Continue heparin drip  Continue telemetry

## 2024-07-23 NOTE — PROGRESS NOTES
"Progress Note - Cardiology Team 1  Dior Lock 68 y.o. male MRN: [id number]  Unit/Bed#: [facility] 524-01 Encounter: [id number]        Principal Problem:    Chest pain  Active Problems:    Gilbert's syndrome    Essential hypertension    Class 2 severe obesity due to excess calories with serious comorbidity in adult (HCC)      Assessment:  Chest pain- fairly atypical   Mild troponin elevation-0-hour troponin 215/2-hour troponin 172/4-hour troponin 184  ECG no ischemic changes  Current TTE LVEF 60%.  No wall motion abnormality.  Few seconds left sided chest pain overnight  Was scheduled for LHC today . Cancelled d/t fever  Hypertension-modestly controlled.  Added.  No antihypertensive prior to admission  Hyperlipidemia- high intensity statin  Cholesterol 163/67/HDL 58/calc LDL 92  Does not appear to have been on statin PTA  4.   Fever-temp up to 101.8 °F last p.m.  WBC 12,000 increased to 17,000  99.1 °F this morning  Reportedly back and legs just feel weak.  Had headache last night.  Procal /lactic acid normal.  Blood cultures pending.  COVID-negative  5.   Gilbert's syndrome-bilirubin 1.6    Plan:  NPO after midnight tonight  Possible LHC tomorrow pending fever workup  Continue Asa, BB, IV heparin, statin  Fever /infectious w/u per primary team    Subjective/Objective   Chief Complaint/subjective  Seconds of left-sided chest pain overnight.  Overall feels back and leg achy headache last night  Is disappointed about canceling left heart cath      Vitals: /68   Pulse 94   Temp 99.1 °F (37.3 °C)   Resp 18   Ht 5' 9\" (1.753 m)   Wt 125 kg (275 lb 5.7 oz)   SpO2 95%   BMI 40.66 kg/m²     Vitals:    07/22/24 1430 07/22/24 1812   Weight: 125 kg (276 lb) 125 kg (275 lb 5.7 oz)     Orthostatic Blood Pressures      Flowsheet Row Most Recent Value   Blood Pressure 129/68 filed at 07/23/2024 0735   Patient Position - Orthostatic VS Sitting filed at 07/22/2024 1107              Intake/Output Summary (Last 24 hours) at " "7/23/2024 0825  Last data filed at 7/23/2024 0601  Gross per 24 hour   Intake 778.39 ml   Output 1000 ml   Net -221.61 ml       Invasive Devices       Peripheral Intravenous Line  Duration             Peripheral IV 07/23/24 Dorsal (posterior);Right Forearm <1 day                    Current Facility-Administered Medications   Medication Dose Route Frequency    acetaminophen (TYLENOL) tablet 650 mg  650 mg Oral Q4H PRN    aspirin chewable tablet 81 mg  81 mg Oral Daily    atorvastatin (LIPITOR) tablet 80 mg  80 mg Oral QPM    heparin (porcine) 25,000 units in 0.45% NaCl 250 mL infusion (premix)  3-20 Units/kg/hr (Order-Specific) Intravenous Titrated    heparin (porcine) injection 2,000 Units  2,000 Units Intravenous Q6H PRN    heparin (porcine) injection 4,000 Units  4,000 Units Intravenous Q6H PRN    metoprolol tartrate (LOPRESSOR) tablet 50 mg  50 mg Oral Q12H HEMALATHA    nitroglycerin (NITROSTAT) SL tablet 0.4 mg  0.4 mg Sublingual Q5 Min PRN    ondansetron (ZOFRAN) injection 4 mg  4 mg Intravenous Q6H PRN    sodium chloride 0.9 % infusion  125 mL/hr Intravenous Continuous         Physical Exam: /68   Pulse 94   Temp 99.1 °F (37.3 °C)   Resp 18   Ht 5' 9\" (1.753 m)   Wt 125 kg (275 lb 5.7 oz)   SpO2 95%   BMI 40.66 kg/m²     General Appearance:    Alert, cooperative, no distress, appears stated age   Head:    Normocephalic, no scleral icterus   Eyes:    PERRL   Nose:   Nares normal, septum midline, no drainage    Throat:   Lips, mucosa, and tongue normal   Neck:   Supple, symmetrical, trachea midline,       no carotid    bruit or JVD       Lungs:     Clear to auscultation bilaterally, respirations unlabored   Chest Wall:    No tenderness or deformity    Heart:    Regular rate and rhythm, S1 and S2 normal, no murmur, rub   or gallop       Extremities:   Extremities normal, atraumatic, no cyanosis or edema   Pulses:   2+ and symmetric all extremities   Skin:   Skin color, texture, turgor normal, no rashes or " lesions   Neurologic:   Alert and oriented to person place and time, no focal deficits                 Lab Results:   Recent Results (from the past 72 hour(s))   ECG 12 lead    Collection Time: 07/22/24 11:05 AM   Result Value Ref Range    Ventricular Rate 86 BPM    Atrial Rate 86 BPM    MT Interval 162 ms    QRSD Interval 98 ms    QT Interval 366 ms    QTC Interval 437 ms    P Bee 32 degrees    QRS Axis -26 degrees    T Wave Axis 47 degrees   CBC and differential    Collection Time: 07/22/24 11:34 AM   Result Value Ref Range    WBC 12.14 (H) 4.31 - 10.16 Thousand/uL    RBC 4.87 3.88 - 5.62 Million/uL    Hemoglobin 15.2 12.0 - 17.0 g/dL    Hematocrit 43.4 36.5 - 49.3 %    MCV 89 82 - 98 fL    MCH 31.2 26.8 - 34.3 pg    MCHC 35.0 31.4 - 37.4 g/dL    RDW 13.6 11.6 - 15.1 %    MPV 9.1 8.9 - 12.7 fL    Platelets 187 149 - 390 Thousands/uL    nRBC 0 /100 WBCs    Segmented % 82 (H) 43 - 75 %    Immature Grans % 1 0 - 2 %    Lymphocytes % 8 (L) 14 - 44 %    Monocytes % 7 4 - 12 %    Eosinophils Relative 2 0 - 6 %    Basophils Relative 0 0 - 1 %    Absolute Neutrophils 10.00 (H) 1.85 - 7.62 Thousands/µL    Absolute Immature Grans 0.06 0.00 - 0.20 Thousand/uL    Absolute Lymphocytes 1.02 0.60 - 4.47 Thousands/µL    Absolute Monocytes 0.84 0.17 - 1.22 Thousand/µL    Eosinophils Absolute 0.18 0.00 - 0.61 Thousand/µL    Basophils Absolute 0.04 0.00 - 0.10 Thousands/µL   Comprehensive metabolic panel    Collection Time: 07/22/24 11:34 AM   Result Value Ref Range    Sodium 139 135 - 147 mmol/L    Potassium 3.9 3.5 - 5.3 mmol/L    Chloride 108 96 - 108 mmol/L    CO2 24 21 - 32 mmol/L    ANION GAP 7 4 - 13 mmol/L    BUN 21 5 - 25 mg/dL    Creatinine 0.75 0.60 - 1.30 mg/dL    Glucose 97 65 - 140 mg/dL    Calcium 8.6 8.4 - 10.2 mg/dL    AST 18 13 - 39 U/L    ALT 20 7 - 52 U/L    Alkaline Phosphatase 68 34 - 104 U/L    Total Protein 7.0 6.4 - 8.4 g/dL    Albumin 4.0 3.5 - 5.0 g/dL    Total Bilirubin 1.61 (H) 0.20 - 1.00 mg/dL    eGFR  "92 ml/min/1.73sq m   HS Troponin 0hr (reflex protocol)    Collection Time: 07/22/24 11:34 AM   Result Value Ref Range    hs TnI 0hr 215 (H) \"Refer to ACS Flowchart\"- see link ng/L   TSH, 3rd generation    Collection Time: 07/22/24 11:34 AM   Result Value Ref Range    TSH 3RD GENERATON 2.001 0.450 - 4.500 uIU/mL   Hemoglobin A1C    Collection Time: 07/22/24 11:34 AM   Result Value Ref Range    Hemoglobin A1C 5.5 Normal 4.0-5.6%; PreDiabetic 5.7-6.4%; Diabetic >=6.5%; Glycemic control for adults with diabetes <7.0% %     mg/dl   HS Troponin I 2hr    Collection Time: 07/22/24  1:28 PM   Result Value Ref Range    hs TnI 2hr 172 (H) \"Refer to ACS Flowchart\"- see link ng/L    Delta 2hr hsTnI -43 <20 ng/L   APTT six (6) hours after Heparin bolus/drip initiation or dosing change    Collection Time: 07/22/24  1:28 PM   Result Value Ref Range    PTT 33 23 - 37 seconds   Protime-INR    Collection Time: 07/22/24  1:28 PM   Result Value Ref Range    Protime 13.4 11.6 - 14.5 seconds    INR 1.03 0.84 - 1.19   Echo complete    Collection Time: 07/22/24  2:57 PM   Result Value Ref Range    BSA 2.37 m2    LVIDd 4.70 cm    LVIDS 3.20 cm    IVSd 1.00 cm    LVPWd 1.20 cm    FS 32 28 - 44    LA Volume Index (BP) 23.2 mL/m2    RVID d 3.7 cm    Tricuspid annular plane systolic excursion 2.40 cm    LA size 3.6 cm    LA length (A2C) 5.50 cm    LA volume (BP) 55 mL    RAA A4C 12 cm2    Ao root 3.60 cm    Asc Ao 3.3 cm    Left ventricular stroke volume (2D) 59.00 mL    IVS 1 cm    LEFT VENTRICLE SYSTOLIC VOLUME (MOD BIPLANE) 2D 42 mL    LV DIASTOLIC VOLUME (MOD BIPLANE) 2D 101 mL    Left Atrium Area-systolic Four Chamber 18.5 cm2    Left Atrium Area-systolic Apical Two Chamber 20.1 cm2    LVSV, 2D 59 mL    LV EF 60    HS Troponin I 4hr    Collection Time: 07/22/24  3:51 PM   Result Value Ref Range    hs TnI 4hr 184 (H) \"Refer to ACS Flowchart\"- see link ng/L    Delta 4hr hsTnI -31 <20 ng/L   APTT    Collection Time: 07/22/24  8:08 PM "   Result Value Ref Range    PTT 37 23 - 37 seconds   D-dimer, quantitative    Collection Time: 07/22/24 11:08 PM   Result Value Ref Range    D-Dimer, Quant 0.59 (H) <0.50 ug/ml FEU   Lactic acid, plasma (w/reflex if result > 2.0)    Collection Time: 07/22/24 11:08 PM   Result Value Ref Range    LACTIC ACID 0.8 0.5 - 2.0 mmol/L   Procalcitonin    Collection Time: 07/22/24 11:08 PM   Result Value Ref Range    Procalcitonin 0.15 <=0.25 ng/ml   CBC    Collection Time: 07/22/24 11:08 PM   Result Value Ref Range    WBC 14.11 (H) 4.31 - 10.16 Thousand/uL    RBC 4.80 3.88 - 5.62 Million/uL    Hemoglobin 14.7 12.0 - 17.0 g/dL    Hematocrit 42.6 36.5 - 49.3 %    MCV 89 82 - 98 fL    MCH 30.6 26.8 - 34.3 pg    MCHC 34.5 31.4 - 37.4 g/dL    RDW 13.5 11.6 - 15.1 %    Platelets 183 149 - 390 Thousands/uL    MPV 9.8 8.9 - 12.7 fL   COVID only    Collection Time: 07/22/24 11:08 PM    Specimen: Nose; Nares   Result Value Ref Range    SARS-CoV-2 Negative Negative   Blood culture    Collection Time: 07/22/24 11:09 PM    Specimen: Arm, Left; Blood   Result Value Ref Range    Blood Culture Received in Microbiology Lab. Culture in Progress.    Blood culture    Collection Time: 07/22/24 11:09 PM    Specimen: Arm, Right; Blood   Result Value Ref Range    Blood Culture Received in Microbiology Lab. Culture in Progress.    APTT    Collection Time: 07/23/24  4:28 AM   Result Value Ref Range    PTT 68 (H) 23 - 37 seconds   Lipid panel    Collection Time: 07/23/24  4:28 AM   Result Value Ref Range    Cholesterol 163 See Comment mg/dL    Triglycerides 67 See Comment mg/dL    HDL, Direct 58 >=40 mg/dL    LDL Calculated 92 0 - 100 mg/dL    Non-HDL-Chol (CHOL-HDL) 105 mg/dl   Basic metabolic panel    Collection Time: 07/23/24  4:28 AM   Result Value Ref Range    Sodium 138 135 - 147 mmol/L    Potassium 3.6 3.5 - 5.3 mmol/L    Chloride 103 96 - 108 mmol/L    CO2 26 21 - 32 mmol/L    ANION GAP 9 4 - 13 mmol/L    BUN 15 5 - 25 mg/dL    Creatinine 0.85  0.60 - 1.30 mg/dL    Glucose 101 65 - 140 mg/dL    Calcium 8.8 8.4 - 10.2 mg/dL    eGFR 88 ml/min/1.73sq m   Procalcitonin, Next Day AM Collection    Collection Time: 07/23/24  4:28 AM   Result Value Ref Range    Procalcitonin 0.19 <=0.25 ng/ml   CBC and differential    Collection Time: 07/23/24  4:28 AM   Result Value Ref Range    WBC 17.35 (H) 4.31 - 10.16 Thousand/uL    RBC 4.93 3.88 - 5.62 Million/uL    Hemoglobin 15.1 12.0 - 17.0 g/dL    Hematocrit 44.6 36.5 - 49.3 %    MCV 91 82 - 98 fL    MCH 30.6 26.8 - 34.3 pg    MCHC 33.9 31.4 - 37.4 g/dL    RDW 13.6 11.6 - 15.1 %    MPV 9.4 8.9 - 12.7 fL    Platelets 179 149 - 390 Thousands/uL    nRBC 0 /100 WBCs    Segmented % 84 (H) 43 - 75 %    Immature Grans % 1 0 - 2 %    Lymphocytes % 6 (L) 14 - 44 %    Monocytes % 8 4 - 12 %    Eosinophils Relative 1 0 - 6 %    Basophils Relative 0 0 - 1 %    Absolute Neutrophils 14.71 (H) 1.85 - 7.62 Thousands/µL    Absolute Immature Grans 0.11 0.00 - 0.20 Thousand/uL    Absolute Lymphocytes 1.00 0.60 - 4.47 Thousands/µL    Absolute Monocytes 1.37 (H) 0.17 - 1.22 Thousand/µL    Eosinophils Absolute 0.11 0.00 - 0.61 Thousand/µL    Basophils Absolute 0.05 0.00 - 0.10 Thousands/µL     Imaging: I have personally reviewed pertinent reports.    Tele-nsr    Counseling / Coordination of Care  Total time spent today 30 minutes. Greater than 50% of total time was spent with the patient and / or family counseling and / or coordination of care.

## 2024-07-23 NOTE — UTILIZATION REVIEW
Initial Clinical Review    Admission: Date/Time/Statement:   Admission Orders (From admission, onward)       Ordered        07/22/24 1232  INPATIENT ADMISSION  Once                          Orders Placed This Encounter   Procedures    INPATIENT ADMISSION     Standing Status:   Standing     Number of Occurrences:   1     Order Specific Question:   Level of Care     Answer:   Med Surg [[id number]]     Order Specific Question:   Estimated length of stay     Answer:   More than 2 Midnights     Order Specific Question:   Certification     Answer:   I certify that inpatient services are medically necessary for this patient for a duration of greater than two midnights. See H&P and MD Progress Notes for additional information about the patient's course of treatment.     ED Arrival Information       Expected   -    Arrival   7/22/2024 11:03    Acuity   Urgent              Means of arrival   Walk-In    Escorted by   Self    Service   Hospitalist    Admission type   Emergency              Arrival complaint   Chest pain             Chief Complaint   Patient presents with    Chest Pain     Pt c/o sharp intermittent CP starting last night, - SOB       Initial Presentation: 68 y.o. male with a PMH of HTN, obesity, [place] presented to the ED form home w/ chest pain.   Pt reports chest pain is brief only lasts a couple second but is recurrent. Happens at rest for the past day. Located substernal.   In the ED, found to have an elevated troponin- 215. WBC 12.14.    Given  mg, started on hep gtt.    Admit as Inpatient for evaluation and treatment of chest pain, concern for unstable angina vs NSTEMI.  Plan: Start baby aspirin, atorvastatin 80mg, metoprolol 25mg and PRN nitroglycerin. Check echo and monitor on telemetry. Cont IV heparin . Trend troponin and consult cardiology    Cardiology Consult: Chest pain with elevated troponin: chest pain at rest and exertion. Pain is sharp and lasting only seconds. Cont ASA, beta blocker, statin  and IV heparin. He is pain free currenlty and has SL nitroglycerin ordered as needed. Check echo, TSH, lipid panel and Hgb A1c.  ASCVD risk score 18.9%; started on high intensity statin.  recommend proceeding with cardiac catheterization. Start IVF in AM. NPO after MN. Further plan pending results of ehco and cath.      Anticipated Length of Stay/Certification Statement:  Patient will be admitted on an inpatient basis with an anticipated length of stay of greater than 2 midnights secondary to NSTEMI.     Date: 07/23   Day 2:   Cardiology Notes: Pt had chest pain again ovn, lasted for a few secs.Current TTE LVEF 60%. No wall motion abnormality. Tmax of 101.8 last evening. WBC elevated. Cardiac catheterization put on hold. Blood cultures pending. continues on IV heparin. Will increase dose of metoprolol. Will reschedule cardiac catheterization for tomorrow pending status.     IM Notes: reports feeling well this morning. Tolerating oral intake. Reports no bowel movement since admission. Uptrending WBC today.  D-dimer mildly elevated. On hep gtt. obtain lower extremity ultrasound. Obtain UA. Blood cultures pending. Obtain CT abdomen pelvis. Start ceftriaxone and vancomycin until blood cultures result. Start senna and MiraLAX. Continue aspirin 81 mg daily, atorvastatin 80 mg daily, metoprolol 50 mg twice daily. plan for heart cath today however canceled due to fever.  Tentative plan for tomorrow.  N.p.o. after midnight. Continue heparin drip. Continue telemetry.      Date: 07/24  Day 3: Has surpassed a 2nd midnight with active treatments and services.  UA with 10-20 WBC, small leukocytes.  No bacteria or nitrates. CT abdomen pelvis with findings concerning of prostatitis. Will discontinue lower extremity ultrasound as CT with possible source and patient improving. F/u bld and urine cxs. Obtain PSA. Dc Vanco. Cont ceftriaxone pending final urine culture. Cont senna, Miralax. Cont tele. Cont Hep gtt. Plan for cardiac cath  today.     ED Triage Vitals [07/22/24 1107]   Temperature Pulse Respirations Blood Pressure SpO2 Pain Score   98.7 °F (37.1 °C) 88 20 129/73 97 % 5     Weight (last 2 days)       Date/Time Weight    07/22/24 1812 125 (275.35)    07/22/24 1430 125 (276)    07/22/24 1246 125 (276.46)            Vital Signs (last 3 days)       Date/Time Temp Pulse Resp BP MAP (mmHg) SpO2 Calculated FIO2 (%) - Nasal Cannula Nasal Cannula O2 Flow Rate (L/min) O2 Device Patient Position - Orthostatic VS [place] Coma Scale Score Pain    07/24/24 14:36:56 -- 80 -- 126/65 85 95 % -- -- -- -- -- --    07/24/24 1436 -- -- -- -- -- -- -- -- -- -- -- No Pain    07/24/24 1415 -- -- -- -- -- -- -- -- -- -- -- No Pain    07/24/24 1400 -- -- -- -- -- -- -- -- -- -- -- No Pain    07/24/24 13:49:15 -- 86 -- 121/67 85 96 % -- -- None (Room air) -- -- No Pain    07/24/24 13:09:50 -- 79 -- 124/67 86 97 % -- -- None (Room air) -- -- --    07/24/24 1309 -- -- -- -- -- -- -- -- -- -- -- No Pain    07/24/24 12:34:56 -- 70 -- 133/69 90 96 % -- -- None (Room air) -- -- --    07/24/24 1234 -- -- -- -- -- -- -- -- -- -- -- No Pain    07/24/24 12:00:37 -- 69 -- 125/68 87 96 % -- -- None (Room air) -- -- No Pain    07/24/24 11:19:45 97.7 °F (36.5 °C) 70 16 124/64 84 95 % -- -- None (Room air) -- -- No Pain    07/24/24 10:50:50 -- -- -- -- -- -- -- -- -- -- -- No Pain    07/24/24 10:24:23 -- -- -- -- -- -- -- -- -- -- -- No Pain    07/24/24 10:24:12 -- -- -- -- -- -- 28 2 L/min Nasal cannula -- -- --    07/24/24 0800 -- -- -- -- -- -- -- -- -- -- 15 No Pain    07/24/24 07:11:26 98.9 °F (37.2 °C) 84 18 134/69 91 98 % -- -- None (Room air) -- -- --    07/24/24 07:10:22 98.9 °F (37.2 °C) 85 -- 134/69 91 99 % -- -- -- -- -- --    07/24/24 02:20:42 98.2 °F (36.8 °C) 80 18 121/55 77 98 % -- -- -- Lying -- --    07/23/24 22:41:24 99.1 °F (37.3 °C) 80 16 119/65 83 95 % -- -- -- Lying -- --    07/23/24 2114 -- -- -- -- -- -- -- -- -- -- -- 2    07/23/24 2000 -- -- -- -- --  -- -- -- None (Room air) -- -- No Pain    07/23/24 19:21:19 99.7 °F (37.6 °C) 94 16 145/59 88 98 % -- -- None (Room air) Lying -- No Pain    07/23/24 15:36:52 99.3 °F (37.4 °C) 79 18 151/82 105 98 % -- -- -- Lying -- --    07/23/24 1100 -- -- -- -- -- -- -- -- -- -- -- No Pain    07/23/24 10:41:48 99.5 °F (37.5 °C) 70 -- 132/64 87 96 % -- -- -- -- -- --    07/23/24 0855 -- 95 -- 135/68 -- -- -- -- -- -- -- 4    07/23/24 07:35:57 99.1 °F (37.3 °C) 94 18 129/68 88 95 % -- -- -- -- -- --    07/23/24 00:30:36 -- 86 -- 135/72 93 95 % -- -- -- -- -- --    07/22/24 2255 101.3 °F (38.5 °C) -- -- -- -- -- -- -- -- -- -- --    07/22/24 2229 -- -- -- -- -- -- -- -- -- -- -- 3    07/22/24 22:22:34 101.8 °F (38.8 °C) 98 -- 147/78 101 97 % -- -- -- -- -- --    07/22/24 2148 100.3 °F (37.9 °C) 99 18 149/70 96 97 % -- -- -- -- -- --    07/22/24 2000 -- -- -- -- -- 95 % -- -- None (Room air) -- -- No Pain    07/22/24 19:02:24 99.5 °F (37.5 °C) 89 18 150/81 104 96 % -- -- -- -- -- --    07/22/24 18:18:02 99.7 °F (37.6 °C) 91 18 121/85 97 97 % -- -- -- -- -- --    07/22/24 1812 -- -- -- -- -- -- -- -- None (Room air) -- -- No Pain    07/22/24 1611 -- 80 18 144/67 96 96 % -- -- -- -- -- --    07/22/24 1430 -- 96 -- 143/77 -- 96 % -- -- -- -- -- --    07/22/24 1407 -- 97 -- 143/77 -- -- -- -- -- -- -- --    07/22/24 1311 -- 88 18 143/82 105 96 % -- -- -- -- -- --    07/22/24 1149 -- 82 20 136/65 -- 95 % -- -- -- -- -- --    07/22/24 1107 98.7 °F (37.1 °C) 88 20 129/73 94 97 % -- -- None (Room air) Sitting -- 5              Pertinent Labs/Diagnostic Test Results:   Radiology:  CT abdomen pelvis wo contrast   Final Interpretation by Robbin Appiah MD (07/24 0105)      Mild periprostatic inflammatory change and fat stranding surrounding the bilateral seminal vesicles which is suggestive of prostatitis      Workstation performed: [id number]         XR chest 2 views   Final Interpretation by Chao Gonzales MD (07/22 8173)      No acute  "cardiopulmonary disease.            Workstation performed: [id number]           Cardiology:  Cardiac catheterization   Final Result by Daren Tinsley MD (07/24 1108)        Mid LAD lesion is 30% stenosed.      Mild nonobstructive plaque. No significant lesions.         ECG 12 lead   Final Result by Garrick Melgoza MD (07/23 1805)   Normal sinus rhythm   Minimal voltage criteria for LVH, may be normal variant   Borderline ECG   When compared with ECG of 22-JUL-2024 11:05,   No significant change was found   Confirmed by Garrick Melgoza ([id number]) on 7/23/2024 6:05:54 PM        GI:  No orders to display       Results from last 7 days   Lab Units 07/22/24 2308   SARS-COV-2  Negative     Results from last 7 days   Lab Units 07/24/24 0414 07/23/24 0428 07/22/24 2308 07/22/24  1134   WBC Thousand/uL 13.88* 17.35* 14.11* 12.14*   HEMOGLOBIN g/dL 13.4 15.1 14.7 15.2   HEMATOCRIT % 38.9 44.6 42.6 43.4   PLATELETS Thousands/uL 148* 179 183 187   TOTAL NEUT ABS Thousands/µL 11.41* 14.71*  --  10.00*         Results from last 7 days   Lab Units 07/24/24 0414 07/23/24 0428 07/22/24  1134   SODIUM mmol/L 138 138 139   POTASSIUM mmol/L 3.6 3.6 3.9   CHLORIDE mmol/L 108 103 108   CO2 mmol/L 22 26 24   ANION GAP mmol/L 8 9 7   BUN mg/dL 10 15 21   CREATININE mg/dL 0.62 0.85 0.75   EGFR ml/min/1.73sq m 100 88 92   CALCIUM mg/dL 8.0* 8.8 8.6     Results from last 7 days   Lab Units 07/22/24  1134   AST U/L 18   ALT U/L 20   ALK PHOS U/L 68   TOTAL PROTEIN g/dL 7.0   ALBUMIN g/dL 4.0   TOTAL BILIRUBIN mg/dL 1.61*         Results from last 7 days   Lab Units 07/24/24  0414 07/23/24  0428 07/22/24  1134   GLUCOSE RANDOM mg/dL 97 101 97         Results from last 7 days   Lab Units 07/22/24  1134   HEMOGLOBIN A1C % 5.5   EAG mg/dl 111     No results found for: \"BETA-HYDROXYBUTYRATE\"                   Results from last 7 days   Lab Units 07/22/24  1551 07/22/24  1328 07/22/24  1134   HS TNI 0HR ng/L  --   --  215*   HS TNI 2HR ng/L  --  " 172*  --    HSTNI D2 ng/L  --  -43  --    HS TNI 4HR ng/L 184*  --   --    HSTNI D4 ng/L -31  --   --      Results from last 7 days   Lab Units 07/22/24  2308   D-DIMER QUANTITATIVE ug/ml FEU 0.59*     Results from last 7 days   Lab Units 07/24/24  0414 07/23/24  1002 07/23/24  0428 07/22/24 2008 07/22/24  1328   PROTIME seconds  --   --   --   --  13.4   INR   --   --   --   --  1.03   PTT seconds 70* 69* 68*   < > 33    < > = values in this interval not displayed.     Results from last 7 days   Lab Units 07/22/24  1134   TSH 3RD GENERATON uIU/mL 2.001     Results from last 7 days   Lab Units 07/23/24  0428 07/22/24 2308   PROCALCITONIN ng/ml 0.20  0.19 0.15     Results from last 7 days   Lab Units 07/22/24  2308   LACTIC ACID mmol/L 0.8         Results from last 7 days   Lab Units 07/22/24  2309   BLOOD CULTURE  No Growth at 24 hrs.  No Growth at 24 hrs.                   ED Treatment-Medication Administration from 07/22/2024 1103 to 07/22/2024 1806         Date/Time Order Dose Route Action     07/22/2024 1311 aspirin tablet 325 mg 325 mg Oral Given     07/22/2024 1328 heparin (porcine) injection 4,000 Units 4,000 Units Intravenous Given     07/22/2024 1329 heparin (porcine) 25,000 units in 0.45% NaCl 250 mL infusion (premix) 11.1 Units/kg/hr Intravenous New Bag     07/22/2024 1407 metoprolol tartrate (LOPRESSOR) tablet 25 mg 25 mg Oral Given     07/22/2024 1440 perflutren lipid microsphere (DEFINITY) injection 0.4 mL/min Intravenous Given            Past Medical History:   Diagnosis Date    Arthritis     Essential hypertension 12/16/2021    Gilbert's syndrome 01/06/2020    Intermittent elevated bilirubin.  Liver ultrasound.    Obesity     Renal colic 06/29/2024    Left-sided kidney stone      Unilateral hearing loss, left 01/18/2022     Present on Admission:   Class 2 severe obesity due to excess calories with serious comorbidity in adult (HCC)   Essential hypertension   Gilbert's syndrome   Chest  pain      Admitting Diagnosis: NSTEMI (non-ST elevated myocardial infarction) ([facility]) [I21.4]  Age/Sex: 68 y.o. male  Admission Orders:  SCD  Continuous Cardiac Monitoring  Continuous Pulse Ox  Continuous ST segment monitoring  Tele    Scheduled Medications:  aspirin, 81 mg, Oral, Daily  atorvastatin, 80 mg, Oral, QPM  cefTRIAXone, 2,000 mg, Intravenous, Q24H  heparin (porcine), 5,000 Units, Subcutaneous, Q12H HEMALATHA  metoprolol tartrate, 50 mg, Oral, Q12H HEMALATHA  polyethylene glycol, 17 g, Oral, Daily  senna, 2 tablet, Oral, HS      Continuous IV Infusions:  heparin (porcine), 3-20 Units/kg/hr (Order-Specific), Intravenous, Titrated  sodium chloride, 125 mL/hr, Intravenous, Continuous      PRN Meds:  acetaminophen, 650 mg, Oral, Q4H PRN 07/22 x 1, 07/23 x 1  heparin (porcine), 2,000 Units, Intravenous, Q6H PRN  heparin (porcine), 4,000 Units, Intravenous, Q6H PRN 07/22 x 1  nitroglycerin, 0.4 mg, Sublingual, Q5 Min PRN  ondansetron, 4 mg, Intravenous, Q6H PRN        IP CONSULT TO CARDIOLOGY    Network Utilization Review Department  ATTENTION: Please call with any questions or concerns to [phone] and carefully listen to the prompts so that you are directed to the right person. All voicemails are confidential.   For Discharge needs, contact Care Management DC Support Team at [phone] opt. [phone]  Send all requests for admission clinical reviews, approved or denied determinations and any other requests to dedicated fax number below belonging to the [place] where the patient is receiving treatment. List of dedicated fax numbers for the Facilities:  FACILITY NAME UR FAX NUMBER   ADMISSION DENIALS (Administrative/Medical Necessity) [phone]   DISCHARGE SUPPORT TEAM (NETWORK) [phone]   PARENT CHILD HEALTH (Maternity/NICU/Pediatrics) [phone]   [facility] [phone]   [facility] [phone]   [facility] [phone]   [facility]  [facility] [phone]   [facility] [phone]   General acute hospital [phone]   [facility] [phone]   [facility] [phone]   [facility] [phone]   [facility] [phone]   [facility] [phone]   [facility] [phone]

## 2024-07-23 NOTE — ASSESSMENT & PLAN NOTE
Patient with tachycardia, leukocytosis and fever Tmax 101.8 °F  Unclear source  Chest x-ray normal  UA with no infection  Blood cultures pending  COVID-negative  Uptrending WBC today  D-dimer mildly elevated.  Patient lost risk for DVT and also on heparin drip but will obtain lower extremity ultrasound  Patient denies infectious symptoms.  He notes constipation and abdomen is distended and hypoactive.  Obtain CT abdomen pelvis  Start ceftriaxone and vancomycin until blood cultures result

## 2024-07-23 NOTE — PROGRESS NOTES
[facility]  Progress Note  Name: Dior Lock I  MRN: [id number]  Unit/Bed#: [facility]PHP 524-01 I Date of Admission: 7/22/2024   Date of Service: 7/23/2024 I Hospital Day: 1    Assessment & Plan   SIRS (systemic inflammatory response syndrome) ([facility])  Assessment & Plan  Patient with tachycardia, leukocytosis and fever Tmax 101.8 °F  Unclear source  Chest x-ray normal  Obtain UA  Blood cultures pending  COVID-negative  Uptrending WBC today  D-dimer mildly elevated.  Patient lost risk for DVT and also on heparin drip but will obtain lower extremity ultrasound  Patient denies infectious symptoms.  He notes constipation and abdomen is distended and hypoactive.  Obtain CT abdomen pelvis  Start ceftriaxone and vancomycin until blood cultures result    Constipation  Assessment & Plan  Patient reports no bowel movement for the last days  Reports chronic constipation  Start senna and MiraLAX  CT abdomen pelvis as above    Class 2 severe obesity due to excess calories with serious comorbidity in adult ([facility])  Assessment & Plan  Weight loss counseling when stable as an outpatient    Essential hypertension  Assessment & Plan  BP reviewed and stable  Continue metoprolol 50 mg twice daily    Gilbert's syndrome  Assessment & Plan  Known history  Mildly elevated bilirubin noted on admission  Outpatient follow-up    * Chest pain  Assessment & Plan  Atypical  S/p high-dose aspirin  Mild troponin elevation  ECG w/ no ischemic changes  TTE LVEF 60%.  No wall motion abnormality.  Continue aspirin 81 mg daily, atorvastatin 80 mg daily, metoprolol 50 mg twice daily   Cardiology following, plan for heart cath today however canceled due to fever.  Tentative plan for tomorrow.  N.p.o. after midnight  Continue heparin drip  Continue telemetry               VTE Pharmacologic Prophylaxis: VTE Score: 4 Moderate Risk (Score 3-4) - Pharmacological DVT Prophylaxis Ordered: heparin drip.    Mobility:   Basic Mobility  Inpatient Raw Score: 24  JH-HLM Goal: 8: Walk 250 feet or more  JH-HLM Achieved: 6: Walk 10 steps or more  JH-HLM Goal NOT achieved. Continue with multidisciplinary rounding and encourage appropriate mobility to improve upon JH-HLM goals.    Patient Centered Rounds: I performed bedside rounds with nursing staff today.   Discussions with Specialists or Other Care Team Provider:     Education and Discussions with Family / Patient:  Patient 43.     Total Time Spent on Date of Encounter in care of patient: 45 mins. This time was spent on one or more of the following: performing physical exam; counseling and coordination of care; obtaining or reviewing history; documenting in the medical record; reviewing/ordering tests, medications or procedures; communicating with other healthcare professionals and discussing with patient's family/caregivers.    Current Length of Stay: 1 day(s)  Current Patient Status: Inpatient   Certification Statement: The patient will continue to require additional inpatient hospital stay due to IV antibiotics, heart cath, CT abdomen pelvis  Discharge Plan: Anticipate discharge in >72 hrs to home with home services.    Code Status: Level 1 - Full Code    Subjective:   No events overnight.  Patient reports feeling well this morning.  Tolerating oral intake.  No chest pain, shortness of breath, abdominal pain, dysuria, cough, wound.  Reports no bowel movement since admission.    Objective:     Vitals:   Temp (24hrs), Av.2 °F (37.9 °C), Min:99.1 °F (37.3 °C), Max:101.8 °F (38.8 °C)    Temp:  [99.1 °F (37.3 °C)-101.8 °F (38.8 °C)] 99.5 °F (37.5 °C)  HR:  [70-99] 70  Resp:  [18] 18  BP: (121-150)/(64-85) 132/64  SpO2:  [95 %-97 %] 96 %  Body mass index is 40.66 kg/m².     Input and Output Summary (last 24 hours):     Intake/Output Summary (Last 24 hours) at 2024 1442  Last data filed at 2024 1301  Gross per 24 hour   Intake 2040.67 ml   Output 1200 ml   Net 840.67 ml        Physical Exam:   Physical Exam  Vitals and nursing note reviewed.   Constitutional:       General: He is not in acute distress.     Appearance: He is well-developed.   HENT:      Head: Normocephalic and atraumatic.   Eyes:      Conjunctiva/sclera: Conjunctivae normal.   Cardiovascular:      Rate and Rhythm: Normal rate and regular rhythm.   Pulmonary:      Effort: Pulmonary effort is normal. No respiratory distress.      Breath sounds: Normal breath sounds. No wheezing, rhonchi or rales.   Abdominal:      General: There is distension.      Palpations: Abdomen is soft.      Tenderness: There is no abdominal tenderness.      Comments: Hypoactive bowel sounds   Musculoskeletal:         General: No signs of injury.      Cervical back: Neck supple.      Right lower leg: No edema.      Left lower leg: No edema.   Skin:     General: Skin is warm and dry.      Findings: No lesion or rash.   Neurological:      Mental Status: He is alert and oriented to person, place, and time.      Cranial Nerves: No cranial nerve deficit.   Psychiatric:         Mood and Affect: Mood normal.         Behavior: Behavior normal.          Additional Data:     Labs:  Results from last 7 days   Lab Units 07/23/24  0428   WBC Thousand/uL 17.35*   HEMOGLOBIN g/dL 15.1   HEMATOCRIT % 44.6   PLATELETS Thousands/uL 179   SEGS PCT % 84*   LYMPHO PCT % 6*   MONO PCT % 8   EOS PCT % 1     Results from last 7 days   Lab Units 07/23/24  0428 07/22/24  1134   SODIUM mmol/L 138 139   POTASSIUM mmol/L 3.6 3.9   CHLORIDE mmol/L 103 108   CO2 mmol/L 26 24   BUN mg/dL 15 21   CREATININE mg/dL 0.85 0.75   ANION GAP mmol/L 9 7   CALCIUM mg/dL 8.8 8.6   ALBUMIN g/dL  --  4.0   TOTAL BILIRUBIN mg/dL  --  1.61*   ALK PHOS U/L  --  68   ALT U/L  --  20   AST U/L  --  18   GLUCOSE RANDOM mg/dL 101 97     Results from last 7 days   Lab Units 07/22/24  1328   INR  1.03         Results from last 7 days   Lab Units 07/22/24  1134   HEMOGLOBIN A1C % 5.5     Results  from last 7 days   Lab Units 07/23/24  0428 07/22/24  2308   LACTIC ACID mmol/L  --  0.8   PROCALCITONIN ng/ml 0.19 0.15       Lines/Drains:  Invasive Devices       Peripheral Intravenous Line  Duration             Peripheral IV 07/23/24 Dorsal (posterior);Right Forearm <1 day                      Telemetry:  Telemetry Orders (From admission, onward)               24 Hour Telemetry Monitoring  Continuous x 24 Hours (Telem)        Question:  Reason for 24 Hour Telemetry  Answer:  PCI/EP study (including pacer and ICD implementation), Cardiac surgery, MI, abnormal cardiac cath, and chest pain- rule out MI                     Telemetry Reviewed: Normal Sinus Rhythm  Indication for Continued Telemetry Use: Acute MI/Unstable Angina/Rule out ACS             Imaging: Reviewed radiology reports from this admission including: chest xray    Recent Cultures (last 7 days):   Results from last 7 days   Lab Units 07/22/24  2309   BLOOD CULTURE  Received in Microbiology Lab. Culture in Progress.  Received in Microbiology Lab. Culture in Progress.       Last 24 Hours Medication List:   Current Facility-Administered Medications   Medication Dose Route Frequency Provider Last Rate    acetaminophen  650 mg Oral Q4H PRN Cesar Navas MD      aspirin  81 mg Oral Daily Cesar Navas MD      atorvastatin  80 mg Oral QPM Cesar Navas MD      cefTRIAXone  2,000 mg Intravenous Q24H Amberly Pike MD      heparin (porcine)  3-20 Units/kg/hr (Order-Specific) Intravenous Titrated Cesar Navas MD 15.1 Units/kg/hr (07/23/24 0535)    heparin (porcine)  2,000 Units Intravenous Q6H PRN Cesar Navas MD      heparin (porcine)  4,000 Units Intravenous Q6H PRN Cesar Navas MD      metoprolol tartrate  50 mg Oral Q12H [facility] Luis Angel Herron MD      nitroglycerin  0.4 mg Sublingual Q5 Min PRN Cesar Navas MD      ondansetron  4 mg Intravenous Q6H PRN Cesar Navas MD      polyethylene glycol  17 g Oral Daily Amberly Pike MD       senna  2 tablet Oral HS Amberly Pike MD      sodium chloride  125 mL/hr Intravenous Continuous JOSE Vang 125 mL/hr (07/23/24 1301)        Today, Patient Was Seen By: Amberly Pike MD    **Please Note: This note may have been constructed using a voice recognition system.**

## 2024-07-23 NOTE — PLAN OF CARE
Problem: PAIN - ADULT  Goal: Verbalizes/displays adequate comfort level or baseline comfort level  Description: Interventions:  - Encourage patient to monitor pain and request assistance  - Assess pain using appropriate pain scale  - Administer analgesics based on type and severity of pain and evaluate response  - Implement non-pharmacological measures as appropriate and evaluate response  - Consider cultural and social influences on pain and pain management  - Notify physician/advanced practitioner if interventions unsuccessful or patient reports new pain  7/23/2024 1123 by Maria Del Carmen Patel RN  Outcome: Progressing  7/23/2024 1122 by Maria Del Carmen Patel RN  Outcome: Progressing     Problem: INFECTION - ADULT  Goal: Absence or prevention of progression during hospitalization  Description: INTERVENTIONS:  - Assess and monitor for signs and symptoms of infection  - Monitor lab/diagnostic results  - Monitor all insertion sites, i.e. indwelling lines, tubes, and drains  - Monitor endotracheal if appropriate and nasal secretions for changes in amount and color  - [place] appropriate cooling/warming therapies per order  - Administer medications as ordered  - Instruct and encourage patient and family to use good hand hygiene technique  - Identify and instruct in appropriate isolation precautions for identified infection/condition  7/23/2024 1123 by Maria Del Carmen Patel RN  Outcome: Progressing  7/23/2024 1122 by Maria Del Carmen Patel RN  Outcome: Progressing  Goal: Absence of fever/infection during neutropenic period  Description: INTERVENTIONS:  - Monitor WBC    7/23/2024 1123 by Maria Del Carmen Patel RN  Outcome: Progressing  7/23/2024 1122 by Maria Del Carmen Patel RN  Outcome: Progressing     Problem: SAFETY ADULT  Goal: Patient will remain free of falls  Description: INTERVENTIONS:  - Educate patient/family on patient safety including physical limitations  - Instruct patient to call for assistance with activity   - Consult  OT/PT to assist with strengthening/mobility   - Keep Call bell within reach  - Keep bed low and locked with side rails adjusted as appropriate  - Keep care items and personal belongings within reach  - Initiate and maintain comfort rounds  - Make Fall Risk Sign visible to staff  - Offer Toileting every  Hours, in advance of need  - Initiate/Maintain alarm  - Obtain necessary fall risk management equipment:   - Apply yellow socks and bracelet for high fall risk patients  - Consider moving patient to room near nurses station  7/23/2024 1123 by Maria Del Carmen Patel RN  Outcome: Progressing  7/23/2024 1122 by Maria Del Carmen Patel RN  Outcome: Progressing  Goal: Maintain or return to baseline ADL function  Description: INTERVENTIONS:  -  Assess patient's ability to carry out ADLs; assess patient's baseline for ADL function and identify physical deficits which impact ability to perform ADLs (bathing, care of mouth/teeth, toileting, grooming, dressing, etc.)  - Assess/evaluate cause of self-care deficits   - Assess range of motion  - Assess patient's mobility; develop plan if impaired  - Assess patient's need for assistive devices and provide as appropriate  - Encourage maximum independence but intervene and supervise when necessary  - Involve family in performance of ADLs  - Assess for home care needs following discharge   - Consider OT consult to assist with ADL evaluation and planning for discharge  - Provide patient education as appropriate  7/23/2024 1123 by Maria Del Carmen Patel RN  Outcome: Progressing  7/23/2024 1122 by Maria Del Carmen Patel RN  Outcome: Progressing  Goal: Maintains/Returns to pre admission functional level  Description: INTERVENTIONS:  - Perform AM-PAC 6 Click Basic Mobility/ Daily Activity assessment daily.  - Set and communicate daily mobility goal to care team and patient/family/caregiver.   - Collaborate with rehabilitation services on mobility goals if consulted  - Perform Range of Motion  times a  day.  - Reposition patient every  hours.  - Dangle patient  times a day  - Stand patient  times a day  - Ambulate patient  times a day  - Out of bed to chair  times a day   - Out of bed for meals  times a day  - Out of bed for toileting  - Record patient progress and toleration of activity level   7/23/2024 1123 by Maria Del Carmen Patel RN  Outcome: Progressing  7/23/2024 1122 by Maria Del Carmen Patel RN  Outcome: Progressing     Problem: DISCHARGE PLANNING  Goal: Discharge to home or other facility with appropriate resources  Description: INTERVENTIONS:  - Identify barriers to discharge w/patient and caregiver  - Arrange for needed discharge resources and transportation as appropriate  - Identify discharge learning needs (meds, wound care, etc.)  - Arrange for interpretive services to assist at discharge as needed  - Refer to Case Management Department for coordinating discharge planning if the patient needs post-hospital services based on physician/advanced practitioner order or complex needs related to functional status, cognitive ability, or social support system  7/23/2024 1123 by Maria Del Carmen Patel RN  Outcome: Progressing  7/23/2024 1122 by Maria Del Carmen Patel RN  Outcome: Progressing     Problem: Knowledge Deficit  Goal: Patient/family/caregiver demonstrates understanding of disease process, treatment plan, medications, and discharge instructions  Description: Complete learning assessment and assess knowledge base.  Interventions:  - Provide teaching at level of understanding  - Provide teaching via preferred learning methods  7/23/2024 1123 by Maria Del Carmen Patel RN  Outcome: Progressing  7/23/2024 1122 by Maria Del Carmen Patel RN  Outcome: Progressing     Problem: CARDIOVASCULAR - ADULT  Goal: Maintains optimal cardiac output and hemodynamic stability  Description: INTERVENTIONS:  - Monitor I/O, vital signs and rhythm  - Monitor for S/S and trends of decreased cardiac output  - Administer and titrate ordered  vasoactive medications to optimize hemodynamic stability  - Assess quality of pulses, skin color and temperature  - Assess for signs of decreased coronary artery perfusion  - Instruct patient to report change in severity of symptoms  Outcome: Progressing  Goal: Absence of cardiac dysrhythmias or at baseline rhythm  Description: INTERVENTIONS:  - Continuous cardiac monitoring, vital signs, obtain 12 lead EKG if ordered  - Administer antiarrhythmic and heart rate control medications as ordered  - Monitor electrolytes and administer replacement therapy as ordered  Outcome: Progressing

## 2024-07-23 NOTE — ASSESSMENT & PLAN NOTE
Patient reports no bowel movement for the last days  Reports chronic constipation  Start senna and MiraLAX  CT abdomen pelvis as above

## 2024-07-23 NOTE — ED ATTENDING ATTESTATION
7/22/2024  I, Thanh Lama MD, saw and evaluated the patient. I have discussed the patient with the resident/non-physician practitioner and agree with the resident's/non-physician practitioner's findings, Plan of Care, and MDM as documented in the resident's/non-physician practitioner's note, except where noted. All available labs and Radiology studies were reviewed.  I was present for key portions of any procedure(s) performed by the resident/non-physician practitioner and I was immediately available to provide assistance.       At this point I agree with the current assessment done in the Emergency Department.  I have conducted an independent evaluation of this patient a history and physical is as follows:    ED Course     68-year-old male presenting with chest pain onset symptoms was last night.  States symptoms were fleeting in nature lasting seconds at a time.  Denies any shortness of breath nausea vomiting other concerns.  Vital signs reviewed heart rate regular rate and rhythm without murmurs with auscultation bilateral.  Ab soft nontender nondistended no masses.  Extremities no edema.    Impression: Chest pain  Differential diagnosis: ACS MI arrhythmia, doubt pneumonia doubt PE    Plan check ECG chest x-ray serial troponins CBC CMP reassess    Labs reviewed patient with initial troponin of 215 indicative of acute myocardial injury 2-hour delta troponin pending.  CMP remarkable for hyperbilirubinemia patient has history of [place] elevated white cell count with shift may be reactive    Chest x-ray independently interpreted by me: No acute cardiopulmonary disease.    ECG independently interpreted by me:  Normal sinus rhythm normal rate normal axis normal intervals no acute ST-T changes.    Given elevated troponin in context of chest pain patient will admitted to medicine service for further evaluation management f of ACS.    Patient started on aspirin and heparin for possible ACS.      Critical Care  Time  Procedures

## 2024-07-23 NOTE — CASE MANAGEMENT
Case Management Assessment & Discharge Planning Note    Patient name Dior Lock  Location [facility] 524/[facility] 524-01 MRN [id number]  : 1955 Date 2024       Current Admission Date: 2024  Current Admission Diagnosis:Chest pain   Patient Active Problem List    Diagnosis Date Noted Date Diagnosed    SIRS (systemic inflammatory response syndrome) (HCC) 2024     Renal colic 2024     Right-sided low back pain without sciatica 2024     Unilateral hearing loss, left 2022     Essential hypertension 2021     Class 2 severe obesity due to excess calories with serious comorbidity in adult (HCC) 2021     Chest pain 2021     Gilbert's syndrome 2020     Fatty liver 2020     History of adenomatous polyp of colon 2018       LOS (days): 1  Geometric Mean LOS (GMLOS) (days): 1.7  Days to GMLOS:0.6     OBJECTIVE:    Risk of Unplanned Readmission Score: 6.77         Current admission status: Inpatient       Preferred Pharmacy:   [facility] - [address] [address]  Phone: [phone] Fax: [phone]    [id number] - [place], [place] - [address] [facility] [address]  Phone: [phone] Fax: [phone]    Primary Care Provider: Lazaro Kapoor MD    Primary Insurance: [facility] REP  Secondary Insurance:     ASSESSMENT:  Active Health Care Proxies    There are no active Health Care Proxies on file.         Social Determinants of Health (SDOH)      Flowsheet Row Most Recent Value   Housing Stability    In the last 12 months, was there a time when you were not able to pay the mortgage or rent on time? N   In the past 12 months, how many times have you moved where you were living? 0   At any time in the past 12 months, were you homeless or living in a shelter (including now)? N   Transportation Needs    In the past 12 months, has lack of transportation kept you from medical  appointments or from getting medications? no   In the past 12 months, has lack of transportation kept you from meetings, work, or from getting things needed for daily living? No   Food Insecurity    Within the past 12 months, you worried that your food would run out before you got the money to buy more. Never true   Within the past 12 months, the food you bought just didn't last and you didn't have money to get more. Never true   Utilities    In the past 12 months has the electric, gas, oil, or water company threatened to shut off services in your home? No            DISCHARGE DETAILS:    Discharge planning discussed with:: patient and brother's, Sarahi at bedside  Freedom of Choice: Yes  Comments - Freedom of Choice: pending any needs  CM contacted family/caregiver?: Yes  Were Treatment Team discharge recommendations reviewed with patient/caregiver?: Yes  Did patient/caregiver verbalize understanding of patient care needs?: Yes  Were patient/caregiver advised of the risks associated with not following Treatment Team discharge recommendations?: Yes    Contacts  Patient Contacts: Severo Lock-brother  Relationship to Patient:: Family  Contact Method: Phone  Phone Number: [phone]  Reason/Outcome: Continuity of Care, Emergency Contact, Discharge Planning     Patient lives alone in an apartment on the 1st level, independent for ambulation and adls.  Doesn't own any DME, retired, drives self. Family is local and supportive.  CM to follow...CM reviewed d/c planning process including the following: identifying help at home, patient preference for d/c planning needs, Discharge Lounge, [facility] Meds to Bed program, availability of treatment team to discuss questions or concerns patient and/or family may have regarding understanding medications and recognizing signs and symptoms once discharged.  CM also encouraged patient to follow up with all recommended appointments after discharge. Patient advised of  importance for patient and family to participate in managing patient’s medical well being.

## 2024-07-23 NOTE — PROGRESS NOTES
Dior Lock is a 68 y.o. male who is currently ordered Vancomycin IV with management by the Pharmacy Consult service.  Relevant clinical data and objective / subjective history reviewed.  Vancomycin Assessment:  Indication and Goal AUC/Trough: Bacteremia (goal -600, trough >10)  Micro:     Renal Function:  SCr: 0.85 mg/dL  CrCl: 106 mL/min  Renal replacement: Not on dialysis  Days of Therapy: 1  Current Dose: vancomycin 2000 mg IV x1 dose  Vancomycin Plan:  New Dosing: vancomycin 2000 mg IV q24h  Estimated AUC: 428 mcg*hr/mL  Estimated Trough: 10.3 mcg/mL  Next Level: 7/26/24 @ 0600  Renal Function Monitoring: Daily BMP and UOP  Pharmacy will continue to follow closely for s/sx of nephrotoxicity, infusion reactions and appropriateness of therapy.  BMP and CBC will be ordered per protocol. We will continue to follow the patient’s culture results and clinical progress daily.    Sharif Kirby, Pharmacist

## 2024-07-24 LAB
ANION GAP SERPL CALCULATED.3IONS-SCNC: 8 MMOL/L (ref 4–13)
APTT PPP: 70 SECONDS (ref 23–37)
BASOPHILS # BLD AUTO: 0.04 THOUSANDS/ÂΜL (ref 0–0.1)
BASOPHILS NFR BLD AUTO: 0 % (ref 0–1)
BUN SERPL-MCNC: 10 MG/DL (ref 5–25)
CALCIUM SERPL-MCNC: 8 MG/DL (ref 8.4–10.2)
CHLORIDE SERPL-SCNC: 108 MMOL/L (ref 96–108)
CO2 SERPL-SCNC: 22 MMOL/L (ref 21–32)
CREAT SERPL-MCNC: 0.62 MG/DL (ref 0.6–1.3)
EOSINOPHIL # BLD AUTO: 0.09 THOUSAND/ÂΜL (ref 0–0.61)
EOSINOPHIL NFR BLD AUTO: 1 % (ref 0–6)
ERYTHROCYTE [DISTWIDTH] IN BLOOD BY AUTOMATED COUNT: 13.6 % (ref 11.6–15.1)
GFR SERPL CREATININE-BSD FRML MDRD: 100 ML/MIN/1.73SQ M
GLUCOSE SERPL-MCNC: 97 MG/DL (ref 65–140)
HCT VFR BLD AUTO: 38.9 % (ref 36.5–49.3)
HGB BLD-MCNC: 13.4 G/DL (ref 12–17)
IMM GRANULOCYTES # BLD AUTO: 0.06 THOUSAND/UL (ref 0–0.2)
IMM GRANULOCYTES NFR BLD AUTO: 0 % (ref 0–2)
LYMPHOCYTES # BLD AUTO: 1.04 THOUSANDS/ÂΜL (ref 0.6–4.47)
LYMPHOCYTES NFR BLD AUTO: 8 % (ref 14–44)
MCH RBC QN AUTO: 31.4 PG (ref 26.8–34.3)
MCHC RBC AUTO-ENTMCNC: 34.4 G/DL (ref 31.4–37.4)
MCV RBC AUTO: 91 FL (ref 82–98)
MONOCYTES # BLD AUTO: 1.24 THOUSAND/ÂΜL (ref 0.17–1.22)
MONOCYTES NFR BLD AUTO: 9 % (ref 4–12)
NEUTROPHILS # BLD AUTO: 11.41 THOUSANDS/ÂΜL (ref 1.85–7.62)
NEUTS SEG NFR BLD AUTO: 82 % (ref 43–75)
NRBC BLD AUTO-RTO: 0 /100 WBCS
PLATELET # BLD AUTO: 148 THOUSANDS/UL (ref 149–390)
PMV BLD AUTO: 9.6 FL (ref 8.9–12.7)
POTASSIUM SERPL-SCNC: 3.6 MMOL/L (ref 3.5–5.3)
RBC # BLD AUTO: 4.27 MILLION/UL (ref 3.88–5.62)
SODIUM SERPL-SCNC: 138 MMOL/L (ref 135–147)
WBC # BLD AUTO: 13.88 THOUSAND/UL (ref 4.31–10.16)

## 2024-07-24 PROCEDURE — C1894 INTRO/SHEATH, NON-LASER: HCPCS | Performed by: INTERNAL MEDICINE

## 2024-07-24 PROCEDURE — C1769 GUIDE WIRE: HCPCS | Performed by: INTERNAL MEDICINE

## 2024-07-24 PROCEDURE — B2111ZZ FLUOROSCOPY OF MULTIPLE CORONARY ARTERIES USING LOW OSMOLAR CONTRAST: ICD-10-PCS | Performed by: INTERNAL MEDICINE

## 2024-07-24 PROCEDURE — 99152 MOD SED SAME PHYS/QHP 5/>YRS: CPT | Performed by: INTERNAL MEDICINE

## 2024-07-24 PROCEDURE — 80048 BASIC METABOLIC PNL TOTAL CA: CPT | Performed by: STUDENT IN AN ORGANIZED HEALTH CARE EDUCATION/TRAINING PROGRAM

## 2024-07-24 PROCEDURE — 85730 THROMBOPLASTIN TIME PARTIAL: CPT | Performed by: STUDENT IN AN ORGANIZED HEALTH CARE EDUCATION/TRAINING PROGRAM

## 2024-07-24 PROCEDURE — 85025 COMPLETE CBC W/AUTO DIFF WBC: CPT | Performed by: STUDENT IN AN ORGANIZED HEALTH CARE EDUCATION/TRAINING PROGRAM

## 2024-07-24 PROCEDURE — 99153 MOD SED SAME PHYS/QHP EA: CPT | Performed by: INTERNAL MEDICINE

## 2024-07-24 PROCEDURE — 99233 SBSQ HOSP IP/OBS HIGH 50: CPT | Performed by: INTERNAL MEDICINE

## 2024-07-24 PROCEDURE — 93454 CORONARY ARTERY ANGIO S&I: CPT | Performed by: INTERNAL MEDICINE

## 2024-07-24 PROCEDURE — 99232 SBSQ HOSP IP/OBS MODERATE 35: CPT | Performed by: STUDENT IN AN ORGANIZED HEALTH CARE EDUCATION/TRAINING PROGRAM

## 2024-07-24 RX ORDER — SODIUM CHLORIDE 9 MG/ML
125 INJECTION, SOLUTION INTRAVENOUS CONTINUOUS
Status: DISCONTINUED | OUTPATIENT
Start: 2024-07-24 | End: 2024-07-24

## 2024-07-24 RX ORDER — MIDAZOLAM HYDROCHLORIDE 2 MG/2ML
INJECTION, SOLUTION INTRAMUSCULAR; INTRAVENOUS CODE/TRAUMA/SEDATION MEDICATION
Status: DISCONTINUED | OUTPATIENT
Start: 2024-07-24 | End: 2024-07-24 | Stop reason: HOSPADM

## 2024-07-24 RX ORDER — VERAPAMIL HCL 2.5 MG/ML
AMPUL (ML) INTRAVENOUS CODE/TRAUMA/SEDATION MEDICATION
Status: DISCONTINUED | OUTPATIENT
Start: 2024-07-24 | End: 2024-07-24 | Stop reason: HOSPADM

## 2024-07-24 RX ORDER — HEPARIN SODIUM 5000 [USP'U]/ML
5000 INJECTION, SOLUTION INTRAVENOUS; SUBCUTANEOUS EVERY 12 HOURS SCHEDULED
Status: DISCONTINUED | OUTPATIENT
Start: 2024-07-24 | End: 2024-07-27 | Stop reason: HOSPADM

## 2024-07-24 RX ORDER — FENTANYL CITRATE 50 UG/ML
INJECTION, SOLUTION INTRAMUSCULAR; INTRAVENOUS CODE/TRAUMA/SEDATION MEDICATION
Status: DISCONTINUED | OUTPATIENT
Start: 2024-07-24 | End: 2024-07-24 | Stop reason: HOSPADM

## 2024-07-24 RX ORDER — HEPARIN SODIUM 1000 [USP'U]/ML
INJECTION, SOLUTION INTRAVENOUS; SUBCUTANEOUS CODE/TRAUMA/SEDATION MEDICATION
Status: DISCONTINUED | OUTPATIENT
Start: 2024-07-24 | End: 2024-07-24 | Stop reason: HOSPADM

## 2024-07-24 RX ORDER — POTASSIUM CHLORIDE 20 MEQ/1
40 TABLET, EXTENDED RELEASE ORAL ONCE
Status: COMPLETED | OUTPATIENT
Start: 2024-07-24 | End: 2024-07-24

## 2024-07-24 RX ORDER — LIDOCAINE HYDROCHLORIDE 10 MG/ML
INJECTION, SOLUTION EPIDURAL; INFILTRATION; INTRACAUDAL; PERINEURAL CODE/TRAUMA/SEDATION MEDICATION
Status: DISCONTINUED | OUTPATIENT
Start: 2024-07-24 | End: 2024-07-24 | Stop reason: HOSPADM

## 2024-07-24 RX ORDER — NITROGLYCERIN 20 MG/100ML
INJECTION INTRAVENOUS CODE/TRAUMA/SEDATION MEDICATION
Status: DISCONTINUED | OUTPATIENT
Start: 2024-07-24 | End: 2024-07-24 | Stop reason: HOSPADM

## 2024-07-24 RX ADMIN — HEPARIN SODIUM 5000 UNITS: 5000 INJECTION INTRAVENOUS; SUBCUTANEOUS at 14:42

## 2024-07-24 RX ADMIN — HEPARIN SODIUM 5000 UNITS: 5000 INJECTION INTRAVENOUS; SUBCUTANEOUS at 21:26

## 2024-07-24 RX ADMIN — CEFTRIAXONE SODIUM 2000 MG: 10 INJECTION, POWDER, FOR SOLUTION INTRAVENOUS at 15:30

## 2024-07-24 RX ADMIN — SODIUM CHLORIDE 125 ML/HR: 0.9 INJECTION, SOLUTION INTRAVENOUS at 11:23

## 2024-07-24 RX ADMIN — METOPROLOL TARTRATE 50 MG: 50 TABLET, FILM COATED ORAL at 09:01

## 2024-07-24 RX ADMIN — SENNOSIDES 17.2 MG: 8.6 TABLET, FILM COATED ORAL at 21:26

## 2024-07-24 RX ADMIN — METOPROLOL TARTRATE 50 MG: 50 TABLET, FILM COATED ORAL at 21:26

## 2024-07-24 RX ADMIN — POTASSIUM CHLORIDE 40 MEQ: 1500 TABLET, EXTENDED RELEASE ORAL at 11:23

## 2024-07-24 RX ADMIN — ASPIRIN 81 MG CHEWABLE TABLET 81 MG: 81 TABLET CHEWABLE at 09:01

## 2024-07-24 RX ADMIN — ATORVASTATIN CALCIUM 80 MG: 80 TABLET, FILM COATED ORAL at 17:04

## 2024-07-24 RX ADMIN — ACETAMINOPHEN 650 MG: 325 TABLET, FILM COATED ORAL at 19:50

## 2024-07-24 RX ADMIN — SODIUM CHLORIDE 125 ML/HR: 0.9 INJECTION, SOLUTION INTRAVENOUS at 03:54

## 2024-07-24 NOTE — PROGRESS NOTES
[facility]  Progress Note  Name: Dior Lock I  MRN: [id number]  Unit/Bed#: [facility] 524-01 I Date of Admission: 7/22/2024   Date of Service: 7/24/2024 I Hospital Day: 2    Assessment & Plan   SIRS (systemic inflammatory response syndrome) ([facility])  Assessment & Plan  Patient with tachycardia, leukocytosis and fever Tmax 101.8 °F  Unclear source  Chest x-ray normal.  COVID-negative  UA with 10-20 WBC, small leukocytes.  No bacteria or nitrates  Follow blood cultures  Follow urine culture  CT abdomen pelvis with findings concerning of prostatitis  Obtain PSA  D-dimer mildly elevated.  Low risk for DVT and patient on heparin drip.  Will discontinue lower extremity ultrasound as CT with possible source and patient improving  Discontinue vancomycin  Continue ceftriaxone pending final urine culture    Constipation  Assessment & Plan  Patient reports no bowel movement for the last days  Reports chronic constipation  Continue senna and MiraLAX  CT abdomen pelvis as above    Class 2 severe obesity due to excess calories with serious comorbidity in adult ([facility])  Assessment & Plan  Weight loss counseling when stable as an outpatient    Essential hypertension  Assessment & Plan  BP reviewed and stable  Continue metoprolol 50 mg twice daily    Gilbert's syndrome  Assessment & Plan  Known history  Mildly elevated bilirubin noted on admission  Outpatient follow-up    * Chest pain  Assessment & Plan  Atypical  S/p high-dose aspirin  Mild troponin elevation  ECG w/ no ischemic changes  TTE LVEF 60%.  No wall motion abnormality.  Continue aspirin 81 mg daily, atorvastatin 80 mg daily, metoprolol 50 mg twice daily   Discussed with cardiology.  Plan for heart cath today  Continue heparin drip  Continue telemetry               VTE Pharmacologic Prophylaxis: VTE Score: 4 Moderate Risk (Score 3-4) - Pharmacological DVT Prophylaxis Ordered: heparin.    Mobility:   Basic Mobility Inpatient Raw Score:  24  JH-HLM Goal: 8: Walk 250 feet or more  JH-HLM Achieved: 6: Walk 10 steps or more  JH-HLM Goal NOT achieved. Continue with multidisciplinary rounding and encourage appropriate mobility to improve upon JH-HLM goals.    Patient Centered Rounds: I performed bedside rounds with nursing staff today.   Discussions with Specialists or Other Care Team Provider:     Education and Discussions with Family / Patient:  Updated patient.     Total Time Spent on Date of Encounter in care of patient: 35 mins. This time was spent on one or more of the following: performing physical exam; counseling and coordination of care; obtaining or reviewing history; documenting in the medical record; reviewing/ordering tests, medications or procedures; communicating with other healthcare professionals and discussing with patient's family/caregivers.    Current Length of Stay: 2 day(s)  Current Patient Status: Inpatient   Certification Statement: The patient will continue to require additional inpatient hospital stay due to IV antibiotics, awaiting cultures, heart cath  Discharge Plan: Anticipate discharge in 48-72 hrs to discharge location to be determined pending rehab evaluations.    Code Status: Level 1 - Full Code    Subjective:   No events overnight.  Patient reports feeling well this morning.  No complaints.  No abdominal pain, chest pain or shortness of breath.  Continues to deny dysuria.    Objective:     Vitals:   Temp (24hrs), Av.8 °F (37.1 °C), Min:97.7 °F (36.5 °C), Max:99.7 °F (37.6 °C)    Temp:  [97.7 °F (36.5 °C)-99.7 °F (37.6 °C)] 97.7 °F (36.5 °C)  HR:  [69-94] 86  Resp:  [16-18] 16  BP: (119-151)/(55-82) 121/67  SpO2:  [95 %-99 %] 96 %  Body mass index is 40.66 kg/m².     Input and Output Summary (last 24 hours):     Intake/Output Summary (Last 24 hours) at 2024 1433  Last data filed at 2024 1003  Gross per 24 hour   Intake 696.05 ml   Output 200 ml   Net 496.05 ml       Physical Exam:   Physical  Exam  Vitals and nursing note reviewed.   Constitutional:       General: He is not in acute distress.     Appearance: He is well-developed.   HENT:      Head: Normocephalic and atraumatic.   Eyes:      Conjunctiva/sclera: Conjunctivae normal.   Cardiovascular:      Rate and Rhythm: Normal rate and regular rhythm.   Pulmonary:      Effort: Pulmonary effort is normal. No respiratory distress.      Breath sounds: Normal breath sounds. No wheezing, rhonchi or rales.   Abdominal:      General: Bowel sounds are normal.      Palpations: Abdomen is soft.      Tenderness: There is no abdominal tenderness.   Musculoskeletal:         General: No swelling.      Cervical back: Neck supple.   Skin:     General: Skin is warm and dry.   Neurological:      Mental Status: He is alert.   Psychiatric:         Mood and Affect: Mood normal.         Behavior: Behavior normal.          Additional Data:     Labs:  Results from last 7 days   Lab Units 07/24/24 0414   WBC Thousand/uL 13.88*   HEMOGLOBIN g/dL 13.4   HEMATOCRIT % 38.9   PLATELETS Thousands/uL 148*   SEGS PCT % 82*   LYMPHO PCT % 8*   MONO PCT % 9   EOS PCT % 1     Results from last 7 days   Lab Units 07/24/24  0414 07/23/24  0428 07/22/24  1134   SODIUM mmol/L 138   < > 139   POTASSIUM mmol/L 3.6   < > 3.9   CHLORIDE mmol/L 108   < > 108   CO2 mmol/L 22   < > 24   BUN mg/dL 10   < > 21   CREATININE mg/dL 0.62   < > 0.75   ANION GAP mmol/L 8   < > 7   CALCIUM mg/dL 8.0*   < > 8.6   ALBUMIN g/dL  --   --  4.0   TOTAL BILIRUBIN mg/dL  --   --  1.61*   ALK PHOS U/L  --   --  68   ALT U/L  --   --  20   AST U/L  --   --  18   GLUCOSE RANDOM mg/dL 97   < > 97    < > = values in this interval not displayed.     Results from last 7 days   Lab Units 07/22/24  1328   INR  1.03         Results from last 7 days   Lab Units 07/22/24  1134   HEMOGLOBIN A1C % 5.5     Results from last 7 days   Lab Units 07/23/24  0428 07/22/24  2308   LACTIC ACID mmol/L  --  0.8   PROCALCITONIN ng/ml 0.20   0.19 0.15       Lines/Drains:  Invasive Devices       Peripheral Intravenous Line  Duration             Peripheral IV 07/23/24 Dorsal (posterior);Right Forearm 1 day    Peripheral IV 07/24/24 Left;Ventral (anterior) Forearm <1 day                      Telemetry:  Telemetry Orders (From admission, onward)               24 Hour Telemetry Monitoring  Continuous x 24 Hours (Telem)        Question:  Reason for 24 Hour Telemetry  Answer:  PCI/EP study (including pacer and ICD implementation), Cardiac surgery, MI, abnormal cardiac cath, and chest pain- rule out MI                     Telemetry Reviewed: Normal Sinus Rhythm  Indication for Continued Telemetry Use: No indication for continued use. Will discontinue.              Imaging: No pertinent imaging reviewed.    Recent Cultures (last 7 days):   Results from last 7 days   Lab Units 07/22/24  2309   BLOOD CULTURE  No Growth at 24 hrs.  No Growth at 24 hrs.       Last 24 Hours Medication List:   Current Facility-Administered Medications   Medication Dose Route Frequency Provider Last Rate    acetaminophen  650 mg Oral Q4H PRN JOSE Page      aspirin  81 mg Oral Daily JOSE Page      atorvastatin  80 mg Oral QPM JOSE Page      cefTRIAXone  2,000 mg Intravenous Q24H JOSE Page Stopped (07/23/24 1604)    heparin (porcine)  5,000 Units Subcutaneous Q12H [facility] Amberly Pike MD      metoprolol tartrate  50 mg Oral Q12H [facility] JOSE Page      nitroglycerin  0.4 mg Sublingual Q5 Min PRN JOSE Page      ondansetron  4 mg Intravenous Q6H PRN JOSE Page      polyethylene glycol  17 g Oral Daily JOSE Page      senna  2 tablet Oral HS JOSE Page          Today, Patient Was Seen By: Amberly Pike MD    **Please Note: This note may have been constructed using a voice recognition system.**

## 2024-07-24 NOTE — ASSESSMENT & PLAN NOTE
Patient reports no bowel movement for the last days  Reports chronic constipation  Continue senna and MiraLAX  CT abdomen pelvis as above

## 2024-07-24 NOTE — ASSESSMENT & PLAN NOTE
Atypical  S/p high-dose aspirin  Mild troponin elevation  ECG w/ no ischemic changes  TTE LVEF 60%.  No wall motion abnormality.  Continue aspirin 81 mg daily, atorvastatin 80 mg daily, metoprolol 50 mg twice daily   Discussed with cardiology.  Plan for heart cath today  Continue heparin drip  Continue telemetry

## 2024-07-24 NOTE — PROGRESS NOTES
"Cardiology Progress Note - Dior oLck 68 y.o. male MRN: [id number]    Unit/Bed#: [facility] 524-01 Encounter: [id number]      Assessment:  Principal Problem:    Chest pain  Active Problems:    Gilbert's syndrome    Essential hypertension    Class 2 severe obesity due to excess calories with serious comorbidity in adult (HCC)    SIRS (systemic inflammatory response syndrome) ([facility])    Constipation      Plan:  Patient with no issues overnight.  He has no chest pain or significant dyspnea.  He has been afebrile since admission.  Vital signs stable.  Patient on IV antibiotic.  BMP today with potassium of 3.6 and creatinine of 0.62.  Will provide potassium supplement.  CBC with WBC of 13.88 improved from 17.35.  Hemoglobin 13.4.  Patient on IV heparin in the setting of chest pain with troponin leak.  Patient for cardiac catheterization today.  Patient accepts and is willing to proceed.    Subjective:   Patient seen and examined.  No significant events overnight.  negative.    Objective:     Vitals: Blood pressure 134/69, pulse 84, temperature 98.9 °F (37.2 °C), resp. rate 18, height 5' 9\" (1.753 m), weight 125 kg (275 lb 5.7 oz), SpO2 98%., Body mass index is 40.66 kg/m².,   Orthostatic Blood Pressures      Flowsheet Row Most Recent Value   Blood Pressure 134/69 filed at 07/24/2024 0711   Patient Position - Orthostatic VS Lying filed at 07/24/2024 0220        ,      Intake/Output Summary (Last 24 hours) at 7/24/2024 0937  Last data filed at 7/24/2024 0606  Gross per 24 hour   Intake 1310.66 ml   Output 200 ml   Net 1110.66 ml       No significant arrhythmias seen on telemetry review.       Physical Exam:    GEN: Dior Lock appears well, alert and oriented x 3, pleasant and cooperative   NECK: supple, no carotid bruits, no JVD or HJR  HEART: normal rate, regular rhythm, normal S1 and S2, no murmurs, clicks, gallops or rubs   LUNGS: clear to auscultation bilaterally; no wheezes, rales, or rhonchi   EXTREMITIES: peripheral " pulses normal; no clubbing, cyanosis, or edema  SKIN: warm and well perfused, no suspicious lesions on exposed skin    Labs & Results:    No results displayed because visit has over 200 results.          CT abdomen pelvis wo contrast    Result Date: 7/24/2024  Narrative: CT ABDOMEN AND PELVIS WITHOUT IV CONTRAST INDICATION: Patient with sepsis, unclear source.  Looking for infection.  Also with constipation and concern for ileus. COMPARISON: None. TECHNIQUE: CT examination of the abdomen and pelvis was performed without intravenous contrast. Multiplanar 2D reformatted images were created from the source data. This examination, like all CT scans performed in the [facility], was performed utilizing techniques to minimize radiation dose exposure, including the use of iterative reconstruction and automated exposure control. Radiation dose length product (DLP) for this visit: 1588.56 mGy-cm Enteric Contrast: Not administered. FINDINGS: ABDOMEN LOWER CHEST: No clinically significant abnormality in the visualized lower chest. LIVER/BILIARY TREE: Unremarkable. GALLBLADDER: No calcified gallstones. No pericholecystic inflammatory change. SPLEEN: Unremarkable. PANCREAS: Unremarkable. ADRENAL GLANDS: Unremarkable. KIDNEYS/URETERS: Simple renal cyst(s). Otherwise unremarkable kidneys. No hydronephrosis. STOMACH AND BOWEL: Colonic diverticulosis without findings of acute diverticulitis. APPENDIX: No findings to suggest appendicitis. ABDOMINOPELVIC CAVITY: No ascites. No pneumoperitoneum. No lymphadenopathy. VESSELS: Unremarkable for patient's age. PELVIS REPRODUCTIVE ORGANS: Mild periprostatic inflammatory change and fat stranding surrounding the bilateral seminal vesicles. URINARY BLADDER: Unremarkable. ABDOMINAL WALL/INGUINAL REGIONS: Unremarkable. BONES: No acute fracture or suspicious osseous lesion.     Impression: Mild periprostatic inflammatory change and fat stranding surrounding the bilateral seminal  vesicles which is suggestive of prostatitis Workstation performed: [id number]     Echo complete    Result Date: 7/22/2024  Narrative:   Left Ventricle: Left ventricular cavity size is normal. Wall thickness is mildly increased. There is mild concentric hypertrophy. The left ventricular ejection fraction is 60% by visual estimation. Systolic function is normal. Although no diagnostic regional wall motion abnormality was identified, this possibility cannot be completely excluded on the basis of this study. Unable to assess diastolic function due to E/A fusion.   Right Ventricle: Right ventricle is not well visualized. Systolic function is normal. Technically difficult study.     XR chest 2 views    Result Date: 7/22/2024  Narrative: XR CHEST PA & LATERAL INDICATION: chest pain. COMPARISON: 11/5/2021. FINDINGS: No focal airspace consolidation, pleural effusion, signs of congestion, or pneumothorax. Cardiomediastinal silhouette is unremarkable. Osseous structures are grossly intact.     Impression: No acute cardiopulmonary disease. Workstation performed: [id number]     XR hip/pelv 2-3 vws right if performed    Result Date: 6/30/2024  Narrative: XR HIP/PELV 2-3 VWS RIGHT W PELVIS IF PERFORMED INDICATION: pain over right hip and SI joint, chronic but worsening. COMPARISON: Compared with 4/23/2023 FINDINGS: No acute fracture or dislocation. Acetabulum lucent area on the right unchanged No lytic or blastic osseous lesion. Unremarkable soft tissues. Unremarkable visualized lumbar spine.     Impression: No acute osseous abnormality. Computerized Assisted Algorithm (CAA) may have been used to analyze all applicable images. Workstation performed: [id number]     CT renal stone study abdomen pelvis wo contrast    Result Date: 6/29/2024  Narrative: CT ABDOMEN AND PELVIS WITHOUT IV CONTRAST - LOW DOSE RENAL STONE INDICATION: colicky left flank pain with nausea and vomiting for the past several hours, concern for nephrolithiasis.  COMPARISON: CT lumbar spine dated 4/23/2023 TECHNIQUE: Low radiation dose thin section CT examination of the abdomen and pelvis was performed without intravenous or oral contrast according to a protocol specifically designed to evaluate for urinary tract calculus. Axial, sagittal, and coronal 2D reformatted images were created from the source data and submitted for interpretation. Evaluation for pathology in the abdomen and pelvis that is unrelated to urinary tract calculi is limited. Radiation dose length product (DLP) for this visit: 471 mGy-cm . This examination, like all CT scans performed in the [facility], was performed utilizing techniques to minimize radiation dose exposure, including the use of iterative reconstruction and automated exposure control. URINARY TRACT FINDINGS: RIGHT KIDNEY AND URETER: No urinary tract calculi. No hydronephrosis or hydroureter. LEFT KIDNEY AND URETER: 3 mm obstructing proximal left ureteral calculus with mild left hydronephrosis. Left lower pole renal cyst. URINARY BLADDER: Unremarkable. ADDITIONAL FINDINGS: LOWER CHEST: No clinically significant abnormality in the visualized lower chest. SOLID VISCERA: Limited low radiation dose noncontrast CT evaluation demonstrates no clinically significant abnormality of the imaged portions of the liver, spleen, pancreas, or adrenal glands. GALLBLADDER/BILIARY TREE: No calcified gallstones. No pericholecystic inflammatory change. No biliary dilation. STOMACH AND BOWEL: Unremarkable. APPENDIX: No findings to suggest appendicitis. ABDOMINOPELVIC CAVITY: No ascites. No pneumoperitoneum. No lymphadenopathy. VESSELS: Unremarkable for patient's age. REPRODUCTIVE ORGANS: Unremarkable for patient's age. ABDOMINAL WALL/INGUINAL REGIONS: Unremarkable. BONES: No acute fracture or suspicious osseous lesion. Mildly expanded trabeculated pattern process involving the left shoulder again seen, likely benign. Differential includes Paget's  disease.     Impression: 3 mm obstructing proximal left ureteral calculus with mild left hydronephrosis. Workstation performed: [id number]       EKG personally reviewed by Luis Angel Herron MD.     Counseling / Coordination of Care  Total floor / unit time spent today 30 minutes.  Greater than 50% of total time was spent with the patient and / or family counseling and / or coordination of care.

## 2024-07-24 NOTE — ASSESSMENT & PLAN NOTE
Patient with tachycardia, leukocytosis and fever Tmax 101.8 °F  Unclear source  Chest x-ray normal.  COVID-negative  UA with 10-20 WBC, small leukocytes.  No bacteria or nitrates  Follow blood cultures  Follow urine culture  CT abdomen pelvis with findings concerning of prostatitis  Obtain PSA  D-dimer mildly elevated.  Low risk for DVT and patient on heparin drip.  Will discontinue lower extremity ultrasound as CT with possible source and patient improving  Discontinue vancomycin  Continue ceftriaxone pending final urine culture

## 2024-07-24 NOTE — DISCHARGE INSTR - AVS FIRST PAGE
1. Please see the post cardiac catheterization dishcarge instructions.   No heavy lifting, greater than 10 lbs. or strenuous  activity for 48 hrs.    2.Remove band aid tomorrow.  Shower and wash area- wrist gently with soap and water- beginning tomorrow. Rinse and pat dry.  Apply new water seal band aid.  Repeat this process for 5 days. No powders, creams lotions or antibiotic ointments  for 5 days.  No tub baths, hot tubs or swimming for 5 days.     3. Please call our office ([phone]) if you have any fever, redness, swelling, discharge from your wrist access site.    4.No driving for 2 days

## 2024-07-25 ENCOUNTER — TELEPHONE (OUTPATIENT)
Dept: OTHER | Facility: HOSPITAL | Age: 69
End: 2024-07-25

## 2024-07-25 PROBLEM — N41.0 ACUTE PROSTATITIS: Status: ACTIVE | Noted: 2024-07-23

## 2024-07-25 LAB
ANION GAP SERPL CALCULATED.3IONS-SCNC: 7 MMOL/L (ref 4–13)
BUN SERPL-MCNC: 11 MG/DL (ref 5–25)
CALCIUM SERPL-MCNC: 8 MG/DL (ref 8.4–10.2)
CHLORIDE SERPL-SCNC: 110 MMOL/L (ref 96–108)
CO2 SERPL-SCNC: 23 MMOL/L (ref 21–32)
CREAT SERPL-MCNC: 0.6 MG/DL (ref 0.6–1.3)
ERYTHROCYTE [DISTWIDTH] IN BLOOD BY AUTOMATED COUNT: 13.8 % (ref 11.6–15.1)
GFR SERPL CREATININE-BSD FRML MDRD: 101 ML/MIN/1.73SQ M
GLUCOSE SERPL-MCNC: 95 MG/DL (ref 65–140)
HCT VFR BLD AUTO: 36.7 % (ref 36.5–49.3)
HGB BLD-MCNC: 12.4 G/DL (ref 12–17)
MCH RBC QN AUTO: 31 PG (ref 26.8–34.3)
MCHC RBC AUTO-ENTMCNC: 33.8 G/DL (ref 31.4–37.4)
MCV RBC AUTO: 92 FL (ref 82–98)
PLATELET # BLD AUTO: 147 THOUSANDS/UL (ref 149–390)
PMV BLD AUTO: 9.7 FL (ref 8.9–12.7)
POTASSIUM SERPL-SCNC: 3.5 MMOL/L (ref 3.5–5.3)
PSA FREE MFR SERPL: 19.06 %
PSA FREE SERPL-MCNC: 5.46 NG/ML
PSA SERPL-MCNC: 28.62 NG/ML (ref 0–4)
RBC # BLD AUTO: 4 MILLION/UL (ref 3.88–5.62)
SODIUM SERPL-SCNC: 140 MMOL/L (ref 135–147)
WBC # BLD AUTO: 8.54 THOUSAND/UL (ref 4.31–10.16)

## 2024-07-25 PROCEDURE — 80048 BASIC METABOLIC PNL TOTAL CA: CPT | Performed by: STUDENT IN AN ORGANIZED HEALTH CARE EDUCATION/TRAINING PROGRAM

## 2024-07-25 PROCEDURE — 84153 ASSAY OF PSA TOTAL: CPT | Performed by: STUDENT IN AN ORGANIZED HEALTH CARE EDUCATION/TRAINING PROGRAM

## 2024-07-25 PROCEDURE — 99232 SBSQ HOSP IP/OBS MODERATE 35: CPT | Performed by: INTERNAL MEDICINE

## 2024-07-25 PROCEDURE — 85027 COMPLETE CBC AUTOMATED: CPT | Performed by: STUDENT IN AN ORGANIZED HEALTH CARE EDUCATION/TRAINING PROGRAM

## 2024-07-25 PROCEDURE — 99233 SBSQ HOSP IP/OBS HIGH 50: CPT | Performed by: UROLOGY

## 2024-07-25 PROCEDURE — 99232 SBSQ HOSP IP/OBS MODERATE 35: CPT | Performed by: STUDENT IN AN ORGANIZED HEALTH CARE EDUCATION/TRAINING PROGRAM

## 2024-07-25 PROCEDURE — 84154 ASSAY OF PSA FREE: CPT | Performed by: STUDENT IN AN ORGANIZED HEALTH CARE EDUCATION/TRAINING PROGRAM

## 2024-07-25 PROCEDURE — 99223 1ST HOSP IP/OBS HIGH 75: CPT | Performed by: INTERNAL MEDICINE

## 2024-07-25 RX ORDER — POTASSIUM CHLORIDE 20 MEQ/1
40 TABLET, EXTENDED RELEASE ORAL ONCE
Status: COMPLETED | OUTPATIENT
Start: 2024-07-25 | End: 2024-07-25

## 2024-07-25 RX ADMIN — HEPARIN SODIUM 5000 UNITS: 5000 INJECTION INTRAVENOUS; SUBCUTANEOUS at 21:48

## 2024-07-25 RX ADMIN — POLYETHYLENE GLYCOL 3350 17 G: 17 POWDER, FOR SOLUTION ORAL at 08:55

## 2024-07-25 RX ADMIN — CEFTRIAXONE SODIUM 2000 MG: 10 INJECTION, POWDER, FOR SOLUTION INTRAVENOUS at 13:44

## 2024-07-25 RX ADMIN — ASPIRIN 81 MG CHEWABLE TABLET 81 MG: 81 TABLET CHEWABLE at 08:55

## 2024-07-25 RX ADMIN — HEPARIN SODIUM 5000 UNITS: 5000 INJECTION INTRAVENOUS; SUBCUTANEOUS at 08:57

## 2024-07-25 RX ADMIN — ATORVASTATIN CALCIUM 80 MG: 80 TABLET, FILM COATED ORAL at 17:10

## 2024-07-25 RX ADMIN — POTASSIUM CHLORIDE 40 MEQ: 1500 TABLET, EXTENDED RELEASE ORAL at 09:02

## 2024-07-25 RX ADMIN — METOPROLOL TARTRATE 50 MG: 50 TABLET, FILM COATED ORAL at 21:48

## 2024-07-25 RX ADMIN — METOPROLOL TARTRATE 50 MG: 50 TABLET, FILM COATED ORAL at 08:56

## 2024-07-25 RX ADMIN — SENNOSIDES 17.2 MG: 8.6 TABLET, FILM COATED ORAL at 21:48

## 2024-07-25 NOTE — ASSESSMENT & PLAN NOTE
CT: Mild periprostatic inflammatory change and fat stranding surrounding the bilateral seminal vesicles   WBC 8.54, trending down from 17.35  PSA 28.62 likely due to prostatitis (PSA 0.81 January 2024)  Afebrile with stable vital signs  Continue ceftriaxone per ID recommendations  Recommend 3-4 weeks of cefdinir twice daily if urine culture is sensitive to this if ID is agreeable; otherwise antibiotics per ID  Follow up with urology

## 2024-07-25 NOTE — PROGRESS NOTES
[facility]  Progress Note  Name: Dior Lock I  MRN: [id number]  Unit/Bed#: [facility] 524-01 I Date of Admission: 7/22/2024   Date of Service: 7/25/2024 I Hospital Day: 3    Assessment & Plan   Acute prostatitis  Assessment & Plan  Patient with tachycardia, leukocytosis and fever Tmax 101.8 °F  UA with 10-20 WBC, small leukocytes.  No bacteria or nitrates  Blood culture with no growth to date  Urine culture pending  CT abdomen pelvis with findings concerning of prostatitis  PSA elevated  Continue ceftriaxone pending final culture  Urology consulted  ID consulted    Constipation  Assessment & Plan  Patient reports no bowel movement for the last days  Reports chronic constipation  Continue senna and MiraLAX  CT abdomen pelvis as above    Class 2 severe obesity due to excess calories with serious comorbidity in adult (HCC)  Assessment & Plan  Weight loss counseling when stable as an outpatient    Essential hypertension  Assessment & Plan  BP reviewed and stable  Continue metoprolol 50 mg twice daily    Gilbert's syndrome  Assessment & Plan  Known history  Mildly elevated bilirubin noted on admission  Outpatient follow-up    * Chest pain  Assessment & Plan  Atypical  S/p high-dose aspirin  Mild troponin elevation  ECG w/ no ischemic changes  TTE LVEF 60%.  No wall motion abnormality.  Continue aspirin 81 mg daily, atorvastatin 80 mg daily, metoprolol 50 mg twice daily   Heart cath with mild nonobstructive CAD  No further cardiology workup at this time               VTE Pharmacologic Prophylaxis: VTE Score: 4 Moderate Risk (Score 3-4) - Pharmacological DVT Prophylaxis Ordered: heparin.    Mobility:   Basic Mobility Inpatient Raw Score: 24  JH-HLM Goal: 8: Walk 250 feet or more  JH-HLM Achieved: 8: Walk 250 feet ot more  JH-HLM Goal achieved. Continue to encourage appropriate mobility.    Patient Centered Rounds: I performed bedside rounds with nursing staff today.   Discussions with  Specialists or Other Care Team Provider:     Education and Discussions with Family / Patient:  Patient.     Total Time Spent on Date of Encounter in care of patient: 35 mins. This time was spent on one or more of the following: performing physical exam; counseling and coordination of care; obtaining or reviewing history; documenting in the medical record; reviewing/ordering tests, medications or procedures; communicating with other healthcare professionals and discussing with patient's family/caregivers.    Current Length of Stay: 3 day(s)  Current Patient Status: Inpatient   Certification Statement: The patient will continue to require additional inpatient hospital stay due to IV antibiotics, urology consult, ID consult  Discharge Plan: Anticipate discharge in 24-48 hrs to home.    Code Status: Level 1 - Full Code    Subjective:   No events overnight.  Patient reports feeling better this morning.  Has no complaints.  Tolerating oral intake.    Objective:     Vitals:   Temp (24hrs), Av.9 °F (37.2 °C), Min:98.3 °F (36.8 °C), Max:99.4 °F (37.4 °C)    Temp:  [98.3 °F (36.8 °C)-99.4 °F (37.4 °C)] 98.7 °F (37.1 °C)  HR:  [67-90] 80  Resp:  [18] 18  BP: (123-147)/(47-69) 131/67  SpO2:  [91 %-98 %] 91 %  Body mass index is 40.66 kg/m².     Input and Output Summary (last 24 hours):     Intake/Output Summary (Last 24 hours) at 2024 1503  Last data filed at 2024 1344  Gross per 24 hour   Intake 998 ml   Output 300 ml   Net 698 ml       Physical Exam:   Physical Exam  Vitals and nursing note reviewed.   Constitutional:       General: He is not in acute distress.     Appearance: He is well-developed.   HENT:      Head: Normocephalic and atraumatic.   Eyes:      Conjunctiva/sclera: Conjunctivae normal.   Cardiovascular:      Rate and Rhythm: Normal rate and regular rhythm.   Pulmonary:      Effort: Pulmonary effort is normal. No respiratory distress.      Breath sounds: Normal breath sounds. No  wheezing, rhonchi or rales.   Musculoskeletal:         General: No swelling.      Cervical back: Neck supple.   Skin:     General: Skin is warm and dry.   Neurological:      Mental Status: He is alert.   Psychiatric:         Mood and Affect: Mood normal.         Behavior: Behavior normal.          Additional Data:     Labs:  Results from last 7 days   Lab Units 07/25/24  0209 07/24/24  0414   WBC Thousand/uL 8.54 13.88*   HEMOGLOBIN g/dL 12.4 13.4   HEMATOCRIT % 36.7 38.9   PLATELETS Thousands/uL 147* 148*   SEGS PCT %  --  82*   LYMPHO PCT %  --  8*   MONO PCT %  --  9   EOS PCT %  --  1     Results from last 7 days   Lab Units 07/25/24  0209 07/23/24  0428 07/22/24  1134   SODIUM mmol/L 140   < > 139   POTASSIUM mmol/L 3.5   < > 3.9   CHLORIDE mmol/L 110*   < > 108   CO2 mmol/L 23   < > 24   BUN mg/dL 11   < > 21   CREATININE mg/dL 0.60   < > 0.75   ANION GAP mmol/L 7   < > 7   CALCIUM mg/dL 8.0*   < > 8.6   ALBUMIN g/dL  --   --  4.0   TOTAL BILIRUBIN mg/dL  --   --  1.61*   ALK PHOS U/L  --   --  68   ALT U/L  --   --  20   AST U/L  --   --  18   GLUCOSE RANDOM mg/dL 95   < > 97    < > = values in this interval not displayed.     Results from last 7 days   Lab Units 07/22/24  1328   INR  1.03         Results from last 7 days   Lab Units 07/22/24  1134   HEMOGLOBIN A1C % 5.5     Results from last 7 days   Lab Units 07/23/24  0428 07/22/24  2308   LACTIC ACID mmol/L  --  0.8   PROCALCITONIN ng/ml 0.20  0.19 0.15       Lines/Drains:  Invasive Devices       Peripheral Intravenous Line  Duration             Peripheral IV 07/23/24 Dorsal (posterior);Right Forearm 2 days    Peripheral IV 07/24/24 Left;Ventral (anterior) Forearm 1 day                          Imaging: No pertinent imaging reviewed.    Recent Cultures (last 7 days):   Results from last 7 days   Lab Units 07/23/24  1726 07/22/24  2309   BLOOD CULTURE   --  No Growth at 48 hrs.  No Growth at 48 hrs.   URINE CULTURE  Culture too young- will reincubate  --         Last 24 Hours Medication List:   Current Facility-Administered Medications   Medication Dose Route Frequency Provider Last Rate    acetaminophen  650 mg Oral Q4H PRN JOSE Page      aspirin  81 mg Oral Daily JOSE Page      atorvastatin  80 mg Oral QPM JOSE Page      cefTRIAXone  2,000 mg Intravenous Q24H JOSE Page 2,000 mg (07/25/24 1344)    heparin (porcine)  5,000 Units Subcutaneous Q12H [facility] Amberly Pike MD      metoprolol tartrate  50 mg Oral Q12H [facility] JOSE Page      nitroglycerin  0.4 mg Sublingual Q5 Min PRN JOSE Page      ondansetron  4 mg Intravenous Q6H PRN JOSE Page      polyethylene glycol  17 g Oral Daily JOSE Page      senna  2 tablet Oral HS JOSE Page          Today, Patient Was Seen By: Amberly Pike MD    **Please Note: This note may have been constructed using a voice recognition system.**

## 2024-07-25 NOTE — PROGRESS NOTES
"Cardiology Progress Note - Dior Lock 68 y.o. male MRN: [id number]    Unit/Bed#: [facility] 524-01 Encounter: [id number]      Assessment:  Principal Problem:    Chest pain  Active Problems:    Gilbert's syndrome    Essential hypertension    Class 2 severe obesity due to excess calories with serious comorbidity in adult (HCC)    SIRS (systemic inflammatory response syndrome) ([facility])    Constipation      Plan:  Patient with no issues overnight.  He has no chest pain or significant dyspnea.  Cardiac catheterization yesterday demonstrated mild nonobstructive LAD plaque.  No significant lesions noted.  Patient with ongoing ID workup.  Vital signs are stable.  Patient afebrile.  BMP with potassium of 3.5 and creatinine of 0.6.  Will supplement potassium.  Will continue current cardiac therapy.  Patient can follow-up with us as an outpatient.    Subjective:   Patient seen and examined.  No significant events overnight.  negative.    Objective:     Vitals: Blood pressure (!) 123/47, pulse 67, temperature 98.3 °F (36.8 °C), resp. rate 18, height 5' 9\" (1.753 m), weight 125 kg (275 lb 5.7 oz), SpO2 91%., Body mass index is 40.66 kg/m².,   Orthostatic Blood Pressures      Flowsheet Row Most Recent Value   Blood Pressure 123/47 filed at 07/24/2024 2326   Patient Position - Orthostatic VS Lying filed at 07/24/2024 0220        ,      Intake/Output Summary (Last 24 hours) at 7/25/2024 0835  Last data filed at 7/24/2024 1951  Gross per 24 hour   Intake 508.05 ml   Output --   Net 508.05 ml         Physical Exam:    GEN: Dior Lock appears well, alert and oriented x 3, pleasant and cooperative   NECK: supple, no carotid bruits, no JVD or HJR  HEART: normal rate, regular rhythm, normal S1 and S2, no murmurs, clicks, gallops or rubs   LUNGS: clear to auscultation bilaterally; no wheezes, rales, or rhonchi   EXTREMITIES: peripheral pulses normal; no clubbing, cyanosis, or edema  SKIN: warm and well perfused, no suspicious lesions on exposed " skin    Labs & Results:    No results displayed because visit has over 200 results.          Cardiac catheterization    Result Date: 7/24/2024  Narrative:   Mid LAD lesion is 30% stenosed. Mild nonobstructive plaque. No significant lesions.     CT abdomen pelvis wo contrast    Result Date: 7/24/2024  Narrative: CT ABDOMEN AND PELVIS WITHOUT IV CONTRAST INDICATION: Patient with sepsis, unclear source.  Looking for infection.  Also with constipation and concern for ileus. COMPARISON: None. TECHNIQUE: CT examination of the abdomen and pelvis was performed without intravenous contrast. Multiplanar 2D reformatted images were created from the source data. This examination, like all CT scans performed in the [facility], was performed utilizing techniques to minimize radiation dose exposure, including the use of iterative reconstruction and automated exposure control. Radiation dose length product (DLP) for this visit: 1588.56 mGy-cm Enteric Contrast: Not administered. FINDINGS: ABDOMEN LOWER CHEST: No clinically significant abnormality in the visualized lower chest. LIVER/BILIARY TREE: Unremarkable. GALLBLADDER: No calcified gallstones. No pericholecystic inflammatory change. SPLEEN: Unremarkable. PANCREAS: Unremarkable. ADRENAL GLANDS: Unremarkable. KIDNEYS/URETERS: Simple renal cyst(s). Otherwise unremarkable kidneys. No hydronephrosis. STOMACH AND BOWEL: Colonic diverticulosis without findings of acute diverticulitis. APPENDIX: No findings to suggest appendicitis. ABDOMINOPELVIC CAVITY: No ascites. No pneumoperitoneum. No lymphadenopathy. VESSELS: Unremarkable for patient's age. PELVIS REPRODUCTIVE ORGANS: Mild periprostatic inflammatory change and fat stranding surrounding the bilateral seminal vesicles. URINARY BLADDER: Unremarkable. ABDOMINAL WALL/INGUINAL REGIONS: Unremarkable. BONES: No acute fracture or suspicious osseous lesion.     Impression: Mild periprostatic inflammatory change and fat  stranding surrounding the bilateral seminal vesicles which is suggestive of prostatitis Workstation performed: [id number]     Echo complete    Result Date: 7/22/2024  Narrative:   Left Ventricle: Left ventricular cavity size is normal. Wall thickness is mildly increased. There is mild concentric hypertrophy. The left ventricular ejection fraction is 60% by visual estimation. Systolic function is normal. Although no diagnostic regional wall motion abnormality was identified, this possibility cannot be completely excluded on the basis of this study. Unable to assess diastolic function due to E/A fusion.   Right Ventricle: Right ventricle is not well visualized. Systolic function is normal. Technically difficult study.     XR chest 2 views    Result Date: 7/22/2024  Narrative: XR CHEST PA & LATERAL INDICATION: chest pain. COMPARISON: 11/5/2021. FINDINGS: No focal airspace consolidation, pleural effusion, signs of congestion, or pneumothorax. Cardiomediastinal silhouette is unremarkable. Osseous structures are grossly intact.     Impression: No acute cardiopulmonary disease. Workstation performed: [id number]     XR hip/pelv 2-3 vws right if performed    Result Date: 6/30/2024  Narrative: XR HIP/PELV 2-3 VWS RIGHT W PELVIS IF PERFORMED INDICATION: pain over right hip and SI joint, chronic but worsening. COMPARISON: Compared with 4/23/2023 FINDINGS: No acute fracture or dislocation. Acetabulum lucent area on the right unchanged No lytic or blastic osseous lesion. Unremarkable soft tissues. Unremarkable visualized lumbar spine.     Impression: No acute osseous abnormality. Computerized Assisted Algorithm (CAA) may have been used to analyze all applicable images. Workstation performed: [id number]     CT renal stone study abdomen pelvis wo contrast    Result Date: 6/29/2024  Narrative: CT ABDOMEN AND PELVIS WITHOUT IV CONTRAST - LOW DOSE RENAL STONE INDICATION: colicky left flank pain with nausea and vomiting for the past  several hours, concern for nephrolithiasis. COMPARISON: CT lumbar spine dated 4/23/2023 TECHNIQUE: Low radiation dose thin section CT examination of the abdomen and pelvis was performed without intravenous or oral contrast according to a protocol specifically designed to evaluate for urinary tract calculus. Axial, sagittal, and coronal 2D reformatted images were created from the source data and submitted for interpretation. Evaluation for pathology in the abdomen and pelvis that is unrelated to urinary tract calculi is limited. Radiation dose length product (DLP) for this visit: 471 mGy-cm . This examination, like all CT scans performed in the [facility], was performed utilizing techniques to minimize radiation dose exposure, including the use of iterative reconstruction and automated exposure control. URINARY TRACT FINDINGS: RIGHT KIDNEY AND URETER: No urinary tract calculi. No hydronephrosis or hydroureter. LEFT KIDNEY AND URETER: 3 mm obstructing proximal left ureteral calculus with mild left hydronephrosis. Left lower pole renal cyst. URINARY BLADDER: Unremarkable. ADDITIONAL FINDINGS: LOWER CHEST: No clinically significant abnormality in the visualized lower chest. SOLID VISCERA: Limited low radiation dose noncontrast CT evaluation demonstrates no clinically significant abnormality of the imaged portions of the liver, spleen, pancreas, or adrenal glands. GALLBLADDER/BILIARY TREE: No calcified gallstones. No pericholecystic inflammatory change. No biliary dilation. STOMACH AND BOWEL: Unremarkable. APPENDIX: No findings to suggest appendicitis. ABDOMINOPELVIC CAVITY: No ascites. No pneumoperitoneum. No lymphadenopathy. VESSELS: Unremarkable for patient's age. REPRODUCTIVE ORGANS: Unremarkable for patient's age. ABDOMINAL WALL/INGUINAL REGIONS: Unremarkable. BONES: No acute fracture or suspicious osseous lesion. Mildly expanded trabeculated pattern process involving the left shoulder again seen,  likely benign. Differential includes Paget's disease.     Impression: 3 mm obstructing proximal left ureteral calculus with mild left hydronephrosis. Workstation performed: [id number]       EKG personally reviewed by Luis Angel Herron MD.     Counseling / Coordination of Care  Total floor / unit time spent today 30 minutes.  Greater than 50% of total time was spent with the patient and / or family counseling and / or coordination of care.

## 2024-07-25 NOTE — ASSESSMENT & PLAN NOTE
Patient with tachycardia, leukocytosis and fever Tmax 101.8 °F  UA with 10-20 WBC, small leukocytes.  No bacteria or nitrates  Blood culture with no growth to date  Urine culture pending  CT abdomen pelvis with findings concerning of prostatitis  PSA elevated  Continue ceftriaxone pending final culture  Urology consulted  ID consulted

## 2024-07-25 NOTE — CONSULTS
Consultation - Infectious Disease   Dior Lock 68 y.o. male MRN: [id number]  Unit/Bed#: [facility] 524-01 Encounter: [id number]      IMPRESSION & RECOMMENDATIONS:   Impression/Recommendations:  1.  Sepsis.  Fever, tachycardia, leukocytosis.  Appears to be secondary to #2, as no other clear explanation.  Negative blood cultures, chest x-ray.  No other infectious findings on CT abdomen/pelvis.  Fevers and leukocytosis have improved after initiation of antibiotic.    -Antibiotic plan as below  -Follow temperatures and hemodynamics  -Recheck CBC in AM to ensure ongoing treatment response    2.  Prostatitis.  As evidenced by active urinary symptomatology, abnormal CT findings.  No radiographic evidence of abscess.  PSA is also acutely elevated at 28.  UA showed mild pyuria and no bacteria but appears to have been collected after ceftriaxone was initiated.  Blood cultures are negative.  Urine culture is still pending.  Symptoms are improving on antibiotic.    -Continue IV ceftriaxone for now  -Follow-up final urine culture and tailor antibiotics accordingly  -Follow symptomatology  -Follow-up urology recommendations    3.  Recent nephrolithiasis.  Recent left UPJ stone with mild left hydronephrosis diagnosed 6/29/2024 treated with medical expulsive therapy.  This may be a risk factor for development of UTI/prostatitis.  No further evidence of ureterolithiasis or hydronephrosis on current CT.    4.  Class II severe obesity, with BMI of 40.  Risk factor for infection.    5.  Atypical chest pain, with elevated troponin.  Underwent cardiac catheterization which demonstrated nonobstructive LAD disease.  Cardiology follow-up ongoing.      Antibiotics:  Antibiotics 3  Ceftriaxone    I discussed above plan in detail with patient, and with Dr. Chadwick who agrees with continuation of ceftriaxone.  I personally reviewed recent medical records in detail.  Thank you for this consultation.  We will follow along with you.        HISTORY OF  PRESENT ILLNESS:  Reason for Consult: Prostatitis    HPI: Dior Lock is a 68 y.o. male with recent left UPJ stone with mild left hydronephrosis diagnosed in ER on 6/29/2024 treated with medical expulsive therapy who presented on 7/22 with substernal chest pain.  Found to have elevated troponins.  He ultimately underwent cardiac catheterization which demonstrated mild nonobstructive disease.  In early hospitalization, patient developed fever up to 101.8.  Blood cultures were collected.  Abdominal CT was performed revealing evidence of prostatitis.  Patient was started on ceftriaxone.  PSA was acutely elevated at 28.  Patient is now feeling better with improvement in fevers.  He does feel that he was urinating more frequently on presentation to the hospital, which has since improved.  No dysuria, suprapubic pain, rectal pain.  No nausea, vomiting, diarrhea.    REVIEW OF SYSTEMS:  A complete system-based review of systems is otherwise negative.    PAST MEDICAL HISTORY:  Past Medical History:   Diagnosis Date    Arthritis     Essential hypertension 12/16/2021    Gilbert's syndrome 01/06/2020    Intermittent elevated bilirubin.  Liver ultrasound.    Obesity     Renal colic 06/29/2024    Left-sided kidney stone      Unilateral hearing loss, left 01/18/2022     Past Surgical History:   Procedure Laterality Date    HAND SURGERY      NC COLONOSCOPY FLX DX W/COLLJ SPEC WHEN PFRMD N/A 3/22/2018    Procedure: COLONOSCOPY;  Surgeon: Mark Bland MD;  Location: [facility];  Service: Gastroenterology    TONSILECTOMY AND ADNOIDECTOMY      TOOTH EXTRACTION         FAMILY HISTORY:  Non-contributory    SOCIAL HISTORY:  Social History     Substance and Sexual Activity   Alcohol Use Yes    Comment: occasional     Social History     Substance and Sexual Activity   Drug Use No     Social History     Tobacco Use   Smoking Status Never   Smokeless Tobacco Never       ALLERGIES:  Allergies   Allergen Reactions    Cefadroxil      Action  Taken: fever;     Amlodipine Other (See Comments)      Brain fog       MEDICATIONS:  All current active medications have been reviewed.    PHYSICAL EXAM:  Vitals:  Temp:  [98.3 °F (36.8 °C)-99.2 °F (37.3 °C)] 98.3 °F (36.8 °C)  HR:  [67-90] 69  Resp:  [16-18] 16  BP: (123-147)/(47-74) 139/74  SpO2:  [91 %-99 %] 99 %  Temp (24hrs), Av.6 °F (37 °C), Min:98.3 °F (36.8 °C), Max:99.2 °F (37.3 °C)  Current: Temperature: 98.3 °F (36.8 °C)     Physical Exam:  General:  Well-nourished, well-developed, in no acute distress, sitting comfortably in chair  Eyes:  Conjunctive clear with no hemorrhages or effusions  Oropharynx:  No ulcers, no lesions  Neck:  Supple, trachea midline  Lungs:  Clear to auscultation bilaterally, no accessory muscle use  Cardiac:  Regular rate and rhythm, no murmurs  Abdomen:  Soft, non-tender, non-distended  Extremities:  No peripheral cyanosis, clubbing, or edema  Skin:  No visible rashes or draining wounds  Neurological:  Moves all four extremities spontaneously    LABS, IMAGING, & OTHER STUDIES:  Lab Results:  I have personally reviewed pertinent labs.  Results from last 7 days   Lab Units 24  02024  1134   POTASSIUM mmol/L 3.5 3.6 3.6 3.9   CHLORIDE mmol/L 110* 108 103 108   CO2 mmol/L 23 22 26 24   BUN mg/dL 11 10 15 21   CREATININE mg/dL 0.60 0.62 0.85 0.75   EGFR ml/min/1.73sq m 101 100 88 92   CALCIUM mg/dL 8.0* 8.0* 8.8 8.6   AST U/L  --   --   --  18   ALT U/L  --   --   --  20   ALK PHOS U/L  --   --   --  68     Results from last 7 days   Lab Units 24  02024  0428   WBC Thousand/uL 8.54 13.88* 17.35*   HEMOGLOBIN g/dL 12.4 13.4 15.1   PLATELETS Thousands/uL 147* 148* 179     Results from last 7 days   Lab Units 24  1726 24  2309   BLOOD CULTURE   --  No Growth at 48 hrs.  No Growth at 48 hrs.   URINE CULTURE  Culture too young- will reincubate  --          Imaging Studies:   I have personally reviewed  pertinent imaging study reports and images in PACS.    CT abdomen/pelvis, personally reviewed, with mild periprosthetic inflammatory change and fat stranding surrounding the bilateral seminal vesicles which is suggestive of prostatitis.    Chest x-ray with no acute cardiopulmonary disease.    EKG, Pathology, and Other Studies:   I have personally reviewed pertinent reports.

## 2024-07-25 NOTE — PLAN OF CARE
Problem: PAIN - ADULT  Goal: Verbalizes/displays adequate comfort level or baseline comfort level  Description: Interventions:  - Encourage patient to monitor pain and request assistance  - Assess pain using appropriate pain scale  - Administer analgesics based on type and severity of pain and evaluate response  - Implement non-pharmacological measures as appropriate and evaluate response  - Consider cultural and social influences on pain and pain management  - Notify physician/advanced practitioner if interventions unsuccessful or patient reports new pain  Outcome: Progressing     Problem: INFECTION - ADULT  Goal: Absence or prevention of progression during hospitalization  Description: INTERVENTIONS:  - Assess and monitor for signs and symptoms of infection  - Monitor lab/diagnostic results  - Monitor all insertion sites, i.e. indwelling lines, tubes, and drains  - Monitor endotracheal if appropriate and nasal secretions for changes in amount and color  - [place] appropriate cooling/warming therapies per order  - Administer medications as ordered  - Instruct and encourage patient and family to use good hand hygiene technique  - Identify and instruct in appropriate isolation precautions for identified infection/condition  Outcome: Progressing  Goal: Absence of fever/infection during neutropenic period  Description: INTERVENTIONS:  - Monitor WBC    Outcome: Progressing     Problem: SAFETY ADULT  Goal: Patient will remain free of falls  Description: INTERVENTIONS:  - Educate patient/family on patient safety including physical limitations  - Instruct patient to call for assistance with activity   - Consult OT/PT to assist with strengthening/mobility   - Keep Call bell within reach  - Keep bed low and locked with side rails adjusted as appropriate  - Keep care items and personal belongings within reach  - Initiate and maintain comfort rounds  - Make Fall Risk Sign visible to staff  - Offer Toileting every  Hours,  in advance of need  - Initiate/Maintain alarm  - Obtain necessary fall risk management equipment:   - Apply yellow socks and bracelet for high fall risk patients  - Consider moving patient to room near nurses station  Outcome: Progressing  Goal: Maintain or return to baseline ADL function  Description: INTERVENTIONS:  -  Assess patient's ability to carry out ADLs; assess patient's baseline for ADL function and identify physical deficits which impact ability to perform ADLs (bathing, care of mouth/teeth, toileting, grooming, dressing, etc.)  - Assess/evaluate cause of self-care deficits   - Assess range of motion  - Assess patient's mobility; develop plan if impaired  - Assess patient's need for assistive devices and provide as appropriate  - Encourage maximum independence but intervene and supervise when necessary  - Involve family in performance of ADLs  - Assess for home care needs following discharge   - Consider OT consult to assist with ADL evaluation and planning for discharge  - Provide patient education as appropriate  Outcome: Progressing  Goal: Maintains/Returns to pre admission functional level  Description: INTERVENTIONS:  - Perform AM-PAC 6 Click Basic Mobility/ Daily Activity assessment daily.  - Set and communicate daily mobility goal to care team and patient/family/caregiver.   - Collaborate with rehabilitation services on mobility goals if consulted  - Perform Range of Motion  times a day.  - Reposition patient every  hours.  - Dangle patient  times a day  - Stand patient  times a day  - Ambulate patient  times a day  - Out of bed to chair  times a day   - Out of bed for meal times a day  - Out of bed for toileting  - Record patient progress and toleration of activity level   Outcome: Progressing     Problem: DISCHARGE PLANNING  Goal: Discharge to home or other facility with appropriate resources  Description: INTERVENTIONS:  - Identify barriers to discharge w/patient and caregiver  - Arrange for  needed discharge resources and transportation as appropriate  - Identify discharge learning needs (meds, wound care, etc.)  - Arrange for interpretive services to assist at discharge as needed  - Refer to Case Management Department for coordinating discharge planning if the patient needs post-hospital services based on physician/advanced practitioner order or complex needs related to functional status, cognitive ability, or social support system  Outcome: Progressing     Problem: Knowledge Deficit  Goal: Patient/family/caregiver demonstrates understanding of disease process, treatment plan, medications, and discharge instructions  Description: Complete learning assessment and assess knowledge base.  Interventions:  - Provide teaching at level of understanding  - Provide teaching via preferred learning methods  Outcome: Progressing     Problem: CARDIOVASCULAR - ADULT  Goal: Maintains optimal cardiac output and hemodynamic stability  Description: INTERVENTIONS:  - Monitor I/O, vital signs and rhythm  - Monitor for S/S and trends of decreased cardiac output  - Administer and titrate ordered vasoactive medications to optimize hemodynamic stability  - Assess quality of pulses, skin color and temperature  - Assess for signs of decreased coronary artery perfusion  - Instruct patient to report change in severity of symptoms  Outcome: Progressing  Goal: Absence of cardiac dysrhythmias or at baseline rhythm  Description: INTERVENTIONS:  - Continuous cardiac monitoring, vital signs, obtain 12 lead EKG if ordered  - Administer antiarrhythmic and heart rate control medications as ordered  - Monitor electrolytes and administer replacement therapy as ordered  Outcome: Progressing

## 2024-07-25 NOTE — CONSULTS
Consult - Urology   Dior Lock 1955, 68 y.o. male MRN: [id number]    Unit/Bed#: [facility] 524-01 Encounter: [id number]    Acute prostatitis  Assessment & Plan  CT: Mild periprostatic inflammatory change and fat stranding surrounding the bilateral seminal vesicles   WBC 8.54, trending down from 17.35  PSA 28.62 likely due to prostatitis (PSA 0.81 January 2024)  Afebrile with stable vital signs  Continue ceftriaxone per ID recommendations  Recommend 3-4 weeks of cefdinir twice daily if urine culture is sensitive to this if ID is agreeable; otherwise antibiotics per ID  Follow up with urology         Subjective:   68 y.o. male who presented on 7/22 with substernal chest pain and elevated troponins.  He had cardiac catheterization with mild nonobstructive disease.  He developed fevers and leukocytosis during this hospitalization.  He ultimately had CT abdomen pelvis which revealed prostatitis.  Patient was started on ceftriaxone.  PSA was checked and acutely elevated at 28 which is likely secondary to prostatitis.  His fevers have improved since starting antibiotics.  He does report urinary frequency.  But no dysuria suprapubic pain or perineal pain.    Was recently in the ER with recent left UPJ stone with mild left hydronephrosis on 6/29/2024 treated with medical expulsive therapy.  His most recent imaging does not show any ureteral calculi or hydronephrosis.    Review of Systems   Constitutional:  Negative for activity change, appetite change, chills, fatigue, fever and unexpected weight change.   HENT:  Negative for facial swelling.    Eyes:  Negative for discharge.   Respiratory: Negative.  Negative for cough and shortness of breath.    Cardiovascular:  Negative for chest pain and leg swelling.   Gastrointestinal: Negative.  Negative for abdominal distention, abdominal pain, constipation, diarrhea, nausea and vomiting.   Endocrine: Negative.    Genitourinary:  Positive for frequency. Negative for decreased urine  "volume, difficulty urinating, dysuria, enuresis, flank pain, genital sores, hematuria and urgency.   Musculoskeletal:  Negative for back pain and myalgias.   Skin:  Negative for pallor and rash.   Allergic/Immunologic: Negative.  Negative for immunocompromised state.   Neurological:  Negative for facial asymmetry and speech difficulty.   Psychiatric/Behavioral:  Negative for agitation and confusion.        Objective:  Vitals: Blood pressure 139/74, pulse 69, temperature 98.3 °F (36.8 °C), resp. rate 16, height 5' 9\" (1.753 m), weight 125 kg (275 lb 5.7 oz), SpO2 99%.,Body mass index is 40.66 kg/m².    Physical Exam  Vitals and nursing note reviewed.   Constitutional:       General: He is not in acute distress.     Appearance: Normal appearance. He is not ill-appearing, toxic-appearing or diaphoretic.   HENT:      Head: Normocephalic.   Cardiovascular:      Rate and Rhythm: Normal rate.   Pulmonary:      Effort: Pulmonary effort is normal. No respiratory distress.   Abdominal:      General: Abdomen is flat. There is no distension.      Palpations: Abdomen is soft.      Tenderness: There is no abdominal tenderness. There is no guarding or rebound.   Genitourinary:     Comments: Prostate smooth, nontender, not boggy  Musculoskeletal:         General: No swelling.      Cervical back: Normal range of motion.   Skin:     General: Skin is warm and dry.   Neurological:      General: No focal deficit present.      Mental Status: He is alert and oriented to person, place, and time.   Psychiatric:         Mood and Affect: Mood normal.         Behavior: Behavior normal.         Thought Content: Thought content normal.         Judgment: Judgment normal.         Imaging:  CT ABDOMEN AND PELVIS WITHOUT IV CONTRAST     INDICATION: Patient with sepsis, unclear source.  Looking for infection.  Also with constipation and concern for ileus.     COMPARISON: None.     TECHNIQUE: CT examination of the abdomen and pelvis was performed " without intravenous contrast. Multiplanar 2D reformatted images were created from the source data.     This examination, like all CT scans performed in the [facility], was performed utilizing techniques to minimize radiation dose exposure, including the use of iterative reconstruction and automated exposure control. Radiation dose length   product (DLP) for this visit: 1588.56 mGy-cm     Enteric Contrast: Not administered.     FINDINGS:     ABDOMEN     LOWER CHEST: No clinically significant abnormality in the visualized lower chest.     LIVER/BILIARY TREE: Unremarkable.     GALLBLADDER: No calcified gallstones. No pericholecystic inflammatory change.     SPLEEN: Unremarkable.     PANCREAS: Unremarkable.     ADRENAL GLANDS: Unremarkable.     KIDNEYS/URETERS: Simple renal cyst(s). Otherwise unremarkable kidneys. No hydronephrosis.     STOMACH AND BOWEL: Colonic diverticulosis without findings of acute diverticulitis.     APPENDIX: No findings to suggest appendicitis.     ABDOMINOPELVIC CAVITY: No ascites. No pneumoperitoneum. No lymphadenopathy.     VESSELS: Unremarkable for patient's age.     PELVIS     REPRODUCTIVE ORGANS: Mild periprostatic inflammatory change and fat stranding surrounding the bilateral seminal vesicles.     URINARY BLADDER: Unremarkable.     ABDOMINAL WALL/INGUINAL REGIONS: Unremarkable.     BONES: No acute fracture or suspicious osseous lesion.     IMPRESSION:     Mild periprostatic inflammatory change and fat stranding surrounding the bilateral seminal vesicles which is suggestive of prostatitis     Workstation performed: [id number]  Imaging reviewed - both report and images personally reviewed.     Labs:  Recent Labs     07/22/24 2308 07/23/24  0428 07/24/24  0414 07/25/24  0209   WBC 14.11* 17.35* 13.88* 8.54     Recent Labs     07/22/24 2308 07/23/24  0428 07/24/24  0414 07/25/24  0209   HGB 14.7 15.1 13.4 12.4         Microbiology:  Urine culture  pending    History:  Social History     Socioeconomic History    Marital status: Single     Spouse name: None    Number of children: None    Years of education: None    Highest education level: None   Occupational History    Occupation: retired   Tobacco Use    Smoking status: Never    Smokeless tobacco: Never   Vaping Use    Vaping status: Never Used   Substance and Sexual Activity    Alcohol use: Yes     Comment: occasional    Drug use: No    Sexual activity: Not Currently   Other Topics Concern    None   Social History Narrative    Single.  Lives alone.      Retired.  Worked for the [employer] in 2000. Worked part-time jobs afterward.      Coaches youth football.  And baseball.    Walks about 6 miles a day.     Social Determinants of Health     Financial Resource Strain: Low Risk  (2/15/2024)    Overall Financial Resource Strain (CARDIA)     Difficulty of Paying Living Expenses: Not hard at all   Food Insecurity: No Food Insecurity (7/23/2024)    Hunger Vital Sign     Worried About Running Out of Food in the Last Year: Never true     Ran Out of Food in the Last Year: Never true   Transportation Needs: No Transportation Needs (7/23/2024)    PRAPARE - Transportation     Lack of Transportation (Medical): No     Lack of Transportation (Non-Medical): No   Physical Activity: Not on file   Stress: Not on file   Social Connections: Not on file   Intimate Partner Violence: Not on file   Housing Stability: Low Risk  (7/23/2024)    Housing Stability Vital Sign     Unable to Pay for Housing in the Last Year: No     Number of Times Moved in the Last Year: 0     Homeless in the Last Year: No       Past Medical History:   Diagnosis Date    Arthritis     Essential hypertension 12/16/2021    Gilbert's syndrome 01/06/2020    Intermittent elevated bilirubin.  Liver ultrasound.    Obesity     Renal colic 06/29/2024    Left-sided kidney stone      Unilateral hearing loss, left 01/18/2022     Past Surgical History:    Procedure Laterality Date    HAND SURGERY      ME COLONOSCOPY FLX DX W/COLLJ SPEC WHEN PFRMD N/A 3/22/2018    Procedure: COLONOSCOPY;  Surgeon: Mark Bland MD;  Location: [facility];  Service: Gastroenterology    TONSILECTOMY AND ADNOIDECTOMY      TOOTH EXTRACTION       Family History   Problem Relation Age of Onset    Breast cancer Mother     Heart disease Father         MI in his 50's       The following portions of the patient's history were reviewed and updated as appropriate: allergies, current medications, past family history, past medical history, past social history, past surgical history and problem list    JOSE Hackett  Date: 7/25/2024 Time: 5:32 PM

## 2024-07-25 NOTE — PLAN OF CARE
Problem: PAIN - ADULT  Goal: Verbalizes/displays adequate comfort level or baseline comfort level  Description: Interventions:  - Encourage patient to monitor pain and request assistance  - Assess pain using appropriate pain scale  - Administer analgesics based on type and severity of pain and evaluate response  - Implement non-pharmacological measures as appropriate and evaluate response  - Consider cultural and social influences on pain and pain management  - Notify physician/advanced practitioner if interventions unsuccessful or patient reports new pain  Outcome: Progressing     Problem: INFECTION - ADULT  Goal: Absence or prevention of progression during hospitalization  Description: INTERVENTIONS:  - Assess and monitor for signs and symptoms of infection  - Monitor lab/diagnostic results  - Monitor all insertion sites, i.e. indwelling lines, tubes, and drains  - Monitor endotracheal if appropriate and nasal secretions for changes in amount and color  - [place] appropriate cooling/warming therapies per order  - Administer medications as ordered  - Instruct and encourage patient and family to use good hand hygiene technique  - Identify and instruct in appropriate isolation precautions for identified infection/condition  Outcome: Progressing  Goal: Absence of fever/infection during neutropenic period  Description: INTERVENTIONS:  - Monitor WBC    Outcome: Progressing     Problem: SAFETY ADULT  Goal: Patient will remain free of falls  Description: INTERVENTIONS:  - Educate patient/family on patient safety including physical limitations  - Instruct patient to call for assistance with activity   - Consult OT/PT to assist with strengthening/mobility   - Keep Call bell within reach  - Keep bed low and locked with side rails adjusted as appropriate  - Keep care items and personal belongings within reach  - Initiate and maintain comfort rounds  - Make Fall Risk Sign visible to staff  - Apply yellow socks and bracelet  for high fall risk patients  - Consider moving patient to room near nurses station  Outcome: Progressing  Goal: Maintain or return to baseline ADL function  Description: INTERVENTIONS:  -  Assess patient's ability to carry out ADLs; assess patient's baseline for ADL function and identify physical deficits which impact ability to perform ADLs (bathing, care of mouth/teeth, toileting, grooming, dressing, etc.)  - Assess/evaluate cause of self-care deficits   - Assess range of motion  - Assess patient's mobility; develop plan if impaired  - Assess patient's need for assistive devices and provide as appropriate  - Encourage maximum independence but intervene and supervise when necessary  - Involve family in performance of ADLs  - Assess for home care needs following discharge   - Consider OT consult to assist with ADL evaluation and planning for discharge  - Provide patient education as appropriate  Outcome: Progressing  Goal: Maintains/Returns to pre admission functional level  Description: INTERVENTIONS:  - Perform AM-PAC 6 Click Basic Mobility/ Daily Activity assessment daily.  - Set and communicate daily mobility goal to care team and patient/family/caregiver.   - Collaborate with rehabilitation services on mobility goals if consulted  - Perform Range of Motion 2 times a day.  - Reposition patient every 2 hours.  - Dangle patient 2 times a day  - Stand patient 2 times a day  - Ambulate patient 2 times a day  - Out of bed to chair 2 times a day   - Out of bed for meals 2 times a day  - Out of bed for toileting  - Record patient progress and toleration of activity level   Outcome: Progressing     Problem: DISCHARGE PLANNING  Goal: Discharge to home or other facility with appropriate resources  Description: INTERVENTIONS:  - Identify barriers to discharge w/patient and caregiver  - Arrange for needed discharge resources and transportation as appropriate  - Identify discharge learning needs (meds, wound care, etc.)  -  Arrange for interpretive services to assist at discharge as needed  - Refer to Case Management Department for coordinating discharge planning if the patient needs post-hospital services based on physician/advanced practitioner order or complex needs related to functional status, cognitive ability, or social support system  Outcome: Progressing     Problem: Knowledge Deficit  Goal: Patient/family/caregiver demonstrates understanding of disease process, treatment plan, medications, and discharge instructions  Description: Complete learning assessment and assess knowledge base.  Interventions:  - Provide teaching at level of understanding  - Provide teaching via preferred learning methods  Outcome: Progressing     Problem: CARDIOVASCULAR - ADULT  Goal: Maintains optimal cardiac output and hemodynamic stability  Description: INTERVENTIONS:  - Monitor I/O, vital signs and rhythm  - Monitor for S/S and trends of decreased cardiac output  - Administer and titrate ordered vasoactive medications to optimize hemodynamic stability  - Assess quality of pulses, skin color and temperature  - Assess for signs of decreased coronary artery perfusion  - Instruct patient to report change in severity of symptoms  Outcome: Progressing  Goal: Absence of cardiac dysrhythmias or at baseline rhythm  Description: INTERVENTIONS:  - Continuous cardiac monitoring, vital signs, obtain 12 lead EKG if ordered  - Administer antiarrhythmic and heart rate control medications as ordered  - Monitor electrolytes and administer replacement therapy as ordered  Outcome: Progressing

## 2024-07-25 NOTE — ASSESSMENT & PLAN NOTE
Atypical  S/p high-dose aspirin  Mild troponin elevation  ECG w/ no ischemic changes  TTE LVEF 60%.  No wall motion abnormality.  Continue aspirin 81 mg daily, atorvastatin 80 mg daily, metoprolol 50 mg twice daily   Heart cath with mild nonobstructive CAD  No further cardiology workup at this time

## 2024-07-26 LAB
ANION GAP SERPL CALCULATED.3IONS-SCNC: 9 MMOL/L (ref 4–13)
BASOPHILS # BLD AUTO: 0.03 THOUSANDS/ÂΜL (ref 0–0.1)
BASOPHILS NFR BLD AUTO: 1 % (ref 0–1)
BUN SERPL-MCNC: 10 MG/DL (ref 5–25)
CALCIUM SERPL-MCNC: 8.6 MG/DL (ref 8.4–10.2)
CHLORIDE SERPL-SCNC: 107 MMOL/L (ref 96–108)
CO2 SERPL-SCNC: 25 MMOL/L (ref 21–32)
CREAT SERPL-MCNC: 0.65 MG/DL (ref 0.6–1.3)
EOSINOPHIL # BLD AUTO: 0.36 THOUSAND/ÂΜL (ref 0–0.61)
EOSINOPHIL NFR BLD AUTO: 6 % (ref 0–6)
ERYTHROCYTE [DISTWIDTH] IN BLOOD BY AUTOMATED COUNT: 13.7 % (ref 11.6–15.1)
GFR SERPL CREATININE-BSD FRML MDRD: 98 ML/MIN/1.73SQ M
GLUCOSE SERPL-MCNC: 95 MG/DL (ref 65–140)
HCT VFR BLD AUTO: 38.3 % (ref 36.5–49.3)
HGB BLD-MCNC: 13 G/DL (ref 12–17)
IMM GRANULOCYTES # BLD AUTO: 0.02 THOUSAND/UL (ref 0–0.2)
IMM GRANULOCYTES NFR BLD AUTO: 0 % (ref 0–2)
LYMPHOCYTES # BLD AUTO: 1.27 THOUSANDS/ÂΜL (ref 0.6–4.47)
LYMPHOCYTES NFR BLD AUTO: 21 % (ref 14–44)
MCH RBC QN AUTO: 30.6 PG (ref 26.8–34.3)
MCHC RBC AUTO-ENTMCNC: 33.9 G/DL (ref 31.4–37.4)
MCV RBC AUTO: 90 FL (ref 82–98)
MONOCYTES # BLD AUTO: 0.79 THOUSAND/ÂΜL (ref 0.17–1.22)
MONOCYTES NFR BLD AUTO: 13 % (ref 4–12)
NEUTROPHILS # BLD AUTO: 3.51 THOUSANDS/ÂΜL (ref 1.85–7.62)
NEUTS SEG NFR BLD AUTO: 59 % (ref 43–75)
NRBC BLD AUTO-RTO: 0 /100 WBCS
PLATELET # BLD AUTO: 169 THOUSANDS/UL (ref 149–390)
PMV BLD AUTO: 9.5 FL (ref 8.9–12.7)
POTASSIUM SERPL-SCNC: 3.6 MMOL/L (ref 3.5–5.3)
RBC # BLD AUTO: 4.25 MILLION/UL (ref 3.88–5.62)
SODIUM SERPL-SCNC: 141 MMOL/L (ref 135–147)
WBC # BLD AUTO: 5.98 THOUSAND/UL (ref 4.31–10.16)

## 2024-07-26 PROCEDURE — 99233 SBSQ HOSP IP/OBS HIGH 50: CPT | Performed by: INTERNAL MEDICINE

## 2024-07-26 PROCEDURE — 99232 SBSQ HOSP IP/OBS MODERATE 35: CPT | Performed by: INTERNAL MEDICINE

## 2024-07-26 PROCEDURE — 99232 SBSQ HOSP IP/OBS MODERATE 35: CPT | Performed by: STUDENT IN AN ORGANIZED HEALTH CARE EDUCATION/TRAINING PROGRAM

## 2024-07-26 PROCEDURE — 80048 BASIC METABOLIC PNL TOTAL CA: CPT | Performed by: STUDENT IN AN ORGANIZED HEALTH CARE EDUCATION/TRAINING PROGRAM

## 2024-07-26 PROCEDURE — 85025 COMPLETE CBC W/AUTO DIFF WBC: CPT | Performed by: STUDENT IN AN ORGANIZED HEALTH CARE EDUCATION/TRAINING PROGRAM

## 2024-07-26 RX ADMIN — HEPARIN SODIUM 5000 UNITS: 5000 INJECTION INTRAVENOUS; SUBCUTANEOUS at 20:37

## 2024-07-26 RX ADMIN — HEPARIN SODIUM 5000 UNITS: 5000 INJECTION INTRAVENOUS; SUBCUTANEOUS at 08:44

## 2024-07-26 RX ADMIN — METOPROLOL TARTRATE 50 MG: 50 TABLET, FILM COATED ORAL at 20:37

## 2024-07-26 RX ADMIN — SENNOSIDES 17.2 MG: 8.6 TABLET, FILM COATED ORAL at 21:51

## 2024-07-26 RX ADMIN — AMPICILLIN SODIUM 2000 MG: 2 INJECTION, POWDER, FOR SOLUTION INTRAMUSCULAR; INTRAVENOUS at 18:46

## 2024-07-26 RX ADMIN — ATORVASTATIN CALCIUM 80 MG: 80 TABLET, FILM COATED ORAL at 17:10

## 2024-07-26 RX ADMIN — POLYETHYLENE GLYCOL 3350 17 G: 17 POWDER, FOR SOLUTION ORAL at 08:44

## 2024-07-26 RX ADMIN — METOPROLOL TARTRATE 50 MG: 50 TABLET, FILM COATED ORAL at 08:44

## 2024-07-26 RX ADMIN — AMPICILLIN SODIUM 2000 MG: 2 INJECTION, POWDER, FOR SOLUTION INTRAMUSCULAR; INTRAVENOUS at 14:15

## 2024-07-26 RX ADMIN — ASPIRIN 81 MG CHEWABLE TABLET 81 MG: 81 TABLET CHEWABLE at 08:44

## 2024-07-26 NOTE — PLAN OF CARE
Problem: PAIN - ADULT  Goal: Verbalizes/displays adequate comfort level or baseline comfort level  Description: Interventions:  - Encourage patient to monitor pain and request assistance  - Assess pain using appropriate pain scale  - Administer analgesics based on type and severity of pain and evaluate response  - Implement non-pharmacological measures as appropriate and evaluate response  - Consider cultural and social influences on pain and pain management  - Notify physician/advanced practitioner if interventions unsuccessful or patient reports new pain  Outcome: Progressing     Problem: INFECTION - ADULT  Goal: Absence or prevention of progression during hospitalization  Description: INTERVENTIONS:  - Assess and monitor for signs and symptoms of infection  - Monitor lab/diagnostic results  - Monitor all insertion sites, i.e. indwelling lines, tubes, and drains  - Monitor endotracheal if appropriate and nasal secretions for changes in amount and color  - [place] appropriate cooling/warming therapies per order  - Administer medications as ordered  - Instruct and encourage patient and family to use good hand hygiene technique  - Identify and instruct in appropriate isolation precautions for identified infection/condition  Outcome: Progressing  Goal: Absence of fever/infection during neutropenic period  Description: INTERVENTIONS:  - Monitor WBC    Outcome: Progressing     Problem: SAFETY ADULT  Goal: Patient will remain free of falls  Description: INTERVENTIONS:  - Educate patient/family on patient safety including physical limitations  - Instruct patient to call for assistance with activity   - Consult OT/PT to assist with strengthening/mobility   - Keep Call bell within reach  - Keep bed low and locked with side rails adjusted as appropriate  - Keep care items and personal belongings within reach  - Initiate and maintain comfort rounds  - Make Fall Risk Sign visible to staff  - Offer Toileting every  Hours,  in advance of need  - Initiate/Maintain alarm  - Obtain necessary fall risk management equipment:   - Apply yellow socks and bracelet for high fall risk patients  - Consider moving patient to room near nurses station  Outcome: Progressing     Problem: DISCHARGE PLANNING  Goal: Discharge to home or other facility with appropriate resources  Description: INTERVENTIONS:  - Identify barriers to discharge w/patient and caregiver  - Arrange for needed discharge resources and transportation as appropriate  - Identify discharge learning needs (meds, wound care, etc.)  - Arrange for interpretive services to assist at discharge as needed  - Refer to Case Management Department for coordinating discharge planning if the patient needs post-hospital services based on physician/advanced practitioner order or complex needs related to functional status, cognitive ability, or social support system  Outcome: Progressing     Problem: CARDIOVASCULAR - ADULT  Goal: Maintains optimal cardiac output and hemodynamic stability  Description: INTERVENTIONS:  - Monitor I/O, vital signs and rhythm  - Monitor for S/S and trends of decreased cardiac output  - Administer and titrate ordered vasoactive medications to optimize hemodynamic stability  - Assess quality of pulses, skin color and temperature  - Assess for signs of decreased coronary artery perfusion  - Instruct patient to report change in severity of symptoms  Outcome: Progressing

## 2024-07-26 NOTE — ASSESSMENT & PLAN NOTE
Patient with tachycardia, leukocytosis and fever Tmax 101.8 °F  UA with 10-20 WBC, small leukocytes.  No bacteria or nitrates  Blood culture with no growth to date  Urine culture pending  CT abdomen pelvis with findings concerning of prostatitis  PSA elevated  Urology input noted-recommending 3-4 weeks of antibiotics with outpatient follow-up with PSA  Discussed with ID and plan to transition to IV ampicillin until final cultures, discontinue ceftriaxone

## 2024-07-26 NOTE — PROGRESS NOTES
"Cardiology Progress Note - Dior Lock 68 y.o. male MRN: [id number]    Unit/Bed#: [facility] 524-01 Encounter: [id number]      Assessment:  Principal Problem:    Chest pain  Active Problems:    Gilbert's syndrome    Essential hypertension    Class 2 severe obesity due to excess calories with serious comorbidity in adult (HCC)    Acute prostatitis    Constipation      Plan:  Patient with no issues overnight.  He has no chest pain or significant dyspnea.  Cardiac catheterization demonstrated mild nonobstructive LAD plaque.  No significant lesions noted. Vital signs are stable.  Patient afebrile.  BMP with potassium of 3.6 and creatinine of 0.6.  Will continue current cardiac therapy.  Patient on treatment for prostatitis.  PSA elevated.  Urology consultation noted.    Subjective:   Patient seen and examined.  No significant events overnight.  negative.    Objective:     Vitals: Blood pressure 142/72, pulse 76, temperature (!) 97.3 °F (36.3 °C), temperature source Oral, resp. rate 20, height 5' 9\" (1.753 m), weight 125 kg (275 lb 5.7 oz), SpO2 92%., Body mass index is 40.66 kg/m².,   Orthostatic Blood Pressures      Flowsheet Row Most Recent Value   Blood Pressure 142/72 filed at 07/26/2024 0731   Patient Position - Orthostatic VS Sitting filed at 07/26/2024 0731        ,      Intake/Output Summary (Last 24 hours) at 7/26/2024 0747  Last data filed at 7/25/2024 1711  Gross per 24 hour   Intake 1166 ml   Output 300 ml   Net 866 ml         Physical Exam:    GEN: Dior Lock appears well, alert and oriented x 3, pleasant and cooperative   NECK: supple, no carotid bruits, no JVD or HJR  HEART: normal rate, regular rhythm, normal S1 and S2, no murmurs, clicks, gallops or rubs   LUNGS: clear to auscultation bilaterally; no wheezes, rales, or rhonchi   ABDOMEN: normal bowel sounds, soft, no tenderness, no distention  EXTREMITIES: peripheral pulses normal; no clubbing, cyanosis, or edema  SKIN: warm and well perfused, no suspicious " lesions on exposed skin    Labs & Results:    No results displayed because visit has over 200 results.          Cardiac catheterization    Result Date: 7/24/2024  Narrative:   Mid LAD lesion is 30% stenosed. Mild nonobstructive plaque. No significant lesions.     CT abdomen pelvis wo contrast    Result Date: 7/24/2024  Narrative: CT ABDOMEN AND PELVIS WITHOUT IV CONTRAST INDICATION: Patient with sepsis, unclear source.  Looking for infection.  Also with constipation and concern for ileus. COMPARISON: None. TECHNIQUE: CT examination of the abdomen and pelvis was performed without intravenous contrast. Multiplanar 2D reformatted images were created from the source data. This examination, like all CT scans performed in the [facility], was performed utilizing techniques to minimize radiation dose exposure, including the use of iterative reconstruction and automated exposure control. Radiation dose length product (DLP) for this visit: 1588.56 mGy-cm Enteric Contrast: Not administered. FINDINGS: ABDOMEN LOWER CHEST: No clinically significant abnormality in the visualized lower chest. LIVER/BILIARY TREE: Unremarkable. GALLBLADDER: No calcified gallstones. No pericholecystic inflammatory change. SPLEEN: Unremarkable. PANCREAS: Unremarkable. ADRENAL GLANDS: Unremarkable. KIDNEYS/URETERS: Simple renal cyst(s). Otherwise unremarkable kidneys. No hydronephrosis. STOMACH AND BOWEL: Colonic diverticulosis without findings of acute diverticulitis. APPENDIX: No findings to suggest appendicitis. ABDOMINOPELVIC CAVITY: No ascites. No pneumoperitoneum. No lymphadenopathy. VESSELS: Unremarkable for patient's age. PELVIS REPRODUCTIVE ORGANS: Mild periprostatic inflammatory change and fat stranding surrounding the bilateral seminal vesicles. URINARY BLADDER: Unremarkable. ABDOMINAL WALL/INGUINAL REGIONS: Unremarkable. BONES: No acute fracture or suspicious osseous lesion.     Impression: Mild periprostatic inflammatory  change and fat stranding surrounding the bilateral seminal vesicles which is suggestive of prostatitis Workstation performed: [id number]     Echo complete    Result Date: 7/22/2024  Narrative:   Left Ventricle: Left ventricular cavity size is normal. Wall thickness is mildly increased. There is mild concentric hypertrophy. The left ventricular ejection fraction is 60% by visual estimation. Systolic function is normal. Although no diagnostic regional wall motion abnormality was identified, this possibility cannot be completely excluded on the basis of this study. Unable to assess diastolic function due to E/A fusion.   Right Ventricle: Right ventricle is not well visualized. Systolic function is normal. Technically difficult study.     XR chest 2 views    Result Date: 7/22/2024  Narrative: XR CHEST PA & LATERAL INDICATION: chest pain. COMPARISON: 11/5/2021. FINDINGS: No focal airspace consolidation, pleural effusion, signs of congestion, or pneumothorax. Cardiomediastinal silhouette is unremarkable. Osseous structures are grossly intact.     Impression: No acute cardiopulmonary disease. Workstation performed: [id number]     XR hip/pelv 2-3 vws right if performed    Result Date: 6/30/2024  Narrative: XR HIP/PELV 2-3 VWS RIGHT W PELVIS IF PERFORMED INDICATION: pain over right hip and SI joint, chronic but worsening. COMPARISON: Compared with 4/23/2023 FINDINGS: No acute fracture or dislocation. Acetabulum lucent area on the right unchanged No lytic or blastic osseous lesion. Unremarkable soft tissues. Unremarkable visualized lumbar spine.     Impression: No acute osseous abnormality. Computerized Assisted Algorithm (CAA) may have been used to analyze all applicable images. Workstation performed: [id number]     CT renal stone study abdomen pelvis wo contrast    Result Date: 6/29/2024  Narrative: CT ABDOMEN AND PELVIS WITHOUT IV CONTRAST - LOW DOSE RENAL STONE INDICATION: colicky left flank pain with nausea and vomiting  for the past several hours, concern for nephrolithiasis. COMPARISON: CT lumbar spine dated 4/23/2023 TECHNIQUE: Low radiation dose thin section CT examination of the abdomen and pelvis was performed without intravenous or oral contrast according to a protocol specifically designed to evaluate for urinary tract calculus. Axial, sagittal, and coronal 2D reformatted images were created from the source data and submitted for interpretation. Evaluation for pathology in the abdomen and pelvis that is unrelated to urinary tract calculi is limited. Radiation dose length product (DLP) for this visit: 471 mGy-cm . This examination, like all CT scans performed in the [facility], was performed utilizing techniques to minimize radiation dose exposure, including the use of iterative reconstruction and automated exposure control. URINARY TRACT FINDINGS: RIGHT KIDNEY AND URETER: No urinary tract calculi. No hydronephrosis or hydroureter. LEFT KIDNEY AND URETER: 3 mm obstructing proximal left ureteral calculus with mild left hydronephrosis. Left lower pole renal cyst. URINARY BLADDER: Unremarkable. ADDITIONAL FINDINGS: LOWER CHEST: No clinically significant abnormality in the visualized lower chest. SOLID VISCERA: Limited low radiation dose noncontrast CT evaluation demonstrates no clinically significant abnormality of the imaged portions of the liver, spleen, pancreas, or adrenal glands. GALLBLADDER/BILIARY TREE: No calcified gallstones. No pericholecystic inflammatory change. No biliary dilation. STOMACH AND BOWEL: Unremarkable. APPENDIX: No findings to suggest appendicitis. ABDOMINOPELVIC CAVITY: No ascites. No pneumoperitoneum. No lymphadenopathy. VESSELS: Unremarkable for patient's age. REPRODUCTIVE ORGANS: Unremarkable for patient's age. ABDOMINAL WALL/INGUINAL REGIONS: Unremarkable. BONES: No acute fracture or suspicious osseous lesion. Mildly expanded trabeculated pattern process involving the left shoulder  again seen, likely benign. Differential includes Paget's disease.     Impression: 3 mm obstructing proximal left ureteral calculus with mild left hydronephrosis. Workstation performed: [id number]       EKG personally reviewed by Luis Angel Herron MD.     Counseling / Coordination of Care  Total floor / unit time spent today 30 minutes.  Greater than 50% of total time was spent with the patient and / or family counseling and / or coordination of care.

## 2024-07-26 NOTE — PROGRESS NOTES
Progress Note - Infectious Disease   Dior Lock 68 y.o. male MRN: [id number]  [facility] Encounter: [id number]      IMPRESSION & RECOMMENDATIONS:   Impression/Recommendations:  1.  Sepsis.  Fever, tachycardia, leukocytosis.  Appears to be secondary to #2, as no other clear explanation.  Negative blood cultures, chest x-ray.  No other infectious findings on CT abdomen/pelvis.  Fevers and leukocytosis have improved after initiation of antibiotic.     -Antibiotic plan as below  -Follow temperatures and hemodynamics  -Recheck CBC in AM to ensure ongoing treatment response     2.  Prostatitis.  As evidenced by active urinary symptomatology, abnormal CT findings.  No radiographic evidence of abscess.  PSA is also acutely elevated at 26 setting of prostatitis.  UA showed mild pyuria and no bacteria but appears to have been collected after antibiotics was initiated.  Blood cultures are negative.  Final urine culture isolated less than 10,000 Streptococcus.  Low colony count is likely due to prior antibiotic exposure.  Discussed with micro lab and suspicion for Enterococcus is high.  Patient did receive dose of vancomycin, which may explain why he has shown clinical improvement.     -Discontinue ceftriaxone  -Start IV ampicillin while inpatient  -Micro lab to identify and perform susceptibilities of organism.  Follow-up final urine culture.  -If patient leaves hospital prior to finalization of culture, can transition to high-dose oral amoxicillin 1 g every 8 hours.  -Check CBCD, creatinine every other week while on antibiotic  -Plan to complete 4 weeks of treatment through 8/22.  -Urology input appreciated, with recommendation to follow-up recheck PSA as outpatient once prostatitis resolves.     3.  Recent nephrolithiasis.  Recent left UPJ stone with mild left hydronephrosis diagnosed 6/29/2024 treated with medical expulsive therapy.  This may be a risk factor for development of UTI/prostatitis.  No further  evidence of ureterolithiasis or hydronephrosis on current CT.     4.  Class II severe obesity, with BMI of 40.  Risk factor for infection.  This also affects antibiotic dosing.     5.  Atypical chest pain, with elevated troponin.  Underwent cardiac catheterization which demonstrated nonobstructive LAD disease.  Cardiology follow-up ongoing.        Antibiotics:  Antibiotics 4  Ceftriaxone  Vancomycin x 1    I discussed the above plan in detail with patient and his family at bedside, and with Dr. Chadwick from primary service who agrees with eventual transition to oral antibiotic on discharge.  Please contact our service with any immediate questions over the weekend.        Subjective:  Patient seen on AM rounds.  He is feeling better and really wants to go home today.  No fevers or chills.  No urinary complaints.    Objective:  Vitals:  Temp:  [97.3 °F (36.3 °C)-98.8 °F (37.1 °C)] 97.3 °F (36.3 °C)  HR:  [65-78] 76  Resp:  [16-20] 20  BP: (124-147)/(69-74) 142/72  SpO2:  [92 %-99 %] 92 %  Temp (24hrs), Av.1 °F (36.7 °C), Min:97.3 °F (36.3 °C), Max:98.8 °F (37.1 °C)  Current: Temperature: (!) 97.3 °F (36.3 °C)    Physical Exam:   General:  No acute distress, nontoxic, sitting comfortably in chair  HEENT atraumatic normocephalic  Neck trachea midline  Psychiatric:  Awake and alert  Pulmonary:  Normal respiratory excursion without accessory muscle use  Abdomen: Nondistended, no rigidity or guarding  Extremities: Symmetric edema  Skin:  No rashes or visible draining wounds  Neuro moves all extremities spontaneously    Lab Results:  I have personally reviewed pertinent labs.  Results from last 7 days   Lab Units 24  0458 24  0209 24  0414 24  0428 24  1134   POTASSIUM mmol/L 3.6 3.5 3.6   < > 3.9   CHLORIDE mmol/L 107 110* 108   < > 108   CO2 mmol/L 25 23 22   < > 24   BUN mg/dL 10 11 10   < > 21   CREATININE mg/dL 0.65 0.60 0.62   < > 0.75   EGFR ml/min/1.73sq m 98 101 100   < > 92   CALCIUM  mg/dL 8.6 8.0* 8.0*   < > 8.6   AST U/L  --   --   --   --  18   ALT U/L  --   --   --   --  20   ALK PHOS U/L  --   --   --   --  68    < > = values in this interval not displayed.     Results from last 7 days   Lab Units 07/26/24  0458 07/25/24  0209 07/24/24  0414   WBC Thousand/uL 5.98 8.54 13.88*   HEMOGLOBIN g/dL 13.0 12.4 13.4   PLATELETS Thousands/uL 169 147* 148*     Results from last 7 days   Lab Units 07/23/24  1726 07/22/24  2309   BLOOD CULTURE   --  No Growth at 72 hrs.  No Growth at 72 hrs.   URINE CULTURE  <10,000 cfu/ml Streptococcus species*  --        Imaging Studies:   I have personally reviewed pertinent imaging study reports and images in PACS.    EKG, Pathology, and Other Studies:   I have personally reviewed pertinent reports.

## 2024-07-27 VITALS
WEIGHT: 275.35 LBS | HEART RATE: 68 BPM | RESPIRATION RATE: 16 BRPM | TEMPERATURE: 98.6 F | BODY MASS INDEX: 40.78 KG/M2 | DIASTOLIC BLOOD PRESSURE: 68 MMHG | OXYGEN SATURATION: 96 % | HEIGHT: 69 IN | SYSTOLIC BLOOD PRESSURE: 130 MMHG

## 2024-07-27 PROBLEM — A41.9 SEPSIS (HCC): Status: ACTIVE | Noted: 2024-07-27

## 2024-07-27 LAB — BACTERIA UR CULT: ABNORMAL

## 2024-07-27 PROCEDURE — 99232 SBSQ HOSP IP/OBS MODERATE 35: CPT | Performed by: INTERNAL MEDICINE

## 2024-07-27 PROCEDURE — 99239 HOSP IP/OBS DSCHRG MGMT >30: CPT | Performed by: STUDENT IN AN ORGANIZED HEALTH CARE EDUCATION/TRAINING PROGRAM

## 2024-07-27 RX ORDER — AMOXICILLIN 500 MG/1
1000 TABLET, FILM COATED ORAL EVERY 8 HOURS
Qty: 168 TABLET | Refills: 0 | Status: SHIPPED | OUTPATIENT
Start: 2024-07-27 | End: 2024-07-30 | Stop reason: SDUPTHER

## 2024-07-27 RX ORDER — METOPROLOL TARTRATE 50 MG/1
50 TABLET, FILM COATED ORAL EVERY 12 HOURS SCHEDULED
Qty: 60 TABLET | Refills: 1 | Status: SHIPPED | OUTPATIENT
Start: 2024-07-27 | End: 2024-08-01

## 2024-07-27 RX ORDER — ATORVASTATIN CALCIUM 80 MG/1
80 TABLET, FILM COATED ORAL EVERY EVENING
Qty: 30 TABLET | Refills: 1 | Status: SHIPPED | OUTPATIENT
Start: 2024-07-27 | End: 2024-08-01

## 2024-07-27 RX ORDER — ASPIRIN 81 MG/1
81 TABLET, CHEWABLE ORAL DAILY
Qty: 30 TABLET | Refills: 1 | Status: SHIPPED | OUTPATIENT
Start: 2024-07-28

## 2024-07-27 RX ORDER — AMOXICILLIN 500 MG/1
1000 CAPSULE ORAL EVERY 8 HOURS SCHEDULED
Qty: 168 CAPSULE | Refills: 0 | Status: SHIPPED | OUTPATIENT
Start: 2024-07-27 | End: 2024-08-24

## 2024-07-27 RX ORDER — NITROGLYCERIN 0.4 MG/1
0.4 TABLET SUBLINGUAL
Qty: 30 TABLET | Refills: 0 | Status: SHIPPED | OUTPATIENT
Start: 2024-07-27

## 2024-07-27 RX ORDER — AMOXICILLIN 500 MG/1
1000 TABLET, FILM COATED ORAL EVERY 8 HOURS
Qty: 168 TABLET | Refills: 0 | Status: SHIPPED | OUTPATIENT
Start: 2024-07-27 | End: 2024-07-27

## 2024-07-27 RX ADMIN — AMPICILLIN SODIUM 2000 MG: 2 INJECTION, POWDER, FOR SOLUTION INTRAMUSCULAR; INTRAVENOUS at 06:19

## 2024-07-27 RX ADMIN — AMPICILLIN SODIUM 2000 MG: 2 INJECTION, POWDER, FOR SOLUTION INTRAMUSCULAR; INTRAVENOUS at 00:40

## 2024-07-27 RX ADMIN — POLYETHYLENE GLYCOL 3350 17 G: 17 POWDER, FOR SOLUTION ORAL at 08:57

## 2024-07-27 RX ADMIN — ASPIRIN 81 MG CHEWABLE TABLET 81 MG: 81 TABLET CHEWABLE at 08:57

## 2024-07-27 RX ADMIN — METOPROLOL TARTRATE 50 MG: 50 TABLET, FILM COATED ORAL at 08:57

## 2024-07-27 NOTE — ASSESSMENT & PLAN NOTE
Atypical  S/p high-dose aspirin  Mild troponin elevation  ECG w/ no ischemic changes  TTE LVEF 60%.  No wall motion abnormality.  Discharged on aspirin 81 mg daily, atorvastatin 80 mg daily, metoprolol 50 mg twice daily.  Prescription provided  Heart cath with mild nonobstructive CAD  No further cardiology workup at this time

## 2024-07-27 NOTE — DISCHARGE SUMMARY
[facility]  Discharge- Dior Lock 1955, 68 y.o. male MRN: [id number]  [facility] Encounter: [id number]  Primary Care Provider: Lazaro Kapoor MD   Date and time admitted to hospital: 7/22/2024 11:16 AM    Acute prostatitis  Assessment & Plan  Patient with tachycardia, leukocytosis and fever Tmax 101.8 °F  UA with 10-20 WBC, small leukocytes.  No bacteria or nitrates  Blood culture with no growth to date  Urine culture with Enterococcus  CT abdomen pelvis with findings concerning of prostatitis  PSA elevated  Urology input noted-recommending 3-4 weeks of antibiotics with outpatient follow-up with PSA.  Discussed with patient importance of following up with urology  Discussed with ID, discharged on amoxicillin 1 g every 8 hours for 4 weeks.  Prescription sent to pharmacy    Sepsis ([facility])  Assessment & Plan  Secondary to prostatitis   Treatment as below    Constipation  Assessment & Plan  Patient reports no bowel movement for the last days  Reports chronic constipation  Continue senna and MiraLAX  CT abdomen pelvis as above    Class 2 severe obesity due to excess calories with serious comorbidity in adult ([facility])  Assessment & Plan  Counseled on lifestyle modifications    Essential hypertension  Assessment & Plan  BP reviewed and stable  Continue metoprolol 50 mg twice daily    Gilbert's syndrome  Assessment & Plan  Known history  Mildly elevated bilirubin noted on admission  Outpatient follow-up    * Chest pain  Assessment & Plan  Atypical  S/p high-dose aspirin  Mild troponin elevation  ECG w/ no ischemic changes  TTE LVEF 60%.  No wall motion abnormality.  Discharged on aspirin 81 mg daily, atorvastatin 80 mg daily, metoprolol 50 mg twice daily.  Prescription provided  Heart cath with mild nonobstructive CAD  No further cardiology workup at this time      Medical Problems       Resolved Problems  Date Reviewed: 7/25/2024   None       Discharging Physician /  "Practitioner: Amberly Pike MD  PCP: Lazaro Kapoor MD  Admission Date:   Admission Orders (From admission, onward)       Ordered        07/22/24 1232  INPATIENT ADMISSION  Once                          Discharge Date: 07/27/24    Consultations During Hospital Stay:  Cardiology  ID  Urology    Procedures Performed:   CT abdomen pelvis, chest x-ray, heart cath     Significant Findings / Test Results:   Prostatitis     Incidental Findings:   None       Test Results Pending at Discharge (will require follow up):   None      Outpatient Tests Requested:  None     Complications:  None     Reason for Admission: chest pain    Hospital Course:   Dior Lock is a 68 y.o. male patient who originally presented to the hospital on 7/22/2024 due to atypical chest pain.  On admission he had mild troponin elevation but no ischemic changes on EKG.  Echocardiogram with no wall motion abnormality.  He was evaluated by cardiology and underwent heart cath that showed mild nonobstructive CAD.  He was started on aspirin, atorvastatin and metoprolol.    Hospital stay was complicated by sepsis secondary to prostatitis confirmed by CT, elevated PSA and urine culture with Enterococcus.  Patient was evaluated by urology who recommended to biotics and outpatient follow-up of PSA.  While inpatient he was treated with IV ampicillin per ID recommendations and discharged on amoxicillin to complete 4 weeks of treatment.    Please see above list of diagnoses and related plan for additional information.     Condition at Discharge: stable    Discharge Day Visit / Exam:   Subjective: No events overnight.  Patient reports feeling well this morning.  No chest pain or shortness of breath.  Tolerating oral intake.  Ambulating without assistive device.  Vitals: Blood Pressure: 130/68 (07/27/24 0730)  Pulse: 68 (07/27/24 0730)  Temperature: 98.6 °F (37 °C) (07/27/24 0730)  Temp Source: Oral (07/27/24 0730)  Respirations: 16 (07/27/24 0730)  Height: 5' 9\" " (175.3 cm) (07/22/24 1812)  Weight - Scale: 125 kg (275 lb 5.7 oz) (07/22/24 1812)  SpO2: 96 % (07/27/24 0730)  Exam:   Physical Exam  Vitals and nursing note reviewed.   Constitutional:       General: He is not in acute distress.     Appearance: He is well-developed.   HENT:      Head: Normocephalic and atraumatic.   Eyes:      Conjunctiva/sclera: Conjunctivae normal.   Cardiovascular:      Rate and Rhythm: Normal rate and regular rhythm.   Pulmonary:      Effort: Pulmonary effort is normal. No respiratory distress.      Breath sounds: Normal breath sounds. No wheezing, rhonchi or rales.   Abdominal:      Palpations: Abdomen is soft.      Tenderness: There is no abdominal tenderness.   Musculoskeletal:         General: No swelling.      Cervical back: Neck supple.   Skin:     General: Skin is warm and dry.   Neurological:      Mental Status: He is alert.   Psychiatric:         Mood and Affect: Mood normal.         Behavior: Behavior normal.          Discussion with Family:  Patient.     Discharge instructions/Information to patient and family:   See after visit summary for information provided to patient and family.      Provisions for Follow-Up Care:  See after visit summary for information related to follow-up care and any pertinent home health orders.      Mobility at time of Discharge:   Basic Mobility Inpatient Raw Score: 24  JH-HLM Goal: 8: Walk 250 feet or more  JH-HLM Achieved: 7: Walk 25 feet or more  HLM Goal NOT achieved. Continue to encourage mobility in post discharge setting.     Disposition:   Home    Planned Readmission: no     Discharge Statement:  I spent 35 minutes discharging the patient. This time was spent on the day of discharge. I had direct contact with the patient on the day of discharge. Greater than 50% of the total time was spent examining patient, answering all patient questions, arranging and discussing plan of care with patient as well as directly providing post-discharge  instructions.  Additional time then spent on discharge activities.    Discharge Medications:  See after visit summary for reconciled discharge medications provided to patient and/or family.      **Please Note: This note may have been constructed using a voice recognition system**

## 2024-07-27 NOTE — ASSESSMENT & PLAN NOTE
Patient with tachycardia, leukocytosis and fever Tmax 101.8 °F  UA with 10-20 WBC, small leukocytes.  No bacteria or nitrates  Blood culture with no growth to date  Urine culture with Enterococcus  CT abdomen pelvis with findings concerning of prostatitis  PSA elevated  Urology input noted-recommending 3-4 weeks of antibiotics with outpatient follow-up with PSA.  Discussed with patient importance of following up with urology  Discussed with ID, discharged on amoxicillin 1 g every 8 hours for 4 weeks.  Prescription sent to pharmacy

## 2024-07-27 NOTE — PROGRESS NOTES
"Cardiology Progress Note - Dior Lock 68 y.o. male MRN: [id number]    Unit/Bed#: [facility] 524-01 Encounter: [id number]      Assessment:  Principal Problem:    Chest pain  Active Problems:    Gilbert's syndrome    Essential hypertension    Class 2 severe obesity due to excess calories with serious comorbidity in adult (HCC)    Acute prostatitis    Constipation      Plan:  Patient with no issues overnight.  He has no chest pain or significant dyspnea.  He continues on antibiotic therapy in reference to prostatitis.  Will continue on current cardiac therapy.  Patient will follow-up with us in the office as an outpatient.    Subjective:   Patient seen and examined.  No significant events overnight.  negative.    Objective:     Vitals: Blood pressure 130/68, pulse 68, temperature 98.6 °F (37 °C), temperature source Oral, resp. rate 16, height 5' 9\" (1.753 m), weight 125 kg (275 lb 5.7 oz), SpO2 96%., Body mass index is 40.66 kg/m².,   Orthostatic Blood Pressures      Flowsheet Row Most Recent Value   Blood Pressure 130/68 filed at 07/27/2024 0730   Patient Position - Orthostatic VS Sitting filed at 07/27/2024 0730        ,      Intake/Output Summary (Last 24 hours) at 7/27/2024 0749  Last data filed at 7/26/2024 1700  Gross per 24 hour   Intake 412 ml   Output --   Net 412 ml         Physical Exam:    GEN: Dior Lock appears well, alert and oriented x 3, pleasant and cooperative   NECK: supple, no carotid bruits, no JVD or HJR  HEART: normal rate, regular rhythm, normal S1 and S2, no murmurs, clicks, gallops or rubs   LUNGS: clear to auscultation bilaterally; no wheezes, rales, or rhonchi   ABDOMEN: normal bowel sounds, soft, no tenderness, no distention  EXTREMITIES: peripheral pulses normal; no clubbing, cyanosis, or edema  SKIN: warm and well perfused, no suspicious lesions on exposed skin    Labs & Results:    No results displayed because visit has over 200 results.          Cardiac catheterization    Result Date: " 7/24/2024  Narrative:   Mid LAD lesion is 30% stenosed. Mild nonobstructive plaque. No significant lesions.     CT abdomen pelvis wo contrast    Result Date: 7/24/2024  Narrative: CT ABDOMEN AND PELVIS WITHOUT IV CONTRAST INDICATION: Patient with sepsis, unclear source.  Looking for infection.  Also with constipation and concern for ileus. COMPARISON: None. TECHNIQUE: CT examination of the abdomen and pelvis was performed without intravenous contrast. Multiplanar 2D reformatted images were created from the source data. This examination, like all CT scans performed in the [facility], was performed utilizing techniques to minimize radiation dose exposure, including the use of iterative reconstruction and automated exposure control. Radiation dose length product (DLP) for this visit: 1588.56 mGy-cm Enteric Contrast: Not administered. FINDINGS: ABDOMEN LOWER CHEST: No clinically significant abnormality in the visualized lower chest. LIVER/BILIARY TREE: Unremarkable. GALLBLADDER: No calcified gallstones. No pericholecystic inflammatory change. SPLEEN: Unremarkable. PANCREAS: Unremarkable. ADRENAL GLANDS: Unremarkable. KIDNEYS/URETERS: Simple renal cyst(s). Otherwise unremarkable kidneys. No hydronephrosis. STOMACH AND BOWEL: Colonic diverticulosis without findings of acute diverticulitis. APPENDIX: No findings to suggest appendicitis. ABDOMINOPELVIC CAVITY: No ascites. No pneumoperitoneum. No lymphadenopathy. VESSELS: Unremarkable for patient's age. PELVIS REPRODUCTIVE ORGANS: Mild periprostatic inflammatory change and fat stranding surrounding the bilateral seminal vesicles. URINARY BLADDER: Unremarkable. ABDOMINAL WALL/INGUINAL REGIONS: Unremarkable. BONES: No acute fracture or suspicious osseous lesion.     Impression: Mild periprostatic inflammatory change and fat stranding surrounding the bilateral seminal vesicles which is suggestive of prostatitis Workstation performed: [id number]     Echo  complete    Result Date: 7/22/2024  Narrative:   Left Ventricle: Left ventricular cavity size is normal. Wall thickness is mildly increased. There is mild concentric hypertrophy. The left ventricular ejection fraction is 60% by visual estimation. Systolic function is normal. Although no diagnostic regional wall motion abnormality was identified, this possibility cannot be completely excluded on the basis of this study. Unable to assess diastolic function due to E/A fusion.   Right Ventricle: Right ventricle is not well visualized. Systolic function is normal. Technically difficult study.     XR chest 2 views    Result Date: 7/22/2024  Narrative: XR CHEST PA & LATERAL INDICATION: chest pain. COMPARISON: 11/5/2021. FINDINGS: No focal airspace consolidation, pleural effusion, signs of congestion, or pneumothorax. Cardiomediastinal silhouette is unremarkable. Osseous structures are grossly intact.     Impression: No acute cardiopulmonary disease. Workstation performed: [id number]     XR hip/pelv 2-3 vws right if performed    Result Date: 6/30/2024  Narrative: XR HIP/PELV 2-3 VWS RIGHT W PELVIS IF PERFORMED INDICATION: pain over right hip and SI joint, chronic but worsening. COMPARISON: Compared with 4/23/2023 FINDINGS: No acute fracture or dislocation. Acetabulum lucent area on the right unchanged No lytic or blastic osseous lesion. Unremarkable soft tissues. Unremarkable visualized lumbar spine.     Impression: No acute osseous abnormality. Computerized Assisted Algorithm (CAA) may have been used to analyze all applicable images. Workstation performed: [id number]     CT renal stone study abdomen pelvis wo contrast    Result Date: 6/29/2024  Narrative: CT ABDOMEN AND PELVIS WITHOUT IV CONTRAST - LOW DOSE RENAL STONE INDICATION: colicky left flank pain with nausea and vomiting for the past several hours, concern for nephrolithiasis. COMPARISON: CT lumbar spine dated 4/23/2023 TECHNIQUE: Low radiation dose thin section CT  examination of the abdomen and pelvis was performed without intravenous or oral contrast according to a protocol specifically designed to evaluate for urinary tract calculus. Axial, sagittal, and coronal 2D reformatted images were created from the source data and submitted for interpretation. Evaluation for pathology in the abdomen and pelvis that is unrelated to urinary tract calculi is limited. Radiation dose length product (DLP) for this visit: 471 mGy-cm . This examination, like all CT scans performed in the [facility], was performed utilizing techniques to minimize radiation dose exposure, including the use of iterative reconstruction and automated exposure control. URINARY TRACT FINDINGS: RIGHT KIDNEY AND URETER: No urinary tract calculi. No hydronephrosis or hydroureter. LEFT KIDNEY AND URETER: 3 mm obstructing proximal left ureteral calculus with mild left hydronephrosis. Left lower pole renal cyst. URINARY BLADDER: Unremarkable. ADDITIONAL FINDINGS: LOWER CHEST: No clinically significant abnormality in the visualized lower chest. SOLID VISCERA: Limited low radiation dose noncontrast CT evaluation demonstrates no clinically significant abnormality of the imaged portions of the liver, spleen, pancreas, or adrenal glands. GALLBLADDER/BILIARY TREE: No calcified gallstones. No pericholecystic inflammatory change. No biliary dilation. STOMACH AND BOWEL: Unremarkable. APPENDIX: No findings to suggest appendicitis. ABDOMINOPELVIC CAVITY: No ascites. No pneumoperitoneum. No lymphadenopathy. VESSELS: Unremarkable for patient's age. REPRODUCTIVE ORGANS: Unremarkable for patient's age. ABDOMINAL WALL/INGUINAL REGIONS: Unremarkable. BONES: No acute fracture or suspicious osseous lesion. Mildly expanded trabeculated pattern process involving the left shoulder again seen, likely benign. Differential includes Paget's disease.     Impression: 3 mm obstructing proximal left ureteral calculus with mild left  hydronephrosis. Workstation performed: [id number]       EKG personally reviewed by Luis Angel Herron MD.     Counseling / Coordination of Care  Total floor / unit time spent today 30 minutes.  Greater than 50% of total time was spent with the patient and / or family counseling and / or coordination of care.

## 2024-07-28 LAB
BACTERIA BLD CULT: NORMAL
BACTERIA BLD CULT: NORMAL

## 2024-07-29 ENCOUNTER — TRANSITIONAL CARE MANAGEMENT (OUTPATIENT)
Dept: FAMILY MEDICINE CLINIC | Facility: CLINIC | Age: 69
End: 2024-07-29

## 2024-07-29 ENCOUNTER — TELEPHONE (OUTPATIENT)
Dept: INFECTIOUS DISEASES | Facility: CLINIC | Age: 69
End: 2024-07-29

## 2024-07-29 DIAGNOSIS — N41.9 PROSTATITIS: Primary | ICD-10-CM

## 2024-07-29 NOTE — TELEPHONE ENCOUNTER
Patient already has follow up schedule - added to note  
Pt seen in consultation for acute prostatitis. 3 weeks of antibiotics. Please schedule follow up in 4-6 weeks  
Zelalem Baeza Regala

## 2024-07-29 NOTE — TELEPHONE ENCOUNTER
Spoke with patient.   He confirms he has started his antibiotics, doing well.  He confirms weekly labs to start next Monday, non fasting.  He confirms follow up with our office 8/20.  He reports he does not have additional questions at this time.

## 2024-07-29 NOTE — UTILIZATION REVIEW
NOTIFICATION OF ADMISSION DISCHARGE   This is a Notification of Discharge from [facility]. Please be advised that this patient has been discharge from our facility. Below you will find the admission and discharge date and time including the patient’s disposition.   UTILIZATION REVIEW CONTACT:  Joe Esquivel  Utilization   Network Utilization Review Department  Phone: [phone] x carefully listen to the prompts. All voicemails are confidential.  Email: [email or web address]     ADMISSION INFORMATION  PRESENTATION DATE: 7/22/2024 11:16 AM  OBERVATION ADMISSION DATE: N/A  INPATIENT ADMISSION DATE: 7/22/24 12:32 PM   DISCHARGE DATE: 7/27/2024  2:31 PM   DISPOSITION:Home/Self Care    Network Utilization Review Department  ATTENTION: Please call with any questions or concerns to [phone] and carefully listen to the prompts so that you are directed to the right person. All voicemails are confidential.   For Discharge needs, contact Care Management DC Support Team at [phone] opt. 2  Send all requests for admission clinical reviews, approved or denied determinations and any other requests to dedicated fax number below belonging to the campus where the patient is receiving treatment. List of dedicated fax numbers for the Facilities:  FACILITY NAME UR FAX NUMBER   ADMISSION DENIALS (Administrative/Medical Necessity) [phone]   DISCHARGE SUPPORT TEAM ([facility]) [phone]   PARENT CHILD HEALTH (Maternity/NICU/Pediatrics) [phone]   [facility] [phone]   [facility] [phone]   [facility] [phone]   [facility] [phone]   Critical access hospital [phone]   [facility] [phone]   [facility] [phone]   [facility] [phone]   [facility]  [facility] [phone]   [facility] [phone]   [facility] [phone]   [facility] [phone]

## 2024-07-29 NOTE — PROGRESS NOTES
Cardiology Follow Up    Dior Lock  1955  [id number]  [facility]  [address] [place] [id number] [phone]    1. Coronary artery disease involving native coronary artery of native heart without angina pectoris        2. Mixed hyperlipidemia        3. Hypertension, unspecified type        4. Class 3 severe obesity with serious comorbidity and body mass index (BMI) of 40.0 to 44.9 in adult, unspecified obesity type (HCC)            Interval History:   Mr Dior Lock wa admitted to [facility] on 7/22 - 7/27/24 with chest pain.  Dior presented to the ED with atypical CP.  Troponin mildly elevated.  EKG without ST changes. 7/22/24 TTE: Left ventricular cavity size normal wall thickness mildly increased mild concentric hypertrophy LVEF 60% , right ventricle not well-visualized systolic function normal, no significant valvular disease aortic root 3.60 cm ascending aorta 3.3 cm.   7/24/24 LHC showed mild non obstructive disease, mid LAD 30% stenosis .  7/23/24  TG 67 HDL 58 LDL 92.  Hospitalization course was tachycardic with leukocytosis and a fever of 101.8.  He was found to have acute prostatitis and treated with 3 to 4 weeks course of antibiotics.    Dior presents our office for a recent hospitalization follow-up visit.  He admits to occasional stabbing midsternal discomfort.  He is walking 3 miles a day without symptoms of chest pain.  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      Medical History   Primary Cardiologist Dr Woodruff  NSVT  Hypertension  Obesity BMI 40  Gilbert's syndrome    Patient Active Problem List   Diagnosis    History of adenomatous polyp of colon    Gilbert's syndrome    Fatty liver    Chest pain    Essential hypertension    Unilateral hearing loss, left    Class 2 severe obesity due to excess calories with serious comorbidity in adult (HCC)    Right-sided low back pain  without sciatica    Renal colic    Acute prostatitis    Constipation    Sepsis (HCC)     Past Medical History:   Diagnosis Date    Arthritis     Essential hypertension 12/16/2021    Gilbert's syndrome 01/06/2020    Intermittent elevated bilirubin.  Liver ultrasound.    Obesity     Renal colic 06/29/2024    Left-sided kidney stone      Unilateral hearing loss, left 01/18/2022     Social History     Socioeconomic History    Marital status: Single     Spouse name: Not on file    Number of children: Not on file    Years of education: Not on file    Highest education level: Not on file   Occupational History    Occupation: retired   Tobacco Use    Smoking status: Never    Smokeless tobacco: Never   Vaping Use    Vaping status: Never Used   Substance and Sexual Activity    Alcohol use: Yes     Comment: occasional    Drug use: No    Sexual activity: Not Currently   Other Topics Concern    Not on file   Social History Narrative    Single.  Lives alone.      Retired.  Worked for the [employer] retiring in 2000. Worked part-time jobs afterward.      Coaches youth football.  And baseball.    Walks about 6 miles a day.     Social Determinants of Health     Financial Resource Strain: Low Risk  (2/15/2024)    Overall Financial Resource Strain (CARDIA)     Difficulty of Paying Living Expenses: Not hard at all   Food Insecurity: No Food Insecurity (7/23/2024)    Hunger Vital Sign     Worried About Running Out of Food in the Last Year: Never true     Ran Out of Food in the Last Year: Never true   Transportation Needs: No Transportation Needs (7/23/2024)    PRAPARE - Transportation     Lack of Transportation (Medical): No     Lack of Transportation (Non-Medical): No   Physical Activity: Not on file   Stress: Not on file   Social Connections: Not on file   Intimate Partner Violence: Not on file   Housing Stability: Low Risk  (7/23/2024)    Housing Stability Vital Sign     Unable to Pay for Housing in the Last Year: No     Number  of Times Moved in the Last Year: 0     Homeless in the Last Year: No      Family History   Problem Relation Age of Onset    Breast cancer Mother     Heart disease Father         MI in his 50's     Past Surgical History:   Procedure Laterality Date    CARDIAC CATHETERIZATION Left 7/24/2024    Procedure: Cardiac Left Heart Cath;  Surgeon: Daren Tinsley MD;  Location: [facility];  Service: Cardiology    CARDIAC CATHETERIZATION N/A 7/24/2024    Procedure: Cardiac Coronary Angiogram;  Surgeon: Daren Tinsley MD;  Location: [facility];  Service: Cardiology    CARDIAC CATHETERIZATION  7/24/2024    Procedure: Cardiac catheterization;  Surgeon: Daren Tinsley MD;  Location: [facility];  Service: Cardiology    HAND SURGERY      OR COLONOSCOPY FLX DX W/COLLJ SPEC WHEN PFRMD N/A 3/22/2018    Procedure: COLONOSCOPY;  Surgeon: Mark Bland MD;  Location: [facility];  Service: Gastroenterology    TONSILECTOMY AND ADNOIDECTOMY      TOOTH EXTRACTION         Current Outpatient Medications:     acetaminophen (TYLENOL) 325 mg tablet, Take 650 mg by mouth every 6 (six) hours as needed for mild pain, Disp: , Rfl:     amoxicillin (AMOXIL) 500 mg capsule, Take 2 capsules (1,000 mg total) by mouth every 8 (eight) hours for 28 days, Disp: 168 capsule, Rfl: 0    amoxicillin (AMOXIL) 500 MG tablet, Take 2 tablets (1,000 mg total) by mouth every 8 (eight) hours for 28 days, Disp: 168 tablet, Rfl: 0    aspirin 81 mg chewable tablet, Chew 1 tablet (81 mg total) daily, Disp: 30 tablet, Rfl: 1    atorvastatin (LIPITOR) 80 mg tablet, Take 1 tablet (80 mg total) by mouth every evening, Disp: 30 tablet, Rfl: 1    metoprolol tartrate (LOPRESSOR) 50 mg tablet, Take 1 tablet (50 mg total) by mouth every 12 (twelve) hours, Disp: 60 tablet, Rfl: 1    naproxen (Naprosyn) 500 mg tablet, Take 1 tablet (500 mg total) by mouth 2 (two) times a day with meals, Disp: 60 tablet, Rfl: 5    nitroglycerin (NITROSTAT) 0.4 mg SL tablet, Place 1  tablet (0.4 mg total) under the tongue every 5 (five) minutes as needed for chest pain, Disp: 30 tablet, Rfl: 0    ondansetron (ZOFRAN) 4 mg tablet, Take 1 tablet (4 mg total) by mouth every 8 (eight) hours as needed for nausea or vomiting, Disp: 12 tablet, Rfl: 0  Allergies   Allergen Reactions    Cefadroxil      Action Taken: fever;     Amlodipine Other (See Comments)      Brain fog       Labs:  No results displayed because visit has over 200 results.      Admission on 06/29/2024, Discharged on 06/29/2024   Component Date Value    WBC 06/29/2024 9.42     RBC 06/29/2024 4.93     Hemoglobin 06/29/2024 15.1     Hematocrit 06/29/2024 45.2     MCV 06/29/2024 92     MCH 06/29/2024 30.6     MCHC 06/29/2024 33.4     RDW 06/29/2024 13.9     MPV 06/29/2024 9.7     Platelets 06/29/2024 198     nRBC 06/29/2024 0     Segmented % 06/29/2024 85 (H)     Immature Grans % 06/29/2024 0     Lymphocytes % 06/29/2024 9 (L)     Monocytes % 06/29/2024 5     Eosinophils Relative 06/29/2024 1     Basophils Relative 06/29/2024 0     Absolute Neutrophils 06/29/2024 7.84 (H)     Absolute Immature Grans 06/29/2024 0.03     Absolute Lymphocytes 06/29/2024 0.88     Absolute Monocytes 06/29/2024 0.51     Eosinophils Absolute 06/29/2024 0.12     Basophils Absolute 06/29/2024 0.04     Sodium 06/29/2024 141     Potassium 06/29/2024 3.8     Chloride 06/29/2024 108     CO2 06/29/2024 27     ANION GAP 06/29/2024 6     BUN 06/29/2024 25     Creatinine 06/29/2024 1.10     Glucose 06/29/2024 124     Calcium 06/29/2024 9.5     AST 06/29/2024 14     ALT 06/29/2024 12     Alkaline Phosphatase 06/29/2024 70     Total Protein 06/29/2024 7.0     Albumin 06/29/2024 4.4     Total Bilirubin 06/29/2024 1.16 (H)     eGFR 06/29/2024 67     Lipase 06/29/2024 16     Color, UA 06/29/2024 Yellow     Clarity, UA 06/29/2024 Turbid     Specific Gravity, UA 06/29/2024 1.031 (H)     pH, UA 06/29/2024 5.5     Leukocytes, UA 06/29/2024 Negative     Nitrite, UA 06/29/2024  Negative     Protein, UA 06/29/2024 30 (1+) (A)     Glucose, UA 06/29/2024 Negative     Ketones, UA 06/29/2024 Negative     Urobilinogen, UA 06/29/2024 <2.0     Bilirubin, UA 06/29/2024 Negative     Occult Blood, UA 06/29/2024 Large (A)     Total CK 06/29/2024 85     RBC, UA 06/29/2024 Innumerable (A)     WBC, UA 06/29/2024 None Seen     Epithelial Cells 06/29/2024 Occasional     Bacteria, UA 06/29/2024 Occasional     MUCUS THREADS 06/29/2024 Occasional (A)     Amorphous Crystals, UA 06/29/2024 Occasional      Imaging: Cardiac catheterization    Result Date: 7/24/2024  Narrative:   Mid LAD lesion is 30% stenosed. Mild nonobstructive plaque. No significant lesions.     CT abdomen pelvis wo contrast    Result Date: 7/24/2024  Narrative: CT ABDOMEN AND PELVIS WITHOUT IV CONTRAST INDICATION: Patient with sepsis, unclear source.  Looking for infection.  Also with constipation and concern for ileus. COMPARISON: None. TECHNIQUE: CT examination of the abdomen and pelvis was performed without intravenous contrast. Multiplanar 2D reformatted images were created from the source data. This examination, like all CT scans performed in the [facility], was performed utilizing techniques to minimize radiation dose exposure, including the use of iterative reconstruction and automated exposure control. Radiation dose length product (DLP) for this visit: 1588.56 mGy-cm Enteric Contrast: Not administered. FINDINGS: ABDOMEN LOWER CHEST: No clinically significant abnormality in the visualized lower chest. LIVER/BILIARY TREE: Unremarkable. GALLBLADDER: No calcified gallstones. No pericholecystic inflammatory change. SPLEEN: Unremarkable. PANCREAS: Unremarkable. ADRENAL GLANDS: Unremarkable. KIDNEYS/URETERS: Simple renal cyst(s). Otherwise unremarkable kidneys. No hydronephrosis. STOMACH AND BOWEL: Colonic diverticulosis without findings of acute diverticulitis. APPENDIX: No findings to suggest appendicitis. ABDOMINOPELVIC  CAVITY: No ascites. No pneumoperitoneum. No lymphadenopathy. VESSELS: Unremarkable for patient's age. PELVIS REPRODUCTIVE ORGANS: Mild periprostatic inflammatory change and fat stranding surrounding the bilateral seminal vesicles. URINARY BLADDER: Unremarkable. ABDOMINAL WALL/INGUINAL REGIONS: Unremarkable. BONES: No acute fracture or suspicious osseous lesion.     Impression: Mild periprostatic inflammatory change and fat stranding surrounding the bilateral seminal vesicles which is suggestive of prostatitis Workstation performed: [id number]     Echo complete    Result Date: 7/22/2024  Narrative:   Left Ventricle: Left ventricular cavity size is normal. Wall thickness is mildly increased. There is mild concentric hypertrophy. The left ventricular ejection fraction is 60% by visual estimation. Systolic function is normal. Although no diagnostic regional wall motion abnormality was identified, this possibility cannot be completely excluded on the basis of this study. Unable to assess diastolic function due to E/A fusion.   Right Ventricle: Right ventricle is not well visualized. Systolic function is normal. Technically difficult study.     XR chest 2 views    Result Date: 7/22/2024  Narrative: XR CHEST PA & LATERAL INDICATION: chest pain. COMPARISON: 11/5/2021. FINDINGS: No focal airspace consolidation, pleural effusion, signs of congestion, or pneumothorax. Cardiomediastinal silhouette is unremarkable. Osseous structures are grossly intact.     Impression: No acute cardiopulmonary disease. Workstation performed: [id number]       Review of Systems:  Review of Systems   Musculoskeletal:  Positive for arthralgias and myalgias.   All other systems reviewed and are negative.      Physical Exam:  Physical Exam  Vitals reviewed.   Constitutional:       Appearance: Normal appearance. He is obese.   Cardiovascular:      Rate and Rhythm: Normal rate and regular rhythm.      Pulses: Normal pulses.      Heart sounds: Normal  heart sounds.   Pulmonary:      Effort: Pulmonary effort is normal.      Breath sounds: Normal breath sounds.   Musculoskeletal:         General: Normal range of motion.      Cervical back: Normal range of motion and neck supple.      Right lower leg: No edema.      Left lower leg: No edema.   Skin:     General: Skin is warm and dry.      Capillary Refill: Capillary refill takes less than 2 seconds.   Neurological:      General: No focal deficit present.      Mental Status: He is alert and oriented to person, place, and time.   Psychiatric:         Mood and Affect: Mood normal.         Behavior: Behavior normal.         Discussion/Summary:  # CAD 7/24/24 LHC showed mild non obstructive disease, mid LAD 30% stenosis .  Continue on aspirin 81 mg daily, Lipitor 80 mg daily   # Hyperlipidemia 7/23/24  TG 67 HDL 58 LDL 92 continue Lipitor 80 mg daily, heart healthy diet will need follow-up fasting lipid profile in the future per primary cardiologist or PCP  # Hypertension RUE sitting 110/60 continue on metoprolol tartrate 50 mg every 12 hours, heart healthy DASH diet  # Obesity BMI 40.18 denies snoring at night or daytime fatigue.  Continue with walking and encouraged calorie control

## 2024-07-29 NOTE — PROGRESS NOTES
OPAT NOTE    Supervising/Discharge physician: Ab    Diagnosis: Prostatitis    Drug: Amox    Dose/Route/Frequency: 1g PO Q8    Labs/Frequency: CBCD Cre eow    End Date: 8/22      Next appointment: 8/20        MA assigned:  Corazon

## 2024-07-30 ENCOUNTER — OFFICE VISIT (OUTPATIENT)
Dept: CARDIOLOGY CLINIC | Facility: CLINIC | Age: 69
End: 2024-07-30
Payer: COMMERCIAL

## 2024-07-30 ENCOUNTER — OFFICE VISIT (OUTPATIENT)
Dept: PHYSICAL THERAPY | Facility: REHABILITATION | Age: 69
End: 2024-07-30
Payer: COMMERCIAL

## 2024-07-30 VITALS
SYSTOLIC BLOOD PRESSURE: 124 MMHG | BODY MASS INDEX: 40.3 KG/M2 | HEIGHT: 69 IN | WEIGHT: 272.1 LBS | OXYGEN SATURATION: 98 % | DIASTOLIC BLOOD PRESSURE: 70 MMHG | HEART RATE: 63 BPM

## 2024-07-30 DIAGNOSIS — I10 HYPERTENSION, UNSPECIFIED TYPE: ICD-10-CM

## 2024-07-30 DIAGNOSIS — E78.2 MIXED HYPERLIPIDEMIA: ICD-10-CM

## 2024-07-30 DIAGNOSIS — M54.50 ACUTE RIGHT-SIDED LOW BACK PAIN WITHOUT SCIATICA: ICD-10-CM

## 2024-07-30 DIAGNOSIS — I25.10 CORONARY ARTERY DISEASE INVOLVING NATIVE CORONARY ARTERY OF NATIVE HEART WITHOUT ANGINA PECTORIS: Primary | ICD-10-CM

## 2024-07-30 DIAGNOSIS — M54.50 RIGHT-SIDED LOW BACK PAIN WITHOUT SCIATICA, UNSPECIFIED CHRONICITY: Primary | ICD-10-CM

## 2024-07-30 DIAGNOSIS — E66.01 CLASS 3 SEVERE OBESITY WITH SERIOUS COMORBIDITY AND BODY MASS INDEX (BMI) OF 40.0 TO 44.9 IN ADULT, UNSPECIFIED OBESITY TYPE (HCC): ICD-10-CM

## 2024-07-30 PROCEDURE — 1159F MED LIST DOCD IN RCRD: CPT | Performed by: NURSE PRACTITIONER

## 2024-07-30 PROCEDURE — 1160F RVW MEDS BY RX/DR IN RCRD: CPT | Performed by: NURSE PRACTITIONER

## 2024-07-30 PROCEDURE — 97140 MANUAL THERAPY 1/> REGIONS: CPT | Performed by: PHYSICAL THERAPIST

## 2024-07-30 PROCEDURE — 99215 OFFICE O/P EST HI 40 MIN: CPT | Performed by: NURSE PRACTITIONER

## 2024-07-30 PROCEDURE — 97112 NEUROMUSCULAR REEDUCATION: CPT | Performed by: PHYSICAL THERAPIST

## 2024-07-30 NOTE — PROGRESS NOTES
PT Re-evaluation    Today's date: 2024  Patient name: Dior Lock  : 1955  MRN: [id number]  Referring provider: Isaiah Lucia DO  Dx:   Encounter Diagnosis     ICD-10-CM    1. Right-sided low back pain without sciatica, unspecified chronicity  M54.50       2. Acute right-sided low back pain without sciatica  M54.50                      Assessment  Impairments: abnormal muscle firing, abnormal muscle tone, abnormal or restricted ROM, abnormal movement, activity intolerance, impaired physical strength and pain with function    Assessment details: Patient is a 68 y.o. year old male who attended physical therapy for 8 treatment sessions regarding acute on chronic R sided posterior buttock, hip pain. Patient reports 50% improvement at this time which correlates to improved impairments and functionality.  Patient has shown improvement throughout PT by demonstrating decreased pain, increased range of motion, increased strength, and improved tolerance to activity.  Patient continues to present with pain, decreased ROM, decreased strength, and decreased tolerance to activity. Dior would benefit from continued physical therapy to address these issues and to maximize function. Thank you.     Focus to be on R hip mobility as I feel this is driving his pain based on LS AROM WNL, neg neurodynamic testing and + hip testing noted in objective.     Understanding of Dx/Px/POC: good     Prognosis: good    Plan  Patient would benefit from: skilled PT  Planned modality interventions: thermotherapy: hydrocollator packs    Planned therapy interventions: joint mobilization, manual therapy, ADL training, neuromuscular re-education, home exercise program, therapeutic exercise, therapeutic activities, strengthening, patient education and functional ROM exercises    Frequency: 2x week  Duration in weeks: 8  Treatment plan discussed with: patient      Subjective Evaluation    History of Present Illness  Mechanism of injury:  "Magali:    Dior is a 68 y.o. male presenting to physical therapy on 24 with referral from [facility] for acute R sided LBP that began about 1 week ago of gradual onset. He has had multiple bouts of LBP with each bout becoming more frequent. States that he feels pain in the morning that gets better throughout the day. Pain is non radicular in nature. Over the last week pain is unchanging. He did have a few \"tweaks\" since seen in February that resolved in 1-2 days.     Patient symptoms are located on R side of LS.     Patient symptoms are intermittent. Pain felt with transitional movements. Walking has some soreness but tolerable. Pain with return from fwd bend   Patient symptoms are localized  Patient denies bowel and bladder changes (denies urinary retention)/saddle numbness    + pain with sneeze/cough       Patient Goals  Patient goals for therapy: decreased pain    Pain  Current pain ratin  At worst pain rating: 3    Short Term Goals:   1. Patient will be Independent with hep - IN PROGRESS  2. Patient will improve pain with activity by 50% - IMPROVED  3. Patient will report GROC 50% or greater - MET      Long Term Goals:   1. Patient will improve FOTO to greater then goal - NOT MET  2. Patient will improve pain with activity to 2/10 or less - NOTMET  3. Patient will continue with HEP independence to allow for decreased future reoccurrence of pain and loss in function - NOT MET  4. Patient will report GROC 75% or greater - NOT MET  5. Patient will have pain free LS AROM - MET  6. Patient will have neg slump R - MET  7. Patient will reach to  baseball without pain/limitation - IMPROVED  8. Patient will get on/off toilet pfree - NA      Objective        Myotomes (L/R): normal  Dermatome: (pinprick- L/R):  normal     Reflexes:  (L/R) L3-4:    2+ b/l    S1:    2+ bl    GAIT: decreased R hip ext, trunk rot comp  Squat assess: normal        Lumbar  % of normal   Flex. 75 - hip hinge - wide CINTHIA   Extn. 50 " "  SG Left 75   SG Right 75   ROT Left 75   ROT Right 75   Repeated Movements  Standing:  extension=   NE,NE  Flexion= P R hip, NW          MMT         AROM          PROM    Hip       L       R        L           R      L     R   Flex.      100   Extn.         Abd.      30   Add.         IR.      0   ER.      30            G. Max 4+ 4       G. Med.         Iliop.             Neuro Dynamic Testing:  Slump test: L=   + for R LBP  R=  neg    Straight leg raise:   L=   neg   R=    neg          Hip:   issa =  +   capsular mobility=   +         Segmental mobility:   LS= hypomobile L4-5 b/l               Precautions: standard       Manuals 7/30            R  lateral hip dist with ER and IR             LAD                                       Neuro Re-Ed 7/30            bridge 10x DL    2x10 with march            Palloff press 5\"x10  OJB                                                                             Ther Ex 7/30            REP hip ext 10x neutral, 10x ABD            Standing ip IR/ER Slider - 10x ea                                                                                          Ther Activity                                       Gait Training                                       Modalities                                            "

## 2024-08-01 ENCOUNTER — OFFICE VISIT (OUTPATIENT)
Dept: FAMILY MEDICINE CLINIC | Facility: CLINIC | Age: 69
End: 2024-08-01
Payer: COMMERCIAL

## 2024-08-01 VITALS
TEMPERATURE: 97.6 F | RESPIRATION RATE: 16 BRPM | HEIGHT: 69 IN | DIASTOLIC BLOOD PRESSURE: 70 MMHG | OXYGEN SATURATION: 96 % | WEIGHT: 274 LBS | SYSTOLIC BLOOD PRESSURE: 130 MMHG | BODY MASS INDEX: 40.58 KG/M2 | HEART RATE: 80 BPM

## 2024-08-01 DIAGNOSIS — A41.9 SEPSIS WITHOUT ACUTE ORGAN DYSFUNCTION, DUE TO UNSPECIFIED ORGANISM (HCC): ICD-10-CM

## 2024-08-01 DIAGNOSIS — I25.10 CORONARY ARTERY DISEASE INVOLVING NATIVE CORONARY ARTERY OF NATIVE HEART WITHOUT ANGINA PECTORIS: Primary | ICD-10-CM

## 2024-08-01 DIAGNOSIS — N41.0 ACUTE PROSTATITIS: ICD-10-CM

## 2024-08-01 PROBLEM — R07.9 CHEST PAIN: Status: RESOLVED | Noted: 2021-11-05 | Resolved: 2024-08-01

## 2024-08-01 PROCEDURE — 99496 TRANSJ CARE MGMT HIGH F2F 7D: CPT | Performed by: FAMILY MEDICINE

## 2024-08-01 PROCEDURE — 1111F DSCHRG MED/CURRENT MED MERGE: CPT | Performed by: FAMILY MEDICINE

## 2024-08-01 RX ORDER — METOPROLOL SUCCINATE 50 MG/1
50 TABLET, EXTENDED RELEASE ORAL DAILY
Qty: 100 TABLET | Refills: 3 | Status: SHIPPED | OUTPATIENT
Start: 2024-08-01

## 2024-08-01 RX ORDER — ATORVASTATIN CALCIUM 40 MG/1
40 TABLET, FILM COATED ORAL DAILY
Qty: 100 TABLET | Refills: 3 | Status: SHIPPED | OUTPATIENT
Start: 2024-08-01

## 2024-08-01 NOTE — PATIENT INSTRUCTIONS
Hospitalization reviewed.  Only has a 30% blockage which was not causing his symptoms.  Being treated for 4 weeks with 1 g amoxicillin 3 times a day.  Decrease atorvastatin to 40 mg daily.  Change metoprolol to 50 mg long-acting once daily.  Continue baby aspirin.  Recheck in 2 months.

## 2024-08-01 NOTE — PROGRESS NOTES
Chief Complaint   Patient presents with    Transition of Care Management        HPI   Here for follow-up of hospitalization on 7/22 with atypical chest pain.  Mild troponin elevation but no changes on EKG.  Echocardiogram showed no wall motion abnormality.  He was seen by cardiology and underwent a heart catheterization that showed mild nonobstructive coronary disease.  Started on aspirin, atorvastatin and metoprolol.  Hospitalization was complicated by sepsis due to prostatitis confirmed on CAT scan.  Elevated PSA.  Urine culture grew Enterococcus.  Seen by urology who recommended antibiotics.  Treated initially with IV ampicillin and discharged on amoxicillin for a total of 4 weeks.  PSA 28.6.  6 months ago, 0.81.  Initial white count 12.1.  Following day 17.35.  At discharge, 5.98.  Left heart catheterization showed 30% mid LAD stenosis.  Cholesterol 163 with HDL 58, LDL 92.  Occasional stabbing midsternal discomfort took him to the hospital.  Noted by cardiology to walk 3 miles a day without any chest pain.  Seen in follow-up by cardiology who wanted to continue 80 mg of atorvastatin, metoprolol 50 twice daily and baby aspirin.  He notes that he is feeling somewhat foggy.  Infectious disease requested weekly CBC through 8/22 when antibiotics are to be completed.    TCM Call       Date and time call was made  7/29/2024  8:53 AM    Hospital care reviewed  Records reviewed    Patient was hospitialized at  [facility]    Date of Admission  07/22/24    Date of discharge  07/27/24    Diagnosis  Chest Pain    Disposition  Home    Were the patients medications reviewed and updated  No    Current Symptoms  None          TCM Call       Post hospital issues  None    Should patient be enrolled in anticoag monitoring?  No    Scheduled for follow up?  Yes    Patients specialists  Cardiologist; Urologist    Did you obtain your prescribed medications  Yes    Why were you unable to obtain your medications  As per pt no  new meds were prescribed.     Do you need help managing your prescriptions or medications  No    Is transportation to your appointment needed  No    I have advised the patient to call PCP with any new or worsening symptoms  Sandie Torres MA    Living Arrangements  Spouse or Significiant other    Support System  [facility]    Are you recieving any outpatient services  No    Are you recieving home care services  No    Counseling  Patient    Counseling topics  Activities of daily living; Importance of RX compliance    Comments  Called and spoke with patient. Patient schedule TCM appt with Dr. Kapoor 8/1 at 8:00 am            Past Medical History:   Diagnosis Date    Arthritis     Coronary artery disease involving native coronary artery 08/01/2024    30% LAD on cardiac cath 7/24.  Presented with atypical chest pain and found to have prostatitis.  Mild troponin elevation but no changes on EKG or echocardiogram.  Started on aspirin and atorvastatin.      Essential hypertension 12/16/2021    Gilbert's syndrome 01/06/2020    Intermittent elevated bilirubin.  Liver ultrasound.    Obesity     Renal colic 06/29/2024    Left-sided kidney stone      Unilateral hearing loss, left 01/18/2022        Past Surgical History:   Procedure Laterality Date    CARDIAC CATHETERIZATION Left 7/24/2024    Procedure: Cardiac Left Heart Cath;  Surgeon: Daren Tinsley MD;  Location: [facility];  Service: Cardiology    CARDIAC CATHETERIZATION N/A 7/24/2024    Procedure: Cardiac Coronary Angiogram;  Surgeon: Daren Tinsley MD;  Location: [facility];  Service: Cardiology    CARDIAC CATHETERIZATION  7/24/2024    Procedure: Cardiac catheterization;  Surgeon: Daren Tinsley MD;  Location: [facility];  Service: Cardiology    HAND SURGERY      NV COLONOSCOPY FLX DX W/COLLJ SPEC WHEN PFRMD N/A 3/22/2018    Procedure: COLONOSCOPY;  Surgeon: Mark Bland MD;  Location: [facility];  Service: Gastroenterology    TONSILECTOMY AND  "ADNOIDECTOMY      TOOTH EXTRACTION         Social History     Tobacco Use    Smoking status: Never    Smokeless tobacco: Never   Substance Use Topics    Alcohol use: Yes     Comment: occasional       Social History     Social History Narrative    Single.  Lives alone.      Retired.  Worked for the [email or web address] in 2000. Worked part-time jobs afterward.      Coaches youth football.  And baseball.    Walks about 6 miles a day.        The following portions of the patient's history were reviewed and updated as appropriate: allergies, current medications, past family history, past medical history, past social history, past surgical history, and problem list.      Review of Systems       /70   Pulse 80   Temp 97.6 °F (36.4 °C) (Temporal)   Resp 16   Ht 5' 9\" (1.753 m)   Wt 124 kg (274 lb)   SpO2 96%   BMI 40.46 kg/m²      Physical Exam   Appears well.  Lungs are clear.  Heart regular.  Abdomen nontender.              Current Outpatient Medications:     acetaminophen (TYLENOL) 325 mg tablet, Take 650 mg by mouth every 6 (six) hours as needed for mild pain, Disp: , Rfl:     amoxicillin (AMOXIL) 500 mg capsule, Take 2 capsules (1,000 mg total) by mouth every 8 (eight) hours for 28 days, Disp: 168 capsule, Rfl: 0    aspirin 81 mg chewable tablet, Chew 1 tablet (81 mg total) daily, Disp: 30 tablet, Rfl: 1    atorvastatin (LIPITOR) 40 mg tablet, Take 1 tablet (40 mg total) by mouth daily, Disp: 100 tablet, Rfl: 3    metoprolol succinate (TOPROL-XL) 50 mg 24 hr tablet, Take 1 tablet (50 mg total) by mouth daily, Disp: 100 tablet, Rfl: 3    naproxen (Naprosyn) 500 mg tablet, Take 1 tablet (500 mg total) by mouth 2 (two) times a day with meals (Patient taking differently: Take 500 mg by mouth if needed), Disp: 60 tablet, Rfl: 5    nitroglycerin (NITROSTAT) 0.4 mg SL tablet, Place 1 tablet (0.4 mg total) under the tongue every 5 (five) minutes as needed for chest pain (Patient not taking: Reported on " 7/30/2024), Disp: 30 tablet, Rfl: 0    ondansetron (ZOFRAN) 4 mg tablet, Take 1 tablet (4 mg total) by mouth every 8 (eight) hours as needed for nausea or vomiting (Patient not taking: Reported on 7/30/2024), Disp: 12 tablet, Rfl: 0     No problem-specific Assessment & Plan notes found for this encounter.       Diagnoses and all orders for this visit:    Coronary artery disease involving native coronary artery of native heart without angina pectoris  -     atorvastatin (LIPITOR) 40 mg tablet; Take 1 tablet (40 mg total) by mouth daily  -     metoprolol succinate (TOPROL-XL) 50 mg 24 hr tablet; Take 1 tablet (50 mg total) by mouth daily    Acute prostatitis    Sepsis without acute organ dysfunction, due to unspecified organism (HCC)        Patient Instructions   Hospitalization reviewed.  Only has a 30% blockage which was not causing his symptoms.  Being treated for 4 weeks with 1 g amoxicillin 3 times a day.  Decrease atorvastatin to 40 mg daily.  Change metoprolol to 50 mg long-acting once daily.  Continue baby aspirin.  Recheck in 2 months.

## 2024-08-04 ENCOUNTER — HOSPITAL ENCOUNTER (EMERGENCY)
Facility: HOSPITAL | Age: 69
Discharge: HOME | End: 2024-08-04
Attending: EMERGENCY MEDICINE
Payer: COMMERCIAL

## 2024-08-04 VITALS
DIASTOLIC BLOOD PRESSURE: 78 MMHG | SYSTOLIC BLOOD PRESSURE: 157 MMHG | RESPIRATION RATE: 18 BRPM | HEART RATE: 90 BPM | TEMPERATURE: 98.4 F | OXYGEN SATURATION: 95 %

## 2024-08-04 DIAGNOSIS — Z71.89 MEDICATION CARE PLAN DISCUSSED WITH PATIENT: Primary | ICD-10-CM

## 2024-08-04 DIAGNOSIS — T50.905A MEDICATION ADVERSE EFFECT, INITIAL ENCOUNTER: ICD-10-CM

## 2024-08-04 LAB
ANION GAP SERPL CALCULATED.3IONS-SCNC: 8 MMOL/L (ref 4–13)
BASOPHILS # BLD AUTO: 0.07 THOUSANDS/ÂΜL (ref 0–0.1)
BASOPHILS NFR BLD AUTO: 1 % (ref 0–1)
BUN SERPL-MCNC: 14 MG/DL (ref 5–25)
CALCIUM SERPL-MCNC: 8.8 MG/DL (ref 8.4–10.2)
CHLORIDE SERPL-SCNC: 105 MMOL/L (ref 96–108)
CO2 SERPL-SCNC: 23 MMOL/L (ref 21–32)
CREAT SERPL-MCNC: 0.62 MG/DL (ref 0.6–1.3)
EOSINOPHIL # BLD AUTO: 0.41 THOUSAND/ÂΜL (ref 0–0.61)
EOSINOPHIL NFR BLD AUTO: 5 % (ref 0–6)
ERYTHROCYTE [DISTWIDTH] IN BLOOD BY AUTOMATED COUNT: 13.5 % (ref 11.6–15.1)
GFR SERPL CREATININE-BSD FRML MDRD: 100 ML/MIN/1.73SQ M
GLUCOSE SERPL-MCNC: 110 MG/DL (ref 65–140)
HCT VFR BLD AUTO: 42.9 % (ref 36.5–49.3)
HGB BLD-MCNC: 14.6 G/DL (ref 12–17)
IMM GRANULOCYTES # BLD AUTO: 0.05 THOUSAND/UL (ref 0–0.2)
IMM GRANULOCYTES NFR BLD AUTO: 1 % (ref 0–2)
LYMPHOCYTES # BLD AUTO: 0.95 THOUSANDS/ÂΜL (ref 0.6–4.47)
LYMPHOCYTES NFR BLD AUTO: 12 % (ref 14–44)
MCH RBC QN AUTO: 30.5 PG (ref 26.8–34.3)
MCHC RBC AUTO-ENTMCNC: 34 G/DL (ref 31.4–37.4)
MCV RBC AUTO: 90 FL (ref 82–98)
MONOCYTES # BLD AUTO: 0.51 THOUSAND/ÂΜL (ref 0.17–1.22)
MONOCYTES NFR BLD AUTO: 7 % (ref 4–12)
NEUTROPHILS # BLD AUTO: 5.69 THOUSANDS/ÂΜL (ref 1.85–7.62)
NEUTS SEG NFR BLD AUTO: 74 % (ref 43–75)
NRBC BLD AUTO-RTO: 0 /100 WBCS
PLATELET # BLD AUTO: 267 THOUSANDS/UL (ref 149–390)
PMV BLD AUTO: 8.9 FL (ref 8.9–12.7)
POTASSIUM SERPL-SCNC: 4.1 MMOL/L (ref 3.5–5.3)
RBC # BLD AUTO: 4.78 MILLION/UL (ref 3.88–5.62)
SODIUM SERPL-SCNC: 136 MMOL/L (ref 135–147)
WBC # BLD AUTO: 7.68 THOUSAND/UL (ref 4.31–10.16)

## 2024-08-04 PROCEDURE — 99284 EMERGENCY DEPT VISIT MOD MDM: CPT | Performed by: EMERGENCY MEDICINE

## 2024-08-04 PROCEDURE — 80048 BASIC METABOLIC PNL TOTAL CA: CPT | Performed by: STUDENT IN AN ORGANIZED HEALTH CARE EDUCATION/TRAINING PROGRAM

## 2024-08-04 PROCEDURE — 85025 COMPLETE CBC W/AUTO DIFF WBC: CPT | Performed by: STUDENT IN AN ORGANIZED HEALTH CARE EDUCATION/TRAINING PROGRAM

## 2024-08-04 PROCEDURE — 36415 COLL VENOUS BLD VENIPUNCTURE: CPT | Performed by: STUDENT IN AN ORGANIZED HEALTH CARE EDUCATION/TRAINING PROGRAM

## 2024-08-04 PROCEDURE — 99283 EMERGENCY DEPT VISIT LOW MDM: CPT

## 2024-08-04 NOTE — ED PROVIDER NOTES
History  Chief Complaint   Patient presents with    Medical Problem     Patient dx with prostate infection during last admission ( a week ago) states prescribed abx for it and is supposed to take 3000mg daily for 4 weeks and he states the abx makes him feel foggy and he is looking for a different option  No other symptoms    D  HPI    Dior Lock is a 68 y.o. male h/o HTN, obesity and CAD p/w medication question. Recently admitted 7/22 with atypical chest pain found to have acute prostatitis confirmed on CT imaging.  Urine culture with Enterococcus. Discharged on 4-week course amoxicillin per infectious disease.  Endorses compliance with antibiotic regimen however indicates feeling groggy and wanted to inquire as to alternative treatment options.      Denies fever/Chills, Nausea/vomiting, Headache/vision changes, SOB/Chest pain, hemoptysis/hematochezia, GI/ symptoms, or any other complaint at this time.          Prior to Admission Medications   Prescriptions Last Dose Informant Patient Reported? Taking?   acetaminophen (TYLENOL) 325 mg tablet  Self Yes No   Sig: Take 650 mg by mouth every 6 (six) hours as needed for mild pain   amoxicillin (AMOXIL) 500 mg capsule  Self No No   Sig: Take 2 capsules (1,000 mg total) by mouth every 8 (eight) hours for 28 days   aspirin 81 mg chewable tablet  Self No No   Sig: Chew 1 tablet (81 mg total) daily   atorvastatin (LIPITOR) 40 mg tablet   No No   Sig: Take 1 tablet (40 mg total) by mouth daily   metoprolol succinate (TOPROL-XL) 50 mg 24 hr tablet   No No   Sig: Take 1 tablet (50 mg total) by mouth daily   naproxen (Naprosyn) 500 mg tablet  Self No No   Sig: Take 1 tablet (500 mg total) by mouth 2 (two) times a day with meals   Patient taking differently: Take 500 mg by mouth if needed   nitroglycerin (NITROSTAT) 0.4 mg SL tablet  Self No No   Sig: Place 1 tablet (0.4 mg total) under the tongue every 5 (five) minutes as needed for chest pain   Patient not taking: Reported on  7/30/2024   ondansetron (ZOFRAN) 4 mg tablet  Self No No   Sig: Take 1 tablet (4 mg total) by mouth every 8 (eight) hours as needed for nausea or vomiting   Patient not taking: Reported on 7/30/2024      Facility-Administered Medications: None       Past Medical History:   Diagnosis Date    Arthritis     Coronary artery disease involving native coronary artery 08/01/2024    30% LAD on cardiac cath 7/24.  Presented with atypical chest pain and found to have prostatitis.  Mild troponin elevation but no changes on EKG or echocardiogram.  Started on aspirin and atorvastatin.      Essential hypertension 12/16/2021    Gilbert's syndrome 01/06/2020    Intermittent elevated bilirubin.  Liver ultrasound.    Obesity     Renal colic 06/29/2024    Left-sided kidney stone      Unilateral hearing loss, left 01/18/2022       Past Surgical History:   Procedure Laterality Date    CARDIAC CATHETERIZATION Left 7/24/2024    Procedure: Cardiac Left Heart Cath;  Surgeon: Daren Tinsley MD;  Location: [facility];  Service: Cardiology    CARDIAC CATHETERIZATION N/A 7/24/2024    Procedure: Cardiac Coronary Angiogram;  Surgeon: Daren Tinsley MD;  Location: [facility];  Service: Cardiology    CARDIAC CATHETERIZATION  7/24/2024    Procedure: Cardiac catheterization;  Surgeon: Daren Tinsley MD;  Location: [facility];  Service: Cardiology    HAND SURGERY      GA COLONOSCOPY FLX DX W/COLLJ SPEC WHEN PFRMD N/A 3/22/2018    Procedure: COLONOSCOPY;  Surgeon: Mark Bland MD;  Location: [facility];  Service: Gastroenterology    TONSILECTOMY AND ADNOIDECTOMY      TOOTH EXTRACTION         Family History   Problem Relation Age of Onset    Breast cancer Mother     Heart disease Father         MI in his 50's     I have reviewed and agree with the history as documented.    E-Cigarette/Vaping    E-Cigarette Use Never User      E-Cigarette/Vaping Substances    Nicotine No     THC No     CBD No     Flavoring No     Other No      Unknown No      Social History     Tobacco Use    Smoking status: Never    Smokeless tobacco: Never   Vaping Use    Vaping status: Never Used   Substance Use Topics    Alcohol use: Yes     Comment: occasional    Drug use: No        Review of Systems   All other systems reviewed and are negative.      Physical Exam  ED Triage Vitals [08/04/24 1133]   Temperature Pulse Respirations Blood Pressure SpO2   98.4 °F (36.9 °C) 90 18 157/78 95 %      Temp src Heart Rate Source Patient Position - Orthostatic VS BP Location FiO2 (%)   -- -- -- -- --      Pain Score       --             Orthostatic Vital Signs  Vitals:    08/04/24 1133   BP: 157/78   Pulse: 90       Physical Exam  Vitals and nursing note reviewed.   Constitutional:       General: He is not in acute distress.     Appearance: Normal appearance. He is well-developed. He is obese. He is not ill-appearing or toxic-appearing.   HENT:      Head: Normocephalic and atraumatic.      Right Ear: External ear normal.      Left Ear: External ear normal.      Nose: Nose normal.      Mouth/Throat:      Mouth: Mucous membranes are moist.   Eyes:      General:         Right eye: No discharge.         Left eye: No discharge.      Conjunctiva/sclera: Conjunctivae normal.   Cardiovascular:      Rate and Rhythm: Normal rate and regular rhythm.      Pulses: Normal pulses.      Heart sounds: No murmur heard.  Pulmonary:      Effort: Pulmonary effort is normal. No respiratory distress.      Breath sounds: Normal breath sounds.   Abdominal:      Palpations: Abdomen is soft.      Tenderness: There is no abdominal tenderness.   Musculoskeletal:         General: No swelling.      Cervical back: Neck supple.   Skin:     General: Skin is warm and dry.      Capillary Refill: Capillary refill takes less than 2 seconds.      Coloration: Skin is not jaundiced or pale.      Findings: No bruising, erythema, lesion or rash.   Neurological:      General: No focal deficit present.      Mental Status:  He is alert and oriented to person, place, and time. Mental status is at baseline.   Psychiatric:         Mood and Affect: Mood normal.         Behavior: Behavior normal.         ED Medications  Medications - No data to display    Diagnostic Studies  Results Reviewed       Procedure Component Value Units Date/Time    Basic metabolic panel [[id number]] Collected: 08/04/24 1228    Lab Status: Final result Specimen: Blood from Arm, Right Updated: 08/04/24 1251     Sodium 136 mmol/L      Potassium 4.1 mmol/L      Chloride 105 mmol/L      CO2 23 mmol/L      ANION GAP 8 mmol/L      BUN 14 mg/dL      Creatinine 0.62 mg/dL      Glucose 110 mg/dL      Calcium 8.8 mg/dL      eGFR 100 ml/min/1.73sq m     Narrative:      National Kidney Disease Foundation guidelines for Chronic Kidney Disease (CKD):     Stage 1 with normal or high GFR (GFR > 90 mL/min/1.73 square meters)    Stage 2 Mild CKD (GFR = 60-89 mL/min/1.73 square meters)    Stage 3A Moderate CKD (GFR = 45-59 mL/min/1.73 square meters)    Stage 3B Moderate CKD (GFR = 30-44 mL/min/1.73 square meters)    Stage 4 Severe CKD (GFR = 15-29 mL/min/1.73 square meters)    Stage 5 End Stage CKD (GFR <15 mL/min/1.73 square meters)  Note: GFR calculation is accurate only with a steady state creatinine    CBC and differential [[id number]]  (Abnormal) Collected: 08/04/24 1228    Lab Status: Final result Specimen: Blood from Arm, Right Updated: 08/04/24 1237     WBC 7.68 Thousand/uL      RBC 4.78 Million/uL      Hemoglobin 14.6 g/dL      Hematocrit 42.9 %      MCV 90 fL      MCH 30.5 pg      MCHC 34.0 g/dL      RDW 13.5 %      MPV 8.9 fL      Platelets 267 Thousands/uL      nRBC 0 /100 WBCs      Segmented % 74 %      Immature Grans % 1 %      Lymphocytes % 12 %      Monocytes % 7 %      Eosinophils Relative 5 %      Basophils Relative 1 %      Absolute Neutrophils 5.69 Thousands/µL      Absolute Immature Grans 0.05 Thousand/uL      Absolute Lymphocytes 0.95 Thousands/µL      Absolute  Monocytes 0.51 Thousand/µL      Eosinophils Absolute 0.41 Thousand/µL      Basophils Absolute 0.07 Thousands/µL                    No orders to display         Procedures  Procedures      ED Course  ED Course as of 08/05/24 2108   Sun Aug 04, 2024   1240 WBC: 7.68  WNL                                       Medical Decision Making  Patient presents requesting possible alternative treatment regimens 2/2 to grogginess.  Workup and sensitivities were reviewed with patient including recommendations per infectious disease.  Alternative treatment mentions were reviewed however patient requested to continue recommended regiment as it was otherwise tolerable without significant adverse effects. Patient endorsed understanding with plan to continue antibiotics and follow-up with infectious disease regarding further questions.    Amount and/or Complexity of Data Reviewed  Labs: ordered. Decision-making details documented in ED Course.          Disposition  Final diagnoses:   Medication adverse effect, initial encounter   Medication care plan discussed with patient     Time reflects when diagnosis was documented in both MDM as applicable and the Disposition within this note       Time User Action Codes Description Comment    8/4/2024 12:35 PM Sampson Rushing [T50.905A] Medication adverse effect, initial encounter     8/4/2024 12:35 PM Sampson Rushing [Z71.89] Medication care plan discussed with patient     8/4/2024 12:35 PM Sampson Rushing [T50.905A] Medication adverse effect, initial encounter     8/4/2024 12:35 PM Sampson Rushing [Z71.89] Medication care plan discussed with patient           ED Disposition       ED Disposition   Discharge    Condition   Stable    Date/Time   Sun Aug 4, 2024 12:36 PM    Comment   Dior Lock discharge to home/self care.                   Follow-up Information       Follow up With Specialties Details Why Contact Info Additional Information    Lazaro Kapoor MD Family Medicine Call  in 2 days to update on ED presentation [address] [facility] [address]  [phone]       [facility] Emergency Department Emergency Medicine  return to ED for any new or worsening symptoms [address] [facility] [address]  [phone] [facility] Emergency Department, [address] [place], [place], [address]            Discharge Medication List as of 8/4/2024 12:38 PM        CONTINUE these medications which have NOT CHANGED    Details   acetaminophen (TYLENOL) 325 mg tablet Take 650 mg by mouth every 6 (six) hours as needed for mild pain, Historical Med      amoxicillin (AMOXIL) 500 mg capsule Take 2 capsules (1,000 mg total) by mouth every 8 (eight) hours for 28 days, Starting Sat 7/27/2024, Until Sat 8/24/2024, Normal      aspirin 81 mg chewable tablet Chew 1 tablet (81 mg total) daily, Starting Sun 7/28/2024, Normal      atorvastatin (LIPITOR) 40 mg tablet Take 1 tablet (40 mg total) by mouth daily, Starting Thu 8/1/2024, Normal      metoprolol succinate (TOPROL-XL) 50 mg 24 hr tablet Take 1 tablet (50 mg total) by mouth daily, Starting Thu 8/1/2024, Normal      naproxen (Naprosyn) 500 mg tablet Take 1 tablet (500 mg total) by mouth 2 (two) times a day with meals, Starting Tue 7/2/2024, Normal      nitroglycerin (NITROSTAT) 0.4 mg SL tablet Place 1 tablet (0.4 mg total) under the tongue every 5 (five) minutes as needed for chest pain, Starting Sat 7/27/2024, Normal      ondansetron (ZOFRAN) 4 mg tablet Take 1 tablet (4 mg total) by mouth every 8 (eight) hours as needed for nausea or vomiting, Starting Sat 6/29/2024, Normal           No discharge procedures on file.    PDMP Review         Value Time User    PDMP Reviewed  Yes 6/29/2024  4:26 AM Corazon Escobar DO             ED Provider  Attending physically available and evaluated Dior Lock. I managed the patient along with the ED Attending.    Electronically Signed  by           Sampson Rushing MD  08/05/24 6764

## 2024-08-06 ENCOUNTER — NURSE TRIAGE (OUTPATIENT)
Age: 69
End: 2024-08-06

## 2024-08-06 DIAGNOSIS — N41.0 ACUTE PROSTATITIS: Primary | ICD-10-CM

## 2024-08-06 RX ORDER — LINEZOLID 600 MG/1
600 TABLET, FILM COATED ORAL EVERY 12 HOURS SCHEDULED
Qty: 32 TABLET | Refills: 0 | Status: SHIPPED | OUTPATIENT
Start: 2024-08-06 | End: 2024-08-22

## 2024-08-07 ENCOUNTER — OFFICE VISIT (OUTPATIENT)
Dept: PHYSICAL THERAPY | Facility: REHABILITATION | Age: 69
End: 2024-08-07
Payer: COMMERCIAL

## 2024-08-07 DIAGNOSIS — M54.50 RIGHT-SIDED LOW BACK PAIN WITHOUT SCIATICA, UNSPECIFIED CHRONICITY: ICD-10-CM

## 2024-08-07 DIAGNOSIS — M54.50 ACUTE RIGHT-SIDED LOW BACK PAIN WITHOUT SCIATICA: Primary | ICD-10-CM

## 2024-08-07 PROCEDURE — 97140 MANUAL THERAPY 1/> REGIONS: CPT | Performed by: PHYSICAL THERAPIST

## 2024-08-07 PROCEDURE — 97112 NEUROMUSCULAR REEDUCATION: CPT | Performed by: PHYSICAL THERAPIST

## 2024-08-07 NOTE — PROGRESS NOTES
"Daily Note     Today's date: 2024  Patient name: Dior Lock  : 1955  MRN: [id number]  Referring provider: Isaiah Lucia DO  Dx:   Encounter Diagnosis     ICD-10-CM    1. Acute right-sided low back pain without sciatica  M54.50       2. Right-sided low back pain without sciatica, unspecified chronicity  M54.50           Start Time: 733  Stop Time: 08  Total time in clinic (min): 38 minutes    Subjective: Mild pain in mornings, overall better. Some R sided gluteal pain.      Objective: See treatment diary below  IR ~ 10 deg    Assessment: Tolerated treatment well. Patient with improvements in Ir as I was unable to get his hip past neutral prior sessions. Pleased with progression.       Plan: Continue per plan of care.      Precautions: standard       Manuals              R  lateral hip dist with ER and IR  3x8           LAD  AB           assessment  3'                        Neuro Re-Ed            bridge 10x DL    2x10 with march 10x DL    2x10 with march           Palloff press 5\"x10  OJB 5\"x10 OJB                                                                            Ther Ex            REP hip ext 10x neutral, 10x ABD 10x neutral, 10x ABD           Standing ip IR/ER Slider - 10x ea Slider 10x ea                                                                                         Ther Activity                                       Gait Training                                       Modalities                                            "

## 2024-08-12 ENCOUNTER — APPOINTMENT (OUTPATIENT)
Dept: LAB | Facility: CLINIC | Age: 69
End: 2024-08-12
Payer: COMMERCIAL

## 2024-08-12 DIAGNOSIS — N41.9 PROSTATITIS: ICD-10-CM

## 2024-08-12 LAB
BASOPHILS # BLD AUTO: 0.05 THOUSANDS/ÂΜL (ref 0–0.1)
BASOPHILS NFR BLD AUTO: 1 % (ref 0–1)
CREAT SERPL-MCNC: 0.74 MG/DL (ref 0.6–1.3)
EOSINOPHIL # BLD AUTO: 0.3 THOUSAND/ÂΜL (ref 0–0.61)
EOSINOPHIL NFR BLD AUTO: 5 % (ref 0–6)
ERYTHROCYTE [DISTWIDTH] IN BLOOD BY AUTOMATED COUNT: 13.5 % (ref 11.6–15.1)
GFR SERPL CREATININE-BSD FRML MDRD: 93 ML/MIN/1.73SQ M
HCT VFR BLD AUTO: 44.2 % (ref 36.5–49.3)
HGB BLD-MCNC: 14.7 G/DL (ref 12–17)
IMM GRANULOCYTES # BLD AUTO: 0.02 THOUSAND/UL (ref 0–0.2)
IMM GRANULOCYTES NFR BLD AUTO: 0 % (ref 0–2)
LYMPHOCYTES # BLD AUTO: 1.35 THOUSANDS/ÂΜL (ref 0.6–4.47)
LYMPHOCYTES NFR BLD AUTO: 22 % (ref 14–44)
MCH RBC QN AUTO: 30.5 PG (ref 26.8–34.3)
MCHC RBC AUTO-ENTMCNC: 33.3 G/DL (ref 31.4–37.4)
MCV RBC AUTO: 92 FL (ref 82–98)
MONOCYTES # BLD AUTO: 0.57 THOUSAND/ÂΜL (ref 0.17–1.22)
MONOCYTES NFR BLD AUTO: 9 % (ref 4–12)
NEUTROPHILS # BLD AUTO: 3.93 THOUSANDS/ÂΜL (ref 1.85–7.62)
NEUTS SEG NFR BLD AUTO: 63 % (ref 43–75)
NRBC BLD AUTO-RTO: 0 /100 WBCS
PLATELET # BLD AUTO: 307 THOUSANDS/UL (ref 149–390)
PMV BLD AUTO: 9.6 FL (ref 8.9–12.7)
RBC # BLD AUTO: 4.82 MILLION/UL (ref 3.88–5.62)
WBC # BLD AUTO: 6.22 THOUSAND/UL (ref 4.31–10.16)

## 2024-08-12 PROCEDURE — 82565 ASSAY OF CREATININE: CPT

## 2024-08-12 PROCEDURE — 36415 COLL VENOUS BLD VENIPUNCTURE: CPT

## 2024-08-12 PROCEDURE — 85025 COMPLETE CBC W/AUTO DIFF WBC: CPT

## 2024-08-19 ENCOUNTER — APPOINTMENT (OUTPATIENT)
Dept: LAB | Facility: CLINIC | Age: 69
End: 2024-08-19
Payer: COMMERCIAL

## 2024-08-19 DIAGNOSIS — N41.9 PROSTATITIS: ICD-10-CM

## 2024-08-19 LAB
BASOPHILS # BLD AUTO: 0.07 THOUSANDS/ÂΜL (ref 0–0.1)
BASOPHILS NFR BLD AUTO: 1 % (ref 0–1)
CREAT SERPL-MCNC: 0.9 MG/DL (ref 0.6–1.3)
EOSINOPHIL # BLD AUTO: 0.59 THOUSAND/ÂΜL (ref 0–0.61)
EOSINOPHIL NFR BLD AUTO: 11 % (ref 0–6)
ERYTHROCYTE [DISTWIDTH] IN BLOOD BY AUTOMATED COUNT: 13.7 % (ref 11.6–15.1)
GFR SERPL CREATININE-BSD FRML MDRD: 86 ML/MIN/1.73SQ M
HCT VFR BLD AUTO: 42.8 % (ref 36.5–49.3)
HGB BLD-MCNC: 14.2 G/DL (ref 12–17)
IMM GRANULOCYTES # BLD AUTO: 0.03 THOUSAND/UL (ref 0–0.2)
IMM GRANULOCYTES NFR BLD AUTO: 1 % (ref 0–2)
LYMPHOCYTES # BLD AUTO: 1.34 THOUSANDS/ÂΜL (ref 0.6–4.47)
LYMPHOCYTES NFR BLD AUTO: 24 % (ref 14–44)
MCH RBC QN AUTO: 30.5 PG (ref 26.8–34.3)
MCHC RBC AUTO-ENTMCNC: 33.2 G/DL (ref 31.4–37.4)
MCV RBC AUTO: 92 FL (ref 82–98)
MONOCYTES # BLD AUTO: 0.51 THOUSAND/ÂΜL (ref 0.17–1.22)
MONOCYTES NFR BLD AUTO: 9 % (ref 4–12)
NEUTROPHILS # BLD AUTO: 3.09 THOUSANDS/ÂΜL (ref 1.85–7.62)
NEUTS SEG NFR BLD AUTO: 54 % (ref 43–75)
NRBC BLD AUTO-RTO: 0 /100 WBCS
PLATELET # BLD AUTO: 204 THOUSANDS/UL (ref 149–390)
PMV BLD AUTO: 9.5 FL (ref 8.9–12.7)
RBC # BLD AUTO: 4.65 MILLION/UL (ref 3.88–5.62)
WBC # BLD AUTO: 5.63 THOUSAND/UL (ref 4.31–10.16)

## 2024-08-19 PROCEDURE — 85025 COMPLETE CBC W/AUTO DIFF WBC: CPT

## 2024-08-19 PROCEDURE — 82565 ASSAY OF CREATININE: CPT

## 2024-08-19 PROCEDURE — 36415 COLL VENOUS BLD VENIPUNCTURE: CPT

## 2024-08-20 ENCOUNTER — OFFICE VISIT (OUTPATIENT)
Age: 69
End: 2024-08-20
Payer: COMMERCIAL

## 2024-08-20 VITALS
HEART RATE: 76 BPM | SYSTOLIC BLOOD PRESSURE: 142 MMHG | OXYGEN SATURATION: 99 % | TEMPERATURE: 97.5 F | WEIGHT: 265 LBS | BODY MASS INDEX: 39.25 KG/M2 | RESPIRATION RATE: 18 BRPM | HEIGHT: 69 IN | DIASTOLIC BLOOD PRESSURE: 78 MMHG

## 2024-08-20 DIAGNOSIS — N20.0 NEPHROLITHIASIS: ICD-10-CM

## 2024-08-20 DIAGNOSIS — A41.9 SEPSIS WITHOUT ACUTE ORGAN DYSFUNCTION, DUE TO UNSPECIFIED ORGANISM (HCC): Primary | ICD-10-CM

## 2024-08-20 DIAGNOSIS — N41.0 ACUTE PROSTATITIS: ICD-10-CM

## 2024-08-20 DIAGNOSIS — E66.01 CLASS 2 SEVERE OBESITY DUE TO EXCESS CALORIES WITH SERIOUS COMORBIDITY AND BODY MASS INDEX (BMI) OF 39.0 TO 39.9 IN ADULT (HCC): ICD-10-CM

## 2024-08-20 PROCEDURE — 99214 OFFICE O/P EST MOD 30 MIN: CPT | Performed by: PHYSICIAN ASSISTANT

## 2024-08-20 NOTE — PROGRESS NOTES
Ambulatory Visit  Name: Dior Lock      : 1955      MRN: [id number]  Encounter Provider: Caitlin Jacobo PA-C  Encounter Date: 2024   Encounter department: [facility] INFECTIOUS DISEASE [facility]    Assessment & Plan   1. Sepsis without acute organ dysfunction, due to unspecified organism (HCC)  2. Acute prostatitis  3. Nephrolithiasis  4. Class 2 severe obesity due to excess calories with serious comorbidity and body mass index (BMI) of 39.0 to 39.9 in adult (HCC)  1.  Sepsis. On Recent admission with fever, tachycardia, leukocytosis.  Appears to be secondary to #2, as no other clear explanation.  Negative blood cultures, chest x-ray.  No other infectious findings on CT abdomen/pelvis.  Fevers and leukocytosis have improved after initiation of antibiotic. Clinically improved     -Antibiotic plan as below     2.  Prostatitis.  As evidenced by active urinary symptomatology, abnormal CT findings.  No radiographic evidence of abscess.  PSA acutely elevated at 28 setting of prostatitis.  UA showed mild pyuria and no bacteria but appears to have been collected after antibiotics was initiated.  Blood cultures are negative.  Final urine culture isolated less than 10,000 Streptococcus.  Low colony count is likely due to prior antibiotic exposure.  Discussed with micro lab and suspicion for Enterococcus is high.  Patient did receive dose of vancomycin, which may explain why he has shown clinical improvement.  24 labs reviewed; eos 11     -patient continues high-dose oral amoxicillin 1 g every 8 hours on and off due to side effects.  -can discontinue antibiotics as completed 4 weeks of antibiotic between inpatient and outpatient course.  -Follow up 24 Renal US   -Urology follow up outpatient in September.  -ID f/u prn hereafter     3.  Recent nephrolithiasis.  Recent left UPJ stone with mild left hydronephrosis diagnosed 2024 treated with medical expulsive therapy.  This may be a risk  "factor for development of UTI/prostatitis.  No further evidence of ureterolithiasis or hydronephrosis on current CT.     4.  Class II severe obesity, with BMI of 40.  Risk factor for infection.  This also affects antibiotic dosing.     5.  Atypical chest pain, with elevated troponin.  Underwent cardiac catheterization which demonstrated nonobstructive LAD disease.  Cardiology follow-up ongoing.        Antibiotics:  Antibiotics D28     I discussed the above plan in detail with patient and he concurs with discontinuing antibiotic and monitoring off additional antibiotics hereafter        History of Present Illness     Dior Lock is a 68 y.o. male who presents for follow up of prostatitis on Amoxicillin    Patient has had brain fog and feeling unwell with high dose amoxcillin. He has not been taking full dose the last few days. No fevers or chills.  No urinary complaints. No N/V/D, no CP, SOB, Cough.     Review of Systems   All other systems reviewed and are negative.      Objective     /78   Pulse 76   Temp 97.5 °F (36.4 °C)   Resp 18   Ht 5' 9\" (1.753 m)   Wt 120 kg (265 lb)   SpO2 99%   BMI 39.13 kg/m²     Physical Exam  Vitals reviewed.   Constitutional:       Appearance: Normal appearance.   HENT:      Head: Normocephalic and atraumatic.      Right Ear: External ear normal.      Left Ear: External ear normal.      Mouth/Throat:      Pharynx: Oropharynx is clear.   Eyes:      Extraocular Movements: Extraocular movements intact.      Conjunctiva/sclera: Conjunctivae normal.   Cardiovascular:      Rate and Rhythm: Normal rate and regular rhythm.   Pulmonary:      Effort: Pulmonary effort is normal.      Breath sounds: Normal breath sounds.   Abdominal:      Palpations: Abdomen is soft.      Tenderness: There is no abdominal tenderness.      Comments: Protuberant pannus   Genitourinary:     Comments: No moore, SPT  Skin:     Findings: No rash.   Neurological:      Mental Status: He is alert and oriented " to person, place, and time. Mental status is at baseline.       Administrative Statements

## 2024-08-21 ENCOUNTER — APPOINTMENT (OUTPATIENT)
Dept: PHYSICAL THERAPY | Facility: REHABILITATION | Age: 69
End: 2024-08-21
Payer: COMMERCIAL

## 2024-08-21 ENCOUNTER — HOSPITAL ENCOUNTER (OUTPATIENT)
Dept: ULTRASOUND IMAGING | Facility: HOSPITAL | Age: 69
Discharge: HOME | End: 2024-08-21
Payer: COMMERCIAL

## 2024-08-21 DIAGNOSIS — N20.0 NEPHROLITHIASIS: ICD-10-CM

## 2024-08-21 PROCEDURE — 76770 US EXAM ABDO BACK WALL COMP: CPT

## 2024-08-26 ENCOUNTER — APPOINTMENT (OUTPATIENT)
Dept: LAB | Facility: CLINIC | Age: 69
End: 2024-08-26
Payer: COMMERCIAL

## 2024-08-26 ENCOUNTER — TELEPHONE (OUTPATIENT)
Dept: UROLOGY | Facility: CLINIC | Age: 69
End: 2024-08-26

## 2024-08-26 DIAGNOSIS — N41.9 PROSTATITIS: ICD-10-CM

## 2024-08-26 PROBLEM — A41.9 SEPSIS (HCC): Status: RESOLVED | Noted: 2024-07-27 | Resolved: 2024-08-26

## 2024-08-26 LAB
BASOPHILS # BLD AUTO: 0.05 THOUSANDS/ÂΜL (ref 0–0.1)
BASOPHILS NFR BLD AUTO: 1 % (ref 0–1)
CREAT SERPL-MCNC: 0.71 MG/DL (ref 0.6–1.3)
EOSINOPHIL # BLD AUTO: 0.3 THOUSAND/ÂΜL (ref 0–0.61)
EOSINOPHIL NFR BLD AUTO: 5 % (ref 0–6)
ERYTHROCYTE [DISTWIDTH] IN BLOOD BY AUTOMATED COUNT: 14.2 % (ref 11.6–15.1)
GFR SERPL CREATININE-BSD FRML MDRD: 95 ML/MIN/1.73SQ M
HCT VFR BLD AUTO: 42.8 % (ref 36.5–49.3)
HGB BLD-MCNC: 14.4 G/DL (ref 12–17)
IMM GRANULOCYTES # BLD AUTO: 0.03 THOUSAND/UL (ref 0–0.2)
IMM GRANULOCYTES NFR BLD AUTO: 1 % (ref 0–2)
LYMPHOCYTES # BLD AUTO: 1.39 THOUSANDS/ÂΜL (ref 0.6–4.47)
LYMPHOCYTES NFR BLD AUTO: 24 % (ref 14–44)
MCH RBC QN AUTO: 30.8 PG (ref 26.8–34.3)
MCHC RBC AUTO-ENTMCNC: 33.6 G/DL (ref 31.4–37.4)
MCV RBC AUTO: 92 FL (ref 82–98)
MONOCYTES # BLD AUTO: 0.56 THOUSAND/ÂΜL (ref 0.17–1.22)
MONOCYTES NFR BLD AUTO: 10 % (ref 4–12)
NEUTROPHILS # BLD AUTO: 3.44 THOUSANDS/ÂΜL (ref 1.85–7.62)
NEUTS SEG NFR BLD AUTO: 59 % (ref 43–75)
NRBC BLD AUTO-RTO: 0 /100 WBCS
PLATELET # BLD AUTO: 201 THOUSANDS/UL (ref 149–390)
PMV BLD AUTO: 9.1 FL (ref 8.9–12.7)
RBC # BLD AUTO: 4.67 MILLION/UL (ref 3.88–5.62)
WBC # BLD AUTO: 5.77 THOUSAND/UL (ref 4.31–10.16)

## 2024-08-26 PROCEDURE — 36415 COLL VENOUS BLD VENIPUNCTURE: CPT

## 2024-08-26 PROCEDURE — 82565 ASSAY OF CREATININE: CPT

## 2024-08-26 PROCEDURE — 85025 COMPLETE CBC W/AUTO DIFF WBC: CPT

## 2024-08-26 NOTE — TELEPHONE ENCOUNTER
Left a voicemail to inform patient of AP recommendations to have a KUB Xray performed before his scheduled appointment on 9/18/2024. Office phone number given.

## 2024-08-26 NOTE — TELEPHONE ENCOUNTER
Please notify patient to obtain her x-ray of her abdomen prior to her follow-up appointment with me in September. I reviewed her kidney ultrasound that she had done. It does not appear that she has any kidney stones but I would still like her to have the x-ray done. Thank you

## 2024-08-28 ENCOUNTER — OFFICE VISIT (OUTPATIENT)
Dept: PHYSICAL THERAPY | Facility: REHABILITATION | Age: 69
End: 2024-08-28
Payer: COMMERCIAL

## 2024-08-28 DIAGNOSIS — M54.50 ACUTE RIGHT-SIDED LOW BACK PAIN WITHOUT SCIATICA: ICD-10-CM

## 2024-08-28 DIAGNOSIS — M54.50 RIGHT-SIDED LOW BACK PAIN WITHOUT SCIATICA, UNSPECIFIED CHRONICITY: Primary | ICD-10-CM

## 2024-08-28 PROCEDURE — 97140 MANUAL THERAPY 1/> REGIONS: CPT | Performed by: PHYSICAL THERAPIST

## 2024-08-28 PROCEDURE — 97110 THERAPEUTIC EXERCISES: CPT | Performed by: PHYSICAL THERAPIST

## 2024-08-28 NOTE — PROGRESS NOTES
"PT Re-evaluation / DC    Today's date: 2024  Patient name: Dior Lock  : 1955  MRN: [id number]  Referring provider: Isaiah Lucia DO  Dx:   Encounter Diagnosis     ICD-10-CM    1. Right-sided low back pain without sciatica, unspecified chronicity  M54.50       2. Acute right-sided low back pain without sciatica  M54.50           Start Time: 0730  Stop Time: 0800  Total time in clinic (min): 30 minutes    Assessment  Impairments: abnormal muscle firing, abnormal muscle tone, abnormal or restricted ROM, abnormal movement, activity intolerance, impaired physical strength and pain with function    Assessment details: Patient is a 68 y.o. year old male who attended physical therapy for 10 treatment sessions regarding acute on chronic R sided posterior buttock, hip pain. Patient reports 75% improvement at this time which correlates to improved impairments and functionality.  Patient has shown improvement throughout PT by demonstrating decreased pain, increased range of motion, increased strength, and improved tolerance to activity. I feel that he may need continued PT over time to manage hip stiffness but I feel that if he does continue with his HEP for hip and LS mobility, he can maintain improvements made and decrease injury potential.     Understanding of Dx/Px/POC: good     Prognosis: good    Plan    Treatment plan discussed with: patient        Subjective Evaluation    History of Present Illness  Mechanism of injury: Eval:    Dior is a 68 y.o. male presenting to physical therapy on 24 with referral from [facility] for acute R sided LBP that began about 1 week ago of gradual onset. He has had multiple bouts of LBP with each bout becoming more frequent. States that he feels pain in the morning that gets better throughout the day. Pain is non radicular in nature. Over the last week pain is unchanging. He did have a few \"tweaks\" since seen in February that resolved in 1-2 days.     Patient symptoms " are located on R side of LS.     Patient symptoms are intermittent. Pain felt with transitional movements. Walking has some soreness but tolerable. Pain with return from fwd bend   Patient symptoms are localized  Patient denies bowel and bladder changes (denies urinary retention)/saddle numbness    + pain with sneeze/cough       Patient Goals  Patient goals for therapy: decreased pain    Pain  Current pain ratin  At worst pain ratin      Short Term Goals:   1. Patient will be Independent with hep - MET  2. Patient will improve pain with activity by 50% - MET  3. Patient will report GROC 50% or greater - MET      Long Term Goals:   1. Patient will improve FOTO to greater then goal - NOT MET but IMPROVED  2. Patient will improve pain with activity to 2/10 or less - MET  3. Patient will continue with HEP independence to allow for decreased future reoccurrence of pain and loss in function - MET  4. Patient will report GROC 75% or greater - MET  5. Patient will have pain free LS AROM - MET  6. Patient will have neg slump R - MET  7. Patient will reach to  baseball without pain/limitation - MET  8. Patient will get on/off toilet pfree - NA      Objective    Flowsheet Rows      Flowsheet Row Most Recent Value   PT/OT G-Codes    Current Score 62   Projected Score 81            Myotomes (L/R): normal  Dermatome: (pinprick- L/R):  normal     Reflexes:  (L/R) L3-4:    2+ b/l    S1:    2+ bl    GAIT: decreased R hip ext, trunk rot comp  Squat assess: normal        Lumbar  % of normal   Flex. 75 - hip hinge - wide CINTHIA   Extn. 75   SG Left 75   SG Right 75   ROT Left 75   ROT Right 75             MMT         AROM          PROM    Hip       L       R        L           R      L     R   Flex.      100   Extn.         Abd.      30   Add.         IR.      10   ER.      30            G. Max 4+ 4       G. Med.         Iliop.             Neuro Dynamic Testing:  Slump and SLR neg      Hip:   issa =  +   capsular mobility=  "  +         Segmental mobility:   LS= hypomobile L4-5 b/l               Precautions: standard       Manuals 7/30 8/28           R  lateral hip dist with ER and IR  AB           LAD  AB           assessment  5'                        Neuro Re-Ed 7/30 8/28           bridge 10x DL    2x10 with march review           Palloff press 5\"x10  OJB                                                                             Ther Ex 7/30 8/28           REP hip ext 10x neutral, 10x ABD 2x10           Standing ip IR/ER Slider - 10x ea            KEVIN  20x           hEP education  5'                                                               Ther Activity                                       Gait Training                                       Modalities                                            "

## 2024-08-28 NOTE — PROGRESS NOTES
"Daily Note     Today's date: 2024  Patient name: Dior Lock  : 1955  MRN: [id number]  Referring provider: Isaiah Lucia DO  Dx: No diagnosis found.               Subjective:       Objective: See treatment diary below      Assessment: Tolerated treatment well. Patient       Plan: Continue per plan of care.      Precautions: standard       Manuals             R  lateral hip dist with ER and IR  3x8           LAD  AB           assessment  3'                        Neuro Re-Ed            bridge 10x DL    2x10 with march 10x DL    2x10 with march           Palloff press 5\"x10  OJB 5\"x10 OJB                                                                            Ther Ex            REP hip ext 10x neutral, 10x ABD 10x neutral, 10x ABD           Standing ip IR/ER Slider - 10x ea Slider 10x ea                                                                                         Ther Activity                                       Gait Training                                       Modalities                                              "

## 2024-08-29 NOTE — ED ATTENDING ATTESTATION
8/4/2024  ISd MD, saw and evaluated the patient. I have discussed the patient with the resident/non-physician practitioner and agree with the resident's/non-physician practitioner's findings, Plan of Care, and MDM as documented in the resident's/non-physician practitioner's note, except where noted. All available labs and Radiology studies were reviewed.  I was present for key portions of any procedure(s) performed by the resident/non-physician practitioner and I was immediately available to provide assistance.       At this point I agree with the current assessment done in the Emergency Department.  I have conducted an independent evaluation of this patient a history and physical is as follows:see h and p above     ED Course  ED Course as of 08/29/24 0148   Sun Aug 04, 2024   1251 Er md note- pt seen and thoroughly evaluated by er md- case d/w er resident -  68 yr male with recent admit found  to have enterococcus in urine - with prostatis on ct scan- as per id placed on 4 weeks of amoxicillin -- pt returns states medical makes him feel groggy  but no other comps- is tolerable- and a states has to get repeat albs- - no fevers- or systemic comps- avss-  systolic htn- pulse ox 95 % on ra- interpretation is normal- no intervention - well appearing in nad- rrr s1/s2-ctab / soft nt/nd         Critical Care Time  Procedures

## 2024-09-12 NOTE — PROGRESS NOTES
Blood cultures- paired- collected by AT. NS bolus and abx treatment started at this time.    Re-evaluation and Discharge     Today's date: 2024  Patient name: Dior Lock  : 1955  MRN: [id number]  Referring provider: Lazaro Kapoor MD  Dx:   Encounter Diagnosis     ICD-10-CM    1. Acute exacerbation of chronic low back pain  M54.50     G89.29             Start Time: 1500  Stop Time: 1525  Total time in clinic (min): 25 minutes    Assessment  Assessment details: Patient is a 68 y.o. year old male who attended physical therapy for 7 treatment sessions regarding acute on chronic R sided LBP. Patient reports 100% improvement at this time which correlates to improved impairments and functionality.  Patient has shown improvement throughout PT by demonstrating decreased pain, increased range of motion, increased strength, and improved tolerance to activity.      Will DC PT  Patient educated on importance of HEP and prevention of future recurrence  HEP with nava BROWN.     Impairments: abnormal muscle firing, abnormal muscle tone, abnormal or restricted ROM, abnormal movement, activity intolerance, impaired physical strength and pain with function  Understanding of Dx/Px/POC: good   Prognosis: good    Plan  Patient would benefit from: skilled PT  Planned modality interventions: thermotherapy: hydrocollator packs  Planned therapy interventions: joint mobilization, manual therapy, ADL training, neuromuscular re-education, home exercise program, therapeutic exercise, therapeutic activities, strengthening, patient education and functional ROM exercises  Frequency: 2x week  Treatment plan discussed with: patient        Subjective Evaluation    History of Present Illness  Mechanism of injury: Evaluation:    Dior is a 68 y.o. male presenting to physical therapy on 01/10/24 with referral through comp spine program for R sided LBP. Patient reports that his pain was gradual onset as this has been an acute on chronic issue. He denies any symptoms into his leg as the are felt localized in his spine. Pain worse  in the morning, worse after inactivity. Pain worse with sitting and turning in the morning especially when sitting on the toilet.     + pain with cough an sneeze in the morning    Patient symptoms are intermittent  Patient symptoms are localized  Pain is better with movement such as walking.   Patient denies bowel and bladder changes (denies urinary retention)/saddle numbness  Denies night pain, recent illness or fatigue  Patient Goals  Patient goals for therapy: decreased pain    Pain  Current pain ratin  At worst pain ratin  Location: posterior buttock region  Quality: sharp      Diagnostic Tests  No diagnostic tests performed    Short Term Goals:   1. Patient will be Independent with hep - MET  2. Patient will improve pain with activity by 50% - MET  3. Patient will report GROC 50% or greater - MET  4. Patient will have pain free LS AROM - MET      Long Term Goals:   1. Patient will improve FOTO to greater then goal - MET  2. Patient will improve pain with activity to 2/10 or less- MET  3. Patient will continue with HEP independence to allow for decreased future reoccurrence of pain and loss in function - MET  4. Patient will report GROC 75% or greater - MET  5. Patient will turn on toilet (R) without pain - MET  6. Patient will report pfree movement with first steps/movement in the morning - MET      Objective    Posture: flat LS  Palpation: increased tone with TTP R psoas  Myotomes (L/R): normal LE  Dermatome: (pinprick- L/R):  normal LE     Reflexes:  (L/R) L3-4:   2+ b/l     S1:   1+ b/l            GAIT: decreased hip ext b/l  Squat assess: 50% pfree      Lumbar  % of normal   Flex. 50 - squat with wide CINTHIA   Extn. 75   SG Left 75   SG Right 75   ROT Left 75   ROT Right 75           MMT         AROM          PROM    Hip       L       R        L           R      L     R   Flex.         Extn.         Abd.         Add.         IR.         ER.                  G. Max 4+ 4+       G. Med.         Iliop.      "        Neuro Dynamic Testing:  Slump test: L= neg    R=  neg     Straight leg raise:   L=    neg  R=   neg         Hip:   issa = +   Capsular pattern: +         Segmental mobility:   LS= hypomobile R PA L3-5               Precautions: standard       Manuals 1/10 2/8           STM R psoas AB-10'            R hip distraction  With IR  3 rds                                     Neuro Re-Ed             Bridge  5\"x15                                                                                         Ther Ex 1/10            KEVIN 10x            R hip IR on chair 10x                                                                                          Ther Activity                                       Gait Training                                       Modalities                                            "

## 2024-09-18 ENCOUNTER — OFFICE VISIT (OUTPATIENT)
Dept: UROLOGY | Facility: CLINIC | Age: 69
End: 2024-09-18
Payer: COMMERCIAL

## 2024-09-18 VITALS
HEIGHT: 69 IN | WEIGHT: 277 LBS | HEART RATE: 77 BPM | DIASTOLIC BLOOD PRESSURE: 80 MMHG | BODY MASS INDEX: 41.03 KG/M2 | OXYGEN SATURATION: 99 % | SYSTOLIC BLOOD PRESSURE: 120 MMHG

## 2024-09-18 DIAGNOSIS — R97.20 ELEVATED PSA: ICD-10-CM

## 2024-09-18 DIAGNOSIS — N20.1 URETEROLITHIASIS: Primary | ICD-10-CM

## 2024-09-18 LAB
SL AMB  POCT GLUCOSE, UA: NORMAL
SL AMB LEUKOCYTE ESTERASE,UA: NORMAL
SL AMB POCT BILIRUBIN,UA: NORMAL
SL AMB POCT BLOOD,UA: NORMAL
SL AMB POCT CLARITY,UA: CLEAR
SL AMB POCT COLOR,UA: YELLOW
SL AMB POCT KETONES,UA: NORMAL
SL AMB POCT NITRITE,UA: NORMAL
SL AMB POCT PH,UA: 6
SL AMB POCT SPECIFIC GRAVITY,UA: 1025
SL AMB POCT URINE PROTEIN: NORMAL
SL AMB POCT UROBILINOGEN: NORMAL

## 2024-09-18 PROCEDURE — 81002 URINALYSIS NONAUTO W/O SCOPE: CPT

## 2024-09-18 PROCEDURE — 99214 OFFICE O/P EST MOD 30 MIN: CPT

## 2024-09-18 NOTE — PROGRESS NOTES
9/18/2024      Chief Complaint   Patient presents with    Follow-up         Assessment and Plan    68 y.o. male   Nephrolithiasis  Acute Prostatitis    -Renal US (8/21/24) No definitive shadowing calculi on study somewhat limited by patient body habitus.   -CT renal stone study (7/23/24) showing simple renal cysts. No calculi. No hydronephrosis. Mild periprostatic inflammatory change and fat stranding surrounding the bilateral seminal vesicles which is suggestive of prostatitis.  -PSA at the time of the CT scan was 28.260, and his Urine culture was positive for enterococcus.   -Patient treated with 4 weeks of abx   -Urine dip today is negative for leukocytes, nitrites, and blood. He has no urinary complaints.  -We will plan to repeat a PSA since he completed abx treatment. I will follow-up with his PSA results.   -Patient will return in 1 year for follow-up.         History of Present Illness  Dior Lock is a 68 y.o. male here with hx of nephrolithiasis and acute prostatitis presenting today for follow-up. I previously saw patient in July after follow-up from ED visit. In June, patient went to the ED for left flank pain, and CT scan (629/24) showed a 3 mm left UPJ stone. He states he did pass the stone and he did have a repeat CT in July that was unremarkable for stones.     He was then seen in the ED and admitted on 7/22 with aytpical chest pain and was found to have acute prostatitis on CT imaging. Urine culture was positive for enterococcus and his PSA was 28.260. He was discharged with 4 weeks of amoxicillin per infection disease.      He has no voiding complaints today. He denies dysuria, flank pain, frequency, urgency, and gross hematuria. His urine dip is negative for leukocytes, nitrites, and blood today.      Medical comorbidities include HTN, CAD      Review of Systems   Constitutional:  Negative for activity change, chills, fatigue and fever.   HENT:  Negative for congestion, rhinorrhea and sore throat.   "  Eyes:  Negative for photophobia, redness and visual disturbance.   Respiratory:  Negative for cough, shortness of breath and wheezing.    Cardiovascular:  Negative for chest pain, palpitations and leg swelling.   Gastrointestinal:  Negative for abdominal pain, diarrhea, nausea and vomiting.   Genitourinary:  Negative for dysuria, flank pain, frequency, hematuria and urgency.   Neurological:  Negative for weakness, light-headedness and headaches.           AUA SYMPTOM SCORE      Flowsheet Row Most Recent Value   AUA SYMPTOM SCORE    How often have you had a sensation of not emptying your bladder completely after you finished urinating? 0 (P)     How often have you had to urinate again less than two hours after you finished urinating? 0 (P)     How often have you found you stopped and started again several times when you urinate? 1 (P)     How often have you found it difficult to postpone urination? 0 (P)     How often have you had a weak urinary stream? 1 (P)     How often have you had to push or strain to begin urination? 0 (P)     How many times did you most typically get up to urinate from the time you went to bed at night until the time you got up in the morning? 1 (P)     Quality of Life: If you were to spend the rest of your life with your urinary condition just the way it is now, how would you feel about that? 1 (P)     AUA SYMPTOM SCORE 3 (P)               Vitals  Vitals:    09/18/24 0839   BP: 120/80   BP Location: Left arm   Patient Position: Sitting   Cuff Size: Standard   Pulse: 77   SpO2: 99%   Weight: 126 kg (277 lb)   Height: 5' 9\" (1.753 m)       Physical Exam  Constitutional:       Appearance: Normal appearance. He is not toxic-appearing.   HENT:      Head: Normocephalic.      Mouth/Throat:      Pharynx: Oropharynx is clear.   Eyes:      Extraocular Movements: Extraocular movements intact.      Pupils: Pupils are equal, round, and reactive to light.   Pulmonary:      Effort: Pulmonary effort is " normal. No respiratory distress.   Musculoskeletal:         General: Normal range of motion.      Cervical back: Normal range of motion.   Neurological:      Mental Status: He is alert and oriented to person, place, and time. Mental status is at baseline.      Gait: Gait normal.   Psychiatric:         Mood and Affect: Mood normal.         Behavior: Behavior normal.         Thought Content: Thought content normal.         Judgment: Judgment normal.           Past History  Past Medical History:   Diagnosis Date    Arthritis     Coronary artery disease involving native coronary artery 08/01/2024    30% LAD on cardiac cath 7/24.  Presented with atypical chest pain and found to have prostatitis.  Mild troponin elevation but no changes on EKG or echocardiogram.  Started on aspirin and atorvastatin.      Essential hypertension 12/16/2021    Gilbert's syndrome 01/06/2020    Intermittent elevated bilirubin.  Liver ultrasound.    Obesity     Renal colic 06/29/2024    Left-sided kidney stone      Unilateral hearing loss, left 01/18/2022     Social History     Socioeconomic History    Marital status: Single     Spouse name: None    Number of children: None    Years of education: None    Highest education level: None   Occupational History    Occupation: retired   Tobacco Use    Smoking status: Never    Smokeless tobacco: Never   Vaping Use    Vaping status: Never Used   Substance and Sexual Activity    Alcohol use: Yes     Comment: occasional    Drug use: No    Sexual activity: Not Currently   Other Topics Concern    None   Social History Narrative    Single.  Lives alone.      Retired.  Worked for the [employer] in 2000. Worked part-time jobs afterward.      Coaches youth football.  And baseball.    Walks about 6 miles a day.     Social Determinants of Health     Financial Resource Strain: Low Risk  (2/15/2024)    Overall Financial Resource Strain (CARDIA)     Difficulty of Paying Living Expenses: Not hard at all    Food Insecurity: No Food Insecurity (7/23/2024)    Hunger Vital Sign     Worried About Running Out of Food in the Last Year: Never true     Ran Out of Food in the Last Year: Never true   Transportation Needs: No Transportation Needs (7/23/2024)    PRAPARE - Transportation     Lack of Transportation (Medical): No     Lack of Transportation (Non-Medical): No   Physical Activity: Not on file   Stress: Not on file   Social Connections: Unknown (6/18/2024)    Received from [facility]    Social Local Motion     How often do you feel lonely or isolated from those around you? (Adult - for ages 18 years and over): Not on file   Intimate Partner Violence: Not on file   Housing Stability: Low Risk  (7/23/2024)    Housing Stability Vital Sign     Unable to Pay for Housing in the Last Year: No     Number of Times Moved in the Last Year: 0     Homeless in the Last Year: No     Social History     Tobacco Use   Smoking Status Never   Smokeless Tobacco Never     Family History   Problem Relation Age of Onset    Breast cancer Mother     Heart disease Father         MI in his 50's       The following portions of the patient's history were reviewed and updated as appropriate: allergies, current medications, past medical history, past social history, past surgical history and problem list.    Results  No results found for this or any previous visit (from the past 1 hour(s)).]  Lab Results   Component Value Date    PSA 28.620 (H) 07/25/2024    PSA 0.81 01/13/2024    PSA 0.6 11/26/2019    PSA 0.8 12/03/2018     Lab Results   Component Value Date    GLUCOSE 104 10/29/2014    CALCIUM 8.8 08/04/2024     10/29/2014    K 4.1 08/04/2024    CO2 23 08/04/2024     08/04/2024    BUN 14 08/04/2024    CREATININE 0.71 08/26/2024     Lab Results   Component Value Date    WBC 5.77 08/26/2024    HGB 14.4 08/26/2024    HCT 42.8 08/26/2024    MCV 92 08/26/2024     08/26/2024       JOSE Mcleod

## 2024-10-02 ENCOUNTER — RA CDI HCC (OUTPATIENT)
Dept: OTHER | Facility: HOSPITAL | Age: 69
End: 2024-10-02

## 2024-10-10 ENCOUNTER — OFFICE VISIT (OUTPATIENT)
Dept: FAMILY MEDICINE CLINIC | Facility: CLINIC | Age: 69
End: 2024-10-10
Payer: COMMERCIAL

## 2024-10-10 VITALS
HEART RATE: 67 BPM | OXYGEN SATURATION: 97 % | BODY MASS INDEX: 41.47 KG/M2 | WEIGHT: 280 LBS | RESPIRATION RATE: 18 BRPM | HEIGHT: 69 IN | SYSTOLIC BLOOD PRESSURE: 138 MMHG | TEMPERATURE: 98 F | DIASTOLIC BLOOD PRESSURE: 80 MMHG

## 2024-10-10 DIAGNOSIS — E66.01 CLASS 2 SEVERE OBESITY DUE TO EXCESS CALORIES WITH SERIOUS COMORBIDITY AND BODY MASS INDEX (BMI) OF 39.0 TO 39.9 IN ADULT (HCC): ICD-10-CM

## 2024-10-10 DIAGNOSIS — I10 ESSENTIAL HYPERTENSION: ICD-10-CM

## 2024-10-10 DIAGNOSIS — E66.812 CLASS 2 SEVERE OBESITY DUE TO EXCESS CALORIES WITH SERIOUS COMORBIDITY AND BODY MASS INDEX (BMI) OF 39.0 TO 39.9 IN ADULT (HCC): ICD-10-CM

## 2024-10-10 DIAGNOSIS — Z23 NEEDS FLU SHOT: ICD-10-CM

## 2024-10-10 DIAGNOSIS — I25.10 CORONARY ARTERY DISEASE INVOLVING NATIVE CORONARY ARTERY OF NATIVE HEART WITHOUT ANGINA PECTORIS: Primary | ICD-10-CM

## 2024-10-10 DIAGNOSIS — Z23 NEED FOR COVID-19 VACCINE: ICD-10-CM

## 2024-10-10 PROBLEM — N23 RENAL COLIC: Status: RESOLVED | Noted: 2024-06-29 | Resolved: 2024-10-10

## 2024-10-10 PROBLEM — K59.00 CONSTIPATION: Status: RESOLVED | Noted: 2024-07-23 | Resolved: 2024-10-10

## 2024-10-10 PROBLEM — N41.0 ACUTE PROSTATITIS: Status: RESOLVED | Noted: 2024-07-23 | Resolved: 2024-10-10

## 2024-10-10 PROBLEM — N20.0 NEPHROLITHIASIS: Status: RESOLVED | Noted: 2024-08-20 | Resolved: 2024-10-10

## 2024-10-10 PROCEDURE — 91320 SARSCV2 VAC 30MCG TRS-SUC IM: CPT

## 2024-10-10 PROCEDURE — 90662 IIV NO PRSV INCREASED AG IM: CPT

## 2024-10-10 PROCEDURE — 99214 OFFICE O/P EST MOD 30 MIN: CPT | Performed by: FAMILY MEDICINE

## 2024-10-10 PROCEDURE — 90480 ADMN SARSCOV2 VAC 1/ONLY CMP: CPT

## 2024-10-10 PROCEDURE — G0008 ADMIN INFLUENZA VIRUS VAC: HCPCS

## 2024-10-10 NOTE — PROGRESS NOTES
Chief Complaint   Patient presents with    Follow-up     2 months        HPI   Here for follow-up of nonobstructive coronary artery disease, hypertension, prostatitis, and kidney stone.  Doing well.  Finished a course of antibiotics for the prostatitis.  No urinary symptoms.  Had a follow-up ultrasound of the kidneys and bladder which was negative for stone.  Was feeling nauseous on antibiotics so stopped taking the atorvastatin and has not restarted.  Has not been taking metoprolol.  Taking baby aspirin most of the time.      Past Medical History:   Diagnosis Date    Acute prostatitis 07/23/2024    Arthritis     Coronary artery disease involving native coronary artery 08/01/2024    30% LAD on cardiac cath 7/24.  Presented with atypical chest pain and found to have prostatitis.  Mild troponin elevation but no changes on EKG or echocardiogram.  Started on aspirin and atorvastatin.      Essential hypertension 12/16/2021    Gilbert's syndrome 01/06/2020    Intermittent elevated bilirubin.  Liver ultrasound.    Obesity     Renal colic 06/29/2024    Left-sided kidney stone      Unilateral hearing loss, left 01/18/2022        Past Surgical History:   Procedure Laterality Date    CARDIAC CATHETERIZATION Left 7/24/2024    Procedure: Cardiac Left Heart Cath;  Surgeon: Daren Tinsley MD;  Location: [facility];  Service: Cardiology    CARDIAC CATHETERIZATION N/A 7/24/2024    Procedure: Cardiac Coronary Angiogram;  Surgeon: Daren Tinsley MD;  Location: [facility];  Service: Cardiology    CARDIAC CATHETERIZATION  7/24/2024    Procedure: Cardiac catheterization;  Surgeon: Daren Tinsley MD;  Location: [facility];  Service: Cardiology    HAND SURGERY      [place] COLONOSCOPY FLX DX W/COLLJ SPEC WHEN PFRMD N/A 3/22/2018    Procedure: COLONOSCOPY;  Surgeon: Mark Bland MD;  Location: [facility];  Service: Gastroenterology    TONSILECTOMY AND ADNOIDECTOMY      TOOTH EXTRACTION         Social History     Tobacco  "Use    Smoking status: Never    Smokeless tobacco: Never   Substance Use Topics    Alcohol use: Yes     Comment: occasional       Social History     Social History Narrative    Single.  Lives alone.      Retired.  Worked for the [employer] in 2000. Worked part-time jobs afterward.      Coaches youth football.  And baseball.    Walks about 6 miles a day.        The following portions of the patient's history were reviewed and updated as appropriate: allergies, current medications, past family history, past medical history, past social history, past surgical history, and problem list.      Review of Systems       /80 (BP Location: Left arm, Patient Position: Sitting, Cuff Size: Large)   Pulse 67   Temp 98 °F (36.7 °C) (Temporal)   Resp 18   Ht 5' 9\" (1.753 m)   Wt 127 kg (280 lb)   SpO2 97%   BMI 41.35 kg/m²      Physical Exam   Repeat blood pressure 138/90.  Appears well.  Lungs are clear.  Heart regular.  Mood is okay.  Affect appropriate.              Current Outpatient Medications:     aspirin 81 mg chewable tablet, Chew 1 tablet (81 mg total) daily, Disp: 30 tablet, Rfl: 1    atorvastatin (LIPITOR) 40 mg tablet, Take 1 tablet (40 mg total) by mouth daily (Patient not taking: Reported on 9/18/2024), Disp: 100 tablet, Rfl: 3     No problem-specific Assessment & Plan notes found for this encounter.       Diagnoses and all orders for this visit:    Coronary artery disease involving native coronary artery of native heart without angina pectoris    Essential hypertension    Class 2 severe obesity due to excess calories with serious comorbidity and body mass index (BMI) of 39.0 to 39.9 in adult (HCC)    Need for COVID-19 vaccine  -     COVID-19 Pfizer mRNA vaccine 12 yr and older (Comirnaty pre-filled syringe)    Needs flu shot  -     influenza vaccine, high-dose, PF 0.5 mL (Fluzone High Dose)        Patient Instructions   Appears to be doing well.  Blood pressure is okay without " metoprolol.  Suggest restarting atorvastatin 40 mg daily because he does have nonobstructive blockages and atorvastatin prevents rupture of plaque.  Also take baby aspirin.  Flu shot and COVID booster today.  Recheck in 6 months.

## 2024-10-10 NOTE — PATIENT INSTRUCTIONS
Appears to be doing well.  Blood pressure is okay without metoprolol.  Suggest restarting atorvastatin 40 mg daily because he does have nonobstructive blockages and atorvastatin prevents rupture of plaque.  Also take baby aspirin.  Flu shot and COVID booster today.  Recheck in 6 months.

## 2024-10-18 ENCOUNTER — APPOINTMENT (OUTPATIENT)
Dept: LAB | Facility: CLINIC | Age: 69
End: 2024-10-18
Payer: COMMERCIAL

## 2024-10-18 DIAGNOSIS — R97.20 ELEVATED PSA: ICD-10-CM

## 2024-10-18 LAB — PSA SERPL-MCNC: 1.54 NG/ML (ref 0–4)

## 2024-10-18 PROCEDURE — 84153 ASSAY OF PSA TOTAL: CPT

## 2024-10-18 PROCEDURE — 36415 COLL VENOUS BLD VENIPUNCTURE: CPT

## 2024-12-17 DIAGNOSIS — Z00.6 ENCOUNTER FOR EXAMINATION FOR NORMAL COMPARISON OR CONTROL IN CLINICAL RESEARCH PROGRAM: ICD-10-CM

## 2024-12-20 ENCOUNTER — APPOINTMENT (OUTPATIENT)
Dept: LAB | Facility: CLINIC | Age: 69
End: 2024-12-20

## 2024-12-20 DIAGNOSIS — Z00.6 ENCOUNTER FOR EXAMINATION FOR NORMAL COMPARISON OR CONTROL IN CLINICAL RESEARCH PROGRAM: ICD-10-CM

## 2024-12-20 PROCEDURE — 36415 COLL VENOUS BLD VENIPUNCTURE: CPT

## 2024-12-30 LAB
APOB+LDLR+PCSK9 GENE MUT ANL BLD/T: NOT DETECTED
BRCA1+BRCA2 DEL+DUP + FULL MUT ANL BLD/T: NOT DETECTED
MLH1+MSH2+MSH6+PMS2 GN DEL+DUP+FUL M: NOT DETECTED

## 2025-01-31 ENCOUNTER — RA CDI HCC (OUTPATIENT)
Dept: OTHER | Facility: HOSPITAL | Age: 70
End: 2025-01-31

## 2025-02-18 ENCOUNTER — OFFICE VISIT (OUTPATIENT)
Dept: FAMILY MEDICINE CLINIC | Facility: CLINIC | Age: 70
End: 2025-02-18
Payer: COMMERCIAL

## 2025-02-18 VITALS
WEIGHT: 287 LBS | SYSTOLIC BLOOD PRESSURE: 122 MMHG | HEIGHT: 69 IN | TEMPERATURE: 98.4 F | HEART RATE: 84 BPM | BODY MASS INDEX: 42.51 KG/M2 | OXYGEN SATURATION: 98 % | RESPIRATION RATE: 18 BRPM | DIASTOLIC BLOOD PRESSURE: 60 MMHG

## 2025-02-18 DIAGNOSIS — E66.01 CLASS 2 SEVERE OBESITY DUE TO EXCESS CALORIES WITH SERIOUS COMORBIDITY AND BODY MASS INDEX (BMI) OF 39.0 TO 39.9 IN ADULT (HCC): ICD-10-CM

## 2025-02-18 DIAGNOSIS — I10 ESSENTIAL HYPERTENSION: ICD-10-CM

## 2025-02-18 DIAGNOSIS — E66.812 CLASS 2 SEVERE OBESITY DUE TO EXCESS CALORIES WITH SERIOUS COMORBIDITY AND BODY MASS INDEX (BMI) OF 39.0 TO 39.9 IN ADULT (HCC): ICD-10-CM

## 2025-02-18 DIAGNOSIS — Z00.00 MEDICARE ANNUAL WELLNESS VISIT, SUBSEQUENT: Primary | ICD-10-CM

## 2025-02-18 DIAGNOSIS — I25.10 CORONARY ARTERY DISEASE INVOLVING NATIVE CORONARY ARTERY OF NATIVE HEART WITHOUT ANGINA PECTORIS: ICD-10-CM

## 2025-02-18 PROCEDURE — 99214 OFFICE O/P EST MOD 30 MIN: CPT | Performed by: FAMILY MEDICINE

## 2025-02-18 PROCEDURE — G0439 PPPS, SUBSEQ VISIT: HCPCS | Performed by: FAMILY MEDICINE

## 2025-02-18 PROCEDURE — G2211 COMPLEX E/M VISIT ADD ON: HCPCS | Performed by: FAMILY MEDICINE

## 2025-02-18 RX ORDER — ATORVASTATIN CALCIUM 20 MG/1
20 TABLET, FILM COATED ORAL DAILY
Qty: 90 TABLET | Refills: 3 | Status: SHIPPED | OUTPATIENT
Start: 2025-02-18

## 2025-02-18 NOTE — PROGRESS NOTES
Name: Dior Lock      : 1955      MRN: [id number]  Encounter Provider: Lazaro Kapoor MD  Encounter Date: 2025   Encounter department: [facility]    Assessment & Plan  Medicare annual wellness visit, subsequent         Coronary artery disease involving native coronary artery of native heart without angina pectoris    Orders:  •  atorvastatin (LIPITOR) 20 mg tablet; Take 1 tablet (20 mg total) by mouth daily    Class 2 severe obesity due to excess calories with serious comorbidity and body mass index (BMI) of 39.0 to 39.9 in adult (HCC)         Essential hypertension            Patient Instructions   Medicare wellness exam is completed.  Doing great.  Blood pressure okay without medication.  Suggest taking atorvastatin on a regular basis but dose lowered to 20 mg.  Also continue baby aspirin.  Recheck in 6 months.    Medicare Preventive Visit Patient Instructions  Thank you for completing your Welcome to Medicare Visit or Medicare Annual Wellness Visit today. Your next wellness visit will be due in one year (2026).  The screening/preventive services that you may require over the next 5-10 years are detailed below. Some tests may not apply to you based off risk factors and/or age. Screening tests ordered at today's visit but not completed yet may show as past due. Also, please note that scanned in results may not display below.  Preventive Screenings:  Service Recommendations Previous Testing/Comments   Colorectal Cancer Screening  Colonoscopy    Fecal Occult Blood Test (FOBT)/Fecal Immunochemical Test (FIT)  Fecal DNA/Cologuard Test  Flexible Sigmoidoscopy Age: 45-75 years old   Colonoscopy: every 10 years (May be performed more frequently if at higher risk)  OR  FOBT/FIT: every 1 year  OR  Cologuard: every 3 years  OR  Sigmoidoscopy: every 5 years  Screening may be recommended earlier than age 45 if at higher risk for colorectal cancer. Also, an individualized decision  between you and your healthcare provider will decide whether screening between the ages of 76-85 would be appropriate. Colonoscopy: 04/02/2024  FOBT/FIT: Not on file  Cologuard: Not on file  Sigmoidoscopy: Not on file    Screening Current     Prostate Cancer Screening Individualized decision between patient and health care provider in men between ages of 55-69   Medicare will cover every 12 months beginning on the day after your 50th birthday PSA: 1.542 ng/mL     Screening Current     Hepatitis C Screening Once for adults born between 1945 and 1965  More frequently in patients at high risk for Hepatitis C Hep C Antibody: 11/26/2019    Screening Current   Diabetes Screening 1-2 times per year if you're at risk for diabetes or have pre-diabetes Fasting glucose: 98 mg/dL (1/13/2024)  A1C: 5.5 % (7/22/2024)  Screening Current   Cholesterol Screening Once every 5 years if you don't have a lipid disorder. May order more often based on risk factors. Lipid panel: 07/23/2024  Screening Not Indicated  History Lipid Disorder      Other Preventive Screenings Covered by Medicare:  Abdominal Aortic Aneurysm (AAA) Screening: covered once if your at risk. You're considered to be at risk if you have a family history of AAA or a male between the age of 65-75 who smoking at least 100 cigarettes in your lifetime.  Lung Cancer Screening: covers low dose CT scan once per year if you meet all of the following conditions: (1) Age 55-77; (2) No signs or symptoms of lung cancer; (3) Current smoker or have quit smoking within the last 15 years; (4) You have a tobacco smoking history of at least 20 pack years (packs per day x number of years you smoked); (5) You get a written order from a healthcare provider.  Glaucoma Screening: covered annually if you're considered high risk: (1) You have diabetes OR (2) Family history of glaucoma OR (3)  aged 50 and older OR (4)  American aged 65 and older  Osteoporosis Screening:  covered every 2 years if you meet one of the following conditions: (1) Have a vertebral abnormality; (2) On glucocorticoid therapy for more than 3 months; (3) Have primary hyperparathyroidism; (4) On osteoporosis medications and need to assess response to drug therapy.  HIV Screening: covered annually if you're between the age of 15-65. Also covered annually if you are younger than 15 and older than 65 with risk factors for HIV infection. For pregnant patients, it is covered up to 3 times per pregnancy.    Immunizations:  Immunization Recommendations   Influenza Vaccine Annual influenza vaccination during flu season is recommended for all persons aged >= 6 months who do not have contraindications   Pneumococcal Vaccine   * Pneumococcal conjugate vaccine = PCV13 (Prevnar 13), PCV15 (Vaxneuvance), PCV20 (Prevnar 20)  * Pneumococcal polysaccharide vaccine = PPSV23 (Pneumovax) Adults 19-65 yo with certain risk factors or if 65+ yo  If never received any pneumonia vaccine: recommend Prevnar 20 (PCV20)  Give PCV20 if previously received 1 dose of PCV13 or PPSV23   Hepatitis B Vaccine 3 dose series if at intermediate or high risk (ex: diabetes, end stage renal disease, liver disease)   Respiratory syncytial virus (RSV) Vaccine - COVERED BY MEDICARE PART D  * RSVPreF3 (Arexvy) CDC recommends that adults 60 years of age and older may receive a single dose of RSV vaccine using shared clinical decision-making (SCDM)   Tetanus (Td) Vaccine - COST NOT COVERED BY MEDICARE PART B Following completion of primary series, a booster dose should be given every 10 years to maintain immunity against tetanus. Td may also be given as tetanus wound prophylaxis.   Tdap Vaccine - COST NOT COVERED BY MEDICARE PART B Recommended at least once for all adults. For pregnant patients, recommended with each pregnancy.   Shingles Vaccine (Shingrix) - COST NOT COVERED BY MEDICARE PART B  2 shot series recommended in those 19 years and older who have  or will have weakened immune systems or those 50 years and older     Health Maintenance Due:      Topic Date Due   • Colorectal Cancer Screening  04/02/2027   • Hepatitis C Screening  Completed     Immunizations Due:      Topic Date Due   • Pneumococcal Vaccine: 65+ Years (2 of 2 - PCV) 11/16/2022     Advance Directives   What are advance directives?  Advance directives are legal documents that state your wishes and plans for medical care. These plans are made ahead of time in case you lose your ability to make decisions for yourself. Advance directives can apply to any medical decision, such as the treatments you want, and if you want to donate organs.   What are the types of advance directives?  There are many types of advance directives, and each state has rules about how to use them. You may choose a combination of any of the following:  Living will:  This is a written record of the treatment you want. You can also choose which treatments you do not want, which to limit, and which to stop at a certain time. This includes surgery, medicine, IV fluid, and tube feedings.   Durable power of  for healthcare (DPAHC):  This is a written record that states who you want to make healthcare choices for you when you are unable to make them for yourself. This person, called a proxy, is usually a family member or a friend. You may choose more than 1 proxy.  Do not resuscitate (DNR) order:  A DNR order is used in case your heart stops beating or you stop breathing. It is a request not to have certain forms of treatment, such as CPR. A DNR order may be included in other types of advance directives.  Medical directive:  This covers the care that you want if you are in a coma, near death, or unable to make decisions for yourself. You can list the treatments you want for each condition. Treatment may include pain medicine, surgery, blood transfusions, dialysis, IV or tube feedings, and a ventilator (breathing  machine).  Values history:  This document has questions about your views, beliefs, and how you feel and think about life. This information can help others choose the care that you would choose.  Why are advance directives important?  An advance directive helps you control your care. Although spoken wishes may be used, it is better to have your wishes written down. Spoken wishes can be misunderstood, or not followed. Treatments may be given even if you do not want them. An advance directive may make it easier for your family to make difficult choices about your care.   Weight Management   Why it is important to manage your weight:  Being overweight increases your risk of health conditions such as heart disease, high blood pressure, type 2 diabetes, and certain types of cancer. It can also increase your risk for osteoarthritis, sleep apnea, and other respiratory problems. Aim for a slow, steady weight loss. Even a small amount of weight loss can lower your risk of health problems.  How to lose weight safely:  A safe and healthy way to lose weight is to eat fewer calories and get regular exercise. You can lose up about 1 pound a week by decreasing the number of calories you eat by 500 calories each day.   Healthy meal plan for weight management:  A healthy meal plan includes a variety of foods, contains fewer calories, and helps you stay healthy. A healthy meal plan includes the following:  Eat whole-grain foods more often.  A healthy meal plan should contain fiber. Fiber is the part of grains, fruits, and vegetables that is not broken down by your body. Whole-grain foods are healthy and provide extra fiber in your diet. Some examples of whole-grain foods are whole-wheat breads and pastas, oatmeal, brown rice, and bulgur.  Eat a variety of vegetables every day.  Include dark, leafy greens such as spinach, kale, mary greens, and mustard greens. Eat yellow and orange vegetables such as carrots, sweet potatoes, and  winter squash.   Eat a variety of fruits every day.  Choose fresh or canned fruit (canned in its own juice or light syrup) instead of juice. Fruit juice has very little or no fiber.  Eat low-fat dairy foods.  Drink fat-free (skim) milk or 1% milk. Eat fat-free yogurt and low-fat cottage cheese. Try low-fat cheeses such as mozzarella and other reduced-fat cheeses.  Choose meat and other protein foods that are low in fat.  Choose beans or other legumes such as split peas or lentils. Choose fish, skinless poultry (chicken or turkey), or lean cuts of red meat (beef or pork). Before you cook meat or poultry, cut off any visible fat.   Use less fat and oil.  Try baking foods instead of frying them. Add less fat, such as margarine, sour cream, regular salad dressing and mayonnaise to foods. Eat fewer high-fat foods. Some examples of high-fat foods include french fries, doughnuts, ice cream, and cakes.  Eat fewer sweets.  Limit foods and drinks that are high in sugar. This includes candy, cookies, regular soda, and sweetened drinks.  Exercise:  Exercise at least 30 minutes per day on most days of the week. Some examples of exercise include walking, biking, dancing, and swimming. You can also fit in more physical activity by taking the stairs instead of the elevator or parking farther away from stores. Ask your healthcare provider about the best exercise plan for you.      © Copyright [employer] 2018 Information is for End User's use only and may not be sold, redistributed or otherwise used for commercial purposes. All illustrations and images included in CareNotes® are the copyrighted property of [employer] or [employer]      History of Present Illness     HPI  Here for follow-up of nonobstructive coronary artery disease, hypertension, prostatitis, and kidney stone.  And Medicare wellness exam.  Is feeling well.  Only using 1/2 tablet of 40 mg of atorvastatin when he remembers.  Does use his baby  aspirin.  Had a helix molecular screen which was negative.  Most recent PSA was 1.54, down from 28.6 when he had prostatitis.  No further prostate symptoms.  Voiding okay.  Blood pressure at home usually in the 120s to the 130s.  Gets some pain in the posterior aspect of the right pelvis.  Does get some relief with naproxen.    Review of Systems    Past Medical History:   Diagnosis Date   • Acute prostatitis 07/23/2024   • Arthritis    • Coronary artery disease involving native coronary artery 08/01/2024    30% LAD on cardiac cath 7/24.  Presented with atypical chest pain and found to have prostatitis.  Mild troponin elevation but no changes on EKG or echocardiogram.  Started on aspirin and atorvastatin.     • Essential hypertension 12/16/2021   • Gilbert's syndrome 01/06/2020    Intermittent elevated bilirubin.  Liver ultrasound.   • Obesity    • Renal colic 06/29/2024    Left-sided kidney stone     • Unilateral hearing loss, left 01/18/2022     Past Surgical History:   Procedure Laterality Date   • CARDIAC CATHETERIZATION Left 7/24/2024    Procedure: Cardiac Left Heart Cath;  Surgeon: Daren Tinsley MD;  Location: [facility];  Service: Cardiology   • CARDIAC CATHETERIZATION N/A 7/24/2024    Procedure: Cardiac Coronary Angiogram;  Surgeon: Daren Tinsley MD;  Location: [facility];  Service: Cardiology   • CARDIAC CATHETERIZATION  7/24/2024    Procedure: Cardiac catheterization;  Surgeon: Daren Tinsley MD;  Location: [facility];  Service: Cardiology   • HAND SURGERY     • ND COLONOSCOPY FLX DX W/COLLJ SPEC WHEN PFRMD N/A 3/22/2018    Procedure: COLONOSCOPY;  Surgeon: Mark Bland MD;  Location: [facility];  Service: Gastroenterology   • TONSILECTOMY AND ADNOIDECTOMY     • TOOTH EXTRACTION       Social History     Social History Narrative    Single.  Lives alone.      Retired.  Worked for the [employer] in 2000. Worked part-time jobs afterward.      Coaches youth football.  And  "baseball.    Walks about 3-6 miles a day.  Depending on the weather.     Current Outpatient Medications on File Prior to Visit   Medication Sig   • aspirin 81 mg chewable tablet Chew 1 tablet (81 mg total) daily   • [DISCONTINUED] atorvastatin (LIPITOR) 40 mg tablet Take 1 tablet (40 mg total) by mouth daily     Allergies   Allergen Reactions   • Cefadroxil      Action Taken: fever;    • Amlodipine Other (See Comments)      Brain fog     Immunization History   Administered Date(s) Administered   • COVID-19 PFIZER VACCINE 0.3 ML IM 02/17/2021, 03/08/2021, 10/30/2021   • COVID-19 Pfizer Vac BIVALENT Lamonte-sucrose 12 Yr+ IM 09/27/2022, 10/12/2022   • COVID-19 Pfizer mRNA vacc PF lamonte-sucrose 12 yr and older (Comirnaty) 10/10/2024   • INFLUENZA 10/19/2022   • Influenza Split High Dose Preservative Free IM 10/10/2024   • Pneumococcal Polysaccharide PPV23 11/16/2021     Objective   /60   Pulse 84   Temp 98.4 °F (36.9 °C) (Temporal)   Resp 18   Ht 5' 9\" (1.753 m)   Wt 130 kg (287 lb)   SpO2 98%   BMI 42.38 kg/m²     Physical Exam  Appears well.  Lungs are clear.  Heart regular with no murmur.  Abdomen soft and nontender.  Mood okay.  Affect appropriate.    Answers submitted by the patient for this visit:  Medicare Annual Wellness Visit (Submitted on 2/16/2025)  How would you rate your overall health?: good  Compared to last year, how is your physical health?: same  In general, how satisfied are you with your life?: satisfied  Compared to last year, how is your eyesight?: same  Compared to last year, how is your hearing?: same  Compared to last year, how is your emotional/mental health?: same  How often is anger a problem for you?: often  How often do you feel unusually tired/fatigued?: never, rarely  In the past 7 days, how much pain have you experienced?: some  If you answered \"some\" or \"a lot\", please rate the severity of your pain on a scale of 1 to 10 (1 being the least severe pain and 10 being the most " intense pain).: 3/10  In the past 6 months, have you lost or gained 10 pounds without trying?: Yes  One or more falls in the last year: No  Do you have trouble with the stairs inside or outside your home?: No  Does your home have working smoke alarms?: No  Does your home have a carbon monoxide monitor?: No  Which safety hazards (if any) have you experienced in your home? Please select all that apply.: none  How would you describe your current diet? Please select all that apply.: Regular  In addition to prescription medications, are you taking any over-the-counter supplements?: No  Can you manage your medications?: Yes  Are you currently taking any opioid medications?: No  Can you walk and transfer into and out of your bed and chair?: Yes  Can you dress and groom yourself?: Yes  Can you bathe or shower yourself?: Yes  Can you feed yourself?: Yes  Can you do your laundry/ housekeeping?: Yes  Can you manage your money, pay your bills, and track your expenses?: Yes  Can you make your own meals?: Yes  Can you do your own shopping?: Yes  Within the last 12 months, have you had any hospitalizations or Emergency Department visits?: Yes  If yes, how many times have you been hospitalized within the past year?: 1-2  Additional Comments: Prostatitis  Do you have a living will?: Yes  Do you have a Durable POA (Power of ) for healthcare decisions?: Yes  Do you have an Advanced Directive for end of life decisions?: Yes  How often have you used an illegal drug (including marijuana) or a prescription medication for non-medical reasons in the past year?: never  What is the typical number of drinks you consume in a day?: 0  What is the typical number of drinks you consume in a week?: 0  How often did you have a drink containing alcohol in the past year?: monthly or less  How many drinks did you have on a typical day  when you were drinking in the past year?: 0  How often did you have 6 or more drinks on one occasion in the  past year?: never

## 2025-02-18 NOTE — PROGRESS NOTES
Name: Dior Lock      : 1955      MRN: [id number]  Encounter Provider: Lazaro Kapoor MD  Encounter Date: 2025   Encounter department: [facility]    Assessment & Plan  Medicare annual wellness visit, subsequent         Coronary artery disease involving native coronary artery of native heart without angina pectoris    Class 2 severe obesity due to excess calories with serious comorbidity and body mass index (BMI) of 39.0 to 39.9 in adult (HCC)    Essential hypertension       Preventive health issues were discussed with patient, and age appropriate screening tests were ordered as noted in patient's After Visit Summary. Personalized health advice and appropriate referrals for health education or preventive services given if needed, as noted in patient's After Visit Summary.    History of Present Illness     HPI   Patient Care Team:  Lazaro Kapoor MD as PCP - General (Family Medicine)  Dariela Garrido MD as PCP - PCP-[facility] ([facility])  [facility]ajan [facility]  MD Ladarius Faria MD Loveleen K Sidhu, MD as Endoscopist    Review of Systems  Medical History Reviewed by provider this encounter:  Tobacco  Allergies  Meds  Problems  Med Hx  Surg Hx  Fam Hx       Annual Wellness Visit Questionnaire   Dior is here for his Subsequent Wellness visit.     Health Risk Assessment:   Patient rates overall health as good. Patient feels that their physical health rating is same. Patient is satisfied with their life. Eyesight was rated as same. Hearing was rated as same. Patient feels that their emotional and mental health rating is same. Patients states they are often angry. Patient states they are never, rarely unusually tired/fatigued. Pain experienced in the last 7 days has been some. Patient's pain rating has been 3/10. Patient states that he has experienced weight loss or gain in last 6 months.     Depression Screening:   PHQ-2 Score:  0      Fall Risk Screening:   In the past year, patient has experienced: no history of falling in past year      Home Safety:  Patient does not have trouble with stairs inside or outside of their home. Patient has no working smoke alarms and has no working carbon monoxide detector. Home safety hazards include: none.     Nutrition:   Current diet is Regular.     Medications:   Patient is not currently taking any over-the-counter supplements. Patient is able to manage medications.     Activities of Daily Living (ADLs)/Instrumental Activities of Daily Living (IADLs):   Walk and transfer into and out of bed and chair?: Yes  Dress and groom yourself?: Yes    Bathe or shower yourself?: Yes    Feed yourself? Yes  Do your laundry/housekeeping?: Yes  Manage your money, pay your bills and track your expenses?: Yes  Make your own meals?: Yes    Do your own shopping?: Yes    Previous Hospitalizations:   Any hospitalizations or ED visits within the last 12 months?: Yes    How many hospitalizations have you had in the last year?: 1-2    Hospitalization Comments: Prostatitis    Advance Care Planning:   Living will: Yes    Durable POA for healthcare: Yes    Advanced directive: Yes      PREVENTIVE SCREENINGS      Cardiovascular Screening:    General: Screening Not Indicated and History Lipid Disorder      Diabetes Screening:     General: Screening Current      Colorectal Cancer Screening:     General: Screening Current      Prostate Cancer Screening:    General: Screening Current      Abdominal Aortic Aneurysm (AAA) Screening:    Risk factors include: age between 65-74 yo        Lung Cancer Screening:     General: Screening Not Indicated      Hepatitis C Screening:    General: Screening Current    Screening, Brief Intervention, and Referral to Treatment (SBIRT)     Screening  Typical number of drinks in a day: 0  Typical number of drinks in a week: 0  Interpretation: Low risk drinking behavior.    AUDIT-C Screenin) How often  "did you have a drink containing alcohol in the past year? monthly or less  2) How many drinks did you have on a typical day when you were drinking in the past year? 0  3) How often did you have 6 or more drinks on one occasion in the past year? never    AUDIT-C Score: 1  Interpretation: Score 0-3 (male): Negative screen for alcohol misuse    Single Item Drug Screening:  How often have you used an illegal drug (including marijuana) or a prescription medication for non-medical reasons in the past year? never    Single Item Drug Screen Score: 0  Interpretation: Negative screen for possible drug use disorder    Social Drivers of Health     Financial Resource Strain: Low Risk  (2/15/2024)    Overall Financial Resource Strain (CARDIA)    • Difficulty of Paying Living Expenses: Not hard at all   Food Insecurity: No Food Insecurity (2/18/2025)    Hunger Vital Sign    • Worried About Running Out of Food in the Last Year: Never true    • Ran Out of Food in the Last Year: Never true   Transportation Needs: No Transportation Needs (2/18/2025)    PRAPARE - Transportation    • Lack of Transportation (Medical): No    • Lack of Transportation (Non-Medical): No   Housing Stability: Low Risk  (2/18/2025)    Housing Stability Vital Sign    • Unable to Pay for Housing in the Last Year: No    • Number of Times Moved in the Last Year: 1    • Homeless in the Last Year: No   Utilities: Not At Risk (2/18/2025)    [facility] Utilities    • Threatened with loss of utilities: No     No results found.    Objective   /60   Pulse 84   Temp 98.4 °F (36.9 °C) (Temporal)   Resp 18   Ht 5' 9\" (1.753 m)   Wt 130 kg (287 lb)   SpO2 98%   BMI 42.38 kg/m²     Physical Exam    "

## 2025-02-18 NOTE — PATIENT INSTRUCTIONS
Medicare wellness exam is completed.  Doing great.  Blood pressure okay without medication.  Suggest taking atorvastatin on a regular basis but dose lowered to 20 mg.  Also continue baby aspirin.  Recheck in 6 months.    Medicare Preventive Visit Patient Instructions  Thank you for completing your Welcome to Medicare Visit or Medicare Annual Wellness Visit today. Your next wellness visit will be due in one year (2/19/2026).  The screening/preventive services that you may require over the next 5-10 years are detailed below. Some tests may not apply to you based off risk factors and/or age. Screening tests ordered at today's visit but not completed yet may show as past due. Also, please note that scanned in results may not display below.  Preventive Screenings:  Service Recommendations Previous Testing/Comments   Colorectal Cancer Screening  Colonoscopy    Fecal Occult Blood Test (FOBT)/Fecal Immunochemical Test (FIT)  Fecal DNA/Cologuard Test  Flexible Sigmoidoscopy Age: 45-75 years old   Colonoscopy: every 10 years (May be performed more frequently if at higher risk)  OR  FOBT/FIT: every 1 year  OR  Cologuard: every 3 years  OR  Sigmoidoscopy: every 5 years  Screening may be recommended earlier than age 45 if at higher risk for colorectal cancer. Also, an individualized decision between you and your healthcare provider will decide whether screening between the ages of 76-85 would be appropriate. Colonoscopy: 04/02/2024  FOBT/FIT: Not on file  Cologuard: Not on file  Sigmoidoscopy: Not on file    Screening Current     Prostate Cancer Screening Individualized decision between patient and health care provider in men between ages of 55-69   Medicare will cover every 12 months beginning on the day after your 50th birthday PSA: 1.542 ng/mL     Screening Current     Hepatitis C Screening Once for adults born between 1945 and 1965  More frequently in patients at high risk for Hepatitis C Hep C Antibody:  11/26/2019    Screening Current   Diabetes Screening 1-2 times per year if you're at risk for diabetes or have pre-diabetes Fasting glucose: 98 mg/dL (1/13/2024)  A1C: 5.5 % (7/22/2024)  Screening Current   Cholesterol Screening Once every 5 years if you don't have a lipid disorder. May order more often based on risk factors. Lipid panel: 07/23/2024  Screening Not Indicated  History Lipid Disorder      Other Preventive Screenings Covered by Medicare:  Abdominal Aortic Aneurysm (AAA) Screening: covered once if your at risk. You're considered to be at risk if you have a family history of AAA or a male between the age of 65-75 who smoking at least 100 cigarettes in your lifetime.  Lung Cancer Screening: covers low dose CT scan once per year if you meet all of the following conditions: (1) Age 55-77; (2) No signs or symptoms of lung cancer; (3) Current smoker or have quit smoking within the last 15 years; (4) You have a tobacco smoking history of at least 20 pack years (packs per day x number of years you smoked); (5) You get a written order from a healthcare provider.  Glaucoma Screening: covered annually if you're considered high risk: (1) You have diabetes OR (2) Family history of glaucoma OR (3)  aged 50 and older OR (4)  American aged 65 and older  Osteoporosis Screening: covered every 2 years if you meet one of the following conditions: (1) Have a vertebral abnormality; (2) On glucocorticoid therapy for more than 3 months; (3) Have primary hyperparathyroidism; (4) On osteoporosis medications and need to assess response to drug therapy.  HIV Screening: covered annually if you're between the age of 15-65. Also covered annually if you are younger than 15 and older than 65 with risk factors for HIV infection. For pregnant patients, it is covered up to 3 times per pregnancy.    Immunizations:  Immunization Recommendations   Influenza Vaccine Annual influenza vaccination during flu season is  recommended for all persons aged >= 6 months who do not have contraindications   Pneumococcal Vaccine   * Pneumococcal conjugate vaccine = PCV13 (Prevnar 13), PCV15 (Vaxneuvance), PCV20 (Prevnar 20)  * Pneumococcal polysaccharide vaccine = PPSV23 (Pneumovax) Adults 19-63 yo with certain risk factors or if 65+ yo  If never received any pneumonia vaccine: recommend Prevnar 20 (PCV20)  Give PCV20 if previously received 1 dose of PCV13 or PPSV23   Hepatitis B Vaccine 3 dose series if at intermediate or high risk (ex: diabetes, end stage renal disease, liver disease)   Respiratory syncytial virus (RSV) Vaccine - COVERED BY MEDICARE PART D  * RSVPreF3 (Arexvy) CDC recommends that adults 60 years of age and older may receive a single dose of RSV vaccine using shared clinical decision-making (SCDM)   Tetanus (Td) Vaccine - COST NOT COVERED BY MEDICARE PART B Following completion of primary series, a booster dose should be given every 10 years to maintain immunity against tetanus. Td may also be given as tetanus wound prophylaxis.   Tdap Vaccine - COST NOT COVERED BY MEDICARE PART B Recommended at least once for all adults. For pregnant patients, recommended with each pregnancy.   Shingles Vaccine (Shingrix) - COST NOT COVERED BY MEDICARE PART B  2 shot series recommended in those 19 years and older who have or will have weakened immune systems or those 50 years and older     Health Maintenance Due:      Topic Date Due    Colorectal Cancer Screening  04/02/2027    Hepatitis C Screening  Completed     Immunizations Due:      Topic Date Due    Pneumococcal Vaccine: 65+ Years (2 of 2 - PCV) 11/16/2022     Advance Directives   What are advance directives?  Advance directives are legal documents that state your wishes and plans for medical care. These plans are made ahead of time in case you lose your ability to make decisions for yourself. Advance directives can apply to any medical decision, such as the treatments you want, and  if you want to donate organs.   What are the types of advance directives?  There are many types of advance directives, and each state has rules about how to use them. You may choose a combination of any of the following:  Living will:  This is a written record of the treatment you want. You can also choose which treatments you do not want, which to limit, and which to stop at a certain time. This includes surgery, medicine, IV fluid, and tube feedings.   Durable power of  for healthcare (DPAHC):  This is a written record that states who you want to make healthcare choices for you when you are unable to make them for yourself. This person, called a proxy, is usually a family member or a friend. You may choose more than 1 proxy.  Do not resuscitate (DNR) order:  A DNR order is used in case your heart stops beating or you stop breathing. It is a request not to have certain forms of treatment, such as CPR. A DNR order may be included in other types of advance directives.  Medical directive:  This covers the care that you want if you are in a coma, near death, or unable to make decisions for yourself. You can list the treatments you want for each condition. Treatment may include pain medicine, surgery, blood transfusions, dialysis, IV or tube feedings, and a ventilator (breathing machine).  Values history:  This document has questions about your views, beliefs, and how you feel and think about life. This information can help others choose the care that you would choose.  Why are advance directives important?  An advance directive helps you control your care. Although spoken wishes may be used, it is better to have your wishes written down. Spoken wishes can be misunderstood, or not followed. Treatments may be given even if you do not want them. An advance directive may make it easier for your family to make difficult choices about your care.   Weight Management   Why it is important to manage your weight:  Being  overweight increases your risk of health conditions such as heart disease, high blood pressure, type 2 diabetes, and certain types of cancer. It can also increase your risk for osteoarthritis, sleep apnea, and other respiratory problems. Aim for a slow, steady weight loss. Even a small amount of weight loss can lower your risk of health problems.  How to lose weight safely:  A safe and healthy way to lose weight is to eat fewer calories and get regular exercise. You can lose up about 1 pound a week by decreasing the number of calories you eat by 500 calories each day.   Healthy meal plan for weight management:  A healthy meal plan includes a variety of foods, contains fewer calories, and helps you stay healthy. A healthy meal plan includes the following:  Eat whole-grain foods more often.  A healthy meal plan should contain fiber. Fiber is the part of grains, fruits, and vegetables that is not broken down by your body. Whole-grain foods are healthy and provide extra fiber in your diet. Some examples of whole-grain foods are whole-wheat breads and pastas, oatmeal, brown rice, and bulgur.  Eat a variety of vegetables every day.  Include dark, leafy greens such as spinach, kale, mary greens, and mustard greens. Eat yellow and orange vegetables such as carrots, sweet potatoes, and winter squash.   Eat a variety of fruits every day.  Choose fresh or canned fruit (canned in its own juice or light syrup) instead of juice. Fruit juice has very little or no fiber.  Eat low-fat dairy foods.  Drink fat-free (skim) milk or 1% milk. Eat fat-free yogurt and low-fat cottage cheese. Try low-fat cheeses such as mozzarella and other reduced-fat cheeses.  Choose meat and other protein foods that are low in fat.  Choose beans or other legumes such as split peas or lentils. Choose fish, skinless poultry (chicken or turkey), or lean cuts of red meat (beef or pork). Before you cook meat or poultry, cut off any visible fat.   Use less  fat and oil.  Try baking foods instead of frying them. Add less fat, such as margarine, sour cream, regular salad dressing and mayonnaise to foods. Eat fewer high-fat foods. Some examples of high-fat foods include french fries, doughnuts, ice cream, and cakes.  Eat fewer sweets.  Limit foods and drinks that are high in sugar. This includes candy, cookies, regular soda, and sweetened drinks.  Exercise:  Exercise at least 30 minutes per day on most days of the week. Some examples of exercise include walking, biking, dancing, and swimming. You can also fit in more physical activity by taking the stairs instead of the elevator or parking farther away from stores. Ask your healthcare provider about the best exercise plan for you.      © Copyright [employer] 2018 Information is for End User's use only and may not be sold, redistributed or otherwise used for commercial purposes. All illustrations and images included in CareNotes® are the copyrighted property of [employer].A[employer]M., Inc. or [employer]

## 2025-05-22 ENCOUNTER — OFFICE VISIT (OUTPATIENT)
Dept: FAMILY MEDICINE CLINIC | Facility: CLINIC | Age: 70
End: 2025-05-22
Payer: COMMERCIAL

## 2025-05-22 VITALS
TEMPERATURE: 98.2 F | SYSTOLIC BLOOD PRESSURE: 140 MMHG | RESPIRATION RATE: 16 BRPM | HEIGHT: 69 IN | WEIGHT: 295 LBS | HEART RATE: 76 BPM | BODY MASS INDEX: 43.69 KG/M2 | OXYGEN SATURATION: 94 % | DIASTOLIC BLOOD PRESSURE: 80 MMHG

## 2025-05-22 DIAGNOSIS — S76.011A STRAIN OF BUTTOCK, RIGHT, INITIAL ENCOUNTER: Primary | ICD-10-CM

## 2025-05-22 PROCEDURE — 99214 OFFICE O/P EST MOD 30 MIN: CPT | Performed by: FAMILY MEDICINE

## 2025-05-22 PROCEDURE — G2211 COMPLEX E/M VISIT ADD ON: HCPCS | Performed by: FAMILY MEDICINE

## 2025-05-22 RX ORDER — NAPROXEN 500 MG/1
500 TABLET ORAL 2 TIMES DAILY WITH MEALS
COMMUNITY
Start: 2025-05-09

## 2025-05-22 NOTE — PROGRESS NOTES
"Name: Dior Lock      : 1955      MRN: [id number]  Encounter Provider: Lazaro Kapoor MD  Encounter Date: 2025   Encounter department: [facility]    Assessment & Plan  Strain of buttock, right, initial encounter    Orders:  •  Ambulatory referral to Physical Therapy; Future       Patient Instructions   Strain of the buttock muscles.  Referral to physical therapy as he is struggling to get up out of a chair.  If tolerated, can use 500 mg of naproxen twice a day.  Would also use 2 of his arthritis strength Tylenol 650 mg twice daily.  Ice compresses would be helpful initially.  Return if not improving.      History of Present Illness     HPI  Here with pain on the right side of his pelvis.  Notes that he bent down to pick something up a few days ago and got a sharp pain which continues.  Took with naproxen yesterday.  Today, took 650 of Tylenol.    Review of Systems    Past Medical History[1]  Past Surgical History[2]  Social History     Social History Narrative    Single.  Lives alone.      Retired.  Worked for the [employer] retiring in . Worked part-time jobs afterward.      Coaches youth football.  And baseball.    Walks about 3-6 miles a day.  Depending on the weather.     Medications[3]  Allergies   Allergen Reactions   • Cefadroxil      Action Taken: fever;    • Amlodipine Other (See Comments)      Brain fog     Immunization History   Administered Date(s) Administered   • COVID-19 PFIZER VACCINE 0.3 ML IM 2021, 2021, 10/30/2021   • COVID-19 Pfizer Vac BIVALENT Lamonte-sucrose 12 Yr+ IM 2022, 10/12/2022   • COVID-19 Pfizer mRNA vacc PF lamonte-sucrose 12 yr and older (Comirnaty) 10/10/2024   • INFLUENZA 10/19/2022   • Influenza Split High Dose Preservative Free IM 10/10/2024   • Pneumococcal Polysaccharide PPV23 2021     Objective   /80   Pulse 76   Temp 98.2 °F (36.8 °C) (Temporal)   Resp 16   Ht 5' 9\" (1.753 m)   Wt 134 kg (295 lb)   " SpO2 94%   BMI 43.56 kg/m²     Physical Exam  Struggles to get out of the chair.  The tenderness is present over the lateral aspect of the right buttock.  No rash present  No tenderness over the greater trochanter.  Good range of motion of the right hip.           [1]  Past Medical History:  Diagnosis Date   • Acute prostatitis 07/23/2024   • Arthritis    • Coronary artery disease involving native coronary artery 08/01/2024    30% LAD on cardiac cath 7/24.  Presented with atypical chest pain and found to have prostatitis.  Mild troponin elevation but no changes on EKG or echocardiogram.  Started on aspirin and atorvastatin.     • Essential hypertension 12/16/2021   • Gilbert's syndrome 01/06/2020    Intermittent elevated bilirubin.  Liver ultrasound.   • Obesity    • Renal colic 06/29/2024    Left-sided kidney stone     • Unilateral hearing loss, left 01/18/2022   [2]  Past Surgical History:  Procedure Laterality Date   • CARDIAC CATHETERIZATION Left 7/24/2024    Procedure: Cardiac Left Heart Cath;  Surgeon: Daren Tinsley MD;  Location: [facility];  Service: Cardiology   • CARDIAC CATHETERIZATION N/A 7/24/2024    Procedure: Cardiac Coronary Angiogram;  Surgeon: Daren Tinsley MD;  Location: [facility];  Service: Cardiology   • CARDIAC CATHETERIZATION  7/24/2024    Procedure: Cardiac catheterization;  Surgeon: Daren Tinsley MD;  Location: [facility];  Service: Cardiology   • HAND SURGERY     • SD COLONOSCOPY FLX DX W/COLLJ SPEC WHEN PFRMD N/A 3/22/2018    Procedure: COLONOSCOPY;  Surgeon: Mark Bland MD;  Location: [facility];  Service: Gastroenterology   • TONSILECTOMY AND ADNOIDECTOMY     • TOOTH EXTRACTION     [3]  Current Outpatient Medications on File Prior to Visit   Medication Sig   • aspirin 81 mg chewable tablet Chew 1 tablet (81 mg total) daily   • atorvastatin (LIPITOR) 20 mg tablet Take 1 tablet (20 mg total) by mouth daily   • naproxen (NAPROSYN) 500 mg tablet Take 500 mg by  mouth 2 (two) times a day with meals

## 2025-05-22 NOTE — PATIENT INSTRUCTIONS
Strain of the buttock muscles.  Referral to physical therapy as he is struggling to get up out of a chair.  If tolerated, can use 500 mg of naproxen twice a day.  Would also use 2 of his arthritis strength Tylenol 650 mg twice daily.  Ice compresses would be helpful initially.  Return if not improving.

## 2025-05-25 ENCOUNTER — HOSPITAL ENCOUNTER (EMERGENCY)
Facility: HOSPITAL | Age: 70
Discharge: HOME | End: 2025-05-25
Attending: EMERGENCY MEDICINE
Payer: COMMERCIAL

## 2025-05-25 VITALS
SYSTOLIC BLOOD PRESSURE: 167 MMHG | OXYGEN SATURATION: 95 % | TEMPERATURE: 98 F | RESPIRATION RATE: 20 BRPM | HEART RATE: 86 BPM | DIASTOLIC BLOOD PRESSURE: 73 MMHG

## 2025-05-25 DIAGNOSIS — M54.50 ACUTE RIGHT-SIDED LOW BACK PAIN WITHOUT SCIATICA: Primary | ICD-10-CM

## 2025-05-25 PROCEDURE — 99284 EMERGENCY DEPT VISIT MOD MDM: CPT | Performed by: EMERGENCY MEDICINE

## 2025-05-25 PROCEDURE — 99283 EMERGENCY DEPT VISIT LOW MDM: CPT

## 2025-05-25 RX ORDER — LIDOCAINE 50 MG/G
1 PATCH TOPICAL ONCE
Status: DISCONTINUED | OUTPATIENT
Start: 2025-05-25 | End: 2025-05-25 | Stop reason: HOSPADM

## 2025-05-25 RX ORDER — METHOCARBAMOL 500 MG/1
500 TABLET, FILM COATED ORAL 3 TIMES DAILY PRN
Qty: 12 TABLET | Refills: 0 | Status: SHIPPED | OUTPATIENT
Start: 2025-05-25 | End: 2025-05-25 | Stop reason: ALTCHOICE

## 2025-05-25 RX ORDER — LIDOCAINE 50 MG/G
1 PATCH TOPICAL DAILY
Qty: 5 PATCH | Refills: 0 | Status: SHIPPED | OUTPATIENT
Start: 2025-05-25

## 2025-05-25 RX ADMIN — LIDOCAINE 1 PATCH: 700 PATCH TOPICAL at 13:22

## 2025-05-25 NOTE — DISCHARGE INSTRUCTIONS
Continue naproxen and Tylenol as recommended by your physician.  May also use Lidoderm patches as prescribed.  Follow-up with comprehensive spine program.  Turn to the emergency department if symptoms worsen or persist.

## 2025-05-25 NOTE — ED NOTES
PT awake and alert, no distress noted. No other questions upon d/c.     Jaylyn Herron RN  05/25/25 [id number]

## 2025-05-25 NOTE — ED PROVIDER NOTES
"Time reflects when diagnosis was documented in both MDM as applicable and the Disposition within this note       Time User Action Codes Description Comment    5/25/2025  1:03 PM Manjit Bernard Add [S39.012A] Strain of lumbar region, initial encounter     5/25/2025  1:03 PM Manjit Bernard Add [M54.50] Acute right-sided low back pain without sciatica     5/25/2025  1:11 PM Manjit Bernard Modify [M54.50] Acute right-sided low back pain without sciatica     5/25/2025  1:11 PM Manjit Bernard Remove [S39.012A] Strain of lumbar region, initial encounter           ED Disposition       ED Disposition   Discharge    Condition   Stable    Date/Time   Sun May 25, 2025  1:11 PM    Comment   Dior Lock discharge to home/self care.                   Assessment & Plan       Medical Decision Making  Patient presents with right lower back pain.  Patient reports that it started after bending over and straightening back up.  He followed up with his PCP who recommended naproxen and Tylenol which she has been taking intermittently.  He continues to have pain with change in position. Given history and physical, suspect musculoskeletal etiology. Neurovascularly intact. Do not suspect AAA, aortic dissection, renal pathology, perforated viscus. Low suspicion for cauda equina or epidural compression syndrome as there is no bowel or bladder dysfunction, bilateral symptoms or saddle paresthesias. No red flag features such as trauma, fever, immunocompromised state, history of malignancy. Do not feel that emergent imaging indicated at this time. Patient advised to follow up with PCP and return to ED if symptoms progress.      Portions of the above record have been created with voice recognition software.  Occasional wrong word or \"sound alike\" substitutions may have occurred due to the inherent limitations of voice recognition software.  Read the chart carefully and recognize, using context, where substitutions may have " occurred.      Problems Addressed:  Acute right-sided low back pain without sciatica:     Details: Exam is reassuring with no red flag symptoms.  Will treat symptomatically.  Recommend continuing naproxen, Tylenol.  Will also give Lidoderm patch.  Recommended stretching and provided with instructions on multiple exercises.  Referral placed for comprehensive spine program.  Patient is ambulating without difficulty and is appropriate for discharge with outpatient follow-up.    Amount and/or Complexity of Data Reviewed  External Data Reviewed: notes.    Risk  OTC drugs.  Prescription drug management.             Medications   lidocaine (LIDODERM) 5 % patch 1 patch (1 patch Topical Medication Applied 5/25/25 1322)       ED Risk Strat Scores                    No data recorded                            History of Present Illness       Chief Complaint   Patient presents with    Hip Pain     Pt reports he gets hip spasms in R hip; saw PCP and was told it might be a muscle strain. Pt reports today pain is worse, feels pain when he walks. Pt reports pain started Monday when he bent over to pick something up.        Past Medical History[1]   Past Surgical History[2]   Family History[3]   Social History[4]   E-Cigarette/Vaping    E-Cigarette Use Never User       E-Cigarette/Vaping Substances    Nicotine No     THC No     CBD No     Flavoring No     Other No     Unknown No       I have reviewed and agree with the history as documented.     Patient is a 69-year-old male with a history of hypertension, obesity who presents with lower back pain.  Patient states that it started on Monday after bending forward to  a baseball.  He states that the pain started acutely and did improve somewhat Tuesday and Wednesday.  He was seen on 5/22/2025 by his PCP and diagnosed with a muscle strain.  He was told to take Tylenol and naproxen.  He was referred for physical therapy.  Patient does state that he injured his back years ago and  felt that physical therapy was very helpful.  He describes worsening pain with standing and other position changes.  The pain was worse today which prompted his visit to the emergency department.  He denies any radiation of pain.  He denies associated fevers, chills, nausea, vomiting, diarrhea, constipation, dysuria, hematuria, bowel or bladder dysfunction.  He denies any trauma to the area.      History provided by:  Patient  Hip Pain  Associated symptoms: no abdominal pain, no chest pain, no cough, no ear pain, no fever, no rash, no shortness of breath, no sore throat and no vomiting        Review of Systems   Constitutional:  Negative for chills and fever.   HENT:  Negative for ear pain and sore throat.    Eyes:  Negative for pain and visual disturbance.   Respiratory:  Negative for cough and shortness of breath.    Cardiovascular:  Negative for chest pain and palpitations.   Gastrointestinal:  Negative for abdominal pain and vomiting.   Genitourinary:  Negative for dysuria and hematuria.   Musculoskeletal:  Positive for back pain. Negative for arthralgias.   Skin:  Negative for color change and rash.   Neurological:  Negative for seizures and syncope.   All other systems reviewed and are negative.          Objective       ED Triage Vitals [05/25/25 1210]   Temperature Pulse Blood Pressure Respirations SpO2 Patient Position - Orthostatic VS   98 °F (36.7 °C) 86 167/73 20 95 % Sitting      Temp Source Heart Rate Source BP Location FiO2 (%) Pain Score    Oral Monitor Right arm -- --      Vitals      Date and Time Temp Pulse SpO2 Resp BP Pain Score FACES Pain Rating User   05/25/25 1210 98 °F (36.7 °C) 86 95 % 20 167/73 -- -- KD            Physical Exam  Vitals and nursing note reviewed.   Constitutional:       General: He is not in acute distress.     Appearance: He is well-developed.   HENT:      Head: Normocephalic and atraumatic.     Eyes:      Conjunctiva/sclera: Conjunctivae normal.       Cardiovascular:       Rate and Rhythm: Normal rate and regular rhythm.      Heart sounds: No murmur heard.  Pulmonary:      Effort: Pulmonary effort is normal. No respiratory distress.      Breath sounds: Normal breath sounds.   Abdominal:      Palpations: Abdomen is soft.      Tenderness: There is no abdominal tenderness.     Musculoskeletal:         General: No swelling.      Cervical back: Neck supple.      Lumbar back: Tenderness (tenderness to palpation in the right SI region) present. No bony tenderness. Negative right straight leg raise test and negative left straight leg raise test.     Skin:     General: Skin is warm and dry.      Capillary Refill: Capillary refill takes less than 2 seconds.     Neurological:      Mental Status: He is alert.     Psychiatric:         Mood and Affect: Mood normal.         Results Reviewed       None            No orders to display       Procedures    ED Medication and Procedure Management   Prior to Admission Medications   Prescriptions Last Dose Informant Patient Reported? Taking?   aspirin 81 mg chewable tablet  Self No No   Sig: Chew 1 tablet (81 mg total) daily   atorvastatin (LIPITOR) 20 mg tablet   No No   Sig: Take 1 tablet (20 mg total) by mouth daily   naproxen (NAPROSYN) 500 mg tablet   Yes No   Sig: Take 500 mg by mouth 2 (two) times a day with meals      Facility-Administered Medications: None     Discharge Medication List as of 5/25/2025  1:18 PM        START taking these medications    Details   lidocaine (Lidoderm) 5 % Apply 1 patch topically daily over 12 hours Remove & Discard patch within 12 hours or as directed by MD, Starting Sun 5/25/2025, Normal           CONTINUE these medications which have NOT CHANGED    Details   aspirin 81 mg chewable tablet Chew 1 tablet (81 mg total) daily, Starting Sun 7/28/2024, Normal      atorvastatin (LIPITOR) 20 mg tablet Take 1 tablet (20 mg total) by mouth daily, Starting Tue 2/18/2025, Normal      naproxen (NAPROSYN) 500 mg tablet Take 500 mg  by mouth 2 (two) times a day with meals, Starting Fri 5/9/2025, Historical Med             ED SEPSIS DOCUMENTATION   Time reflects when diagnosis was documented in both MDM as applicable and the Disposition within this note       Time User Action Codes Description Comment    5/25/2025  1:03 PM Manjit Bernard L Add [S39.012A] Strain of lumbar region, initial encounter     5/25/2025  1:03 PM Gaye Bernardn L Add [M54.50] Acute right-sided low back pain without sciatica     5/25/2025  1:11 PM BernardGayen L Modify [M54.50] Acute right-sided low back pain without sciatica     5/25/2025  1:11 PM Manjit Bernard L Remove [S39.012A] Strain of lumbar region, initial encounter                      [1]   Past Medical History:  Diagnosis Date    Acute prostatitis 07/23/2024    Arthritis     Coronary artery disease involving native coronary artery 08/01/2024    30% LAD on cardiac cath 7/24.  Presented with atypical chest pain and found to have prostatitis.  Mild troponin elevation but no changes on EKG or echocardiogram.  Started on aspirin and atorvastatin.      Essential hypertension 12/16/2021    Gilbert's syndrome 01/06/2020    Intermittent elevated bilirubin.  Liver ultrasound.    Obesity     Renal colic 06/29/2024    Left-sided kidney stone      Unilateral hearing loss, left 01/18/2022   [2]   Past Surgical History:  Procedure Laterality Date    CARDIAC CATHETERIZATION Left 7/24/2024    Procedure: Cardiac Left Heart Cath;  Surgeon: Daren Tinsley MD;  Location: [facility];  Service: Cardiology    CARDIAC CATHETERIZATION N/A 7/24/2024    Procedure: Cardiac Coronary Angiogram;  Surgeon: Daren Tinsley MD;  Location: [facility];  Service: Cardiology    CARDIAC CATHETERIZATION  7/24/2024    Procedure: Cardiac catheterization;  Surgeon: Daren Tinsley MD;  Location: [facility];  Service: Cardiology    HAND SURGERY      UT COLONOSCOPY FLX DX W/COLLJ SPEC WHEN PFRMD N/A 3/22/2018    Procedure:  COLONOSCOPY;  Surgeon: Mark Bland MD;  Location: [facility];  Service: Gastroenterology    TONSILECTOMY AND ADNOIDECTOMY      TOOTH EXTRACTION     [3]   Family History  Problem Relation Name Age of Onset    Breast cancer Mother Melodie Lock     Heart disease Father Reynaldo Lock         MI in his 50's   [4]   Social History  Tobacco Use    Smoking status: Never    Smokeless tobacco: Never   Vaping Use    Vaping status: Never Used   Substance Use Topics    Alcohol use: Yes     Comment: occasional    Drug use: No        Manjit Bernard DO  05/25/25 3389

## 2025-06-03 ENCOUNTER — EVALUATION (OUTPATIENT)
Dept: PHYSICAL THERAPY | Facility: REHABILITATION | Age: 70
End: 2025-06-03
Attending: FAMILY MEDICINE
Payer: COMMERCIAL

## 2025-06-03 DIAGNOSIS — M54.50 ACUTE ON CHRONIC LOW BACK PAIN: Primary | ICD-10-CM

## 2025-06-03 DIAGNOSIS — G89.29 ACUTE ON CHRONIC LOW BACK PAIN: Primary | ICD-10-CM

## 2025-06-03 PROCEDURE — 97110 THERAPEUTIC EXERCISES: CPT

## 2025-06-03 PROCEDURE — 97161 PT EVAL LOW COMPLEX 20 MIN: CPT

## 2025-06-03 NOTE — PROGRESS NOTES
PT Evaluation     Today's date: 6/3/2025  Patient name: Dior Lock  : 1955  MRN: [id number]  Referring provider: Lazaro Kapoor MD  Dx:   Encounter Diagnosis     ICD-10-CM    1. Acute on chronic low back pain  M54.50     G89.29           Start Time: 08  Stop Time: 0845  Total time in clinic (min): 41 minutes    Assessment  Impairments: abnormal muscle tone, abnormal or restricted ROM, activity intolerance, lacks appropriate home exercise program and pain with function  Symptom irritability: moderate    Assessment details:   Dior Lock is a pleasant 69 y.o. male who presents with acute on chronic right sided low back pain. Neuro exam unremarkable. Patient demonstrates an extension preference with repeated motion testing. Provided and reviewed HEP today. The impairments listed above are resulting in reduced QoL, difficulties with ADLs/IADLs, and limitations with recreational activities. No further referral appears necessary at this time based upon examination results.  I expect he will improve over the next 4-6 weeks.          Understanding of Dx/Px/POC: good     Prognosis: good    Goals  Short Term Goals (Week 4):  1. Decreased pain by 50%  2. GROC >50%      Long Term Goals (8 weeks):  1. GROC >75%  2. Patient will exceed FOTO predicted outcome score  3. Patient will be fully independent with HEP by discharge  4. Patient will be able to manage symptoms independently.       Plan  Patient would benefit from: skilled physical therapy    Planned therapy interventions: graded exercise, functional ROM exercises, flexibility, gait training, graded activity, home exercise program, therapeutic training, therapeutic exercise, therapeutic activities, stretching, strengthening, patient education, neuromuscular re-education, nerve gliding, behavior modification, balance, activity modification, manual therapy, IASTM and joint mobilization    Frequency: 2x week  Duration in weeks: 6  Treatment plan discussed with:  patient        Subjective  Patient presents to PT with acute on chronic low back pain that started 15 days ago when he went to  a baseball and felt with back tighten up. Patient reports the pain is localized to right side of the low back. Patient denies any n/t and/or weakness in the leg. Patient reports he is taken NSAIDs for symptom relief. Patient reports symptoms are improved once he is up an walking. Patient hasn't noticed anything specifically that makes it worse. Patient has previously had similar low back pain. Patient has no history of prior spinal surgeries and/or traumas. Patient reports goals are to return to his PLOF and reduce his pain. Patient denies any b/b dysfunction, saddle anesthesia, and/or gait disturbances.     Pain Levels  Current: 1/10  Worst: 10/10    Objective  Postural Observation   Sitting: neutral  Standing: kyphotic  Postural Change: no effect  Lateral Shift: nil  Shift Relevant: n/a  Functional Baselines: pain with bed mobility, getting in car    Myotomes  Right Hip Flexion (L1-3): (5/5)  Left Hip Flexion (L1-3): (5/5)  R Knee Ext (L3-4): (5/5)  L Knee Ext (L3-4): (5/5)  R DF (L4): (5/5)  L DF (L4): (5/5)  R EDL (L5): (5/5)  L EDL (L5): (5/5)  R PF (S1): (5/5)  L PF (S1): (5/5)  R Knee Flex (S1-2): (5/5)  L Knee Flex (S1-2): (5/5)    Dermatomes  Sensation intact bilaterally throughout L2-S2    Reflexes  R Patellar Reflex: (2+)  L Patellar Reflex: (2+)  R Achilles Reflex: (2+)  L Achilles Reflex: (2+)    Neurodynamic Testing  Slump Test: negative, tight bilaterally  SLR: negative, tight bilaterally  Femoral Nerve Traction Test: negative    Lumbar Active Range of Motion  Movement Loss Symptoms Norbert Mod Min Nil Symptoms   Flexion  x      Extension  x      R SG   x     L SG   x     Other          Carrie Assessment  Rep EIS: produce, nb/nw  Rep EIL: produce, better    Static Tests  No effect    Hip Special Tests:  FADIRS= (-), FABERS= (-), Scours= (-), Distraction= (-)      SIJ  Special Tests:  Compression= (-), Gapping= (-), FABERS= (-), Gaenslan's= (-), Sacral Thrust/PA Pressure= (+), Post Thigh Thrust= (-)         Precautions: HTN      Manuals 6/3                                                                Neuro Re-Ed                                                                                                        Ther Ex             REIL HEP            KEVIN HEP                                                                                          Ther Activity                                       Gait Training                                       Modalities

## 2025-06-11 ENCOUNTER — OFFICE VISIT (OUTPATIENT)
Dept: PHYSICAL THERAPY | Facility: REHABILITATION | Age: 70
End: 2025-06-11
Attending: FAMILY MEDICINE
Payer: COMMERCIAL

## 2025-06-11 DIAGNOSIS — G89.29 ACUTE ON CHRONIC LOW BACK PAIN: Primary | ICD-10-CM

## 2025-06-11 DIAGNOSIS — M54.50 ACUTE ON CHRONIC LOW BACK PAIN: Primary | ICD-10-CM

## 2025-06-11 PROCEDURE — 97112 NEUROMUSCULAR REEDUCATION: CPT

## 2025-06-11 PROCEDURE — 97110 THERAPEUTIC EXERCISES: CPT

## 2025-06-11 NOTE — PROGRESS NOTES
"Daily Note     Today's date: 2025  Patient name: Dior Lock  : 1955  MRN: [id number]  Referring provider: Lazaro Kapoor MD  Dx:   Encounter Diagnosis     ICD-10-CM    1. Acute on chronic low back pain  M54.50     G89.29           Start Time: 0800  Stop Time: 0825  Total time in clinic (min): 25 minutes    Subjective: Patient reports hip pain is doing better, states some am stiffness that releases once he is up and moving.      Objective: See treatment diary below      Assessment: Tolerated treatment well today. Updated HEP see below. Progress as tolerated. Patient would benefit from continued PT      Plan: Continue per plan of care.      Precautions: HTN      Manuals 6/3 6/11                                                               Neuro Re-Ed             Supine clamshell  Yhb 2x10 3\" hold HEP           Supine bridge  Yhb 2x10 HEP                                                                            Ther Ex             REIL HEP            KEVIN HEP            Seated Lumbar Flex Stretch  1x15 HEP                                                                            Ther Activity                                       Gait Training                                       Modalities                                                   "

## 2025-06-13 ENCOUNTER — OFFICE VISIT (OUTPATIENT)
Dept: PHYSICAL THERAPY | Facility: REHABILITATION | Age: 70
End: 2025-06-13
Attending: FAMILY MEDICINE
Payer: COMMERCIAL

## 2025-06-13 DIAGNOSIS — G89.29 ACUTE ON CHRONIC LOW BACK PAIN: Primary | ICD-10-CM

## 2025-06-13 DIAGNOSIS — M54.50 ACUTE ON CHRONIC LOW BACK PAIN: Primary | ICD-10-CM

## 2025-06-13 PROCEDURE — 97110 THERAPEUTIC EXERCISES: CPT

## 2025-06-13 PROCEDURE — 97112 NEUROMUSCULAR REEDUCATION: CPT

## 2025-06-13 NOTE — PROGRESS NOTES
"Daily Note     Today's date: 2025  Patient name: Dior Lock  : 1955  MRN: [id number]  Referring provider: Lazaro Kapoor MD  Dx:   Encounter Diagnosis     ICD-10-CM    1. Acute on chronic low back pain  M54.50     G89.29           Start Time: 0800  Stop Time: 0830  Total time in clinic (min): 30 minutes    Subjective: Patient reports his back and hip are holding up well. States he was able to go 30 minutes at baseball practice without any symptoms. Patient reports stiffness in the low back in the mornings but this resolves once he gets up.       Objective: See treatment diary below      Assessment: Tolerated treatment well today. Progress as tolerated. Patient would benefit from continued PT      Plan: Continue per plan of care.      Precautions: HTN      Manuals 6/3 6/11 6/13                                                              Neuro Re-Ed             Supine clamshell  Yhb 2x10 3\" hold HEP Yhb 3x10 3\" hold          Supine bridge  Yhb 2x10 HEP Yhb 3x10                                                                           Ther Ex             REIL HEP            KEVIN HEP            Seated Lumbar Flex Stretch  1x15 HEP 1x15          Deadlift   5# kb 1x10  10# kb 1x10                                                              Ther Activity                                       Gait Training                                       Modalities                                                     "

## 2025-06-18 ENCOUNTER — OFFICE VISIT (OUTPATIENT)
Dept: PHYSICAL THERAPY | Facility: REHABILITATION | Age: 70
End: 2025-06-18
Attending: FAMILY MEDICINE
Payer: COMMERCIAL

## 2025-06-18 DIAGNOSIS — G89.29 ACUTE ON CHRONIC LOW BACK PAIN: Primary | ICD-10-CM

## 2025-06-18 DIAGNOSIS — M54.50 ACUTE ON CHRONIC LOW BACK PAIN: Primary | ICD-10-CM

## 2025-06-18 PROCEDURE — 97110 THERAPEUTIC EXERCISES: CPT

## 2025-06-18 PROCEDURE — 97112 NEUROMUSCULAR REEDUCATION: CPT

## 2025-06-18 NOTE — PROGRESS NOTES
"Daily Note     Today's date: 2025  Patient name: Dior Lock  : 1955  MRN: [id number]  Referring provider: Lazaro Kapoor MD  Dx:   Encounter Diagnosis     ICD-10-CM    1. Acute on chronic low back pain  M54.50     G89.29           Start Time: 0800  Stop Time: 0830  Total time in clinic (min): 30 minutes    Subjective: Patient reports no issues in the buttock region. Patient reports am stiffness is the only thing he feels in the back.       Objective: See treatment diary below      Assessment: Tolerated treatment well. Progressed activities without issue. Mild muscle soreness throughout session. Patient would benefit from continued PT      Plan: Continue per plan of care.      Precautions: HTN      Manuals 6/3 6/11 6/13 6/18                                                             Neuro Re-Ed             Supine clamshell  Yhb 2x10 3\" hold HEP Yhb 3x10 3\" hold Rhb 3x10 3\" hold         Supine bridge  Yhb 2x10 HEP Yhb 3x10 Rhb 3x10         XS Pallof Press    Prp 2x10 ea         Suitcase Carry    5 laps 20# kb 80 ft                                                Ther Ex             REIL HEP            KEVIN HEP            Seated Lumbar Flex Stretch  1x15 HEP 1x15 2x10         Deadlift   5# kb 1x10  10# kb 1x10 15# kb 2x10                                                             Ther Activity                                       Gait Training                                       Modalities                                                       "

## 2025-06-20 ENCOUNTER — OFFICE VISIT (OUTPATIENT)
Dept: PHYSICAL THERAPY | Facility: REHABILITATION | Age: 70
End: 2025-06-20
Attending: FAMILY MEDICINE
Payer: COMMERCIAL

## 2025-06-20 DIAGNOSIS — M54.50 ACUTE ON CHRONIC LOW BACK PAIN: Primary | ICD-10-CM

## 2025-06-20 DIAGNOSIS — G89.29 ACUTE ON CHRONIC LOW BACK PAIN: Primary | ICD-10-CM

## 2025-06-20 PROCEDURE — 97110 THERAPEUTIC EXERCISES: CPT

## 2025-06-20 PROCEDURE — 97112 NEUROMUSCULAR REEDUCATION: CPT

## 2025-06-20 NOTE — PROGRESS NOTES
"Daily Note     Today's date: 2025  Patient name: Dior Lock  : 1955  MRN: [id number]  Referring provider: Lazaro Kapoor MD  Dx:   Encounter Diagnosis     ICD-10-CM    1. Acute on chronic low back pain  M54.50     G89.29           Start Time: 0800  Stop Time: 0840  Total time in clinic (min): 40 minutes  Patient 1 on 1 with PT from 815-840.    Subjective: Patient reports some stiffness in the low back in the am.       Objective: See treatment diary below      Assessment: Tolerated treatment well. Patient is doing well overall. Muscular fatigue with activities. Progress as tolerated. Patient would benefit from continued PT      Plan: Continue per plan of care.      Precautions: HTN      Manuals 6/3 6/11 6/13 6/18 6/20                                                            Neuro Re-Ed             Supine clamshell  Yhb 2x10 3\" hold HEP Yhb 3x10 3\" hold Rhb 3x10 3\" hold Rhb 3x10 3\" hold        Supine bridge  Yhb 2x10 HEP Yhb 3x10 Rhb 3x10 Rhb 3x10        XS Pallof Press    Prp 2x10 ea Prp 2x10 ea 3\" hold        Suitcase Carry    5 laps 20# kb 80 ft 5 laps 25# kb 80 ft                                               Ther Ex             REIL HEP            KEVIN HEP            Seated Lumbar Flex Stretch  1x15 HEP 1x15 2x10 2x10        Deadlift   5# kb 1x10  10# kb 1x10 15# kb 2x10 20# kb 2x10                                                            Ther Activity                                       Gait Training                                       Modalities                                                         "

## 2025-06-24 ENCOUNTER — OFFICE VISIT (OUTPATIENT)
Dept: PHYSICAL THERAPY | Facility: REHABILITATION | Age: 70
End: 2025-06-24
Attending: FAMILY MEDICINE
Payer: COMMERCIAL

## 2025-06-24 DIAGNOSIS — M54.50 ACUTE ON CHRONIC LOW BACK PAIN: Primary | ICD-10-CM

## 2025-06-24 DIAGNOSIS — G89.29 ACUTE ON CHRONIC LOW BACK PAIN: Primary | ICD-10-CM

## 2025-06-24 PROCEDURE — 97110 THERAPEUTIC EXERCISES: CPT

## 2025-06-24 PROCEDURE — 97112 NEUROMUSCULAR REEDUCATION: CPT

## 2025-06-24 NOTE — PROGRESS NOTES
"Daily Note     Today's date: 2025  Patient name: Dior Lock  : 1955  MRN: [id number]  Referring provider: Lazaro Kapoor MD  Dx:   Encounter Diagnosis     ICD-10-CM    1. Acute on chronic low back pain  M54.50     G89.29           Start Time: 0800  Stop Time: 0830  Total time in clinic (min): 30 minutes  Patient 1 on 1 with PT from -815 and -830.    Subjective: Patient reports the back is holding up well. States he just feels his usual stiffness in the back that clears up once he is up and moving.       Objective: See treatment diary below      Assessment: Tolerated treatment well today. Symptoms are stable and not limiting him at this time. Will see patient back in 1 week to determine if further PT is needed. Patient would benefit from continued PT      Plan: Continue per plan of care.      Precautions: HTN      Manuals 6/3 6/11 6/13 6/18 6/20 6/24                                                           Neuro Re-Ed             Supine clamshell  Yhb 2x10 3\" hold HEP Yhb 3x10 3\" hold Rhb 3x10 3\" hold Rhb 3x10 3\" hold Rhb 4x10 3\" hold       Supine bridge  Yhb 2x10 HEP Yhb 3x10 Rhb 3x10 Rhb 3x10 Rhb 4x10       XS Pallof Press    Prp 2x10 ea Prp 2x10 ea 3\" hold Ylw 2x10 3\" hold ea       Suitcase Carry    5 laps 20# kb 80 ft 5 laps 25# kb 80 ft 7 laps 25# kb 80 ft                                              Ther Ex             REIL HEP            KEVIN HEP            Seated Lumbar Flex Stretch  1x15 HEP 1x15 2x10 2x10 2x10       Deadlift   5# kb 1x10  10# kb 1x10 15# kb 2x10 20# kb 2x10 20# kb 3x10                                                           Ther Activity                                       Gait Training                                       Modalities                                                           "

## 2025-06-26 ENCOUNTER — APPOINTMENT (OUTPATIENT)
Dept: PHYSICAL THERAPY | Facility: REHABILITATION | Age: 70
End: 2025-06-26
Attending: FAMILY MEDICINE
Payer: COMMERCIAL

## 2025-07-03 ENCOUNTER — OFFICE VISIT (OUTPATIENT)
Dept: PHYSICAL THERAPY | Facility: REHABILITATION | Age: 70
End: 2025-07-03
Attending: FAMILY MEDICINE
Payer: COMMERCIAL

## 2025-07-03 DIAGNOSIS — M54.50 ACUTE ON CHRONIC LOW BACK PAIN: Primary | ICD-10-CM

## 2025-07-03 DIAGNOSIS — G89.29 ACUTE ON CHRONIC LOW BACK PAIN: Primary | ICD-10-CM

## 2025-07-03 PROCEDURE — 97140 MANUAL THERAPY 1/> REGIONS: CPT

## 2025-07-03 PROCEDURE — 97110 THERAPEUTIC EXERCISES: CPT

## 2025-07-03 NOTE — PROGRESS NOTES
"Daily Note     Today's date: 7/3/2025  Patient name: Dior Lock  : 1955  MRN: [id number]  Referring provider: Lzaaro Kapoor MD  Dx:   Encounter Diagnosis     ICD-10-CM    1. Acute on chronic low back pain  M54.50     G89.29           Start Time: 1140  Stop Time: 1205  Total time in clinic (min): 25 minutes    Subjective: Patient reports he is feeling back to normal. He just experiences his typical low back discomfort/stiffness in the am and after laying down. Patient feels ready to finish up today.     Pain Levels:  Current: 0/10  Worst: 3/10    Objective: See treatment diary below      Assessment:   Patient has met all goals established at [facility]. Reviewed HEP again today for continued compliance. Patient will f/u with PT as needed.       Goals  Short Term Goals (Week 4): met all  1. Decreased pain by 50%  2. GROC >50%      Long Term Goals (8 weeks): met all  1. GROC >75%  2. Patient will exceed FOTO predicted outcome score  3. Patient will be fully independent with HEP by discharge  4. Patient will be able to manage symptoms independently.     Plan: Discharge to [facility] at this time.     Precautions: HTN      Manuals 6/3 6 7/3                                FOTO       perf      Re-Assessment       PRR      Neuro Re-Ed             Supine clamshell  Yhb 2x10 3\" hold HEP Yhb 3x10 3\" hold Rhb 3x10 3\" hold Rhb 3x10 3\" hold Rhb 4x10 3\" hold reviewed      Supine bridge  Yhb 2x10 HEP Yhb 3x10 Rhb 3x10 Rhb 3x10 Rhb 4x10 reviewed      XS Pallof Press    Prp 2x10 ea Prp 2x10 ea 3\" hold Ylw 2x10 3\" hold ea reviewed      Suitcase Carry    5 laps 20# kb 80 ft 5 laps 25# kb 80 ft 7 laps 25# kb 80 ft reviewed                                             Ther Ex             REIL HEP      reviewed      KEVIN HEP      reviewed      Seated Lumbar Flex Stretch  1x15 HEP 1x15 2x10 2x10 2x10       Deadlift   5# kb 1x10  10# kb 1x10 15# kb 2x10 20# kb 2x10 20# kb 3x10 reviewed                                     "                      Ther Activity                                       Gait Training                                       Modalities

## 2025-07-28 PROCEDURE — 87205 SMEAR GRAM STAIN: CPT | Performed by: PHYSICIAN ASSISTANT

## 2025-07-28 PROCEDURE — 87070 CULTURE OTHR SPECIMN AEROBIC: CPT | Performed by: PHYSICIAN ASSISTANT

## 2025-07-28 PROCEDURE — 87186 SC STD MICRODIL/AGAR DIL: CPT | Performed by: PHYSICIAN ASSISTANT

## 2025-07-28 PROCEDURE — 87102 FUNGUS ISOLATION CULTURE: CPT | Performed by: PHYSICIAN ASSISTANT

## 2025-07-28 PROCEDURE — 87077 CULTURE AEROBIC IDENTIFY: CPT | Performed by: PHYSICIAN ASSISTANT

## 2025-08-11 ENCOUNTER — RA CDI HCC (OUTPATIENT)
Dept: OTHER | Facility: HOSPITAL | Age: 70
End: 2025-08-11

## 2025-08-19 ENCOUNTER — OFFICE VISIT (OUTPATIENT)
Dept: FAMILY MEDICINE CLINIC | Facility: CLINIC | Age: 70
End: 2025-08-19
Payer: COMMERCIAL

## 2025-08-19 VITALS
RESPIRATION RATE: 18 BRPM | WEIGHT: 294 LBS | TEMPERATURE: 98 F | DIASTOLIC BLOOD PRESSURE: 78 MMHG | SYSTOLIC BLOOD PRESSURE: 139 MMHG | HEART RATE: 82 BPM | OXYGEN SATURATION: 98 % | HEIGHT: 69 IN | BODY MASS INDEX: 43.55 KG/M2

## 2025-08-19 DIAGNOSIS — E66.812 CLASS 2 SEVERE OBESITY DUE TO EXCESS CALORIES WITH SERIOUS COMORBIDITY AND BODY MASS INDEX (BMI) OF 39.0 TO 39.9 IN ADULT (HCC): ICD-10-CM

## 2025-08-19 DIAGNOSIS — I25.10 CORONARY ARTERY DISEASE INVOLVING NATIVE CORONARY ARTERY OF NATIVE HEART WITHOUT ANGINA PECTORIS: Primary | ICD-10-CM

## 2025-08-19 DIAGNOSIS — I10 ESSENTIAL HYPERTENSION: ICD-10-CM

## 2025-08-19 DIAGNOSIS — E66.01 CLASS 2 SEVERE OBESITY DUE TO EXCESS CALORIES WITH SERIOUS COMORBIDITY AND BODY MASS INDEX (BMI) OF 39.0 TO 39.9 IN ADULT (HCC): ICD-10-CM

## 2025-08-19 PROCEDURE — G2211 COMPLEX E/M VISIT ADD ON: HCPCS | Performed by: FAMILY MEDICINE

## 2025-08-19 PROCEDURE — 99214 OFFICE O/P EST MOD 30 MIN: CPT | Performed by: FAMILY MEDICINE

## (undated) DEVICE — TR BAND RADIAL ARTERY COMPRESSION DEVICE: Brand: TR BAND

## (undated) DEVICE — RADIFOCUS OPTITORQUE ANGIOGRAPHIC CATHETER: Brand: OPTITORQUE

## (undated) DEVICE — DGW .035 FC J3MM 260CM TEF: Brand: EMERALD

## (undated) DEVICE — GLIDESHEATH BASIC HYDROPHILIC COATED INTRODUCER SHEATH: Brand: GLIDESHEATH

## (undated) DEVICE — WORKING LENGTH 235CM, WORKING CHANNEL 2.8MM: Brand: RESOLUTION 360 CLIP

## (undated) DEVICE — GUIDEWIRE WHOLEY HI TORQUE INTERM MOD J .035 145CM